# Patient Record
Sex: FEMALE | Race: WHITE | NOT HISPANIC OR LATINO | Employment: OTHER | URBAN - METROPOLITAN AREA
[De-identification: names, ages, dates, MRNs, and addresses within clinical notes are randomized per-mention and may not be internally consistent; named-entity substitution may affect disease eponyms.]

---

## 2018-03-26 LAB
INR PPP: 2.29 (ref 0.9–1.5)
PROTHROMBIN TIME (HISTORICAL): 27 SEC (ref 10.1–12.9)

## 2018-04-11 LAB
INR PPP: 1.44 (ref 0.9–1.5)
PROTHROMBIN TIME (HISTORICAL): 16.8 SEC (ref 10.1–12.9)

## 2018-04-18 LAB
%SAT (HISTORICAL): 17 % (ref 20–55)
ALBUMIN SERPL BCP-MCNC: 3.9 G/DL (ref 3.5–5.7)
ALP SERPL-CCNC: 81 IU/L (ref 55–165)
ALT SERPL W P-5'-P-CCNC: 16 IU/L (ref 5–26)
ANION GAP SERPL CALCULATED.3IONS-SCNC: 11.2 MM/L
AST SERPL W P-5'-P-CCNC: 19 U/L (ref 7–26)
BASOPHILS # BLD AUTO: 0 X3/UL (ref 0–0.3)
BASOPHILS # BLD AUTO: 0.6 % (ref 0–2)
BILIRUB SERPL-MCNC: 0.4 MG/DL (ref 0.3–1)
BILIRUBIN DIRECT (HISTORICAL): 0.1 MG/DL (ref 0–0.2)
BUN SERPL-MCNC: 18 MG/DL (ref 7–25)
CALCIUM SERPL-MCNC: 8.7 MG/DL (ref 8.6–10.5)
CHLORIDE SERPL-SCNC: 103 MM/L (ref 98–107)
CHOLEST SERPL-MCNC: 195 MG/DL (ref 0–200)
CO2 SERPL-SCNC: 29 MM/L (ref 21–31)
CREAT SERPL-MCNC: 0.93 MG/DL (ref 0.6–1.2)
DEPRECATED RDW RBC AUTO: 16.5 % (ref 11.5–14.5)
EGFR (HISTORICAL): 57 GFR
EGFR AFRICAN AMERICAN (HISTORICAL): > 60 GFR
EOSINOPHIL # BLD AUTO: 0.1 X3/UL (ref 0–0.5)
EOSINOPHIL NFR BLD AUTO: 1 % (ref 0–5)
GLUCOSE (HISTORICAL): 170 MG/DL (ref 65–99)
HCT VFR BLD AUTO: 45.2 % (ref 37–47)
HDLC SERPL-MCNC: 72 MG/DL (ref 40–60)
HGB BLD-MCNC: 14.7 G/DL (ref 12–16)
INR PPP: 2.36 (ref 0.9–1.5)
IRON SERPL-MCNC: 58 UG/DL (ref 50–212)
LDLC SERPL CALC-MCNC: 92.2 MG/DL (ref 75–193)
LYMPHOCYTES # BLD AUTO: 1.1 X3/UL (ref 1.2–4.2)
LYMPHOCYTES NFR BLD AUTO: 17.4 % (ref 20.5–51.1)
MCH RBC QN AUTO: 30 PG (ref 26–34)
MCHC RBC AUTO-ENTMCNC: 32.5 G/DL (ref 31–36)
MCV RBC AUTO: 92.4 FL (ref 81–99)
MONOCYTES # BLD AUTO: 0.4 X3/UL (ref 0–1)
MONOCYTES NFR BLD AUTO: 7 % (ref 1.7–12)
NEUTROPHILS # BLD AUTO: 4.8 X3/UL (ref 1.4–6.5)
NEUTS SEG NFR BLD AUTO: 74 % (ref 42.2–75.2)
OSMOLALITY, SERUM (HISTORICAL): 283 MOSM (ref 262–291)
PLATELET # BLD AUTO: 175 X3/UL (ref 130–400)
PMV BLD AUTO: 10.2 FL (ref 8.6–11.7)
POTASSIUM SERPL-SCNC: 4.2 MM/L (ref 3.5–5.5)
PROTHROMBIN TIME (HISTORICAL): 27.8 SEC (ref 10.1–12.9)
RBC # BLD AUTO: 4.89 X6/UL (ref 3.9–5.2)
SODIUM SERPL-SCNC: 139 MM/L (ref 134–143)
TIBC SERPL-MCNC: 347 UG/DL (ref 250–425)
TOTAL PROTEIN (HISTORICAL): 6.7 G/DL (ref 6.4–8.9)
TRANSFERRIN (HISTORICAL): 248 MG/DL (ref 250–380)
TRIGL SERPL-MCNC: 156 MG/DL (ref 44–166)
VLDL CHOLESTEROL (HISTORICAL): 31 MG/DL (ref 5–51)
WBC # BLD AUTO: 6.4 X3/UL (ref 4.8–10.8)

## 2018-04-30 LAB
INR PPP: 3.35 (ref 0.9–1.5)
PROTHROMBIN TIME (HISTORICAL): 39.8 SEC (ref 10.1–12.9)

## 2018-05-18 LAB
INR PPP: 1.52 (ref 0.9–1.5)
PROTHROMBIN TIME (HISTORICAL): 17.8 SEC (ref 10.1–12.9)

## 2018-06-01 LAB
INR PPP: 1.66 (ref 0.9–1.5)
PROTHROMBIN TIME (HISTORICAL): 19.5 SEC (ref 10.1–12.9)

## 2018-06-18 LAB
INR PPP: 3.89 (ref 0.9–1.5)
PROTHROMBIN TIME (HISTORICAL): 46 SEC (ref 10.1–12.9)

## 2018-07-07 ENCOUNTER — APPOINTMENT (OUTPATIENT)
Dept: LAB | Facility: HOSPITAL | Age: 83
End: 2018-07-07
Payer: MEDICARE

## 2018-07-07 ENCOUNTER — TRANSCRIBE ORDERS (OUTPATIENT)
Dept: ADMINISTRATIVE | Facility: HOSPITAL | Age: 83
End: 2018-07-07

## 2018-07-07 DIAGNOSIS — R53.83 FATIGUE, UNSPECIFIED TYPE: ICD-10-CM

## 2018-07-07 DIAGNOSIS — I82.402 DEEP VEIN THROMBOSIS (DVT) OF LEFT LOWER EXTREMITY, UNSPECIFIED CHRONICITY, UNSPECIFIED VEIN (HCC): ICD-10-CM

## 2018-07-07 DIAGNOSIS — E11.9 TYPE 2 DIABETES MELLITUS WITHOUT COMPLICATION, UNSPECIFIED WHETHER LONG TERM INSULIN USE (HCC): Primary | ICD-10-CM

## 2018-07-07 LAB
INR PPP: 1.57 (ref 0.9–1.5)
PROTHROMBIN TIME: 18.2 SECONDS (ref 10.1–12.9)

## 2018-07-07 PROCEDURE — 85610 PROTHROMBIN TIME: CPT

## 2018-07-07 PROCEDURE — 36415 COLL VENOUS BLD VENIPUNCTURE: CPT

## 2018-07-18 ENCOUNTER — APPOINTMENT (OUTPATIENT)
Dept: LAB | Facility: HOSPITAL | Age: 83
End: 2018-07-18
Payer: MEDICARE

## 2018-07-18 DIAGNOSIS — E11.9 TYPE 2 DIABETES MELLITUS WITHOUT COMPLICATION, UNSPECIFIED WHETHER LONG TERM INSULIN USE (HCC): ICD-10-CM

## 2018-07-18 DIAGNOSIS — I82.402 DEEP VEIN THROMBOSIS (DVT) OF LEFT LOWER EXTREMITY, UNSPECIFIED CHRONICITY, UNSPECIFIED VEIN (HCC): ICD-10-CM

## 2018-07-18 DIAGNOSIS — R53.83 FATIGUE, UNSPECIFIED TYPE: ICD-10-CM

## 2018-07-18 LAB
ALBUMIN SERPL BCP-MCNC: 4.1 G/DL (ref 3.5–5.7)
ALP SERPL-CCNC: 75 U/L (ref 55–165)
ALT SERPL W P-5'-P-CCNC: 9 U/L (ref 7–52)
ANION GAP SERPL CALCULATED.3IONS-SCNC: 8 MMOL/L (ref 4–13)
AST SERPL W P-5'-P-CCNC: 14 U/L (ref 13–39)
BASOPHILS # BLD AUTO: 0 THOUSANDS/ΜL (ref 0–0.1)
BASOPHILS NFR BLD AUTO: 1 % (ref 0–1)
BILIRUB DIRECT SERPL-MCNC: 0.1 MG/DL (ref 0–0.2)
BILIRUB SERPL-MCNC: 0.4 MG/DL (ref 0.2–1)
BUN SERPL-MCNC: 24 MG/DL (ref 7–25)
CALCIUM SERPL-MCNC: 9.4 MG/DL (ref 8.6–10.5)
CHLORIDE SERPL-SCNC: 104 MMOL/L (ref 98–107)
CHOLEST SERPL-MCNC: 171 MG/DL (ref 0–200)
CO2 SERPL-SCNC: 28 MMOL/L (ref 21–31)
CREAT SERPL-MCNC: 1.05 MG/DL (ref 0.6–1.2)
EOSINOPHIL # BLD AUTO: 0 THOUSAND/ΜL (ref 0–0.61)
EOSINOPHIL NFR BLD AUTO: 0 % (ref 0–6)
ERYTHROCYTE [DISTWIDTH] IN BLOOD BY AUTOMATED COUNT: 16.1 % (ref 11.6–15.1)
FERRITIN SERPL-MCNC: 46 NG/ML (ref 8–388)
GFR SERPL CREATININE-BSD FRML MDRD: 49 ML/MIN/1.73SQ M
GLUCOSE P FAST SERPL-MCNC: 140 MG/DL (ref 65–99)
HCT VFR BLD AUTO: 43.6 % (ref 37–47)
HDLC SERPL-MCNC: 66 MG/DL (ref 40–60)
HGB BLD-MCNC: 14.7 G/DL (ref 11.5–15.4)
INR PPP: 2.98 (ref 0.9–1.5)
IRON SATN MFR SERPL: 20 %
IRON SERPL-MCNC: 61 UG/DL (ref 50–170)
LDLC SERPL CALC-MCNC: 79 MG/DL (ref 0–100)
LYMPHOCYTES # BLD AUTO: 1 THOUSANDS/ΜL (ref 0.6–4.47)
LYMPHOCYTES NFR BLD AUTO: 12 % (ref 14–44)
MCH RBC QN AUTO: 32.1 PG (ref 26.8–34.3)
MCHC RBC AUTO-ENTMCNC: 33.8 G/DL (ref 31.4–37.4)
MCV RBC AUTO: 95 FL (ref 82–98)
MONOCYTES # BLD AUTO: 0.6 THOUSAND/ΜL (ref 0.17–1.22)
MONOCYTES NFR BLD AUTO: 7 % (ref 4–12)
NEUTROPHILS # BLD AUTO: 6.7 THOUSANDS/ΜL (ref 1.85–7.62)
NEUTS SEG NFR BLD AUTO: 80 % (ref 43–75)
NONHDLC SERPL-MCNC: 105 MG/DL
NRBC BLD AUTO-RTO: 0 /100 WBCS
PLATELET # BLD AUTO: 177 THOUSANDS/UL (ref 149–390)
PMV BLD AUTO: 9.9 FL (ref 8.9–12.7)
POTASSIUM SERPL-SCNC: 4.1 MMOL/L (ref 3.5–5.5)
PROT SERPL-MCNC: 7.6 G/DL (ref 6.4–8.9)
PROTHROMBIN TIME: 35.1 SECONDS (ref 10.1–12.9)
RBC # BLD AUTO: 4.59 MILLION/UL (ref 3.81–5.12)
SODIUM SERPL-SCNC: 140 MMOL/L (ref 134–143)
T4 FREE SERPL-MCNC: 1.31 NG/DL (ref 0.76–1.46)
TIBC SERPL-MCNC: 312 UG/DL (ref 250–450)
TRIGL SERPL-MCNC: 132 MG/DL (ref 44–166)
TSH SERPL DL<=0.05 MIU/L-ACNC: 3.39 UIU/ML (ref 0.45–5.33)
VIT B12 SERPL-MCNC: 678 PG/ML (ref 100–900)
WBC # BLD AUTO: 8.4 THOUSAND/UL (ref 4.31–10.16)

## 2018-07-18 PROCEDURE — 83550 IRON BINDING TEST: CPT

## 2018-07-18 PROCEDURE — 82306 VITAMIN D 25 HYDROXY: CPT

## 2018-07-18 PROCEDURE — 36415 COLL VENOUS BLD VENIPUNCTURE: CPT

## 2018-07-18 PROCEDURE — 85610 PROTHROMBIN TIME: CPT

## 2018-07-18 PROCEDURE — 84439 ASSAY OF FREE THYROXINE: CPT

## 2018-07-18 PROCEDURE — 82728 ASSAY OF FERRITIN: CPT

## 2018-07-18 PROCEDURE — 80076 HEPATIC FUNCTION PANEL: CPT

## 2018-07-18 PROCEDURE — 82607 VITAMIN B-12: CPT

## 2018-07-18 PROCEDURE — 80061 LIPID PANEL: CPT

## 2018-07-18 PROCEDURE — 84443 ASSAY THYROID STIM HORMONE: CPT

## 2018-07-18 PROCEDURE — 80048 BASIC METABOLIC PNL TOTAL CA: CPT

## 2018-07-18 PROCEDURE — 83540 ASSAY OF IRON: CPT

## 2018-07-18 PROCEDURE — 85025 COMPLETE CBC W/AUTO DIFF WBC: CPT

## 2018-07-24 LAB
25(OH)D2 SERPL-MCNC: <1 NG/ML
25(OH)D3 SERPL-MCNC: 49 NG/ML
25(OH)D3+25(OH)D2 SERPL-MCNC: 49 NG/ML

## 2018-08-01 ENCOUNTER — APPOINTMENT (OUTPATIENT)
Dept: LAB | Facility: HOSPITAL | Age: 83
End: 2018-08-01
Payer: MEDICARE

## 2018-08-01 ENCOUNTER — TRANSCRIBE ORDERS (OUTPATIENT)
Dept: ADMINISTRATIVE | Facility: HOSPITAL | Age: 83
End: 2018-08-01

## 2018-08-01 DIAGNOSIS — I82.4Y2 DEEP VEIN THROMBOSIS (DVT) OF PROXIMAL VEIN OF LEFT LOWER EXTREMITY, UNSPECIFIED CHRONICITY (HCC): ICD-10-CM

## 2018-08-01 DIAGNOSIS — I82.4Y2 DEEP VEIN THROMBOSIS (DVT) OF PROXIMAL VEIN OF LEFT LOWER EXTREMITY, UNSPECIFIED CHRONICITY (HCC): Primary | ICD-10-CM

## 2018-08-01 LAB
INR PPP: 4.02 (ref 0.9–1.5)
PROTHROMBIN TIME: 47.6 SECONDS (ref 10.1–12.9)

## 2018-08-01 PROCEDURE — 36415 COLL VENOUS BLD VENIPUNCTURE: CPT

## 2018-08-01 PROCEDURE — 85610 PROTHROMBIN TIME: CPT

## 2018-08-08 ENCOUNTER — TRANSCRIBE ORDERS (OUTPATIENT)
Dept: ADMINISTRATIVE | Facility: HOSPITAL | Age: 83
End: 2018-08-08

## 2018-08-08 ENCOUNTER — APPOINTMENT (OUTPATIENT)
Dept: LAB | Facility: HOSPITAL | Age: 83
End: 2018-08-08
Payer: MEDICARE

## 2018-08-08 DIAGNOSIS — I82.402 DEEP VEIN THROMBOSIS (DVT) OF LEFT LOWER EXTREMITY, UNSPECIFIED CHRONICITY, UNSPECIFIED VEIN (HCC): ICD-10-CM

## 2018-08-08 DIAGNOSIS — I82.402 DEEP VEIN THROMBOSIS (DVT) OF LEFT LOWER EXTREMITY, UNSPECIFIED CHRONICITY, UNSPECIFIED VEIN (HCC): Primary | ICD-10-CM

## 2018-08-08 LAB
INR PPP: 0.96 (ref 0.9–1.5)
PROTHROMBIN TIME: 11 SECONDS (ref 10.1–12.9)

## 2018-08-08 PROCEDURE — 36415 COLL VENOUS BLD VENIPUNCTURE: CPT

## 2018-08-08 PROCEDURE — 85610 PROTHROMBIN TIME: CPT

## 2018-08-15 ENCOUNTER — APPOINTMENT (OUTPATIENT)
Dept: LAB | Facility: HOSPITAL | Age: 83
End: 2018-08-15
Payer: MEDICARE

## 2018-08-15 ENCOUNTER — TRANSCRIBE ORDERS (OUTPATIENT)
Dept: ADMINISTRATIVE | Facility: HOSPITAL | Age: 83
End: 2018-08-15

## 2018-08-15 DIAGNOSIS — I82.402 DEEP VEIN THROMBOSIS (DVT) OF LEFT LOWER EXTREMITY, UNSPECIFIED CHRONICITY, UNSPECIFIED VEIN (HCC): ICD-10-CM

## 2018-08-15 DIAGNOSIS — I82.402 DEEP VEIN THROMBOSIS (DVT) OF LEFT LOWER EXTREMITY, UNSPECIFIED CHRONICITY, UNSPECIFIED VEIN (HCC): Primary | ICD-10-CM

## 2018-08-15 LAB
INR PPP: 1.61 (ref 0.9–1.5)
PROTHROMBIN TIME: 18.7 SECONDS (ref 10.1–12.9)

## 2018-08-15 PROCEDURE — 85610 PROTHROMBIN TIME: CPT

## 2018-08-15 PROCEDURE — 36415 COLL VENOUS BLD VENIPUNCTURE: CPT

## 2018-08-27 ENCOUNTER — TRANSCRIBE ORDERS (OUTPATIENT)
Dept: ADMINISTRATIVE | Facility: HOSPITAL | Age: 83
End: 2018-08-27

## 2018-08-27 ENCOUNTER — APPOINTMENT (OUTPATIENT)
Dept: LAB | Facility: HOSPITAL | Age: 83
End: 2018-08-27
Payer: MEDICARE

## 2018-08-27 DIAGNOSIS — I82.402 DEEP VEIN THROMBOSIS (DVT) OF LEFT LOWER EXTREMITY, UNSPECIFIED CHRONICITY, UNSPECIFIED VEIN (HCC): Primary | ICD-10-CM

## 2018-08-27 DIAGNOSIS — I82.402 DEEP VEIN THROMBOSIS (DVT) OF LEFT LOWER EXTREMITY, UNSPECIFIED CHRONICITY, UNSPECIFIED VEIN (HCC): ICD-10-CM

## 2018-08-27 LAB
INR PPP: 2.91 (ref 0.9–1.5)
PROTHROMBIN TIME: 34.2 SECONDS (ref 10.1–12.9)

## 2018-08-27 PROCEDURE — 85610 PROTHROMBIN TIME: CPT

## 2018-08-27 PROCEDURE — 36415 COLL VENOUS BLD VENIPUNCTURE: CPT

## 2018-09-12 ENCOUNTER — TRANSCRIBE ORDERS (OUTPATIENT)
Dept: ADMINISTRATIVE | Facility: HOSPITAL | Age: 83
End: 2018-09-12

## 2018-09-12 ENCOUNTER — APPOINTMENT (OUTPATIENT)
Dept: LAB | Facility: HOSPITAL | Age: 83
End: 2018-09-12
Payer: MEDICARE

## 2018-09-12 DIAGNOSIS — I82.4Y2 DEEP VEIN THROMBOSIS (DVT) OF PROXIMAL VEIN OF LEFT LOWER EXTREMITY, UNSPECIFIED CHRONICITY (HCC): ICD-10-CM

## 2018-09-12 DIAGNOSIS — I82.4Y2 DEEP VEIN THROMBOSIS (DVT) OF PROXIMAL VEIN OF LEFT LOWER EXTREMITY, UNSPECIFIED CHRONICITY (HCC): Primary | ICD-10-CM

## 2018-09-12 LAB
INR PPP: 3.72 (ref 0.9–1.5)
PROTHROMBIN TIME: 44 SECONDS (ref 10.1–12.9)

## 2018-09-12 PROCEDURE — 85610 PROTHROMBIN TIME: CPT

## 2018-09-12 PROCEDURE — 36415 COLL VENOUS BLD VENIPUNCTURE: CPT

## 2018-09-24 ENCOUNTER — APPOINTMENT (OUTPATIENT)
Dept: LAB | Facility: HOSPITAL | Age: 83
End: 2018-09-24
Payer: MEDICARE

## 2018-09-24 ENCOUNTER — TRANSCRIBE ORDERS (OUTPATIENT)
Dept: ADMINISTRATIVE | Facility: HOSPITAL | Age: 83
End: 2018-09-24

## 2018-09-24 DIAGNOSIS — I82.402 DEEP VEIN THROMBOSIS (DVT) OF LEFT LOWER EXTREMITY, UNSPECIFIED CHRONICITY, UNSPECIFIED VEIN (HCC): Primary | ICD-10-CM

## 2018-09-24 DIAGNOSIS — I82.402 DEEP VEIN THROMBOSIS (DVT) OF LEFT LOWER EXTREMITY, UNSPECIFIED CHRONICITY, UNSPECIFIED VEIN (HCC): ICD-10-CM

## 2018-09-24 LAB
INR PPP: 2.36 (ref 0.9–1.5)
PROTHROMBIN TIME: 27.6 SECONDS (ref 10.1–12.9)

## 2018-09-24 PROCEDURE — 36415 COLL VENOUS BLD VENIPUNCTURE: CPT

## 2018-09-24 PROCEDURE — 85610 PROTHROMBIN TIME: CPT

## 2018-10-08 ENCOUNTER — APPOINTMENT (OUTPATIENT)
Dept: LAB | Facility: HOSPITAL | Age: 83
End: 2018-10-08
Payer: MEDICARE

## 2018-10-08 ENCOUNTER — TRANSCRIBE ORDERS (OUTPATIENT)
Dept: ADMINISTRATIVE | Facility: HOSPITAL | Age: 83
End: 2018-10-08

## 2018-10-08 DIAGNOSIS — Z79.4 TYPE 2 DIABETES MELLITUS WITHOUT COMPLICATION, WITH LONG-TERM CURRENT USE OF INSULIN (HCC): Primary | ICD-10-CM

## 2018-10-08 DIAGNOSIS — E11.9 TYPE 2 DIABETES MELLITUS WITHOUT COMPLICATION, WITH LONG-TERM CURRENT USE OF INSULIN (HCC): Primary | ICD-10-CM

## 2018-10-08 DIAGNOSIS — E11.9 TYPE 2 DIABETES MELLITUS WITHOUT COMPLICATION, WITH LONG-TERM CURRENT USE OF INSULIN (HCC): ICD-10-CM

## 2018-10-08 DIAGNOSIS — Z79.4 TYPE 2 DIABETES MELLITUS WITHOUT COMPLICATION, WITH LONG-TERM CURRENT USE OF INSULIN (HCC): ICD-10-CM

## 2018-10-08 LAB
ALBUMIN SERPL BCP-MCNC: 3.9 G/DL (ref 3.5–5.7)
ALP SERPL-CCNC: 75 U/L (ref 55–165)
ALT SERPL W P-5'-P-CCNC: 17 U/L (ref 7–52)
ANION GAP SERPL CALCULATED.3IONS-SCNC: 11 MMOL/L (ref 4–13)
AST SERPL W P-5'-P-CCNC: 19 U/L (ref 13–39)
BASOPHILS # BLD AUTO: 0.1 THOUSANDS/ΜL (ref 0–0.1)
BASOPHILS NFR BLD AUTO: 1 % (ref 0–1)
BILIRUB DIRECT SERPL-MCNC: 0.1 MG/DL (ref 0–0.2)
BILIRUB SERPL-MCNC: 0.4 MG/DL (ref 0.2–1)
BUN SERPL-MCNC: 20 MG/DL (ref 7–25)
CALCIUM SERPL-MCNC: 8.7 MG/DL (ref 8.6–10.5)
CHLORIDE SERPL-SCNC: 105 MMOL/L (ref 98–107)
CHOLEST SERPL-MCNC: 175 MG/DL (ref 0–200)
CO2 SERPL-SCNC: 24 MMOL/L (ref 21–31)
CREAT SERPL-MCNC: 1.07 MG/DL (ref 0.6–1.2)
EOSINOPHIL # BLD AUTO: 0.1 THOUSAND/ΜL (ref 0–0.61)
EOSINOPHIL NFR BLD AUTO: 1 % (ref 0–6)
ERYTHROCYTE [DISTWIDTH] IN BLOOD BY AUTOMATED COUNT: 15.4 % (ref 11.6–15.1)
FERRITIN SERPL-MCNC: 34 NG/ML (ref 8–388)
GFR SERPL CREATININE-BSD FRML MDRD: 47 ML/MIN/1.73SQ M
GLUCOSE P FAST SERPL-MCNC: 125 MG/DL (ref 65–99)
HCT VFR BLD AUTO: 44.4 % (ref 37–47)
HDLC SERPL-MCNC: 67 MG/DL (ref 40–60)
HGB BLD-MCNC: 14.5 G/DL (ref 11.5–15.4)
INR PPP: 2.73 (ref 0.9–1.5)
IRON SATN MFR SERPL: 19 %
IRON SERPL-MCNC: 63 UG/DL (ref 50–170)
LDLC SERPL CALC-MCNC: 79 MG/DL (ref 0–100)
LYMPHOCYTES # BLD AUTO: 1.1 THOUSANDS/ΜL (ref 0.6–4.47)
LYMPHOCYTES NFR BLD AUTO: 16 % (ref 14–44)
MCH RBC QN AUTO: 31.1 PG (ref 26.8–34.3)
MCHC RBC AUTO-ENTMCNC: 32.7 G/DL (ref 31.4–37.4)
MCV RBC AUTO: 95 FL (ref 82–98)
MONOCYTES # BLD AUTO: 0.5 THOUSAND/ΜL (ref 0.17–1.22)
MONOCYTES NFR BLD AUTO: 8 % (ref 4–12)
NEUTROPHILS # BLD AUTO: 5.1 THOUSANDS/ΜL (ref 1.85–7.62)
NEUTS SEG NFR BLD AUTO: 75 % (ref 43–75)
NONHDLC SERPL-MCNC: 108 MG/DL
NRBC BLD AUTO-RTO: 0 /100 WBCS
PLATELET # BLD AUTO: 163 THOUSANDS/UL (ref 149–390)
PMV BLD AUTO: 9.6 FL (ref 8.9–12.7)
POTASSIUM SERPL-SCNC: 4.2 MMOL/L (ref 3.5–5.5)
PROT SERPL-MCNC: 7 G/DL (ref 6.4–8.9)
PROTHROMBIN TIME: 32.1 SECONDS (ref 10.1–12.9)
RBC # BLD AUTO: 4.68 MILLION/UL (ref 3.81–5.12)
SODIUM SERPL-SCNC: 140 MMOL/L (ref 134–143)
T4 FREE SERPL-MCNC: 1.16 NG/DL (ref 0.76–1.46)
TIBC SERPL-MCNC: 325 UG/DL (ref 250–450)
TRIGL SERPL-MCNC: 146 MG/DL (ref 44–166)
TSH SERPL DL<=0.05 MIU/L-ACNC: 4.06 UIU/ML (ref 0.45–5.33)
WBC # BLD AUTO: 6.9 THOUSAND/UL (ref 4.31–10.16)

## 2018-10-08 PROCEDURE — 83540 ASSAY OF IRON: CPT

## 2018-10-08 PROCEDURE — 36415 COLL VENOUS BLD VENIPUNCTURE: CPT

## 2018-10-08 PROCEDURE — 80061 LIPID PANEL: CPT

## 2018-10-08 PROCEDURE — 83550 IRON BINDING TEST: CPT

## 2018-10-08 PROCEDURE — 84443 ASSAY THYROID STIM HORMONE: CPT

## 2018-10-08 PROCEDURE — 80076 HEPATIC FUNCTION PANEL: CPT

## 2018-10-08 PROCEDURE — 84439 ASSAY OF FREE THYROXINE: CPT

## 2018-10-08 PROCEDURE — 85025 COMPLETE CBC W/AUTO DIFF WBC: CPT

## 2018-10-08 PROCEDURE — 80048 BASIC METABOLIC PNL TOTAL CA: CPT

## 2018-10-08 PROCEDURE — 82728 ASSAY OF FERRITIN: CPT

## 2018-10-08 PROCEDURE — 85610 PROTHROMBIN TIME: CPT

## 2018-10-19 ENCOUNTER — TRANSCRIBE ORDERS (OUTPATIENT)
Dept: ADMINISTRATIVE | Facility: HOSPITAL | Age: 83
End: 2018-10-19

## 2018-10-19 ENCOUNTER — APPOINTMENT (OUTPATIENT)
Dept: LAB | Facility: HOSPITAL | Age: 83
End: 2018-10-19
Payer: MEDICARE

## 2018-10-19 DIAGNOSIS — I82.4Y2 DEEP VEIN THROMBOSIS (DVT) OF PROXIMAL VEIN OF LEFT LOWER EXTREMITY, UNSPECIFIED CHRONICITY (HCC): Primary | ICD-10-CM

## 2018-10-19 DIAGNOSIS — I82.4Y2 DEEP VEIN THROMBOSIS (DVT) OF PROXIMAL VEIN OF LEFT LOWER EXTREMITY, UNSPECIFIED CHRONICITY (HCC): ICD-10-CM

## 2018-10-19 LAB
INR PPP: 2.43 (ref 0.9–1.5)
PROTHROMBIN TIME: 28.5 SECONDS (ref 10.1–12.9)

## 2018-10-19 PROCEDURE — 85610 PROTHROMBIN TIME: CPT

## 2018-10-19 PROCEDURE — 36415 COLL VENOUS BLD VENIPUNCTURE: CPT

## 2018-10-31 ENCOUNTER — TRANSCRIBE ORDERS (OUTPATIENT)
Dept: ADMINISTRATIVE | Facility: HOSPITAL | Age: 83
End: 2018-10-31

## 2018-10-31 ENCOUNTER — APPOINTMENT (OUTPATIENT)
Dept: LAB | Facility: HOSPITAL | Age: 83
End: 2018-10-31
Payer: MEDICARE

## 2018-10-31 DIAGNOSIS — I82.4Y2 DEEP VEIN THROMBOSIS (DVT) OF PROXIMAL VEIN OF LEFT LOWER EXTREMITY, UNSPECIFIED CHRONICITY (HCC): ICD-10-CM

## 2018-10-31 DIAGNOSIS — I82.4Y2 DEEP VEIN THROMBOSIS (DVT) OF PROXIMAL VEIN OF LEFT LOWER EXTREMITY, UNSPECIFIED CHRONICITY (HCC): Primary | ICD-10-CM

## 2018-10-31 LAB
INR PPP: 2.01 (ref 0.9–1.5)
PROTHROMBIN TIME: 23.4 SECONDS (ref 10.1–12.9)

## 2018-10-31 PROCEDURE — 85610 PROTHROMBIN TIME: CPT

## 2018-10-31 PROCEDURE — 36415 COLL VENOUS BLD VENIPUNCTURE: CPT

## 2018-11-14 ENCOUNTER — APPOINTMENT (OUTPATIENT)
Dept: LAB | Facility: HOSPITAL | Age: 83
End: 2018-11-14
Payer: MEDICARE

## 2018-11-14 ENCOUNTER — TRANSCRIBE ORDERS (OUTPATIENT)
Dept: ADMINISTRATIVE | Facility: HOSPITAL | Age: 83
End: 2018-11-14

## 2018-11-14 DIAGNOSIS — I82.4Y2 DEEP VEIN THROMBOSIS (DVT) OF PROXIMAL VEIN OF LEFT LOWER EXTREMITY, UNSPECIFIED CHRONICITY (HCC): ICD-10-CM

## 2018-11-14 DIAGNOSIS — I82.4Y2 DEEP VEIN THROMBOSIS (DVT) OF PROXIMAL VEIN OF LEFT LOWER EXTREMITY, UNSPECIFIED CHRONICITY (HCC): Primary | ICD-10-CM

## 2018-11-14 LAB
INR PPP: 2.79 (ref 0.9–1.5)
PROTHROMBIN TIME: 32.4 SECONDS (ref 10.2–13)

## 2018-11-14 PROCEDURE — 36415 COLL VENOUS BLD VENIPUNCTURE: CPT

## 2018-11-14 PROCEDURE — 85610 PROTHROMBIN TIME: CPT

## 2018-11-28 ENCOUNTER — APPOINTMENT (OUTPATIENT)
Dept: LAB | Facility: HOSPITAL | Age: 83
End: 2018-11-28
Payer: MEDICARE

## 2018-11-28 ENCOUNTER — TRANSCRIBE ORDERS (OUTPATIENT)
Dept: ADMINISTRATIVE | Facility: HOSPITAL | Age: 83
End: 2018-11-28

## 2018-11-28 DIAGNOSIS — I82.402 DEEP VEIN THROMBOSIS (DVT) OF LEFT LOWER EXTREMITY, UNSPECIFIED CHRONICITY, UNSPECIFIED VEIN (HCC): Primary | ICD-10-CM

## 2018-11-28 DIAGNOSIS — I82.402 DEEP VEIN THROMBOSIS (DVT) OF LEFT LOWER EXTREMITY, UNSPECIFIED CHRONICITY, UNSPECIFIED VEIN (HCC): ICD-10-CM

## 2018-11-28 LAB
INR PPP: 3.01 (ref 0.9–1.5)
PROTHROMBIN TIME: 35 SECONDS (ref 10.2–13)

## 2018-11-28 PROCEDURE — 36415 COLL VENOUS BLD VENIPUNCTURE: CPT

## 2018-11-28 PROCEDURE — 85610 PROTHROMBIN TIME: CPT

## 2018-12-10 ENCOUNTER — APPOINTMENT (OUTPATIENT)
Dept: LAB | Facility: HOSPITAL | Age: 83
End: 2018-12-10
Payer: MEDICARE

## 2018-12-10 ENCOUNTER — TRANSCRIBE ORDERS (OUTPATIENT)
Dept: ADMINISTRATIVE | Facility: HOSPITAL | Age: 83
End: 2018-12-10

## 2018-12-10 DIAGNOSIS — I82.4Y2 DEEP VEIN THROMBOSIS (DVT) OF PROXIMAL VEIN OF LEFT LOWER EXTREMITY, UNSPECIFIED CHRONICITY (HCC): ICD-10-CM

## 2018-12-10 DIAGNOSIS — I82.4Y2 DEEP VEIN THROMBOSIS (DVT) OF PROXIMAL VEIN OF LEFT LOWER EXTREMITY, UNSPECIFIED CHRONICITY (HCC): Primary | ICD-10-CM

## 2018-12-10 LAB
INR PPP: 2.68 (ref 0.9–1.5)
PROTHROMBIN TIME: 31.1 SECONDS (ref 10.2–13)

## 2018-12-10 PROCEDURE — 85610 PROTHROMBIN TIME: CPT

## 2018-12-10 PROCEDURE — 36415 COLL VENOUS BLD VENIPUNCTURE: CPT

## 2018-12-27 ENCOUNTER — APPOINTMENT (OUTPATIENT)
Dept: LAB | Facility: HOSPITAL | Age: 83
End: 2018-12-27
Payer: MEDICARE

## 2018-12-27 ENCOUNTER — TRANSCRIBE ORDERS (OUTPATIENT)
Dept: ADMINISTRATIVE | Facility: HOSPITAL | Age: 83
End: 2018-12-27

## 2018-12-27 DIAGNOSIS — I82.4Y2 DEEP VEIN THROMBOSIS (DVT) OF PROXIMAL VEIN OF LEFT LOWER EXTREMITY, UNSPECIFIED CHRONICITY (HCC): Primary | ICD-10-CM

## 2018-12-27 DIAGNOSIS — I82.4Y2 DEEP VEIN THROMBOSIS (DVT) OF PROXIMAL VEIN OF LEFT LOWER EXTREMITY, UNSPECIFIED CHRONICITY (HCC): ICD-10-CM

## 2018-12-27 LAB
INR PPP: 2.34 (ref 0.9–1.5)
PROTHROMBIN TIME: 27.1 SECONDS (ref 10.2–13)

## 2018-12-27 PROCEDURE — 85610 PROTHROMBIN TIME: CPT

## 2018-12-27 PROCEDURE — 36415 COLL VENOUS BLD VENIPUNCTURE: CPT

## 2019-01-10 ENCOUNTER — TRANSCRIBE ORDERS (OUTPATIENT)
Dept: ADMINISTRATIVE | Facility: HOSPITAL | Age: 84
End: 2019-01-10

## 2019-01-10 ENCOUNTER — APPOINTMENT (OUTPATIENT)
Dept: LAB | Facility: HOSPITAL | Age: 84
End: 2019-01-10
Payer: MEDICARE

## 2019-01-10 DIAGNOSIS — I82.492 DEEP VEIN THROMBOSIS (DVT) OF OTHER VEIN OF LEFT LOWER EXTREMITY, UNSPECIFIED CHRONICITY (HCC): Primary | ICD-10-CM

## 2019-01-10 DIAGNOSIS — I82.492 DEEP VEIN THROMBOSIS (DVT) OF OTHER VEIN OF LEFT LOWER EXTREMITY, UNSPECIFIED CHRONICITY (HCC): ICD-10-CM

## 2019-01-10 LAB
INR PPP: 2.1 (ref 0.9–1.5)
PROTHROMBIN TIME: 24.3 SECONDS (ref 10.2–13)

## 2019-01-10 PROCEDURE — 85610 PROTHROMBIN TIME: CPT

## 2019-01-10 PROCEDURE — 36415 COLL VENOUS BLD VENIPUNCTURE: CPT

## 2019-01-23 ENCOUNTER — TRANSCRIBE ORDERS (OUTPATIENT)
Dept: ADMINISTRATIVE | Facility: HOSPITAL | Age: 84
End: 2019-01-23

## 2019-01-23 ENCOUNTER — APPOINTMENT (OUTPATIENT)
Dept: LAB | Facility: HOSPITAL | Age: 84
End: 2019-01-23
Payer: MEDICARE

## 2019-01-23 DIAGNOSIS — I82.492 DEEP VEIN THROMBOSIS (DVT) OF OTHER VEIN OF LEFT LOWER EXTREMITY, UNSPECIFIED CHRONICITY (HCC): Primary | ICD-10-CM

## 2019-01-23 DIAGNOSIS — I82.492 DEEP VEIN THROMBOSIS (DVT) OF OTHER VEIN OF LEFT LOWER EXTREMITY, UNSPECIFIED CHRONICITY (HCC): ICD-10-CM

## 2019-01-23 LAB
INR PPP: 2.83 (ref 0.9–1.5)
PROTHROMBIN TIME: 32.9 SECONDS (ref 10.2–13)

## 2019-01-23 PROCEDURE — 36415 COLL VENOUS BLD VENIPUNCTURE: CPT

## 2019-01-23 PROCEDURE — 85610 PROTHROMBIN TIME: CPT

## 2019-02-06 ENCOUNTER — TRANSCRIBE ORDERS (OUTPATIENT)
Dept: ADMINISTRATIVE | Facility: HOSPITAL | Age: 84
End: 2019-02-06

## 2019-02-06 ENCOUNTER — APPOINTMENT (OUTPATIENT)
Dept: LAB | Facility: HOSPITAL | Age: 84
End: 2019-02-06
Payer: MEDICARE

## 2019-02-06 DIAGNOSIS — I82.4Y2 DEEP VEIN THROMBOSIS (DVT) OF PROXIMAL VEIN OF LEFT LOWER EXTREMITY, UNSPECIFIED CHRONICITY (HCC): ICD-10-CM

## 2019-02-06 DIAGNOSIS — I82.4Y2 DEEP VEIN THROMBOSIS (DVT) OF PROXIMAL VEIN OF LEFT LOWER EXTREMITY, UNSPECIFIED CHRONICITY (HCC): Primary | ICD-10-CM

## 2019-02-06 LAB
INR PPP: 2.04 (ref 0.9–1.5)
PROTHROMBIN TIME: 23.6 SECONDS (ref 10.2–13)

## 2019-02-06 PROCEDURE — 85610 PROTHROMBIN TIME: CPT

## 2019-02-06 PROCEDURE — 36415 COLL VENOUS BLD VENIPUNCTURE: CPT

## 2019-02-15 ENCOUNTER — TRANSCRIBE ORDERS (OUTPATIENT)
Dept: ADMINISTRATIVE | Facility: HOSPITAL | Age: 84
End: 2019-02-15

## 2019-02-15 ENCOUNTER — APPOINTMENT (OUTPATIENT)
Dept: LAB | Facility: HOSPITAL | Age: 84
End: 2019-02-15
Payer: MEDICARE

## 2019-02-15 DIAGNOSIS — I82.4Y2 DEEP VEIN THROMBOSIS (DVT) OF PROXIMAL VEIN OF LEFT LOWER EXTREMITY, UNSPECIFIED CHRONICITY (HCC): Primary | ICD-10-CM

## 2019-02-15 DIAGNOSIS — I82.4Y2 DEEP VEIN THROMBOSIS (DVT) OF PROXIMAL VEIN OF LEFT LOWER EXTREMITY, UNSPECIFIED CHRONICITY (HCC): ICD-10-CM

## 2019-02-15 LAB
INR PPP: 2.84 (ref 0.9–1.5)
PROTHROMBIN TIME: 33 SECONDS (ref 10.2–13)

## 2019-02-15 PROCEDURE — 85610 PROTHROMBIN TIME: CPT

## 2019-02-15 PROCEDURE — 36415 COLL VENOUS BLD VENIPUNCTURE: CPT

## 2019-03-19 ENCOUNTER — APPOINTMENT (EMERGENCY)
Dept: CT IMAGING | Facility: HOSPITAL | Age: 84
End: 2019-03-19
Payer: MEDICARE

## 2019-03-19 ENCOUNTER — HOSPITAL ENCOUNTER (EMERGENCY)
Facility: HOSPITAL | Age: 84
Discharge: HOME | End: 2019-03-19
Attending: EMERGENCY MEDICINE | Admitting: EMERGENCY MEDICINE
Payer: MEDICARE

## 2019-03-19 VITALS
HEART RATE: 68 BPM | WEIGHT: 160 LBS | SYSTOLIC BLOOD PRESSURE: 165 MMHG | RESPIRATION RATE: 18 BRPM | OXYGEN SATURATION: 99 % | TEMPERATURE: 99 F | BODY MASS INDEX: 25.71 KG/M2 | HEIGHT: 66 IN | DIASTOLIC BLOOD PRESSURE: 80 MMHG

## 2019-03-19 DIAGNOSIS — M54.50 ACUTE LOW BACK PAIN: Primary | ICD-10-CM

## 2019-03-19 PROCEDURE — 96372 THER/PROPH/DIAG INJ SC/IM: CPT

## 2019-03-19 PROCEDURE — 74176 CT ABD & PELVIS W/O CONTRAST: CPT

## 2019-03-19 PROCEDURE — 99284 EMERGENCY DEPT VISIT MOD MDM: CPT

## 2019-03-19 RX ORDER — ACETAMINOPHEN AND CODEINE PHOSPHATE 300; 30 MG/1; MG/1
1-2 TABLET ORAL EVERY 6 HOURS PRN
Qty: 20 TABLET | Refills: 0 | Status: SHIPPED | OUTPATIENT
Start: 2019-03-19 | End: 2019-03-29

## 2019-03-19 RX ORDER — LOVASTATIN 20 MG/1
20 TABLET ORAL
COMMUNITY
End: 2019-10-10 | Stop reason: SDUPTHER

## 2019-03-19 RX ORDER — LEVOTHYROXINE SODIUM 0.05 MG/1
50 TABLET ORAL DAILY
COMMUNITY
End: 2019-10-10 | Stop reason: SDUPTHER

## 2019-03-19 RX ORDER — GLIPIZIDE 5 MG/1
2.5 TABLET ORAL DAILY
COMMUNITY
End: 2019-10-10 | Stop reason: SDUPTHER

## 2019-03-19 RX ORDER — WARFARIN SODIUM 5 MG/1
5 TABLET ORAL
COMMUNITY
End: 2019-10-10 | Stop reason: SDUPTHER

## 2019-03-19 RX ADMIN — MORPHINE SULFATE 2 MG: 2 INJECTION, SOLUTION INTRAMUSCULAR; INTRAVENOUS at 16:00

## 2019-03-19 NOTE — ED PROVIDER NOTES
History  Chief Complaint   Patient presents with    Back Pain     Pt states that she has lower back pain that started Friday after she was cleaning and goes around to the front of her abdomen; pt states multiple abdominal surgeries    Abdominal Pain     Patient 80year old female who stated she was cleaning the radiator is a 5 days ago and was bending over  Patient states that since that time she has had pain in her low back  Patient is a poor historian who use muscle wall possible reasons for having the back pain besides the cleaning episode  The pain started the day after she did the cleaning out her house  Pain is localized to her low back  She denies any radiation of pain down her legs  She denies any abdominal pain  She denies any numbness or weakness  Denies any difficulty with bladder function          Prior to Admission Medications   Prescriptions Last Dose Informant Patient Reported? Taking?   glipiZIDE (GLUCOTROL) 5 mg tablet   Yes Yes   Sig: Take 5 mg by mouth 3 (three) times a day   levothyroxine 50 mcg tablet   Yes Yes   Sig: Take 50 mcg by mouth daily   lovastatin (MEVACOR) 20 mg tablet   Yes Yes   Sig: Take 20 mg by mouth daily at bedtime   metFORMIN (GLUCOPHAGE) 500 mg tablet   Yes Yes   Sig: Take 1,000 mg by mouth 2 (two) times a day with meals   metoprolol tartrate (LOPRESSOR) 25 mg tablet   Yes Yes   Sig: Take 25 mg by mouth every 12 (twelve) hours   warfarin (COUMADIN) 5 mg tablet   Yes Yes   Sig: Take 5 mg by mouth daily      Facility-Administered Medications: None       Past Medical History:   Diagnosis Date    Diabetes mellitus ([address] )     Disease of thyroid gland     Hypertension        Past Surgical History:   Procedure Laterality Date    ABDOMINAL SURGERY      APPENDECTOMY      CHOLECYSTECTOMY      COLON SURGERY      EXCISION BLADDER MESH TRANSVESICLEPORT W/ LASER      HERNIA REPAIR      IVC FILTER INSERTION         History reviewed  No pertinent family history    I have reviewed and agree with the history as documented  Social History     Tobacco Use    Smoking status: Former Smoker     Types: Cigarettes     Last attempt to quit: 3/19/2001     Years since quittin 0    Smokeless tobacco: Never Used   Substance Use Topics    Alcohol use: Not on file    Drug use: Never        Review of Systems   Constitutional: Negative  Respiratory: Negative  Cardiovascular: Negative  Gastrointestinal: Negative  Genitourinary: Negative  Musculoskeletal: Positive for back pain and myalgias  Skin: Negative  Neurological: Negative  All other systems reviewed and are negative  Physical Exam  Physical Exam   Constitutional: She is oriented to person, place, and time  She appears well-developed and well-nourished  HENT:   Head: Normocephalic and atraumatic  Eyes: No scleral icterus  Cardiovascular: Normal rate and normal heart sounds  Pulmonary/Chest: Effort normal and breath sounds normal    Abdominal: Soft  There is no tenderness  Musculoskeletal:        Arms:  Neurological: She is alert and oriented to person, place, and time  Skin: Skin is warm and dry  Capillary refill takes less than 2 seconds  Psychiatric: She has a normal mood and affect  Her behavior is normal    Nursing note and vitals reviewed        Vital Signs  ED Triage Vitals [19 1529]   Temperature Pulse Respirations Blood Pressure SpO2   98 1 °F (36 7 °C) 75 18 (!) 198/89 99 %      Temp src Heart Rate Source Patient Position - Orthostatic VS BP Location FiO2 (%)   -- Monitor Sitting Left arm --      Pain Score       9           Vitals:    19 1529 19 1635   BP: (!) 198/89 (!) 185/69   Pulse: 75    Patient Position - Orthostatic VS: Sitting Sitting         Visual Acuity      ED Medications  Medications   morphine injection 2 mg (2 mg Intramuscular Given 3/19/19 1600)       Diagnostic Studies  Results Reviewed     None                 CT renal stone study abdomen pelvis wo contrast   Final Result by Huber Shah MD (03/19 4900)      No urinary tract calculi  Age-indeterminate T11 superior endplate compression deformity  Clinical correlation recommended  Workstation performed: [id number]                    Procedures  Procedures       Phone Contacts  ED Phone Contact    ED Course                               MDM  Number of Diagnoses or Management Options  Diagnosis management comments: As patient is poor historian and has history of kidney stones decision was made to CT the patient damages the low back  Age indeterminate compression fracture was seen but no acute pathology noted       Amount and/or Complexity of Data Reviewed  Tests in the radiology section of CPT®: reviewed    Risk of Complications, Morbidity, and/or Mortality  Presenting problems: moderate  Diagnostic procedures: moderate  Management options: low    Patient Progress  Patient progress: stable      Disposition  Final diagnoses:   None     ED Disposition     None      Follow-up Information    None         Patient's Medications   Discharge Prescriptions    No medications on file     No discharge procedures on file      ED Provider  Electronically Signed by           Francheska Ramirez MD  03/19/19 5428

## 2019-03-26 ENCOUNTER — TRANSCRIBE ORDERS (OUTPATIENT)
Dept: ADMINISTRATIVE | Facility: HOSPITAL | Age: 84
End: 2019-03-26

## 2019-03-26 ENCOUNTER — APPOINTMENT (OUTPATIENT)
Dept: LAB | Facility: HOSPITAL | Age: 84
End: 2019-03-26
Payer: MEDICARE

## 2019-03-26 DIAGNOSIS — I82.4Y2 DEEP VEIN THROMBOSIS (DVT) OF PROXIMAL VEIN OF LEFT LOWER EXTREMITY, UNSPECIFIED CHRONICITY (HCC): ICD-10-CM

## 2019-03-26 DIAGNOSIS — I51.9 MYXEDEMA HEART DISEASE: ICD-10-CM

## 2019-03-26 DIAGNOSIS — E11.9 TYPE 2 DIABETES MELLITUS WITHOUT COMPLICATION, WITHOUT LONG-TERM CURRENT USE OF INSULIN (HCC): ICD-10-CM

## 2019-03-26 DIAGNOSIS — E11.9 TYPE 2 DIABETES MELLITUS WITHOUT COMPLICATION, WITHOUT LONG-TERM CURRENT USE OF INSULIN (HCC): Primary | ICD-10-CM

## 2019-03-26 DIAGNOSIS — E03.9 MYXEDEMA HEART DISEASE: ICD-10-CM

## 2019-03-26 LAB
ALBUMIN SERPL BCP-MCNC: 3.8 G/DL (ref 3.5–5.7)
ALP SERPL-CCNC: 94 U/L (ref 55–165)
ALT SERPL W P-5'-P-CCNC: 13 U/L (ref 7–52)
ANION GAP SERPL CALCULATED.3IONS-SCNC: 9 MMOL/L (ref 4–13)
AST SERPL W P-5'-P-CCNC: 16 U/L (ref 13–39)
BILIRUB DIRECT SERPL-MCNC: 0.1 MG/DL (ref 0–0.2)
BILIRUB SERPL-MCNC: 0.4 MG/DL (ref 0.2–1)
BUN SERPL-MCNC: 20 MG/DL (ref 7–25)
CALCIUM SERPL-MCNC: 9 MG/DL (ref 8.6–10.5)
CHLORIDE SERPL-SCNC: 99 MMOL/L (ref 98–107)
CHOLEST SERPL-MCNC: 146 MG/DL (ref 0–200)
CO2 SERPL-SCNC: 27 MMOL/L (ref 21–31)
CREAT SERPL-MCNC: 1.17 MG/DL (ref 0.6–1.2)
EOSINOPHIL # BLD AUTO: 0.14 THOUSAND/UL (ref 0–0.61)
EOSINOPHIL NFR BLD MANUAL: 2 % (ref 0–6)
ERYTHROCYTE [DISTWIDTH] IN BLOOD BY AUTOMATED COUNT: 15.2 % (ref 11.6–15.1)
FERRITIN SERPL-MCNC: 62 NG/ML (ref 8–388)
GFR SERPL CREATININE-BSD FRML MDRD: 43 ML/MIN/1.73SQ M
GLUCOSE P FAST SERPL-MCNC: 311 MG/DL (ref 65–99)
HCT VFR BLD AUTO: 43.7 % (ref 37–47)
HDLC SERPL-MCNC: 56 MG/DL (ref 40–60)
HGB BLD-MCNC: 14.5 G/DL (ref 11.5–15.4)
INR PPP: 2.87 (ref 0.9–1.5)
IRON SATN MFR SERPL: 21 %
IRON SERPL-MCNC: 65 UG/DL (ref 50–170)
LDLC SERPL CALC-MCNC: 69 MG/DL (ref 0–100)
LYMPHOCYTES # BLD AUTO: 0.64 THOUSAND/UL (ref 0.6–4.47)
LYMPHOCYTES # BLD AUTO: 9 % (ref 20–51)
MCH RBC QN AUTO: 31.6 PG (ref 26.8–34.3)
MCHC RBC AUTO-ENTMCNC: 33.3 G/DL (ref 31.4–37.4)
MCV RBC AUTO: 95 FL (ref 82–98)
MONOCYTES # BLD AUTO: 0.21 THOUSAND/UL (ref 0–1.22)
MONOCYTES NFR BLD AUTO: 3 % (ref 4–12)
NEUTS SEG # BLD: 6.11 THOUSAND/UL (ref 1.81–6.82)
NEUTS SEG NFR BLD AUTO: 86 % (ref 42–75)
NONHDLC SERPL-MCNC: 90 MG/DL
PLATELET # BLD AUTO: 186 THOUSANDS/UL (ref 149–390)
PLATELET BLD QL SMEAR: ADEQUATE
PMV BLD AUTO: 9.9 FL (ref 8.9–12.7)
POTASSIUM SERPL-SCNC: 4.5 MMOL/L (ref 3.5–5.5)
PROT SERPL-MCNC: 7.2 G/DL (ref 6.4–8.9)
PROTHROMBIN TIME: 33.4 SECONDS (ref 10.2–13)
RBC # BLD AUTO: 4.61 MILLION/UL (ref 3.81–5.12)
RBC MORPH BLD: NORMAL
SODIUM SERPL-SCNC: 135 MMOL/L (ref 134–143)
T4 FREE SERPL-MCNC: 1.44 NG/DL (ref 0.76–1.46)
TIBC SERPL-MCNC: 309 UG/DL (ref 250–450)
TOTAL CELLS COUNTED SPEC: 100
TRIGL SERPL-MCNC: 105 MG/DL (ref 44–166)
TSH SERPL DL<=0.05 MIU/L-ACNC: 2.94 UIU/ML (ref 0.45–5.33)
WBC # BLD AUTO: 7.1 THOUSAND/UL (ref 4.31–10.16)

## 2019-03-26 PROCEDURE — 84439 ASSAY OF FREE THYROXINE: CPT

## 2019-03-26 PROCEDURE — 80061 LIPID PANEL: CPT

## 2019-03-26 PROCEDURE — 85027 COMPLETE CBC AUTOMATED: CPT

## 2019-03-26 PROCEDURE — 84443 ASSAY THYROID STIM HORMONE: CPT

## 2019-03-26 PROCEDURE — 83550 IRON BINDING TEST: CPT

## 2019-03-26 PROCEDURE — 85610 PROTHROMBIN TIME: CPT

## 2019-03-26 PROCEDURE — 85007 BL SMEAR W/DIFF WBC COUNT: CPT

## 2019-03-26 PROCEDURE — 36415 COLL VENOUS BLD VENIPUNCTURE: CPT

## 2019-03-26 PROCEDURE — 80076 HEPATIC FUNCTION PANEL: CPT

## 2019-03-26 PROCEDURE — 80048 BASIC METABOLIC PNL TOTAL CA: CPT

## 2019-03-26 PROCEDURE — 82728 ASSAY OF FERRITIN: CPT

## 2019-03-26 PROCEDURE — 83540 ASSAY OF IRON: CPT

## 2019-04-04 ENCOUNTER — APPOINTMENT (EMERGENCY)
Dept: RADIOLOGY | Facility: HOSPITAL | Age: 84
End: 2019-04-04
Payer: MEDICARE

## 2019-04-04 ENCOUNTER — HOSPITAL ENCOUNTER (EMERGENCY)
Facility: HOSPITAL | Age: 84
Discharge: HOME | End: 2019-04-04
Attending: EMERGENCY MEDICINE
Payer: MEDICARE

## 2019-04-04 VITALS
TEMPERATURE: 97.9 F | HEIGHT: 66 IN | BODY MASS INDEX: 25.72 KG/M2 | OXYGEN SATURATION: 98 % | SYSTOLIC BLOOD PRESSURE: 140 MMHG | HEART RATE: 96 BPM | RESPIRATION RATE: 18 BRPM | WEIGHT: 160.05 LBS | DIASTOLIC BLOOD PRESSURE: 57 MMHG

## 2019-04-04 DIAGNOSIS — G89.29 CHRONIC BACK PAIN: Primary | ICD-10-CM

## 2019-04-04 DIAGNOSIS — N39.0 UTI (URINARY TRACT INFECTION): ICD-10-CM

## 2019-04-04 DIAGNOSIS — M54.9 CHRONIC BACK PAIN: Primary | ICD-10-CM

## 2019-04-04 LAB
BACTERIA UR QL AUTO: ABNORMAL /HPF
BILIRUB UR QL STRIP: NEGATIVE
CLARITY UR: ABNORMAL
COLOR UR: YELLOW
GLUCOSE UR STRIP-MCNC: NEGATIVE MG/DL
HGB UR QL STRIP.AUTO: ABNORMAL
KETONES UR STRIP-MCNC: ABNORMAL MG/DL
LEUKOCYTE ESTERASE UR QL STRIP: NEGATIVE
NITRITE UR QL STRIP: NEGATIVE
NON-SQ EPI CELLS URNS QL MICRO: ABNORMAL /HPF
PH UR STRIP.AUTO: 7 [PH]
PROT UR STRIP-MCNC: ABNORMAL MG/DL
RBC #/AREA URNS AUTO: ABNORMAL /HPF
SP GR UR STRIP.AUTO: >=1.03 (ref 1–1.03)
UROBILINOGEN UR QL STRIP.AUTO: 0.2 E.U./DL
WBC #/AREA URNS AUTO: ABNORMAL /HPF

## 2019-04-04 PROCEDURE — 81001 URINALYSIS AUTO W/SCOPE: CPT | Performed by: EMERGENCY MEDICINE

## 2019-04-04 PROCEDURE — 99284 EMERGENCY DEPT VISIT MOD MDM: CPT

## 2019-04-04 PROCEDURE — 72100 X-RAY EXAM L-S SPINE 2/3 VWS: CPT

## 2019-04-04 RX ORDER — NITROFURANTOIN 25; 75 MG/1; MG/1
100 CAPSULE ORAL 2 TIMES DAILY
Qty: 14 CAPSULE | Refills: 0 | Status: SHIPPED | OUTPATIENT
Start: 2019-04-04 | End: 2019-04-11

## 2019-04-04 RX ORDER — NITROFURANTOIN 25; 75 MG/1; MG/1
100 CAPSULE ORAL 2 TIMES DAILY WITH MEALS
Status: DISCONTINUED | OUTPATIENT
Start: 2019-04-04 | End: 2019-04-04 | Stop reason: HOSPADM

## 2019-04-04 RX ORDER — TRAMADOL HYDROCHLORIDE 50 MG/1
50 TABLET ORAL
Qty: 20 TABLET | Refills: 0 | Status: SHIPPED | OUTPATIENT
Start: 2019-04-04 | End: 2019-04-14

## 2019-04-04 RX ORDER — TRAMADOL HYDROCHLORIDE 50 MG/1
50 TABLET ORAL ONCE
Status: COMPLETED | OUTPATIENT
Start: 2019-04-04 | End: 2019-04-04

## 2019-04-04 RX ORDER — NITROFURANTOIN 25; 75 MG/1; MG/1
100 CAPSULE ORAL 2 TIMES DAILY WITH MEALS
Status: DISCONTINUED | OUTPATIENT
Start: 2019-04-04 | End: 2019-04-04

## 2019-04-04 RX ORDER — TRAMADOL HYDROCHLORIDE 50 MG/1
50 TABLET ORAL
Status: DISCONTINUED | OUTPATIENT
Start: 2019-04-04 | End: 2019-04-04

## 2019-04-04 RX ADMIN — TRAMADOL HYDROCHLORIDE 50 MG: 50 TABLET, COATED ORAL at 08:49

## 2019-04-04 RX ADMIN — NITROFURANTOIN (MONOHYDRATE/MACROCRYSTALS) 100 MG: 75; 25 CAPSULE ORAL at 10:59

## 2019-04-08 ENCOUNTER — HOSPITAL ENCOUNTER (EMERGENCY)
Facility: HOSPITAL | Age: 84
Discharge: HOME | End: 2019-04-08
Attending: EMERGENCY MEDICINE | Admitting: EMERGENCY MEDICINE
Payer: MEDICARE

## 2019-04-08 VITALS
HEART RATE: 71 BPM | DIASTOLIC BLOOD PRESSURE: 84 MMHG | HEIGHT: 66 IN | TEMPERATURE: 99.5 F | BODY MASS INDEX: 25.72 KG/M2 | OXYGEN SATURATION: 95 % | WEIGHT: 160.05 LBS | RESPIRATION RATE: 18 BRPM | SYSTOLIC BLOOD PRESSURE: 177 MMHG

## 2019-04-08 DIAGNOSIS — M54.9 BACK PAIN: Primary | ICD-10-CM

## 2019-04-08 LAB
BACTERIA UR QL AUTO: ABNORMAL /HPF
BILIRUB UR QL STRIP: NEGATIVE
CLARITY UR: ABNORMAL
COLOR UR: YELLOW
GLUCOSE UR STRIP-MCNC: NEGATIVE MG/DL
HGB UR QL STRIP.AUTO: ABNORMAL
KETONES UR STRIP-MCNC: ABNORMAL MG/DL
LEUKOCYTE ESTERASE UR QL STRIP: NEGATIVE
NITRITE UR QL STRIP: POSITIVE
NON-SQ EPI CELLS URNS QL MICRO: ABNORMAL /HPF
PH UR STRIP.AUTO: 6.5 [PH]
PROT UR STRIP-MCNC: ABNORMAL MG/DL
RBC #/AREA URNS AUTO: ABNORMAL /HPF
SP GR UR STRIP.AUTO: 1.02 (ref 1–1.03)
UROBILINOGEN UR QL STRIP.AUTO: 1 E.U./DL
WBC #/AREA URNS AUTO: ABNORMAL /HPF

## 2019-04-08 PROCEDURE — 81001 URINALYSIS AUTO W/SCOPE: CPT | Performed by: EMERGENCY MEDICINE

## 2019-04-08 PROCEDURE — 96372 THER/PROPH/DIAG INJ SC/IM: CPT

## 2019-04-08 PROCEDURE — 87086 URINE CULTURE/COLONY COUNT: CPT | Performed by: EMERGENCY MEDICINE

## 2019-04-08 PROCEDURE — 87077 CULTURE AEROBIC IDENTIFY: CPT | Performed by: EMERGENCY MEDICINE

## 2019-04-08 PROCEDURE — 99284 EMERGENCY DEPT VISIT MOD MDM: CPT

## 2019-04-08 PROCEDURE — 87186 SC STD MICRODIL/AGAR DIL: CPT | Performed by: EMERGENCY MEDICINE

## 2019-04-08 RX ORDER — KETOROLAC TROMETHAMINE 30 MG/ML
15 INJECTION, SOLUTION INTRAMUSCULAR; INTRAVENOUS ONCE
Status: COMPLETED | OUTPATIENT
Start: 2019-04-08 | End: 2019-04-08

## 2019-04-08 RX ORDER — KETOROLAC TROMETHAMINE 30 MG/ML
15 INJECTION, SOLUTION INTRAMUSCULAR; INTRAVENOUS ONCE
Status: DISCONTINUED | OUTPATIENT
Start: 2019-04-08 | End: 2019-04-08

## 2019-04-08 RX ORDER — LORAZEPAM 1 MG/1
1 TABLET ORAL ONCE
Status: COMPLETED | OUTPATIENT
Start: 2019-04-08 | End: 2019-04-08

## 2019-04-08 RX ORDER — IBUPROFEN 400 MG/1
400 TABLET ORAL EVERY 8 HOURS PRN
Qty: 15 TABLET | Refills: 0 | Status: SHIPPED | OUTPATIENT
Start: 2019-04-08 | End: 2019-10-10

## 2019-04-08 RX ORDER — CIPROFLOXACIN 250 MG/1
250 TABLET, FILM COATED ORAL EVERY 12 HOURS
Qty: 10 TABLET | Refills: 0 | Status: SHIPPED | OUTPATIENT
Start: 2019-04-08 | End: 2019-04-13

## 2019-04-08 RX ORDER — DIAZEPAM 5 MG/1
5 TABLET ORAL 2 TIMES DAILY PRN
Qty: 10 TABLET | Refills: 0 | Status: SHIPPED | OUTPATIENT
Start: 2019-04-08 | End: 2019-10-10

## 2019-04-08 RX ORDER — HYDROCODONE BITARTRATE AND ACETAMINOPHEN 5; 325 MG/1; MG/1
1 TABLET ORAL EVERY 6 HOURS PRN
Qty: 12 TABLET | Refills: 0 | Status: SHIPPED | OUTPATIENT
Start: 2019-04-08 | End: 2019-04-18

## 2019-04-08 RX ADMIN — LORAZEPAM 1 MG: 1 TABLET ORAL at 20:36

## 2019-04-08 RX ADMIN — METOPROLOL TARTRATE 25 MG: 25 TABLET ORAL at 20:19

## 2019-04-08 RX ADMIN — KETOROLAC TROMETHAMINE 15 MG: 30 INJECTION, SOLUTION INTRAMUSCULAR; INTRAVENOUS at 21:15

## 2019-04-10 LAB — BACTERIA UR CULT: ABNORMAL

## 2019-06-04 DIAGNOSIS — I48.91 ATRIAL FIBRILLATION, UNSPECIFIED TYPE (HCC): Primary | ICD-10-CM

## 2019-06-05 ENCOUNTER — CONSULT (OUTPATIENT)
Dept: CARDIOLOGY CLINIC | Facility: CLINIC | Age: 84
End: 2019-06-05
Payer: MEDICARE

## 2019-06-05 VITALS
DIASTOLIC BLOOD PRESSURE: 72 MMHG | SYSTOLIC BLOOD PRESSURE: 138 MMHG | HEIGHT: 66 IN | HEART RATE: 64 BPM | WEIGHT: 157 LBS | BODY MASS INDEX: 25.23 KG/M2

## 2019-06-05 DIAGNOSIS — Z86.711 HISTORY OF PULMONARY EMBOLISM: ICD-10-CM

## 2019-06-05 DIAGNOSIS — I48.0 PAROXYSMAL ATRIAL FIBRILLATION (HCC): ICD-10-CM

## 2019-06-05 DIAGNOSIS — I10 ESSENTIAL HYPERTENSION: ICD-10-CM

## 2019-06-05 DIAGNOSIS — I48.20 CHRONIC ATRIAL FIBRILLATION (HCC): Primary | ICD-10-CM

## 2019-06-05 PROCEDURE — 99204 OFFICE O/P NEW MOD 45 MIN: CPT | Performed by: INTERNAL MEDICINE

## 2019-06-05 PROCEDURE — 93000 ELECTROCARDIOGRAM COMPLETE: CPT | Performed by: INTERNAL MEDICINE

## 2019-06-05 RX ORDER — BLOOD SUGAR DIAGNOSTIC
STRIP MISCELLANEOUS
Refills: 0 | COMMUNITY
Start: 2019-06-01 | End: 2020-08-13 | Stop reason: SDUPTHER

## 2019-06-05 RX ORDER — ACETAMINOPHEN 500 MG
500 TABLET ORAL EVERY 6 HOURS PRN
COMMUNITY
Start: 2016-03-02

## 2019-06-05 RX ORDER — LANCETS
EACH MISCELLANEOUS
Refills: 0 | COMMUNITY
Start: 2019-04-05 | End: 2020-08-13 | Stop reason: SDUPTHER

## 2019-06-05 RX ORDER — DOXEPIN HYDROCHLORIDE 10 MG/1
10 CAPSULE ORAL DAILY
Refills: 0 | COMMUNITY
Start: 2019-06-03 | End: 2019-10-10

## 2019-06-07 ENCOUNTER — APPOINTMENT (OUTPATIENT)
Dept: LAB | Facility: HOSPITAL | Age: 84
End: 2019-06-07
Attending: INTERNAL MEDICINE
Payer: MEDICARE

## 2019-06-07 ENCOUNTER — TRANSCRIBE ORDERS (OUTPATIENT)
Dept: ADMINISTRATIVE | Facility: HOSPITAL | Age: 84
End: 2019-06-07

## 2019-06-07 DIAGNOSIS — I48.91 ATRIAL FIBRILLATION, UNSPECIFIED TYPE (HCC): ICD-10-CM

## 2019-06-07 DIAGNOSIS — I48.91 ATRIAL FIBRILLATION, UNSPECIFIED TYPE (HCC): Primary | ICD-10-CM

## 2019-06-07 LAB
INR PPP: 3.77 (ref 0.9–1.5)
PROTHROMBIN TIME: 44.7 SECONDS (ref 10.2–13)

## 2019-06-07 PROCEDURE — 85610 PROTHROMBIN TIME: CPT

## 2019-06-07 PROCEDURE — 36415 COLL VENOUS BLD VENIPUNCTURE: CPT

## 2019-06-14 ENCOUNTER — TRANSCRIBE ORDERS (OUTPATIENT)
Dept: ADMINISTRATIVE | Facility: HOSPITAL | Age: 84
End: 2019-06-14

## 2019-06-14 ENCOUNTER — APPOINTMENT (OUTPATIENT)
Dept: LAB | Facility: HOSPITAL | Age: 84
End: 2019-06-14
Attending: INTERNAL MEDICINE
Payer: MEDICARE

## 2019-06-14 DIAGNOSIS — E03.9 HYPOTHYROIDISM, UNSPECIFIED TYPE: ICD-10-CM

## 2019-06-14 DIAGNOSIS — E11.9 TYPE 2 DIABETES MELLITUS WITHOUT COMPLICATION, WITHOUT LONG-TERM CURRENT USE OF INSULIN (HCC): ICD-10-CM

## 2019-06-14 DIAGNOSIS — E78.2 MIXED HYPERLIPIDEMIA: ICD-10-CM

## 2019-06-14 DIAGNOSIS — I48.91 ATRIAL FIBRILLATION, UNSPECIFIED TYPE (HCC): ICD-10-CM

## 2019-06-14 DIAGNOSIS — I48.91 ATRIAL FIBRILLATION, UNSPECIFIED TYPE (HCC): Primary | ICD-10-CM

## 2019-06-14 LAB
ALBUMIN SERPL BCP-MCNC: 4.3 G/DL (ref 3.5–5.7)
ALP SERPL-CCNC: 94 U/L (ref 55–165)
ALT SERPL W P-5'-P-CCNC: 13 U/L (ref 7–52)
ANION GAP SERPL CALCULATED.3IONS-SCNC: 9 MMOL/L (ref 4–13)
AST SERPL W P-5'-P-CCNC: 18 U/L (ref 13–39)
BACTERIA UR QL AUTO: ABNORMAL /HPF
BILIRUB SERPL-MCNC: 0.4 MG/DL (ref 0.2–1)
BILIRUB UR QL STRIP: NEGATIVE
BUN SERPL-MCNC: 21 MG/DL (ref 7–25)
CALCIUM SERPL-MCNC: 9.2 MG/DL (ref 8.6–10.5)
CHLORIDE SERPL-SCNC: 105 MMOL/L (ref 98–107)
CLARITY UR: CLEAR
CO2 SERPL-SCNC: 27 MMOL/L (ref 21–31)
COLOR UR: YELLOW
CREAT SERPL-MCNC: 1.08 MG/DL (ref 0.6–1.2)
CREAT UR-MCNC: 201 MG/DL
ERYTHROCYTE [DISTWIDTH] IN BLOOD BY AUTOMATED COUNT: 15.8 % (ref 11.6–15.1)
GFR SERPL CREATININE-BSD FRML MDRD: 47 ML/MIN/1.73SQ M
GLUCOSE P FAST SERPL-MCNC: 153 MG/DL (ref 65–99)
GLUCOSE UR STRIP-MCNC: NEGATIVE MG/DL
HCT VFR BLD AUTO: 47.1 % (ref 37–47)
HGB BLD-MCNC: 15.8 G/DL (ref 11.5–15.4)
HGB UR QL STRIP.AUTO: ABNORMAL
INR PPP: 2.97 (ref 0.9–1.5)
KETONES UR STRIP-MCNC: NEGATIVE MG/DL
LDLC SERPL DIRECT ASSAY-MCNC: 99 MG/DL (ref 0–100)
LEUKOCYTE ESTERASE UR QL STRIP: ABNORMAL
MCH RBC QN AUTO: 32.1 PG (ref 26.8–34.3)
MCHC RBC AUTO-ENTMCNC: 33.5 G/DL (ref 31.4–37.4)
MCV RBC AUTO: 96 FL (ref 82–98)
MICROALBUMIN UR-MCNC: 852 MG/L (ref 0–20)
MICROALBUMIN/CREAT 24H UR: 424 MG/G CREATININE (ref 0–30)
NITRITE UR QL STRIP: NEGATIVE
NON-SQ EPI CELLS URNS QL MICRO: ABNORMAL /HPF
PH UR STRIP.AUTO: 5.5 [PH]
PLATELET # BLD AUTO: 179 THOUSANDS/UL (ref 149–390)
PMV BLD AUTO: 9.8 FL (ref 8.9–12.7)
POTASSIUM SERPL-SCNC: 4.1 MMOL/L (ref 3.5–5.5)
PROT SERPL-MCNC: 6.9 G/DL (ref 6.4–8.9)
PROT UR STRIP-MCNC: ABNORMAL MG/DL
PROTHROMBIN TIME: 35.1 SECONDS (ref 10.2–13)
RBC # BLD AUTO: 4.92 MILLION/UL (ref 3.81–5.12)
RBC #/AREA URNS AUTO: ABNORMAL /HPF
SODIUM SERPL-SCNC: 141 MMOL/L (ref 134–143)
SP GR UR STRIP.AUTO: >=1.03 (ref 1–1.03)
T4 SERPL-MCNC: 13 UG/DL (ref 4.7–13.3)
TSH SERPL DL<=0.05 MIU/L-ACNC: 3.33 UIU/ML (ref 0.45–5.33)
UROBILINOGEN UR QL STRIP.AUTO: 0.2 E.U./DL
WBC # BLD AUTO: 7.1 THOUSAND/UL (ref 4.31–10.16)
WBC #/AREA URNS AUTO: ABNORMAL /HPF

## 2019-06-14 PROCEDURE — 85610 PROTHROMBIN TIME: CPT

## 2019-06-14 PROCEDURE — 84436 ASSAY OF TOTAL THYROXINE: CPT

## 2019-06-14 PROCEDURE — 81001 URINALYSIS AUTO W/SCOPE: CPT | Performed by: INTERNAL MEDICINE

## 2019-06-14 PROCEDURE — 84443 ASSAY THYROID STIM HORMONE: CPT

## 2019-06-14 PROCEDURE — 83721 ASSAY OF BLOOD LIPOPROTEIN: CPT

## 2019-06-14 PROCEDURE — 80053 COMPREHEN METABOLIC PANEL: CPT

## 2019-06-14 PROCEDURE — 82043 UR ALBUMIN QUANTITATIVE: CPT | Performed by: INTERNAL MEDICINE

## 2019-06-14 PROCEDURE — 82570 ASSAY OF URINE CREATININE: CPT | Performed by: INTERNAL MEDICINE

## 2019-06-14 PROCEDURE — 36415 COLL VENOUS BLD VENIPUNCTURE: CPT

## 2019-06-14 PROCEDURE — 85027 COMPLETE CBC AUTOMATED: CPT

## 2019-07-02 ENCOUNTER — APPOINTMENT (OUTPATIENT)
Dept: LAB | Facility: HOSPITAL | Age: 84
End: 2019-07-02
Attending: INTERNAL MEDICINE
Payer: MEDICARE

## 2019-07-02 ENCOUNTER — TRANSCRIBE ORDERS (OUTPATIENT)
Dept: URGENT CARE | Facility: HOSPITAL | Age: 84
End: 2019-07-02

## 2019-07-02 DIAGNOSIS — I48.91 ATRIAL FIBRILLATION, UNSPECIFIED TYPE (HCC): ICD-10-CM

## 2019-07-02 DIAGNOSIS — I48.91 ATRIAL FIBRILLATION, UNSPECIFIED TYPE (HCC): Primary | ICD-10-CM

## 2019-07-02 LAB
INR PPP: 2.13 (ref 0.84–1.19)
PROTHROMBIN TIME: 23.3 SECONDS (ref 11.6–14.5)

## 2019-07-02 PROCEDURE — 36415 COLL VENOUS BLD VENIPUNCTURE: CPT

## 2019-07-02 PROCEDURE — 85610 PROTHROMBIN TIME: CPT

## 2019-07-18 ENCOUNTER — APPOINTMENT (OUTPATIENT)
Dept: LAB | Facility: HOSPITAL | Age: 84
End: 2019-07-18
Attending: INTERNAL MEDICINE
Payer: MEDICARE

## 2019-07-18 ENCOUNTER — TRANSCRIBE ORDERS (OUTPATIENT)
Dept: URGENT CARE | Facility: HOSPITAL | Age: 84
End: 2019-07-18

## 2019-07-18 DIAGNOSIS — I48.91 ATRIAL FIBRILLATION, UNSPECIFIED TYPE (HCC): ICD-10-CM

## 2019-07-18 DIAGNOSIS — I48.91 ATRIAL FIBRILLATION, UNSPECIFIED TYPE (HCC): Primary | ICD-10-CM

## 2019-07-18 LAB
INR PPP: 1.7 (ref 0.84–1.19)
PROTHROMBIN TIME: 19.5 SECONDS (ref 11.6–14.5)

## 2019-07-18 PROCEDURE — 85610 PROTHROMBIN TIME: CPT

## 2019-07-18 PROCEDURE — 36415 COLL VENOUS BLD VENIPUNCTURE: CPT

## 2019-07-26 ENCOUNTER — TRANSCRIBE ORDERS (OUTPATIENT)
Dept: ADMINISTRATIVE | Facility: HOSPITAL | Age: 84
End: 2019-07-26

## 2019-07-26 ENCOUNTER — APPOINTMENT (OUTPATIENT)
Dept: LAB | Facility: HOSPITAL | Age: 84
End: 2019-07-26
Attending: INTERNAL MEDICINE
Payer: MEDICARE

## 2019-07-26 DIAGNOSIS — I48.91 ATRIAL FIBRILLATION, UNSPECIFIED TYPE (HCC): Primary | ICD-10-CM

## 2019-07-26 DIAGNOSIS — I48.91 ATRIAL FIBRILLATION, UNSPECIFIED TYPE (HCC): ICD-10-CM

## 2019-07-26 LAB
INR PPP: 1.25 (ref 0.84–1.19)
PROTHROMBIN TIME: 15.3 SECONDS (ref 11.6–14.5)

## 2019-07-26 PROCEDURE — 36415 COLL VENOUS BLD VENIPUNCTURE: CPT

## 2019-07-26 PROCEDURE — 85610 PROTHROMBIN TIME: CPT

## 2019-08-05 ENCOUNTER — TRANSCRIBE ORDERS (OUTPATIENT)
Dept: URGENT CARE | Facility: HOSPITAL | Age: 84
End: 2019-08-05

## 2019-08-05 ENCOUNTER — APPOINTMENT (OUTPATIENT)
Dept: LAB | Facility: HOSPITAL | Age: 84
End: 2019-08-05
Attending: INTERNAL MEDICINE
Payer: MEDICARE

## 2019-08-05 DIAGNOSIS — I48.91 ATRIAL FIBRILLATION, UNSPECIFIED TYPE (HCC): ICD-10-CM

## 2019-08-05 DIAGNOSIS — I48.91 ATRIAL FIBRILLATION, UNSPECIFIED TYPE (HCC): Primary | ICD-10-CM

## 2019-08-05 LAB
INR PPP: 1.99 (ref 0.84–1.19)
PROTHROMBIN TIME: 22.1 SECONDS (ref 11.6–14.5)

## 2019-08-05 PROCEDURE — 85610 PROTHROMBIN TIME: CPT

## 2019-08-05 PROCEDURE — 36415 COLL VENOUS BLD VENIPUNCTURE: CPT

## 2019-08-13 ENCOUNTER — TRANSCRIBE ORDERS (OUTPATIENT)
Dept: ADMINISTRATIVE | Facility: HOSPITAL | Age: 84
End: 2019-08-13

## 2019-08-13 ENCOUNTER — APPOINTMENT (OUTPATIENT)
Dept: LAB | Facility: HOSPITAL | Age: 84
End: 2019-08-13
Attending: INTERNAL MEDICINE
Payer: MEDICARE

## 2019-08-13 DIAGNOSIS — I48.91 ATRIAL FIBRILLATION, UNSPECIFIED TYPE (HCC): Primary | ICD-10-CM

## 2019-08-13 DIAGNOSIS — I48.91 ATRIAL FIBRILLATION, UNSPECIFIED TYPE (HCC): ICD-10-CM

## 2019-08-13 LAB
INR PPP: 2.27 (ref 0.84–1.19)
PROTHROMBIN TIME: 24.5 SECONDS (ref 11.6–14.5)

## 2019-08-13 PROCEDURE — 36415 COLL VENOUS BLD VENIPUNCTURE: CPT

## 2019-08-13 PROCEDURE — 85610 PROTHROMBIN TIME: CPT

## 2019-08-27 ENCOUNTER — TRANSCRIBE ORDERS (OUTPATIENT)
Dept: ADMINISTRATIVE | Facility: HOSPITAL | Age: 84
End: 2019-08-27

## 2019-08-27 ENCOUNTER — APPOINTMENT (OUTPATIENT)
Dept: LAB | Facility: HOSPITAL | Age: 84
End: 2019-08-27
Attending: INTERNAL MEDICINE
Payer: MEDICARE

## 2019-08-27 DIAGNOSIS — I48.91 ATRIAL FIBRILLATION, UNSPECIFIED TYPE (HCC): Primary | ICD-10-CM

## 2019-08-27 DIAGNOSIS — I48.91 ATRIAL FIBRILLATION, UNSPECIFIED TYPE (HCC): ICD-10-CM

## 2019-08-27 LAB
INR PPP: 2.16 (ref 0.84–1.19)
PROTHROMBIN TIME: 23.6 SECONDS (ref 11.6–14.5)

## 2019-08-27 PROCEDURE — 36415 COLL VENOUS BLD VENIPUNCTURE: CPT

## 2019-08-27 PROCEDURE — 85610 PROTHROMBIN TIME: CPT

## 2019-09-19 ENCOUNTER — TRANSCRIBE ORDERS (OUTPATIENT)
Dept: ADMINISTRATIVE | Facility: HOSPITAL | Age: 84
End: 2019-09-19

## 2019-09-19 ENCOUNTER — APPOINTMENT (OUTPATIENT)
Dept: LAB | Facility: HOSPITAL | Age: 84
End: 2019-09-19
Attending: INTERNAL MEDICINE
Payer: MEDICARE

## 2019-09-19 DIAGNOSIS — I48.91 ATRIAL FIBRILLATION, UNSPECIFIED TYPE (HCC): ICD-10-CM

## 2019-09-19 DIAGNOSIS — I48.91 ATRIAL FIBRILLATION, UNSPECIFIED TYPE (HCC): Primary | ICD-10-CM

## 2019-09-19 LAB
INR PPP: 3 (ref 0.84–1.19)
PROTHROMBIN TIME: 30.6 SECONDS (ref 11.6–14.5)

## 2019-09-19 PROCEDURE — 36415 COLL VENOUS BLD VENIPUNCTURE: CPT

## 2019-09-19 PROCEDURE — 85610 PROTHROMBIN TIME: CPT

## 2019-09-20 ENCOUNTER — TELEPHONE (OUTPATIENT)
Dept: NEPHROLOGY | Facility: CLINIC | Age: 84
End: 2019-09-20

## 2019-09-20 NOTE — TELEPHONE ENCOUNTER
----- Message from Lorenzo Huff MD sent at 9/20/2019 12:54 PM EDT -----  Reduce to 5mg a day and recheck in a week

## 2019-09-20 NOTE — TELEPHONE ENCOUNTER
----- Message from Mukesh Hutton MD sent at 9/20/2019 12:54 PM EDT -----  Reduce to 5mg a day and recheck in a week

## 2019-09-23 ENCOUNTER — TELEPHONE (OUTPATIENT)
Dept: NEPHROLOGY | Facility: CLINIC | Age: 84
End: 2019-09-23

## 2019-09-23 NOTE — TELEPHONE ENCOUNTER
----- Message from Rosey Kearns MD sent at 9/20/2019 12:54 PM EDT -----  Reduce to 5mg a day and recheck in a week

## 2019-09-27 ENCOUNTER — APPOINTMENT (OUTPATIENT)
Dept: LAB | Facility: HOSPITAL | Age: 84
End: 2019-09-27
Attending: INTERNAL MEDICINE
Payer: MEDICARE

## 2019-09-27 ENCOUNTER — TRANSCRIBE ORDERS (OUTPATIENT)
Dept: ADMINISTRATIVE | Facility: HOSPITAL | Age: 84
End: 2019-09-27

## 2019-09-27 DIAGNOSIS — I48.91 ATRIAL FIBRILLATION, UNSPECIFIED TYPE (HCC): ICD-10-CM

## 2019-09-27 DIAGNOSIS — I48.91 ATRIAL FIBRILLATION, UNSPECIFIED TYPE (HCC): Primary | ICD-10-CM

## 2019-09-27 LAB
INR PPP: 2.44 (ref 0.84–1.19)
PROTHROMBIN TIME: 26 SECONDS (ref 11.6–14.5)

## 2019-09-27 PROCEDURE — 36415 COLL VENOUS BLD VENIPUNCTURE: CPT

## 2019-09-27 PROCEDURE — 85610 PROTHROMBIN TIME: CPT

## 2019-10-01 ENCOUNTER — TELEPHONE (OUTPATIENT)
Dept: NEPHROLOGY | Facility: CLINIC | Age: 84
End: 2019-10-01

## 2019-10-10 ENCOUNTER — OFFICE VISIT (OUTPATIENT)
Dept: NEPHROLOGY | Facility: CLINIC | Age: 84
End: 2019-10-10
Payer: MEDICARE

## 2019-10-10 VITALS
DIASTOLIC BLOOD PRESSURE: 80 MMHG | WEIGHT: 152 LBS | HEART RATE: 80 BPM | BODY MASS INDEX: 25.33 KG/M2 | SYSTOLIC BLOOD PRESSURE: 142 MMHG | HEIGHT: 65 IN

## 2019-10-10 DIAGNOSIS — E11.69 TYPE 2 DIABETES MELLITUS WITH OTHER SPECIFIED COMPLICATION, WITHOUT LONG-TERM CURRENT USE OF INSULIN (HCC): Primary | ICD-10-CM

## 2019-10-10 DIAGNOSIS — I26.99 OTHER PULMONARY EMBOLISM WITHOUT ACUTE COR PULMONALE, UNSPECIFIED CHRONICITY (HCC): ICD-10-CM

## 2019-10-10 DIAGNOSIS — E03.9 ACQUIRED HYPOTHYROIDISM: ICD-10-CM

## 2019-10-10 DIAGNOSIS — E78.00 PURE HYPERCHOLESTEROLEMIA: ICD-10-CM

## 2019-10-10 DIAGNOSIS — I10 ESSENTIAL HYPERTENSION: ICD-10-CM

## 2019-10-10 PROBLEM — E11.9 TYPE 2 DIABETES MELLITUS WITHOUT COMPLICATION (HCC): Status: ACTIVE | Noted: 2019-10-10

## 2019-10-10 PROCEDURE — 99214 OFFICE O/P EST MOD 30 MIN: CPT | Performed by: INTERNAL MEDICINE

## 2019-10-10 RX ORDER — WARFARIN SODIUM 5 MG/1
5 TABLET ORAL
Qty: 90 TABLET | Refills: 3 | Status: SHIPPED | OUTPATIENT
Start: 2019-10-10 | End: 2020-03-16 | Stop reason: SDUPTHER

## 2019-10-10 RX ORDER — LEVOTHYROXINE SODIUM 0.05 MG/1
50 TABLET ORAL DAILY
Qty: 90 TABLET | Refills: 3 | Status: SHIPPED | OUTPATIENT
Start: 2019-10-10 | End: 2020-09-14

## 2019-10-10 RX ORDER — LOVASTATIN 20 MG/1
20 TABLET ORAL
Qty: 90 TABLET | Refills: 3 | Status: SHIPPED | OUTPATIENT
Start: 2019-10-10 | End: 2020-09-14

## 2019-10-10 RX ORDER — GLIPIZIDE 5 MG/1
2.5 TABLET ORAL DAILY
Qty: 90 TABLET | Refills: 3 | Status: SHIPPED | OUTPATIENT
Start: 2019-10-10 | End: 2020-02-20 | Stop reason: SDUPTHER

## 2019-10-10 NOTE — PROGRESS NOTES
[address] [facility] [place], [place]    Name: Ki Herrera  YOB: 1933      Assessment/Plan:    No problem-specific Assessment & Plan notes found for this encounter  Problem List Items Addressed This Visit     None            Subjective:      Patient ID: Ki Herrera is a 80 y o  female  HPI  Has a history of CKD 3  She has lost weight as she has less appetite  She does not sleep well  She is taking coumadin and ran out of it  She is taking it for atrial fibrillation  She is urinating well and has nocturia 4 times a night  She is not drinking a lot in the evening  Diabetes:  this morning   Was in the 70-80 in the morning    The following portions of the patient's history were reviewed and updated as appropriate: allergies, current medications, past family history, past medical history, past social history, past surgical history and problem list     Review of Systems   Gastrointestinal: Positive for nausea  Genitourinary: Positive for urgency (has nocturia frequently)  Musculoskeletal: Positive for arthralgias and back pain          She had seen [facility]          Social History     Socioeconomic History    Marital status: /Civil Union     Spouse name: None    Number of children: None    Years of education: None    Highest education level: None   Occupational History    Occupation: Retired    Social Needs    Financial resource strain: None    Food insecurity:     Worry: None     Inability: None    Transportation needs:     Medical: None     Non-medical: None   Tobacco Use    Smoking status: Former Smoker     Types: Cigarettes     Last attempt to quit: 3/19/2001     Years since quittin 5    Smokeless tobacco: Never Used   Substance and Sexual Activity    Alcohol use: Never     Frequency: Never    Drug use: Never    Sexual activity: Not Currently   Lifestyle    Physical activity:     Days per week: None     Minutes per session: None    Stress: None   Relationships    Social connections:     Talks on phone: None     Gets together: None     Attends Catholic service: None     Active member of club or organization: None     Attends meetings of clubs or organizations: None     Relationship status: None    Intimate partner violence:     Fear of current or ex partner: None     Emotionally abused: None     Physically abused: None     Forced sexual activity: None   Other Topics Concern    None   Social History Narrative    Rarely consumes alcohol - As per [employer]    Consumes on average 2 cups of regular coffee per day    Consumes on average 12 oz of soda per day     Past Medical History:   Diagnosis Date    Abnormal gait     Accidental fall from chair, subsequent encounter     [facility]     Benign essential hypertension     Bite of animal     Cataract     Cellulitis     Chronic kidney disease, stage 3 ([facility]     Colon cancer ([facility]     Current tear of lateral cartilage or meniscus of knee     Diabetes mellitus ([facility]     Disease of thyroid gland     Disorder of magnesium metabolism     Dvt femoral (deep venous thrombosis)     Embolism from thrombosis of vein of distal end of lower extremity     Essential hypertension     Fall     Hyperlipidemia     Hyperlipidemia     Hypertension     Hypothyroidism     Insomnia     Knee joint effusion     Leukocytosis     Malaise and fatigue     MI (myocardial infarction) ([facility] )     Hospitalization     Orbital hemorrhage     Other sleep disorders     PAF (paroxysmal atrial fibrillation) ([facility] )     Presence of vena cava filter     Pulmonary embolism (HCC)     Tear film insufficiency     Type 2 diabetes mellitus ([facility] )     Unspecified osteoarthritis, unspecified site     Weight decreased      Past Surgical History:   Procedure Laterality Date    ABDOMINAL ADHESION SURGERY  2015    Had wound vac    ABDOMINAL SURGERY      APPENDECTOMY      CATARACT EXTRACTION      CHOLECYSTECTOMY      COLON SURGERY      COLOSTOMY  2011    Due to Ileus, then reanastomosis in 2002   [place] W/ LASER      HERNIA REPAIR      IVC FILTER INSERTION      KNEE SURGERY  2014    repair        Current Outpatient Medications:     acetaminophen (TYLENOL) 500 mg tablet, Take 500 mg by mouth every 6 (six) hours as needed, Disp: , Rfl:     CONTOUR NEXT TEST test strip, , Disp: , Rfl: 0    glipiZIDE (GLUCOTROL) 5 mg tablet, Take 5 mg by mouth daily , Disp: , Rfl:     levothyroxine 50 mcg tablet, Take 50 mcg by mouth daily, Disp: , Rfl:     linaGLIPtin (TRADJENTA) 5 MG TABS, Take 5 mg by mouth daily, Disp: , Rfl:     lovastatin (MEVACOR) 20 mg tablet, Take 20 mg by mouth daily at bedtime, Disp: , Rfl:     metFORMIN (GLUCOPHAGE) 500 mg tablet, Take 1,000 mg by mouth 2 (two) times a day with meals, Disp: , Rfl:     metoprolol tartrate (LOPRESSOR) 25 mg tablet, Take 25 mg by mouth every 12 (twelve) hours, Disp: , Rfl:     MICROLET LANCETS MISC, , Disp: , Rfl: 0    sitaGLIPtin (JANUVIA) 50 mg tablet, Take 50 mg by mouth daily, Disp: , Rfl:     warfarin (COUMADIN) 5 mg tablet, Take 5 mg by mouth daily, Disp: , Rfl:     diazepam (VALIUM) 5 mg tablet, Take 1 tablet (5 mg total) by mouth 2 (two) times a day as needed for muscle spasms for up to 10 days, Disp: 10 tablet, Rfl: 0    doxepin (SINEquan) 10 mg capsule, Take 10 mg by mouth daily, Disp: , Rfl: 0    ibuprofen (MOTRIN) 400 mg tablet, Take 1 tablet (400 mg total) by mouth every 8 (eight) hours as needed for moderate pain (Patient not taking: Reported on 6/5/2019), Disp: 15 tablet, Rfl: 0    Lab Results   Component Value Date     04/18/2018    SODIUM 141 06/14/2019    K 4 1 06/14/2019     06/14/2019    CO2 27 06/14/2019    ANIONGAP 11 2 04/18/2018    AGAP 9 06/14/2019    BUN 21 06/14/2019    CREATININE 1 08 06/14/2019    GLUF 153 (H) 06/14/2019    CALCIUM 9 2 06/14/2019    AST 18 06/14/2019    ALT 13 06/14/2019    ALKPHOS 94 06/14/2019    PROT 6 7 04/18/2018    TP 6 9 06/14/2019    BILITOT 0 4 04/18/2018    TBILI 0 40 06/14/2019    EGFR 47 06/14/2019     Lab Results   Component Value Date    WBC 7 10 06/14/2019    HGB 15 8 (H) 06/14/2019    HCT 47 1 (H) 06/14/2019    MCV 96 06/14/2019     06/14/2019     Lab Results   Component Value Date    CHOLESTEROL 146 03/26/2019    CHOLESTEROL 175 10/08/2018    CHOLESTEROL 171 07/18/2018     Lab Results   Component Value Date    HDL 56 03/26/2019    HDL 67 (H) 10/08/2018    HDL 66 (H) 07/18/2018     Lab Results   Component Value Date    LDLCALC 69 03/26/2019    LDLCALC 79 10/08/2018    LDLCALC 79 07/18/2018     Lab Results   Component Value Date    TRIG 105 03/26/2019    TRIG 146 10/08/2018    TRIG 132 07/18/2018     No results found for: CHOLHDL  Lab Results   Component Value Date    OEN0YUFHXSXX 3 330 06/14/2019     Lab Results   Component Value Date    CALCIUM 9 2 06/14/2019     No results found for: SPEP, UPEP  No results found for: MICROALBUR, [id number]     mg/g  Objective:      /80 (BP Location: Right arm, Patient Position: Sitting, Cuff Size: Standard)   Pulse 80   Ht 5' 5" (1 651 m)   Wt 68 9 kg (152 lb)   BMI 25 29 kg/m²          Physical Exam   Constitutional: She is oriented to person, place, and time  She appears well-developed and well-nourished  No distress  HENT:   Head: Normocephalic  Right Ear: External ear normal    Left Ear: External ear normal    Nose: Nose normal    Mouth/Throat: Oropharynx is clear and moist    Eyes: Pupils are equal, round, and reactive to light  Conjunctivae are normal    Cardiovascular: Normal rate and normal heart sounds  No murmur heard  Has atrial fibrillation    Pulmonary/Chest: Effort normal and breath sounds normal  No respiratory distress  She has no wheezes  She has no rales  Abdominal: Soft  Bowel sounds are normal  She exhibits no distension  There is no tenderness  There is no rebound and no guarding  Musculoskeletal: Normal range of motion  She exhibits no edema  Severe right sided back pain   Neurological: She is alert and oriented to person, place, and time  Skin: Skin is warm and dry  Capillary refill takes less than 2 seconds  She is not diaphoretic  Psychiatric: She has a normal mood and affect

## 2019-10-21 ENCOUNTER — APPOINTMENT (OUTPATIENT)
Dept: LAB | Facility: HOSPITAL | Age: 84
End: 2019-10-21
Attending: INTERNAL MEDICINE
Payer: MEDICARE

## 2019-10-21 ENCOUNTER — TRANSCRIBE ORDERS (OUTPATIENT)
Dept: ADMINISTRATIVE | Facility: HOSPITAL | Age: 84
End: 2019-10-21

## 2019-10-21 DIAGNOSIS — I48.91 ATRIAL FIBRILLATION, UNSPECIFIED TYPE (HCC): Primary | ICD-10-CM

## 2019-10-21 DIAGNOSIS — I48.91 ATRIAL FIBRILLATION, UNSPECIFIED TYPE (HCC): ICD-10-CM

## 2019-10-21 LAB
INR PPP: 1.66 (ref 0.84–1.19)
PROTHROMBIN TIME: 19.1 SECONDS (ref 11.6–14.5)

## 2019-10-21 PROCEDURE — 85610 PROTHROMBIN TIME: CPT

## 2019-10-21 PROCEDURE — 36415 COLL VENOUS BLD VENIPUNCTURE: CPT

## 2019-10-22 ENCOUNTER — TELEPHONE (OUTPATIENT)
Dept: NEPHROLOGY | Facility: CLINIC | Age: 84
End: 2019-10-22

## 2019-10-22 NOTE — TELEPHONE ENCOUNTER
----- Message from Melquiades Webber MD sent at 10/22/2019 12:48 PM EDT -----  INR 1 66 - how much coumadin is she on

## 2019-10-24 ENCOUNTER — ANTICOAG VISIT (OUTPATIENT)
Dept: NEPHROLOGY | Facility: CLINIC | Age: 84
End: 2019-10-24

## 2019-10-24 DIAGNOSIS — Z86.711 HISTORY OF PULMONARY EMBOLISM: Primary | ICD-10-CM

## 2019-10-24 LAB — INR PPP: 1.66 (ref 0.84–1.19)

## 2019-10-30 ENCOUNTER — APPOINTMENT (OUTPATIENT)
Dept: LAB | Facility: HOSPITAL | Age: 84
End: 2019-10-30
Attending: INTERNAL MEDICINE
Payer: MEDICARE

## 2019-10-30 ENCOUNTER — TRANSCRIBE ORDERS (OUTPATIENT)
Dept: ADMINISTRATIVE | Facility: HOSPITAL | Age: 84
End: 2019-10-30

## 2019-10-30 DIAGNOSIS — I48.91 ATRIAL FIBRILLATION, UNSPECIFIED TYPE (HCC): ICD-10-CM

## 2019-10-30 DIAGNOSIS — I48.91 ATRIAL FIBRILLATION, UNSPECIFIED TYPE (HCC): Primary | ICD-10-CM

## 2019-10-30 LAB
INR PPP: 1.98 (ref 0.84–1.19)
PROTHROMBIN TIME: 22 SECONDS (ref 11.6–14.5)

## 2019-10-30 PROCEDURE — 85610 PROTHROMBIN TIME: CPT

## 2019-10-30 PROCEDURE — 36415 COLL VENOUS BLD VENIPUNCTURE: CPT

## 2019-10-31 ENCOUNTER — TELEPHONE (OUTPATIENT)
Dept: NEPHROLOGY | Facility: CLINIC | Age: 84
End: 2019-10-31

## 2019-10-31 NOTE — TELEPHONE ENCOUNTER
----- Message from Brandon Toure MD sent at 10/31/2019 10:29 AM EDT -----  Do we control her warfarin?  How muc is she on

## 2019-11-01 ENCOUNTER — ANTICOAG VISIT (OUTPATIENT)
Dept: NEPHROLOGY | Facility: CLINIC | Age: 84
End: 2019-11-01

## 2019-11-01 LAB — INR PPP: 1.98 (ref 0.84–1.19)

## 2019-11-08 ENCOUNTER — TRANSCRIBE ORDERS (OUTPATIENT)
Dept: ADMINISTRATIVE | Facility: HOSPITAL | Age: 84
End: 2019-11-08

## 2019-11-08 ENCOUNTER — APPOINTMENT (OUTPATIENT)
Dept: LAB | Facility: HOSPITAL | Age: 84
End: 2019-11-08
Attending: INTERNAL MEDICINE
Payer: MEDICARE

## 2019-11-08 DIAGNOSIS — I48.91 ATRIAL FIBRILLATION, UNSPECIFIED TYPE (HCC): Primary | ICD-10-CM

## 2019-11-08 DIAGNOSIS — I48.91 ATRIAL FIBRILLATION, UNSPECIFIED TYPE (HCC): ICD-10-CM

## 2019-11-08 LAB
INR PPP: 2.41 (ref 0.84–1.19)
PROTHROMBIN TIME: 25.7 SECONDS (ref 11.6–14.5)

## 2019-11-08 PROCEDURE — 85610 PROTHROMBIN TIME: CPT

## 2019-11-08 PROCEDURE — 36415 COLL VENOUS BLD VENIPUNCTURE: CPT

## 2019-11-12 ENCOUNTER — ANTICOAG VISIT (OUTPATIENT)
Dept: NEPHROLOGY | Facility: CLINIC | Age: 84
End: 2019-11-12

## 2019-11-12 LAB — INR PPP: 2.41 (ref 0.84–1.19)

## 2019-11-30 ENCOUNTER — TRANSCRIBE ORDERS (OUTPATIENT)
Dept: URGENT CARE | Facility: HOSPITAL | Age: 84
End: 2019-11-30

## 2019-11-30 ENCOUNTER — LAB (OUTPATIENT)
Dept: LAB | Facility: HOSPITAL | Age: 84
End: 2019-11-30
Attending: INTERNAL MEDICINE
Payer: MEDICARE

## 2019-11-30 DIAGNOSIS — I48.91 ATRIAL FIBRILLATION, UNSPECIFIED TYPE (HCC): Primary | ICD-10-CM

## 2019-11-30 DIAGNOSIS — I48.91 ATRIAL FIBRILLATION, UNSPECIFIED TYPE (HCC): ICD-10-CM

## 2019-11-30 LAB
INR PPP: 2.91 (ref 0.84–1.19)
PROTHROMBIN TIME: 29.9 SECONDS (ref 11.6–14.5)

## 2019-11-30 PROCEDURE — 85610 PROTHROMBIN TIME: CPT

## 2019-11-30 PROCEDURE — 36415 COLL VENOUS BLD VENIPUNCTURE: CPT

## 2019-12-02 ENCOUNTER — ANTICOAG VISIT (OUTPATIENT)
Dept: NEPHROLOGY | Facility: CLINIC | Age: 84
End: 2019-12-02

## 2019-12-02 LAB — INR PPP: 2.91 (ref 0.84–1.19)

## 2019-12-20 ENCOUNTER — APPOINTMENT (OUTPATIENT)
Dept: LAB | Facility: HOSPITAL | Age: 84
End: 2019-12-20
Attending: INTERNAL MEDICINE
Payer: MEDICARE

## 2019-12-20 ENCOUNTER — TRANSCRIBE ORDERS (OUTPATIENT)
Dept: ADMINISTRATIVE | Facility: HOSPITAL | Age: 84
End: 2019-12-20

## 2019-12-20 DIAGNOSIS — I48.91 ATRIAL FIBRILLATION, UNSPECIFIED TYPE (HCC): ICD-10-CM

## 2019-12-20 DIAGNOSIS — I48.91 ATRIAL FIBRILLATION, UNSPECIFIED TYPE (HCC): Primary | ICD-10-CM

## 2019-12-20 LAB
INR PPP: 2.07 (ref 0.84–1.19)
PROTHROMBIN TIME: 22.8 SECONDS (ref 11.6–14.5)

## 2019-12-20 PROCEDURE — 36415 COLL VENOUS BLD VENIPUNCTURE: CPT

## 2019-12-20 PROCEDURE — 85610 PROTHROMBIN TIME: CPT

## 2019-12-23 ENCOUNTER — ANTICOAG VISIT (OUTPATIENT)
Dept: NEPHROLOGY | Facility: CLINIC | Age: 84
End: 2019-12-23

## 2019-12-30 ENCOUNTER — TRANSCRIBE ORDERS (OUTPATIENT)
Dept: ADMINISTRATIVE | Facility: HOSPITAL | Age: 84
End: 2019-12-30

## 2019-12-30 ENCOUNTER — APPOINTMENT (OUTPATIENT)
Dept: LAB | Facility: HOSPITAL | Age: 84
End: 2019-12-30
Attending: INTERNAL MEDICINE
Payer: MEDICARE

## 2019-12-30 DIAGNOSIS — I48.91 ATRIAL FIBRILLATION, UNSPECIFIED TYPE (HCC): ICD-10-CM

## 2019-12-30 DIAGNOSIS — I48.91 ATRIAL FIBRILLATION, UNSPECIFIED TYPE (HCC): Primary | ICD-10-CM

## 2019-12-30 LAB
INR PPP: 2.77 (ref 0.84–1.19)
PROTHROMBIN TIME: 28.7 SECONDS (ref 11.6–14.5)

## 2019-12-30 PROCEDURE — 85610 PROTHROMBIN TIME: CPT

## 2019-12-30 PROCEDURE — 36415 COLL VENOUS BLD VENIPUNCTURE: CPT

## 2019-12-31 ENCOUNTER — ANTICOAG VISIT (OUTPATIENT)
Dept: NEPHROLOGY | Facility: CLINIC | Age: 84
End: 2019-12-31

## 2020-01-07 ENCOUNTER — TRANSCRIBE ORDERS (OUTPATIENT)
Dept: ADMINISTRATIVE | Facility: HOSPITAL | Age: 85
End: 2020-01-07

## 2020-01-07 ENCOUNTER — APPOINTMENT (OUTPATIENT)
Dept: LAB | Facility: HOSPITAL | Age: 85
End: 2020-01-07
Attending: INTERNAL MEDICINE
Payer: MEDICARE

## 2020-01-07 DIAGNOSIS — I48.91 ATRIAL FIBRILLATION, UNSPECIFIED TYPE (HCC): Primary | ICD-10-CM

## 2020-01-07 DIAGNOSIS — I48.91 ATRIAL FIBRILLATION, UNSPECIFIED TYPE (HCC): ICD-10-CM

## 2020-01-07 LAB
INR PPP: 2.96 (ref 0.84–1.19)
PROTHROMBIN TIME: 30.3 SECONDS (ref 11.6–14.5)

## 2020-01-07 PROCEDURE — 85610 PROTHROMBIN TIME: CPT

## 2020-01-07 PROCEDURE — 36415 COLL VENOUS BLD VENIPUNCTURE: CPT

## 2020-01-08 ENCOUNTER — TELEPHONE (OUTPATIENT)
Dept: NEPHROLOGY | Facility: CLINIC | Age: 85
End: 2020-01-08

## 2020-01-09 ENCOUNTER — ANTICOAG VISIT (OUTPATIENT)
Dept: NEPHROLOGY | Facility: CLINIC | Age: 85
End: 2020-01-09

## 2020-01-09 LAB — INR PPP: 2.96 (ref 0.84–1.19)

## 2020-01-16 ENCOUNTER — TRANSCRIBE ORDERS (OUTPATIENT)
Dept: ADMINISTRATIVE | Facility: HOSPITAL | Age: 85
End: 2020-01-16

## 2020-01-16 ENCOUNTER — APPOINTMENT (OUTPATIENT)
Dept: LAB | Facility: HOSPITAL | Age: 85
End: 2020-01-16
Attending: INTERNAL MEDICINE
Payer: MEDICARE

## 2020-01-16 DIAGNOSIS — I48.91 ATRIAL FIBRILLATION, UNSPECIFIED TYPE (HCC): ICD-10-CM

## 2020-01-16 DIAGNOSIS — I48.91 ATRIAL FIBRILLATION, UNSPECIFIED TYPE (HCC): Primary | ICD-10-CM

## 2020-01-16 LAB
INR PPP: 4.1 (ref 0.84–1.19)
PROTHROMBIN TIME: 39.2 SECONDS (ref 11.6–14.5)

## 2020-01-16 PROCEDURE — 36415 COLL VENOUS BLD VENIPUNCTURE: CPT

## 2020-01-16 PROCEDURE — 85610 PROTHROMBIN TIME: CPT

## 2020-01-17 ENCOUNTER — ANTICOAG VISIT (OUTPATIENT)
Dept: NEPHROLOGY | Facility: CLINIC | Age: 85
End: 2020-01-17

## 2020-01-24 ENCOUNTER — APPOINTMENT (OUTPATIENT)
Dept: LAB | Facility: HOSPITAL | Age: 85
End: 2020-01-24
Attending: INTERNAL MEDICINE
Payer: MEDICARE

## 2020-01-24 ENCOUNTER — TRANSCRIBE ORDERS (OUTPATIENT)
Dept: ADMINISTRATIVE | Facility: HOSPITAL | Age: 85
End: 2020-01-24

## 2020-01-24 DIAGNOSIS — I48.91 ATRIAL FIBRILLATION, UNSPECIFIED TYPE (HCC): Primary | ICD-10-CM

## 2020-01-24 DIAGNOSIS — I48.91 ATRIAL FIBRILLATION, UNSPECIFIED TYPE (HCC): ICD-10-CM

## 2020-01-24 LAB
INR PPP: 1.56 (ref 0.84–1.19)
PROTHROMBIN TIME: 18.2 SECONDS (ref 11.6–14.5)

## 2020-01-24 PROCEDURE — 85610 PROTHROMBIN TIME: CPT

## 2020-01-24 PROCEDURE — 36415 COLL VENOUS BLD VENIPUNCTURE: CPT

## 2020-01-27 LAB — INR PPP: 1.56 (ref 0.84–1.19)

## 2020-01-28 ENCOUNTER — ANTICOAG VISIT (OUTPATIENT)
Dept: NEPHROLOGY | Facility: CLINIC | Age: 85
End: 2020-01-28

## 2020-02-04 ENCOUNTER — TRANSCRIBE ORDERS (OUTPATIENT)
Dept: URGENT CARE | Facility: CLINIC | Age: 85
End: 2020-02-04

## 2020-02-04 ENCOUNTER — APPOINTMENT (OUTPATIENT)
Dept: LAB | Facility: CLINIC | Age: 85
End: 2020-02-04
Payer: MEDICARE

## 2020-02-04 DIAGNOSIS — I48.91 ATRIAL FIBRILLATION, UNSPECIFIED TYPE (HCC): Primary | ICD-10-CM

## 2020-02-04 DIAGNOSIS — I48.91 ATRIAL FIBRILLATION, UNSPECIFIED TYPE (HCC): ICD-10-CM

## 2020-02-04 LAB
INR PPP: 2.35 (ref 0.84–1.19)
PROTHROMBIN TIME: 25.2 SECONDS (ref 11.6–14.5)

## 2020-02-04 PROCEDURE — 85610 PROTHROMBIN TIME: CPT

## 2020-02-04 PROCEDURE — 36415 COLL VENOUS BLD VENIPUNCTURE: CPT

## 2020-02-05 ENCOUNTER — ANTICOAG VISIT (OUTPATIENT)
Dept: NEPHROLOGY | Facility: CLINIC | Age: 85
End: 2020-02-05

## 2020-02-05 LAB — INR PPP: 2.35 (ref 0.84–1.19)

## 2020-02-19 ENCOUNTER — TRANSCRIBE ORDERS (OUTPATIENT)
Dept: URGENT CARE | Facility: CLINIC | Age: 85
End: 2020-02-19

## 2020-02-19 ENCOUNTER — APPOINTMENT (OUTPATIENT)
Dept: LAB | Facility: CLINIC | Age: 85
End: 2020-02-19
Payer: MEDICARE

## 2020-02-19 DIAGNOSIS — I48.91 ATRIAL FIBRILLATION, UNSPECIFIED TYPE (HCC): ICD-10-CM

## 2020-02-19 DIAGNOSIS — I48.91 ATRIAL FIBRILLATION, UNSPECIFIED TYPE (HCC): Primary | ICD-10-CM

## 2020-02-19 LAB
INR PPP: 1.82 (ref 0.84–1.19)
PROTHROMBIN TIME: 20.6 SECONDS (ref 11.6–14.5)

## 2020-02-19 PROCEDURE — 85610 PROTHROMBIN TIME: CPT

## 2020-02-19 PROCEDURE — 36415 COLL VENOUS BLD VENIPUNCTURE: CPT

## 2020-02-20 ENCOUNTER — ANTICOAG VISIT (OUTPATIENT)
Dept: NEPHROLOGY | Facility: CLINIC | Age: 85
End: 2020-02-20

## 2020-02-20 DIAGNOSIS — E11.69 TYPE 2 DIABETES MELLITUS WITH OTHER SPECIFIED COMPLICATION, WITHOUT LONG-TERM CURRENT USE OF INSULIN (HCC): ICD-10-CM

## 2020-02-20 RX ORDER — GLIPIZIDE 5 MG/1
2.5 TABLET ORAL DAILY
Qty: 90 TABLET | Refills: 1 | Status: SHIPPED | OUTPATIENT
Start: 2020-02-20 | End: 2020-11-10

## 2020-02-27 ENCOUNTER — APPOINTMENT (OUTPATIENT)
Dept: LAB | Facility: CLINIC | Age: 85
End: 2020-02-27
Payer: MEDICARE

## 2020-02-27 ENCOUNTER — TRANSCRIBE ORDERS (OUTPATIENT)
Dept: URGENT CARE | Facility: CLINIC | Age: 85
End: 2020-02-27

## 2020-02-27 DIAGNOSIS — I48.91 ATRIAL FIBRILLATION, UNSPECIFIED TYPE (HCC): ICD-10-CM

## 2020-02-27 DIAGNOSIS — I48.91 ATRIAL FIBRILLATION, UNSPECIFIED TYPE (HCC): Primary | ICD-10-CM

## 2020-02-27 LAB
INR PPP: 1.69 (ref 0.84–1.19)
PROTHROMBIN TIME: 19.4 SECONDS (ref 11.6–14.5)

## 2020-02-27 PROCEDURE — 36415 COLL VENOUS BLD VENIPUNCTURE: CPT

## 2020-02-27 PROCEDURE — 85610 PROTHROMBIN TIME: CPT

## 2020-02-28 ENCOUNTER — OFFICE VISIT (OUTPATIENT)
Dept: NEPHROLOGY | Facility: CLINIC | Age: 85
End: 2020-02-28
Payer: MEDICARE

## 2020-02-28 ENCOUNTER — ANTICOAG VISIT (OUTPATIENT)
Dept: NEPHROLOGY | Facility: CLINIC | Age: 85
End: 2020-02-28

## 2020-02-28 VITALS
HEART RATE: 60 BPM | OXYGEN SATURATION: 98 % | WEIGHT: 156 LBS | DIASTOLIC BLOOD PRESSURE: 74 MMHG | HEIGHT: 65 IN | SYSTOLIC BLOOD PRESSURE: 116 MMHG | BODY MASS INDEX: 25.99 KG/M2

## 2020-02-28 DIAGNOSIS — M48.54XS COMPRESSION FRACTURE OF THORACIC SPINE, NON-TRAUMATIC, SEQUELA: ICD-10-CM

## 2020-02-28 DIAGNOSIS — I10 ESSENTIAL HYPERTENSION: ICD-10-CM

## 2020-02-28 DIAGNOSIS — Z86.711 HISTORY OF PULMONARY EMBOLISM: Primary | ICD-10-CM

## 2020-02-28 DIAGNOSIS — E55.9 VITAMIN D DEFICIENCY: ICD-10-CM

## 2020-02-28 DIAGNOSIS — E78.00 PURE HYPERCHOLESTEROLEMIA: ICD-10-CM

## 2020-02-28 DIAGNOSIS — N18.30 CHRONIC KIDNEY DISEASE, STAGE 3 (MODERATE): ICD-10-CM

## 2020-02-28 DIAGNOSIS — E11.9 TYPE 2 DIABETES MELLITUS WITHOUT COMPLICATION, WITHOUT LONG-TERM CURRENT USE OF INSULIN (HCC): ICD-10-CM

## 2020-02-28 DIAGNOSIS — I48.0 PAROXYSMAL ATRIAL FIBRILLATION (HCC): ICD-10-CM

## 2020-02-28 LAB — INR PPP: 1.69 (ref 0.84–1.19)

## 2020-02-28 PROCEDURE — 99215 OFFICE O/P EST HI 40 MIN: CPT | Performed by: INTERNAL MEDICINE

## 2020-02-28 NOTE — PROGRESS NOTES
[facility] [place], [place]    Name: Khurram Wolf  YOB: 1933      Assessment/Plan:  Kidney function was at 47% - Will check labs - If the function is < 45 will need to stop metformin  Blood pressure is fine  Has an old T11 compression fracture - will obtain an xray of the thoracici spine  Full labs ordered  Heart sounds regular today  Diabetes - will be assessed with an A1c           Problem List Items Addressed This Visit        Endocrine    Type 2 diabetes mellitus without complication ([address] )       Cardiovascular and Mediastinum    Paroxysmal atrial fibrillation ([address] )    Essential hypertension       Musculoskeletal and Integument    Compression fracture of thoracic spine, non-traumatic, sequela       Genitourinary    Chronic kidney disease, stage 3 (moderate) (HCC)       Other    History of pulmonary embolism - Primary            Subjective:      Patient ID: Khurram Wolf is a 80 y o  female  HPI  Has CKD 3 and diabetes  She is using metformin which gives her intermittent diarrhea  Has hypertension, dyslipidemia and hypothyroidism  She had fallen and was told that T11 was abnormal   She has abdominal pain on standing and walking around,  She needs to lie down and it goes away  She has a t11 compression fracture which had increased  In April 2019    The following portions of the patient's history were reviewed and updated as appropriate: allergies, current medications, past family history, past medical history, past social history, past surgical history and problem list     Review of Systems   Constitutional: Positive for fatigue  Negative for activity change, appetite change and unexpected weight change  HENT: Positive for rhinorrhea and sinus pain  Negative for congestion, ear discharge, ear pain, hearing loss, sinus pressure, sore throat, trouble swallowing and voice change  Eyes: Positive for visual disturbance  Negative for pain and discharge  Has diplopia of the left eye  She had a remote history of cataract extraction  Respiratory: Negative for cough, chest tightness, shortness of breath and wheezing  Cardiovascular: Negative for chest pain, palpitations and leg swelling  Gastrointestinal: Negative for abdominal distention, abdominal pain, blood in stool, constipation, diarrhea, nausea and vomiting  Endocrine: Negative for heat intolerance, polydipsia, polyphagia and polyuria  Genitourinary: Negative for decreased urine volume, difficulty urinating, frequency and urgency  Musculoskeletal: Positive for arthralgias, gait problem and myalgias  Negative for back pain  Skin: Negative for color change and rash  Neurological: Positive for headaches  Negative for dizziness and weakness  Hematological: Negative for adenopathy  Does not bruise/bleed easily  Psychiatric/Behavioral: Negative for agitation           Social History     Socioeconomic History    Marital status: /Civil Union     Spouse name: None    Number of children: None    Years of education: None    Highest education level: None   Occupational History    Occupation: Retired    Social Needs    Financial resource strain: None    Food insecurity:     Worry: None     Inability: None    Transportation needs:     Medical: None     Non-medical: None   Tobacco Use    Smoking status: Former Smoker     Types: Cigarettes     Last attempt to quit: 3/19/2001     Years since quittin 9    Smokeless tobacco: Never Used   Substance and Sexual Activity    Alcohol use: Never     Frequency: Never    Drug use: Never    Sexual activity: Not Currently   Lifestyle    Physical activity:     Days per week: None     Minutes per session: None    Stress: None   Relationships    Social connections:     Talks on phone: None     Gets together: None     Attends Voodoo service: None     Active member of club or organization: None     Attends meetings of clubs or organizations: None     Relationship status: None    Intimate partner violence:     Fear of current or ex partner: None     Emotionally abused: None     Physically abused: None     Forced sexual activity: None   Other Topics Concern    None   Social History Narrative    Rarely consumes alcohol - As per [employer]    Consumes on average 2 cups of regular coffee per day    Consumes on average 12 oz of soda per day     Past Medical History:   Diagnosis Date    Abnormal gait     Accidental fall from chair, subsequent encounter     Atrial fibrillation ([place] [address] )     Benign essential hypertension     Bite of animal     Cataract     Cellulitis     Chronic kidney disease, stage 3 ([place] [address] )     Colon cancer ([place] [address] )     Current tear of lateral cartilage or meniscus of knee     Diabetes mellitus ([place] [address] )     Disease of thyroid gland     Disorder of magnesium metabolism     Dvt femoral (deep venous thrombosis)     Embolism from thrombosis of vein of distal end of lower extremity     Essential hypertension     Fall     Hyperlipidemia     Hyperlipidemia     Hypertension     Hypothyroidism     Insomnia     Knee joint effusion     Leukocytosis     Malaise and fatigue     MI (myocardial infarction) ([place] [address] )     Hospitalization     Orbital hemorrhage     Other sleep disorders     PAF (paroxysmal atrial fibrillation) (HCC)     Presence of vena cava filter     Pulmonary embolism (HCC)     Tear film insufficiency     Type 2 diabetes mellitus ([place] [address] )     Unspecified osteoarthritis, unspecified site     Weight decreased      Past Surgical History:   Procedure Laterality Date    ABDOMINAL ADHESION SURGERY  2015    Had wound vac    ABDOMINAL SURGERY      APPENDECTOMY      CATARACT EXTRACTION      CHOLECYSTECTOMY      COLON SURGERY      COLOSTOMY  2011    Due to Ileus, then reanastomosis in 2002    EXCISION BLADDER MESH TRANSVESICLEPORT W/ LASER      HERNIA REPAIR      IVC FILTER INSERTION      KNEE SURGERY  2014 repair        Current Outpatient Medications:     acetaminophen (TYLENOL) 500 mg tablet, Take 500 mg by mouth every 6 (six) hours as needed, Disp: , Rfl:     CONTOUR NEXT TEST test strip, , Disp: , Rfl: 0    glipiZIDE (GLUCOTROL) 5 mg tablet, Take 0 5 tablets (2 5 mg total) by mouth daily, Disp: 90 tablet, Rfl: 1    levothyroxine 50 mcg tablet, Take 1 tablet (50 mcg total) by mouth daily, Disp: 90 tablet, Rfl: 3    lovastatin (MEVACOR) 20 mg tablet, Take 1 tablet (20 mg total) by mouth daily at bedtime, Disp: 90 tablet, Rfl: 3    metFORMIN (GLUCOPHAGE) 500 mg tablet, Take 1 tablet (500 mg total) by mouth 2 (two) times a day with meals, Disp: 180 tablet, Rfl: 3    metoprolol tartrate (LOPRESSOR) 25 mg tablet, Take 1 tablet (25 mg total) by mouth every 12 (twelve) hours, Disp: 90 tablet, Rfl: 3    MICROLET LANCETS MISC, , Disp: , Rfl: 0    sitaGLIPtin (JANUVIA) 50 mg tablet, Take 1 tablet (50 mg total) by mouth daily, Disp: 90 tablet, Rfl: 3    warfarin (COUMADIN) 5 mg tablet, Take 1 tablet (5 mg total) by mouth daily, Disp: 90 tablet, Rfl: 3    Lab Results   Component Value Date     04/18/2018    SODIUM 141 06/14/2019    K 4 1 06/14/2019     06/14/2019    CO2 27 06/14/2019    ANIONGAP 11 2 04/18/2018    AGAP 9 06/14/2019    BUN 21 06/14/2019    CREATININE 1 08 06/14/2019    GLUF 153 (H) 06/14/2019    CALCIUM 9 2 06/14/2019    AST 18 06/14/2019    ALT 13 06/14/2019    ALKPHOS 94 06/14/2019    PROT 6 7 04/18/2018    TP 6 9 06/14/2019    BILITOT 0 4 04/18/2018    TBILI 0 40 06/14/2019    EGFR 47 06/14/2019     Lab Results   Component Value Date    WBC 7 10 06/14/2019    HGB 15 8 (H) 06/14/2019    HCT 47 1 (H) 06/14/2019    MCV 96 06/14/2019     06/14/2019     Lab Results   Component Value Date    CHOLESTEROL 146 03/26/2019    CHOLESTEROL 175 10/08/2018    CHOLESTEROL 171 07/18/2018     Lab Results   Component Value Date    HDL 56 03/26/2019    HDL 67 (H) 10/08/2018    HDL 66 (H) 07/18/2018 Lab Results   Component Value Date    LDLCALC 69 03/26/2019    LDLCALC 79 10/08/2018    LDLCALC 79 07/18/2018     Lab Results   Component Value Date    TRIG 105 03/26/2019    TRIG 146 10/08/2018    TRIG 132 07/18/2018     No results found for: CHOLHDL  Lab Results   Component Value Date    [id number] 3 330 06/14/2019     Lab Results   Component Value Date    CALCIUM 9 2 06/14/2019     No results found for: SPEP, UPEP  No results found for: KEISHAALBUR, [id number]        Objective:      /74 (BP Location: Right arm, Patient Position: Sitting, Cuff Size: Standard)   Pulse 60   Ht 5' 5" (1 651 m)   Wt 70 8 kg (156 lb)   SpO2 98%   BMI 25 96 kg/m²          Physical Exam   Constitutional: She is oriented to person, place, and time  She appears well-developed and well-nourished  No distress  HENT:   Head: Normocephalic  Right Ear: External ear normal    Left Ear: External ear normal    Nose: Nose normal    Mouth/Throat: Oropharynx is clear and moist    Eyes: Pupils are equal, round, and reactive to light  Conjunctivae are normal    Cardiovascular: Normal rate, regular rhythm and normal heart sounds  No murmur heard  Pulmonary/Chest: Effort normal and breath sounds normal  No respiratory distress  She has no wheezes  She has no rales  Abdominal: Soft  Bowel sounds are normal  She exhibits no distension  There is no tenderness  There is no rebound and no guarding  Musculoskeletal: Normal range of motion  She exhibits no edema  Pain on getting on and off the table  Neurological: She is alert and oriented to person, place, and time  Skin: Skin is warm and dry  Capillary refill takes less than 2 seconds  She is not diaphoretic  Psychiatric: She has a normal mood and affect

## 2020-03-06 ENCOUNTER — APPOINTMENT (OUTPATIENT)
Dept: LAB | Facility: CLINIC | Age: 85
End: 2020-03-06
Payer: MEDICARE

## 2020-03-06 ENCOUNTER — APPOINTMENT (OUTPATIENT)
Dept: RADIOLOGY | Facility: CLINIC | Age: 85
End: 2020-03-06
Payer: MEDICARE

## 2020-03-06 ENCOUNTER — TRANSCRIBE ORDERS (OUTPATIENT)
Dept: URGENT CARE | Facility: CLINIC | Age: 85
End: 2020-03-06

## 2020-03-06 DIAGNOSIS — E11.9 TYPE 2 DIABETES MELLITUS WITHOUT COMPLICATION, WITHOUT LONG-TERM CURRENT USE OF INSULIN (HCC): ICD-10-CM

## 2020-03-06 DIAGNOSIS — E78.00 PURE HYPERCHOLESTEROLEMIA: ICD-10-CM

## 2020-03-06 DIAGNOSIS — E55.9 VITAMIN D DEFICIENCY: ICD-10-CM

## 2020-03-06 DIAGNOSIS — I48.91 ATRIAL FIBRILLATION, UNSPECIFIED TYPE (HCC): Primary | ICD-10-CM

## 2020-03-06 DIAGNOSIS — N18.30 CHRONIC KIDNEY DISEASE, STAGE 3 (MODERATE): ICD-10-CM

## 2020-03-06 DIAGNOSIS — I48.91 ATRIAL FIBRILLATION, UNSPECIFIED TYPE (HCC): ICD-10-CM

## 2020-03-06 DIAGNOSIS — M48.54XS COMPRESSION FRACTURE OF THORACIC SPINE, NON-TRAUMATIC, SEQUELA: ICD-10-CM

## 2020-03-06 LAB
25(OH)D3 SERPL-MCNC: 45.9 NG/ML (ref 30–100)
ALBUMIN SERPL BCP-MCNC: 3.8 G/DL (ref 3.5–5)
ALP SERPL-CCNC: 76 U/L (ref 46–116)
ALT SERPL W P-5'-P-CCNC: 27 U/L (ref 12–78)
ANION GAP SERPL CALCULATED.3IONS-SCNC: 4 MMOL/L (ref 4–13)
AST SERPL W P-5'-P-CCNC: 24 U/L (ref 5–45)
BASOPHILS # BLD AUTO: 0.05 THOUSANDS/ΜL (ref 0–0.1)
BASOPHILS NFR BLD AUTO: 1 % (ref 0–1)
BILIRUB SERPL-MCNC: 0.44 MG/DL (ref 0.2–1)
BUN SERPL-MCNC: 22 MG/DL (ref 5–25)
CALCIUM SERPL-MCNC: 8.6 MG/DL (ref 8.3–10.1)
CHLORIDE SERPL-SCNC: 107 MMOL/L (ref 100–108)
CHOLEST SERPL-MCNC: 201 MG/DL (ref 50–200)
CO2 SERPL-SCNC: 29 MMOL/L (ref 21–32)
CREAT SERPL-MCNC: 1.12 MG/DL (ref 0.6–1.3)
CREAT UR-MCNC: 143 MG/DL
CREAT UR-MCNC: 143 MG/DL
EOSINOPHIL # BLD AUTO: 0.05 THOUSAND/ΜL (ref 0–0.61)
EOSINOPHIL NFR BLD AUTO: 1 % (ref 0–6)
ERYTHROCYTE [DISTWIDTH] IN BLOOD BY AUTOMATED COUNT: 14.7 % (ref 11.6–15.1)
EST. AVERAGE GLUCOSE BLD GHB EST-MCNC: 140 MG/DL
GFR SERPL CREATININE-BSD FRML MDRD: 45 ML/MIN/1.73SQ M
GLUCOSE P FAST SERPL-MCNC: 125 MG/DL (ref 65–99)
HBA1C MFR BLD: 6.5 %
HCT VFR BLD AUTO: 48.1 % (ref 34.8–46.1)
HDLC SERPL-MCNC: 80 MG/DL
HGB BLD-MCNC: 15 G/DL (ref 11.5–15.4)
IMM GRANULOCYTES # BLD AUTO: 0.01 THOUSAND/UL (ref 0–0.2)
IMM GRANULOCYTES NFR BLD AUTO: 0 % (ref 0–2)
INR PPP: 1.89 (ref 0.84–1.19)
LDLC SERPL CALC-MCNC: 90 MG/DL (ref 0–100)
LYMPHOCYTES # BLD AUTO: 1.33 THOUSANDS/ΜL (ref 0.6–4.47)
LYMPHOCYTES NFR BLD AUTO: 19 % (ref 14–44)
MCH RBC QN AUTO: 31.1 PG (ref 26.8–34.3)
MCHC RBC AUTO-ENTMCNC: 31.2 G/DL (ref 31.4–37.4)
MCV RBC AUTO: 100 FL (ref 82–98)
MICROALBUMIN UR-MCNC: 115 MG/L (ref 0–20)
MICROALBUMIN/CREAT 24H UR: 80 MG/G CREATININE (ref 0–30)
MONOCYTES # BLD AUTO: 0.48 THOUSAND/ΜL (ref 0.17–1.22)
MONOCYTES NFR BLD AUTO: 7 % (ref 4–12)
NEUTROPHILS # BLD AUTO: 5.01 THOUSANDS/ΜL (ref 1.85–7.62)
NEUTS SEG NFR BLD AUTO: 72 % (ref 43–75)
NONHDLC SERPL-MCNC: 121 MG/DL
NRBC BLD AUTO-RTO: 0 /100 WBCS
PLATELET # BLD AUTO: 142 THOUSANDS/UL (ref 149–390)
PMV BLD AUTO: 11.7 FL (ref 8.9–12.7)
POTASSIUM SERPL-SCNC: 4.5 MMOL/L (ref 3.5–5.3)
PROT SERPL-MCNC: 7.4 G/DL (ref 6.4–8.2)
PROT UR-MCNC: 24 MG/DL
PROT/CREAT UR: 0.17 MG/G{CREAT} (ref 0–0.1)
PROTHROMBIN TIME: 21.2 SECONDS (ref 11.6–14.5)
RBC # BLD AUTO: 4.82 MILLION/UL (ref 3.81–5.12)
SODIUM SERPL-SCNC: 140 MMOL/L (ref 136–145)
TRIGL SERPL-MCNC: 153 MG/DL
URATE SERPL-MCNC: 5 MG/DL (ref 2–6.8)
WBC # BLD AUTO: 6.93 THOUSAND/UL (ref 4.31–10.16)

## 2020-03-06 PROCEDURE — 84156 ASSAY OF PROTEIN URINE: CPT

## 2020-03-06 PROCEDURE — 72072 X-RAY EXAM THORAC SPINE 3VWS: CPT

## 2020-03-06 PROCEDURE — 85025 COMPLETE CBC W/AUTO DIFF WBC: CPT

## 2020-03-06 PROCEDURE — 83036 HEMOGLOBIN GLYCOSYLATED A1C: CPT

## 2020-03-06 PROCEDURE — 36415 COLL VENOUS BLD VENIPUNCTURE: CPT

## 2020-03-06 PROCEDURE — 82570 ASSAY OF URINE CREATININE: CPT

## 2020-03-06 PROCEDURE — 80053 COMPREHEN METABOLIC PANEL: CPT

## 2020-03-06 PROCEDURE — 84550 ASSAY OF BLOOD/URIC ACID: CPT

## 2020-03-06 PROCEDURE — 82043 UR ALBUMIN QUANTITATIVE: CPT

## 2020-03-06 PROCEDURE — 82306 VITAMIN D 25 HYDROXY: CPT

## 2020-03-06 PROCEDURE — 85610 PROTHROMBIN TIME: CPT

## 2020-03-06 PROCEDURE — 80061 LIPID PANEL: CPT

## 2020-03-09 ENCOUNTER — ANTICOAG VISIT (OUTPATIENT)
Dept: NEPHROLOGY | Facility: CLINIC | Age: 85
End: 2020-03-09

## 2020-03-09 DIAGNOSIS — I48.0 PAROXYSMAL ATRIAL FIBRILLATION (HCC): Primary | ICD-10-CM

## 2020-03-10 DIAGNOSIS — Z86.711 HISTORY OF PULMONARY EMBOLISM: Primary | ICD-10-CM

## 2020-03-10 RX ORDER — WARFARIN SODIUM 7.5 MG/1
TABLET ORAL
Qty: 30 TABLET | Refills: 5 | Status: SHIPPED | OUTPATIENT
Start: 2020-03-10 | End: 2021-05-14 | Stop reason: SDUPTHER

## 2020-03-12 RX ORDER — WARFARIN SODIUM 7.5 MG/1
TABLET ORAL
Qty: 90 TABLET | Refills: 0 | Status: SHIPPED | OUTPATIENT
Start: 2020-03-12 | End: 2021-05-01 | Stop reason: HOSPADM

## 2020-03-13 ENCOUNTER — TRANSCRIBE ORDERS (OUTPATIENT)
Dept: URGENT CARE | Facility: CLINIC | Age: 85
End: 2020-03-13

## 2020-03-13 ENCOUNTER — APPOINTMENT (OUTPATIENT)
Dept: LAB | Facility: CLINIC | Age: 85
End: 2020-03-13
Payer: MEDICARE

## 2020-03-13 DIAGNOSIS — I48.91 ATRIAL FIBRILLATION, UNSPECIFIED TYPE (HCC): ICD-10-CM

## 2020-03-13 DIAGNOSIS — I48.91 ATRIAL FIBRILLATION, UNSPECIFIED TYPE (HCC): Primary | ICD-10-CM

## 2020-03-13 LAB
INR PPP: 2.24 (ref 0.84–1.19)
PROTHROMBIN TIME: 24.3 SECONDS (ref 11.6–14.5)

## 2020-03-13 PROCEDURE — 85610 PROTHROMBIN TIME: CPT

## 2020-03-13 PROCEDURE — 36415 COLL VENOUS BLD VENIPUNCTURE: CPT

## 2020-03-16 ENCOUNTER — ANTICOAG VISIT (OUTPATIENT)
Dept: NEPHROLOGY | Facility: CLINIC | Age: 85
End: 2020-03-16

## 2020-03-16 DIAGNOSIS — I26.99 OTHER PULMONARY EMBOLISM WITHOUT ACUTE COR PULMONALE, UNSPECIFIED CHRONICITY (HCC): ICD-10-CM

## 2020-03-16 RX ORDER — WARFARIN SODIUM 5 MG/1
5 TABLET ORAL
Qty: 90 TABLET | Refills: 3 | Status: SHIPPED | OUTPATIENT
Start: 2020-03-16 | End: 2021-05-14 | Stop reason: SDUPTHER

## 2020-03-23 ENCOUNTER — TELEPHONE (OUTPATIENT)
Dept: NEPHROLOGY | Facility: CLINIC | Age: 85
End: 2020-03-23

## 2020-03-23 NOTE — TELEPHONE ENCOUNTER
Patient called stating that she doesn't want to get her PT/INR checked due to everyone being sick  She states that she doesn't want to go to the lab for a while

## 2020-03-25 ENCOUNTER — APPOINTMENT (OUTPATIENT)
Dept: LAB | Facility: CLINIC | Age: 85
End: 2020-03-25
Payer: MEDICARE

## 2020-03-25 ENCOUNTER — TRANSCRIBE ORDERS (OUTPATIENT)
Dept: URGENT CARE | Facility: CLINIC | Age: 85
End: 2020-03-25

## 2020-03-25 DIAGNOSIS — I48.91 ATRIAL FIBRILLATION, UNSPECIFIED TYPE (HCC): Primary | ICD-10-CM

## 2020-03-25 DIAGNOSIS — I48.91 ATRIAL FIBRILLATION, UNSPECIFIED TYPE (HCC): ICD-10-CM

## 2020-03-25 PROCEDURE — 85610 PROTHROMBIN TIME: CPT

## 2020-03-25 PROCEDURE — 36415 COLL VENOUS BLD VENIPUNCTURE: CPT

## 2020-03-26 LAB
INR PPP: 4.96 (ref 0.84–1.19)
PROTHROMBIN TIME: 45.6 SECONDS (ref 11.6–14.5)

## 2020-03-27 ENCOUNTER — ANTICOAG VISIT (OUTPATIENT)
Dept: NEPHROLOGY | Facility: CLINIC | Age: 85
End: 2020-03-27

## 2020-03-27 LAB — INR PPP: 4.96 (ref 0.84–1.19)

## 2020-04-02 ENCOUNTER — TRANSCRIBE ORDERS (OUTPATIENT)
Dept: URGENT CARE | Facility: CLINIC | Age: 85
End: 2020-04-02

## 2020-04-02 ENCOUNTER — APPOINTMENT (OUTPATIENT)
Dept: LAB | Facility: CLINIC | Age: 85
End: 2020-04-02
Payer: MEDICARE

## 2020-04-02 DIAGNOSIS — I48.91 ATRIAL FIBRILLATION, UNSPECIFIED TYPE (HCC): Primary | ICD-10-CM

## 2020-04-02 DIAGNOSIS — I48.91 ATRIAL FIBRILLATION, UNSPECIFIED TYPE (HCC): ICD-10-CM

## 2020-04-02 LAB
INR PPP: 2.41 (ref 0.84–1.19)
PROTHROMBIN TIME: 25.7 SECONDS (ref 11.6–14.5)

## 2020-04-02 PROCEDURE — 36415 COLL VENOUS BLD VENIPUNCTURE: CPT

## 2020-04-02 PROCEDURE — 85610 PROTHROMBIN TIME: CPT

## 2020-04-03 ENCOUNTER — ANTICOAG VISIT (OUTPATIENT)
Dept: NEPHROLOGY | Facility: CLINIC | Age: 85
End: 2020-04-03

## 2020-04-10 ENCOUNTER — APPOINTMENT (OUTPATIENT)
Dept: LAB | Facility: CLINIC | Age: 85
End: 2020-04-10
Payer: MEDICARE

## 2020-04-10 ENCOUNTER — TRANSCRIBE ORDERS (OUTPATIENT)
Dept: URGENT CARE | Facility: CLINIC | Age: 85
End: 2020-04-10

## 2020-04-10 DIAGNOSIS — I48.91 ATRIAL FIBRILLATION, UNSPECIFIED TYPE (HCC): ICD-10-CM

## 2020-04-10 DIAGNOSIS — I48.91 ATRIAL FIBRILLATION, UNSPECIFIED TYPE (HCC): Primary | ICD-10-CM

## 2020-04-10 LAB
INR PPP: 2.92 (ref 0.84–1.19)
PROTHROMBIN TIME: 30 SECONDS (ref 11.6–14.5)

## 2020-04-10 PROCEDURE — 85610 PROTHROMBIN TIME: CPT

## 2020-04-10 PROCEDURE — 36415 COLL VENOUS BLD VENIPUNCTURE: CPT

## 2020-04-13 ENCOUNTER — ANTICOAG VISIT (OUTPATIENT)
Dept: NEPHROLOGY | Facility: CLINIC | Age: 85
End: 2020-04-13

## 2020-04-14 LAB — INTERNATIONAL NORMALIZATION RATIO, POC: 2.92

## 2020-04-19 DIAGNOSIS — I10 ESSENTIAL HYPERTENSION: Primary | ICD-10-CM

## 2020-04-19 RX ORDER — LISINOPRIL 2.5 MG/1
TABLET ORAL
Qty: 90 TABLET | Refills: 3 | Status: SHIPPED | OUTPATIENT
Start: 2020-04-19 | End: 2021-03-01

## 2020-04-20 ENCOUNTER — APPOINTMENT (OUTPATIENT)
Dept: LAB | Facility: CLINIC | Age: 85
End: 2020-04-20
Payer: MEDICARE

## 2020-04-20 ENCOUNTER — TRANSCRIBE ORDERS (OUTPATIENT)
Dept: URGENT CARE | Facility: CLINIC | Age: 85
End: 2020-04-20

## 2020-04-20 DIAGNOSIS — I48.91 ATRIAL FIBRILLATION, UNSPECIFIED TYPE (HCC): ICD-10-CM

## 2020-04-20 DIAGNOSIS — I48.91 ATRIAL FIBRILLATION, UNSPECIFIED TYPE (HCC): Primary | ICD-10-CM

## 2020-04-20 PROCEDURE — 85610 PROTHROMBIN TIME: CPT

## 2020-04-20 PROCEDURE — 36415 COLL VENOUS BLD VENIPUNCTURE: CPT

## 2020-04-21 ENCOUNTER — ANTICOAG VISIT (OUTPATIENT)
Dept: NEPHROLOGY | Facility: CLINIC | Age: 85
End: 2020-04-21

## 2020-04-21 LAB
INR PPP: 2.69 (ref 0.84–1.19)
PROTHROMBIN TIME: 28.1 SECONDS (ref 11.6–14.5)

## 2020-04-27 ENCOUNTER — TRANSCRIBE ORDERS (OUTPATIENT)
Dept: URGENT CARE | Facility: CLINIC | Age: 85
End: 2020-04-27

## 2020-04-27 ENCOUNTER — APPOINTMENT (OUTPATIENT)
Dept: LAB | Facility: CLINIC | Age: 85
End: 2020-04-27
Payer: MEDICARE

## 2020-04-27 DIAGNOSIS — I48.91 ATRIAL FIBRILLATION, UNSPECIFIED TYPE (HCC): ICD-10-CM

## 2020-04-27 DIAGNOSIS — I48.91 ATRIAL FIBRILLATION, UNSPECIFIED TYPE (HCC): Primary | ICD-10-CM

## 2020-04-27 LAB
INR PPP: 2.18 (ref 0.84–1.19)
PROTHROMBIN TIME: 23.8 SECONDS (ref 11.6–14.5)

## 2020-04-27 PROCEDURE — 85610 PROTHROMBIN TIME: CPT

## 2020-04-27 PROCEDURE — 36415 COLL VENOUS BLD VENIPUNCTURE: CPT

## 2020-04-28 ENCOUNTER — ANTICOAG VISIT (OUTPATIENT)
Dept: NEPHROLOGY | Facility: CLINIC | Age: 85
End: 2020-04-28

## 2020-05-12 ENCOUNTER — APPOINTMENT (OUTPATIENT)
Dept: LAB | Facility: CLINIC | Age: 85
End: 2020-05-12
Payer: MEDICARE

## 2020-05-12 ENCOUNTER — TRANSCRIBE ORDERS (OUTPATIENT)
Dept: URGENT CARE | Facility: CLINIC | Age: 85
End: 2020-05-12

## 2020-05-12 DIAGNOSIS — I48.91 ATRIAL FIBRILLATION, UNSPECIFIED TYPE (HCC): ICD-10-CM

## 2020-05-12 DIAGNOSIS — I48.91 ATRIAL FIBRILLATION, UNSPECIFIED TYPE (HCC): Primary | ICD-10-CM

## 2020-05-12 LAB
INR PPP: 3.1 (ref 0.84–1.19)
PROTHROMBIN TIME: 31.4 SECONDS (ref 11.6–14.5)

## 2020-05-12 PROCEDURE — 36415 COLL VENOUS BLD VENIPUNCTURE: CPT

## 2020-05-12 PROCEDURE — 85610 PROTHROMBIN TIME: CPT

## 2020-05-13 ENCOUNTER — ANTICOAG VISIT (OUTPATIENT)
Dept: NEPHROLOGY | Facility: CLINIC | Age: 85
End: 2020-05-13

## 2020-05-20 ENCOUNTER — APPOINTMENT (OUTPATIENT)
Dept: LAB | Facility: CLINIC | Age: 85
End: 2020-05-20
Payer: MEDICARE

## 2020-05-20 ENCOUNTER — TRANSCRIBE ORDERS (OUTPATIENT)
Dept: URGENT CARE | Facility: CLINIC | Age: 85
End: 2020-05-20

## 2020-05-20 DIAGNOSIS — I48.91 ATRIAL FIBRILLATION, UNSPECIFIED TYPE (HCC): ICD-10-CM

## 2020-05-20 DIAGNOSIS — I48.91 ATRIAL FIBRILLATION, UNSPECIFIED TYPE (HCC): Primary | ICD-10-CM

## 2020-05-20 LAB
INR PPP: 2.54 (ref 0.84–1.19)
PROTHROMBIN TIME: 26.8 SECONDS (ref 11.6–14.5)

## 2020-05-20 PROCEDURE — 36415 COLL VENOUS BLD VENIPUNCTURE: CPT

## 2020-05-20 PROCEDURE — 85610 PROTHROMBIN TIME: CPT

## 2020-05-21 ENCOUNTER — ANTICOAG VISIT (OUTPATIENT)
Dept: NEPHROLOGY | Facility: CLINIC | Age: 85
End: 2020-05-21

## 2020-05-27 ENCOUNTER — APPOINTMENT (OUTPATIENT)
Dept: LAB | Facility: CLINIC | Age: 85
End: 2020-05-27
Payer: MEDICARE

## 2020-05-27 ENCOUNTER — TRANSCRIBE ORDERS (OUTPATIENT)
Dept: URGENT CARE | Facility: CLINIC | Age: 85
End: 2020-05-27

## 2020-05-27 DIAGNOSIS — I48.91 ATRIAL FIBRILLATION, UNSPECIFIED TYPE (HCC): Primary | ICD-10-CM

## 2020-05-27 DIAGNOSIS — I48.91 ATRIAL FIBRILLATION, UNSPECIFIED TYPE (HCC): ICD-10-CM

## 2020-05-27 LAB
INR PPP: 2.37 (ref 0.84–1.19)
PROTHROMBIN TIME: 25.4 SECONDS (ref 11.6–14.5)

## 2020-05-27 PROCEDURE — 36415 COLL VENOUS BLD VENIPUNCTURE: CPT

## 2020-05-27 PROCEDURE — 85610 PROTHROMBIN TIME: CPT

## 2020-05-29 ENCOUNTER — ANTICOAG VISIT (OUTPATIENT)
Dept: NEPHROLOGY | Facility: CLINIC | Age: 85
End: 2020-05-29

## 2020-06-09 ENCOUNTER — TRANSCRIBE ORDERS (OUTPATIENT)
Dept: URGENT CARE | Facility: CLINIC | Age: 85
End: 2020-06-09

## 2020-06-09 ENCOUNTER — APPOINTMENT (OUTPATIENT)
Dept: LAB | Facility: CLINIC | Age: 85
End: 2020-06-09
Payer: MEDICARE

## 2020-06-09 DIAGNOSIS — I48.91 ATRIAL FIBRILLATION, UNSPECIFIED TYPE (HCC): ICD-10-CM

## 2020-06-09 DIAGNOSIS — I48.91 ATRIAL FIBRILLATION, UNSPECIFIED TYPE (HCC): Primary | ICD-10-CM

## 2020-06-09 LAB
INR PPP: 4.18 (ref 0.84–1.19)
PROTHROMBIN TIME: 39.8 SECONDS (ref 11.6–14.5)

## 2020-06-09 PROCEDURE — 36415 COLL VENOUS BLD VENIPUNCTURE: CPT

## 2020-06-09 PROCEDURE — 85610 PROTHROMBIN TIME: CPT

## 2020-06-10 ENCOUNTER — ANTICOAG VISIT (OUTPATIENT)
Dept: NEPHROLOGY | Facility: CLINIC | Age: 85
End: 2020-06-10

## 2020-06-17 ENCOUNTER — TRANSCRIBE ORDERS (OUTPATIENT)
Dept: URGENT CARE | Facility: CLINIC | Age: 85
End: 2020-06-17

## 2020-06-17 ENCOUNTER — APPOINTMENT (OUTPATIENT)
Dept: LAB | Facility: CLINIC | Age: 85
End: 2020-06-17
Payer: MEDICARE

## 2020-06-17 DIAGNOSIS — I48.91 ATRIAL FIBRILLATION, UNSPECIFIED TYPE (HCC): Primary | ICD-10-CM

## 2020-06-17 DIAGNOSIS — I48.91 ATRIAL FIBRILLATION, UNSPECIFIED TYPE (HCC): ICD-10-CM

## 2020-06-17 LAB
INR PPP: 1.56 (ref 0.84–1.19)
PROTHROMBIN TIME: 18.2 SECONDS (ref 11.6–14.5)

## 2020-06-17 PROCEDURE — 36415 COLL VENOUS BLD VENIPUNCTURE: CPT

## 2020-06-17 PROCEDURE — 85610 PROTHROMBIN TIME: CPT

## 2020-06-18 ENCOUNTER — ANTICOAG VISIT (OUTPATIENT)
Dept: NEPHROLOGY | Facility: CLINIC | Age: 85
End: 2020-06-18

## 2020-06-24 ENCOUNTER — TRANSCRIBE ORDERS (OUTPATIENT)
Dept: URGENT CARE | Facility: CLINIC | Age: 85
End: 2020-06-24

## 2020-06-24 ENCOUNTER — APPOINTMENT (OUTPATIENT)
Dept: LAB | Facility: CLINIC | Age: 85
End: 2020-06-24
Payer: MEDICARE

## 2020-06-24 DIAGNOSIS — I48.91 ATRIAL FIBRILLATION, UNSPECIFIED TYPE (HCC): Primary | ICD-10-CM

## 2020-06-24 DIAGNOSIS — I48.91 ATRIAL FIBRILLATION, UNSPECIFIED TYPE (HCC): ICD-10-CM

## 2020-06-24 LAB
INR PPP: 2.91 (ref 0.84–1.19)
PROTHROMBIN TIME: 30.2 SECONDS (ref 11.6–14.5)

## 2020-06-24 PROCEDURE — 85610 PROTHROMBIN TIME: CPT

## 2020-06-24 PROCEDURE — 36415 COLL VENOUS BLD VENIPUNCTURE: CPT

## 2020-06-25 ENCOUNTER — ANTICOAG VISIT (OUTPATIENT)
Dept: NEPHROLOGY | Facility: CLINIC | Age: 85
End: 2020-06-25

## 2020-07-08 ENCOUNTER — APPOINTMENT (OUTPATIENT)
Dept: LAB | Facility: CLINIC | Age: 85
End: 2020-07-08
Payer: MEDICARE

## 2020-07-08 ENCOUNTER — TRANSCRIBE ORDERS (OUTPATIENT)
Dept: URGENT CARE | Facility: CLINIC | Age: 85
End: 2020-07-08

## 2020-07-08 DIAGNOSIS — I48.91 ATRIAL FIBRILLATION, UNSPECIFIED TYPE (HCC): ICD-10-CM

## 2020-07-08 DIAGNOSIS — I48.91 ATRIAL FIBRILLATION, UNSPECIFIED TYPE (HCC): Primary | ICD-10-CM

## 2020-07-08 LAB
INR PPP: 2.03 (ref 0.84–1.19)
PROTHROMBIN TIME: 22.8 SECONDS (ref 11.6–14.5)

## 2020-07-08 PROCEDURE — 36415 COLL VENOUS BLD VENIPUNCTURE: CPT

## 2020-07-08 PROCEDURE — 85610 PROTHROMBIN TIME: CPT

## 2020-07-10 ENCOUNTER — ANTICOAG VISIT (OUTPATIENT)
Dept: NEPHROLOGY | Facility: CLINIC | Age: 85
End: 2020-07-10

## 2020-07-17 ENCOUNTER — TRANSCRIBE ORDERS (OUTPATIENT)
Dept: URGENT CARE | Facility: CLINIC | Age: 85
End: 2020-07-17

## 2020-07-17 ENCOUNTER — APPOINTMENT (OUTPATIENT)
Dept: LAB | Facility: CLINIC | Age: 85
End: 2020-07-17
Payer: MEDICARE

## 2020-07-17 DIAGNOSIS — I48.91 ATRIAL FIBRILLATION, UNSPECIFIED TYPE (HCC): Primary | ICD-10-CM

## 2020-07-17 DIAGNOSIS — I48.91 ATRIAL FIBRILLATION, UNSPECIFIED TYPE (HCC): ICD-10-CM

## 2020-07-17 LAB
INR PPP: 2.89 (ref 0.84–1.19)
PROTHROMBIN TIME: 30 SECONDS (ref 11.6–14.5)

## 2020-07-17 PROCEDURE — 36415 COLL VENOUS BLD VENIPUNCTURE: CPT

## 2020-07-17 PROCEDURE — 85610 PROTHROMBIN TIME: CPT

## 2020-07-21 ENCOUNTER — TELEPHONE (OUTPATIENT)
Dept: NEPHROLOGY | Facility: CLINIC | Age: 85
End: 2020-07-21

## 2020-07-21 ENCOUNTER — ANTICOAG VISIT (OUTPATIENT)
Dept: NEPHROLOGY | Facility: CLINIC | Age: 85
End: 2020-07-21

## 2020-08-05 ENCOUNTER — APPOINTMENT (OUTPATIENT)
Dept: LAB | Facility: CLINIC | Age: 85
End: 2020-08-05
Payer: MEDICARE

## 2020-08-05 ENCOUNTER — TRANSCRIBE ORDERS (OUTPATIENT)
Dept: URGENT CARE | Facility: CLINIC | Age: 85
End: 2020-08-05

## 2020-08-05 DIAGNOSIS — I48.91 ATRIAL FIBRILLATION, UNSPECIFIED TYPE (HCC): ICD-10-CM

## 2020-08-05 DIAGNOSIS — I48.91 ATRIAL FIBRILLATION, UNSPECIFIED TYPE (HCC): Primary | ICD-10-CM

## 2020-08-05 LAB
INR PPP: 3.91 (ref 0.84–1.19)
PROTHROMBIN TIME: 38 SECONDS (ref 11.6–14.5)

## 2020-08-05 PROCEDURE — 85610 PROTHROMBIN TIME: CPT

## 2020-08-05 PROCEDURE — 36415 COLL VENOUS BLD VENIPUNCTURE: CPT

## 2020-08-06 ENCOUNTER — TELEPHONE (OUTPATIENT)
Dept: NEPHROLOGY | Facility: CLINIC | Age: 85
End: 2020-08-06

## 2020-08-06 DIAGNOSIS — Z86.711 HISTORY OF PULMONARY EMBOLISM: Primary | ICD-10-CM

## 2020-08-06 NOTE — TELEPHONE ENCOUNTER
The lab called stating that they need a script for patient's pt/inr please make sure that it is a standing order

## 2020-08-07 ENCOUNTER — ANTICOAG VISIT (OUTPATIENT)
Dept: NEPHROLOGY | Facility: CLINIC | Age: 85
End: 2020-08-07

## 2020-08-13 ENCOUNTER — APPOINTMENT (OUTPATIENT)
Dept: LAB | Facility: CLINIC | Age: 85
End: 2020-08-13
Payer: MEDICARE

## 2020-08-13 DIAGNOSIS — E11.69 TYPE 2 DIABETES MELLITUS WITH OTHER SPECIFIED COMPLICATION, WITHOUT LONG-TERM CURRENT USE OF INSULIN (HCC): Primary | ICD-10-CM

## 2020-08-13 LAB
INR PPP: 1.91 (ref 0.84–1.19)
PROTHROMBIN TIME: 21.8 SECONDS (ref 11.6–14.5)

## 2020-08-13 PROCEDURE — 36415 COLL VENOUS BLD VENIPUNCTURE: CPT | Performed by: INTERNAL MEDICINE

## 2020-08-13 PROCEDURE — 85610 PROTHROMBIN TIME: CPT | Performed by: INTERNAL MEDICINE

## 2020-08-13 RX ORDER — BLOOD SUGAR DIAGNOSTIC
STRIP MISCELLANEOUS
Qty: 100 EACH | Refills: 5 | Status: SHIPPED | OUTPATIENT
Start: 2020-08-13 | End: 2021-08-30 | Stop reason: SDUPTHER

## 2020-08-13 RX ORDER — LANCETS
EACH MISCELLANEOUS
Qty: 100 EACH | Refills: 5 | Status: SHIPPED | OUTPATIENT
Start: 2020-08-13 | End: 2021-08-30 | Stop reason: SDUPTHER

## 2020-08-14 ENCOUNTER — ANTICOAG VISIT (OUTPATIENT)
Dept: NEPHROLOGY | Facility: CLINIC | Age: 85
End: 2020-08-14

## 2020-08-20 ENCOUNTER — APPOINTMENT (OUTPATIENT)
Dept: LAB | Facility: CLINIC | Age: 85
End: 2020-08-20
Payer: MEDICARE

## 2020-08-21 ENCOUNTER — ANTICOAG VISIT (OUTPATIENT)
Dept: NEPHROLOGY | Facility: CLINIC | Age: 85
End: 2020-08-21

## 2020-08-24 ENCOUNTER — OFFICE VISIT (OUTPATIENT)
Dept: NEPHROLOGY | Facility: CLINIC | Age: 85
End: 2020-08-24
Payer: MEDICARE

## 2020-08-24 VITALS
TEMPERATURE: 97 F | HEIGHT: 65 IN | SYSTOLIC BLOOD PRESSURE: 138 MMHG | DIASTOLIC BLOOD PRESSURE: 80 MMHG | OXYGEN SATURATION: 98 % | WEIGHT: 154 LBS | HEART RATE: 86 BPM | BODY MASS INDEX: 25.66 KG/M2

## 2020-08-24 DIAGNOSIS — E78.00 PURE HYPERCHOLESTEROLEMIA: ICD-10-CM

## 2020-08-24 DIAGNOSIS — E11.69 TYPE 2 DIABETES MELLITUS WITH OTHER SPECIFIED COMPLICATION, WITHOUT LONG-TERM CURRENT USE OF INSULIN (HCC): ICD-10-CM

## 2020-08-24 DIAGNOSIS — I10 ESSENTIAL HYPERTENSION: Primary | ICD-10-CM

## 2020-08-24 DIAGNOSIS — N18.30 CHRONIC KIDNEY DISEASE, STAGE 3 (MODERATE): ICD-10-CM

## 2020-08-24 DIAGNOSIS — E11.9 TYPE 2 DIABETES MELLITUS WITHOUT COMPLICATION, WITHOUT LONG-TERM CURRENT USE OF INSULIN (HCC): ICD-10-CM

## 2020-08-24 DIAGNOSIS — Z86.711 HISTORY OF PULMONARY EMBOLISM: ICD-10-CM

## 2020-08-24 DIAGNOSIS — E03.9 ACQUIRED HYPOTHYROIDISM: ICD-10-CM

## 2020-08-24 DIAGNOSIS — M48.54XS COMPRESSION FRACTURE OF THORACIC SPINE, NON-TRAUMATIC, SEQUELA: ICD-10-CM

## 2020-08-24 DIAGNOSIS — I48.0 PAROXYSMAL ATRIAL FIBRILLATION (HCC): ICD-10-CM

## 2020-08-24 DIAGNOSIS — R27.0 ATAXIA: ICD-10-CM

## 2020-08-24 PROCEDURE — 99214 OFFICE O/P EST MOD 30 MIN: CPT | Performed by: INTERNAL MEDICINE

## 2020-08-24 RX ORDER — METFORMIN HYDROCHLORIDE 500 MG/1
500 TABLET, EXTENDED RELEASE ORAL
Qty: 90 TABLET | Refills: 3 | Status: SHIPPED | OUTPATIENT
Start: 2020-08-24 | End: 2020-09-14

## 2020-08-24 NOTE — PROGRESS NOTES
[address] [facility] [place], [place]    Name: Andrew Valdez  YOB: 1933      Assessment/Plan:       Problem List Items Addressed This Visit        Endocrine    Type 2 diabetes mellitus without complication [facility])       Lab Results   Component Value Date    HGBA1C 6 5 (H) 03/06/2020   check an A1c  Take metformin to ER once a day in the morning         Relevant Medications    metFORMIN (GLUCOPHAGE-XR) 500 mg 24 hr tablet       Cardiovascular and Mediastinum    Paroxysmal atrial fibrillation (HCC)     Rate controlled  We are controlling her coumadin level         Essential hypertension - Primary     BP stable            Musculoskeletal and Integument    Compression fracture of thoracic spine, non-traumatic, sequela     She has significant pain in her lower back  Suggested using a walker and a lidocaine patch            Genitourinary    Chronic kidney disease, stage 3 (moderate) (HCC)     eGFR was 45 - will need to have  Further evaluation to see if she can continue metformin            Other    History of pulmonary embolism      Other Visit Diagnoses     Pure hypercholesterolemia        Acquired hypothyroidism        Relevant Orders    TSH + Free T4    Type 2 diabetes mellitus with other specified complication, without long-term current use of insulin (HCC)        Relevant Medications    metFORMIN (GLUCOPHAGE-XR) 500 mg 24 hr tablet    Other Relevant Orders    Hemoglobin A1C    CBC and differential    Comprehensive metabolic panel    TSH + Free T4    Vitamin B12/Folate, Serum Panel    Microalbumin / creatinine urine ratio    Protein / creatinine ratio, urine    Uric acid    Urinalysis with microscopic    Ataxia        Relevant Orders    Vitamin B12/Folate, Serum Panel            Subjective:      Patient ID: Andrew Valdez is a 80 y o  female  HPI  Has CKD 3, hypertension and hyperlipidemia  She is not sleeping and is sweating      The following portions of the patient's history were reviewed and updated as appropriate: allergies, current medications, past family history, past medical history, past social history, past surgical history and problem list     Review of Systems   Constitutional: Positive for activity change and diaphoresis  HENT: Negative for facial swelling  Eyes: Negative for redness and visual disturbance  Occasional diplopia on turning head to side   Respiratory: Negative for cough, chest tightness and shortness of breath  Cardiovascular: Negative for chest pain, palpitations and leg swelling  Gastrointestinal: Positive for diarrhea  Negative for abdominal pain, blood in stool, constipation, nausea and vomiting  Has diarrhea due to metformin   Genitourinary: Positive for frequency  Negative for dysuria  Nocturia   Musculoskeletal: Positive for gait problem  Skin: Negative  Neurological: Negative for dizziness, weakness, light-headedness and headaches  Hematological: Bruises/bleeds easily  Psychiatric/Behavioral: Negative for dysphoric mood           Social History     Socioeconomic History    Marital status: /Civil Union     Spouse name: None    Number of children: None    Years of education: None    Highest education level: None   Occupational History    Occupation: Retired    Social Needs    Financial resource strain: None    Food insecurity     Worry: None     Inability: None    Transportation needs     Medical: None     Non-medical: None   Tobacco Use    Smoking status: Former Smoker     Types: Cigarettes     Last attempt to quit: 3/19/2001     Years since quittin 4    Smokeless tobacco: Never Used   Substance and Sexual Activity    Alcohol use: Never     Frequency: Never    Drug use: Never    Sexual activity: Not Currently   Lifestyle    Physical activity     Days per week: None     Minutes per session: None    Stress: None   Relationships    Social connections     Talks on phone: None     Gets together: None     Attends Tenriism service: None     Active member of club or organization: None     Attends meetings of clubs or organizations: None     Relationship status: None    Intimate partner violence     Fear of current or ex partner: None     Emotionally abused: None     Physically abused: None     Forced sexual activity: None   Other Topics Concern    None   Social History Narrative    Rarely consumes alcohol - As per [employer]    Consumes on average 2 cups of regular coffee per day    Consumes on average 12 oz of soda per day     Past Medical History:   Diagnosis Date    Abnormal gait     Accidental fall from chair, subsequent encounter     [facility]     Benign essential hypertension     Bite of animal     Cataract     Cellulitis     Chronic [facility], [facility]     [facility]     Current tear of lateral cartilage or meniscus of knee     Diabetes mellitus [facility]     Disease of thyroid gland     Disorder of magnesium metabolism     Dvt femoral (deep venous thrombosis)     Embolism from thrombosis of vein of distal end of lower extremity     Essential hypertension     Fall     Hyperlipidemia     Hyperlipidemia     Hypertension     Hypothyroidism     Insomnia     Knee joint effusion     Leukocytosis     Malaise and fatigue     MI [facility]myocardial infarction) ([facility] )     Hospitalization     Orbital hemorrhage     Other sleep disorders     [facility]F [facility]oxysmal [facility] fibrillation) [facility]     Presence of vena cava filter     Pulmonary embolism (HCC)     Tear film insufficiency     Type 2 diabetes mellitus ([facility]     Unspecified osteoarthritis, unspecified site     Weight decreased      Past Surgical History:   Procedure Laterality Date    ABDOMINAL ADHESION SURGERY  2015    Had wound vac    ABDOMINAL SURGERY      APPENDECTOMY      CATARACT EXTRACTION      CHOLECYSTECTOMY      COLON SURGERY      COLOSTOMY  2011    Due to Ileus, then reanastomosis in 2002    EXCISION BLADDER MESH TRANSVESICLEPORT W/ LASER      HERNIA REPAIR      IVC FILTER INSERTION      KNEE SURGERY  2014    repair        Current Outpatient Medications:     acetaminophen (TYLENOL) 500 mg tablet, Take 500 mg by mouth every 6 (six) hours as needed, Disp: , Rfl:     Contour Next Test test strip, Test twice a day or as needed, Disp: 100 each, Rfl: 5    glipiZIDE (GLUCOTROL) 5 mg tablet, Take 0 5 tablets (2 5 mg total) by mouth daily, Disp: 90 tablet, Rfl: 1    levothyroxine 50 mcg tablet, Take 1 tablet (50 mcg total) by mouth daily, Disp: 90 tablet, Rfl: 3    lisinopril (ZESTRIL) 2 5 mg tablet, TAKE 1 TABLET BY MOUTH  EVERY DAY TO PROTECT YOUR  KIDNEYS, Disp: 90 tablet, Rfl: 3    lovastatin (MEVACOR) 20 mg tablet, Take 1 tablet (20 mg total) by mouth daily at bedtime, Disp: 90 tablet, Rfl: 3    metoprolol tartrate (LOPRESSOR) 25 mg tablet, Take 1 tablet (25 mg total) by mouth every 12 (twelve) hours, Disp: 90 tablet, Rfl: 3    Microlet Lancets MISC, Test twice a day or as needed, Disp: 100 each, Rfl: 5    warfarin (COUMADIN) 5 mg tablet, Take 1 tablet (5 mg total) by mouth daily, Disp: 90 tablet, Rfl: 3    warfarin (COUMADIN) 7 5 mg tablet, 1 daily as directed by INR, Disp: 90 tablet, Rfl: 0    warfarin (COUMADIN) 7 5 mg tablet, 1 daily as directed according to INR, Disp: 30 tablet, Rfl: 5    metFORMIN (GLUCOPHAGE-XR) 500 mg 24 hr tablet, Take 1 tablet (500 mg total) by mouth daily with dinner, Disp: 90 tablet, Rfl: 3    sitaGLIPtin (JANUVIA) 50 mg tablet, Take 1 tablet (50 mg total) by mouth daily (Patient not taking: Reported on 8/24/2020), Disp: 90 tablet, Rfl: 3    Lab Results   Component Value Date     04/18/2018    SODIUM 140 03/06/2020    K 4 5 03/06/2020     03/06/2020    CO2 29 03/06/2020    ANIONGAP 11 2 04/18/2018    AGAP 4 03/06/2020    BUN 22 03/06/2020    CREATININE 1 12 03/06/2020    GLUF 125 (H) 03/06/2020    CALCIUM 8 6 03/06/2020    AST 24 03/06/2020 ALT 27 03/06/2020    ALKPHOS 76 03/06/2020    PROT 6 7 04/18/2018    TP 7 4 03/06/2020    BILITOT 0 4 04/18/2018    TBILI 0 44 03/06/2020    EGFR 45 03/06/2020     Lab Results   Component Value Date    WBC 6 93 03/06/2020    HGB 15 0 03/06/2020    HCT 48 1 (H) 03/06/2020     (H) 03/06/2020     (L) 03/06/2020     Lab Results   Component Value Date    CHOLESTEROL 201 (H) 03/06/2020    CHOLESTEROL 146 03/26/2019    CHOLESTEROL 175 10/08/2018     Lab Results   Component Value Date    HDL 80 03/06/2020    HDL 56 03/26/2019    HDL 67 (H) 10/08/2018     Lab Results   Component Value Date    LDLCALC 90 03/06/2020    LDLCALC 69 03/26/2019    LDLCALC 79 10/08/2018     Lab Results   Component Value Date    TRIG 153 (H) 03/06/2020    TRIG 105 03/26/2019    TRIG 146 10/08/2018     No results found for: CHOLHDL  Lab Results   Component Value Date    [id number] 3 330 06/14/2019     Lab Results   Component Value Date    CALCIUM 8 6 03/06/2020     No results found for: SPEP, UPEP  No results found for: KEISHAALNEELIMA DASILVA4HUR        Objective:      /80 (BP Location: Right arm, Patient Position: Sitting, Cuff Size: Standard)   Pulse 86   Temp (!) 97 °F (36 1 °C) (Temporal)   Ht 5' 5" (1 651 m)   Wt 69 9 kg (154 lb)   SpO2 98%   BMI 25 63 kg/m²          Physical Exam  Constitutional:       Appearance: Normal appearance  She is normal weight  Eyes:      Pupils: Pupils are equal, round, and reactive to light  Neck:      Musculoskeletal: Normal range of motion and neck supple  Cardiovascular:      Rate and Rhythm: Normal rate and regular rhythm  Pulmonary:      Effort: Pulmonary effort is normal       Breath sounds: Normal breath sounds  Abdominal:      General: Bowel sounds are normal  There is no distension  Palpations: Abdomen is soft  Tenderness: There is no guarding  Musculoskeletal:         General: Tenderness present  No deformity        Comments: Difficulty arising from the table Skin:     General: Skin is warm and dry  Neurological:      General: No focal deficit present  Mental Status: She is alert  Mental status is at baseline  She is disoriented  Psychiatric:         Mood and Affect: Mood normal          Behavior: Behavior normal          Thought Content:  Thought content normal          Judgment: Judgment normal

## 2020-08-24 NOTE — ASSESSMENT & PLAN NOTE
Lab Results   Component Value Date    HGBA1C 6 5 (H) 03/06/2020   check an A1c  Take metformin to ER once a day in the morning

## 2020-08-25 ENCOUNTER — APPOINTMENT (OUTPATIENT)
Dept: LAB | Facility: CLINIC | Age: 85
End: 2020-08-25
Payer: MEDICARE

## 2020-08-25 ENCOUNTER — ANTICOAG VISIT (OUTPATIENT)
Dept: NEPHROLOGY | Facility: CLINIC | Age: 85
End: 2020-08-25

## 2020-08-25 DIAGNOSIS — R27.0 ATAXIA: ICD-10-CM

## 2020-08-25 DIAGNOSIS — Z86.711 HISTORY OF PULMONARY EMBOLISM: ICD-10-CM

## 2020-08-25 DIAGNOSIS — E03.9 ACQUIRED HYPOTHYROIDISM: ICD-10-CM

## 2020-08-25 DIAGNOSIS — E11.69 TYPE 2 DIABETES MELLITUS WITH OTHER SPECIFIED COMPLICATION, WITHOUT LONG-TERM CURRENT USE OF INSULIN (HCC): ICD-10-CM

## 2020-08-25 LAB
ALBUMIN SERPL BCP-MCNC: 3.9 G/DL (ref 3.5–5)
ALP SERPL-CCNC: 76 U/L (ref 46–116)
ALT SERPL W P-5'-P-CCNC: 19 U/L (ref 12–78)
ANION GAP SERPL CALCULATED.3IONS-SCNC: 8 MMOL/L (ref 4–13)
AST SERPL W P-5'-P-CCNC: 18 U/L (ref 5–45)
BACTERIA UR QL AUTO: ABNORMAL /HPF
BASOPHILS # BLD AUTO: 0.05 THOUSANDS/ΜL (ref 0–0.1)
BASOPHILS NFR BLD AUTO: 1 % (ref 0–1)
BILIRUB SERPL-MCNC: 0.62 MG/DL (ref 0.2–1)
BILIRUB UR QL STRIP: ABNORMAL
BUN SERPL-MCNC: 27 MG/DL (ref 5–25)
CALCIUM SERPL-MCNC: 8.9 MG/DL (ref 8.3–10.1)
CHLORIDE SERPL-SCNC: 106 MMOL/L (ref 100–108)
CLARITY UR: CLEAR
CO2 SERPL-SCNC: 26 MMOL/L (ref 21–32)
COLOR UR: ABNORMAL
CREAT SERPL-MCNC: 1.2 MG/DL (ref 0.6–1.3)
CREAT UR-MCNC: 249 MG/DL
CREAT UR-MCNC: 249 MG/DL
EOSINOPHIL # BLD AUTO: 0.04 THOUSAND/ΜL (ref 0–0.61)
EOSINOPHIL NFR BLD AUTO: 1 % (ref 0–6)
ERYTHROCYTE [DISTWIDTH] IN BLOOD BY AUTOMATED COUNT: 15.4 % (ref 11.6–15.1)
EST. AVERAGE GLUCOSE BLD GHB EST-MCNC: 174 MG/DL
FOLATE SERPL-MCNC: 15.5 NG/ML (ref 3.1–17.5)
GFR SERPL CREATININE-BSD FRML MDRD: 41 ML/MIN/1.73SQ M
GLUCOSE P FAST SERPL-MCNC: 177 MG/DL (ref 65–99)
GLUCOSE UR STRIP-MCNC: NEGATIVE MG/DL
HBA1C MFR BLD: 7.7 %
HCT VFR BLD AUTO: 49.8 % (ref 34.8–46.1)
HGB BLD-MCNC: 15.4 G/DL (ref 11.5–15.4)
HGB UR QL STRIP.AUTO: NEGATIVE
IMM GRANULOCYTES # BLD AUTO: 0.02 THOUSAND/UL (ref 0–0.2)
IMM GRANULOCYTES NFR BLD AUTO: 0 % (ref 0–2)
INR PPP: 1.42 (ref 0.84–1.19)
KETONES UR STRIP-MCNC: ABNORMAL MG/DL
LEUKOCYTE ESTERASE UR QL STRIP: ABNORMAL
LYMPHOCYTES # BLD AUTO: 1.28 THOUSANDS/ΜL (ref 0.6–4.47)
LYMPHOCYTES NFR BLD AUTO: 18 % (ref 14–44)
MCH RBC QN AUTO: 31.9 PG (ref 26.8–34.3)
MCHC RBC AUTO-ENTMCNC: 30.9 G/DL (ref 31.4–37.4)
MCV RBC AUTO: 103 FL (ref 82–98)
MICROALBUMIN UR-MCNC: 362 MG/L (ref 0–20)
MICROALBUMIN/CREAT 24H UR: 145 MG/G CREATININE (ref 0–30)
MONOCYTES # BLD AUTO: 0.47 THOUSAND/ΜL (ref 0.17–1.22)
MONOCYTES NFR BLD AUTO: 7 % (ref 4–12)
NEUTROPHILS # BLD AUTO: 5.16 THOUSANDS/ΜL (ref 1.85–7.62)
NEUTS SEG NFR BLD AUTO: 73 % (ref 43–75)
NITRITE UR QL STRIP: NEGATIVE
NON-SQ EPI CELLS URNS QL MICRO: ABNORMAL /HPF
NRBC BLD AUTO-RTO: 0 /100 WBCS
PH UR STRIP.AUTO: 6 [PH]
PLATELET # BLD AUTO: 155 THOUSANDS/UL (ref 149–390)
PMV BLD AUTO: 12.6 FL (ref 8.9–12.7)
POTASSIUM SERPL-SCNC: 4.2 MMOL/L (ref 3.5–5.3)
PROT SERPL-MCNC: 7.6 G/DL (ref 6.4–8.2)
PROT UR STRIP-MCNC: ABNORMAL MG/DL
PROT UR-MCNC: 61 MG/DL
PROT/CREAT UR: 0.24 MG/G{CREAT} (ref 0–0.1)
PROTHROMBIN TIME: 17.3 SECONDS (ref 11.6–14.5)
RBC # BLD AUTO: 4.83 MILLION/UL (ref 3.81–5.12)
RBC #/AREA URNS AUTO: ABNORMAL /HPF
SODIUM SERPL-SCNC: 140 MMOL/L (ref 136–145)
SP GR UR STRIP.AUTO: 1.03 (ref 1–1.03)
T4 FREE SERPL-MCNC: 1.17 NG/DL (ref 0.76–1.46)
TSH SERPL DL<=0.05 MIU/L-ACNC: 2.19 UIU/ML (ref 0.36–3.74)
URATE SERPL-MCNC: 5.7 MG/DL (ref 2–6.8)
UROBILINOGEN UR QL STRIP.AUTO: 0.2 E.U./DL
VIT B12 SERPL-MCNC: 660 PG/ML (ref 100–900)
WBC # BLD AUTO: 7.02 THOUSAND/UL (ref 4.31–10.16)
WBC #/AREA URNS AUTO: ABNORMAL /HPF

## 2020-08-25 PROCEDURE — 84439 ASSAY OF FREE THYROXINE: CPT

## 2020-08-25 PROCEDURE — 83036 HEMOGLOBIN GLYCOSYLATED A1C: CPT

## 2020-08-25 PROCEDURE — 85025 COMPLETE CBC W/AUTO DIFF WBC: CPT

## 2020-08-25 PROCEDURE — 84156 ASSAY OF PROTEIN URINE: CPT

## 2020-08-25 PROCEDURE — 85610 PROTHROMBIN TIME: CPT

## 2020-08-25 PROCEDURE — 80053 COMPREHEN METABOLIC PANEL: CPT

## 2020-08-25 PROCEDURE — 84550 ASSAY OF BLOOD/URIC ACID: CPT

## 2020-08-25 PROCEDURE — 82746 ASSAY OF FOLIC ACID SERUM: CPT

## 2020-08-25 PROCEDURE — 36415 COLL VENOUS BLD VENIPUNCTURE: CPT

## 2020-08-25 PROCEDURE — 81001 URINALYSIS AUTO W/SCOPE: CPT

## 2020-08-25 PROCEDURE — 82570 ASSAY OF URINE CREATININE: CPT

## 2020-08-25 PROCEDURE — 82607 VITAMIN B-12: CPT

## 2020-08-25 PROCEDURE — 84443 ASSAY THYROID STIM HORMONE: CPT

## 2020-08-25 PROCEDURE — 82043 UR ALBUMIN QUANTITATIVE: CPT

## 2020-08-28 ENCOUNTER — APPOINTMENT (OUTPATIENT)
Dept: LAB | Facility: CLINIC | Age: 85
End: 2020-08-28
Payer: MEDICARE

## 2020-08-28 ENCOUNTER — ANTICOAG VISIT (OUTPATIENT)
Dept: NEPHROLOGY | Facility: CLINIC | Age: 85
End: 2020-08-28

## 2020-08-28 DIAGNOSIS — Z86.711 HISTORY OF PULMONARY EMBOLISM: ICD-10-CM

## 2020-08-28 LAB
INR PPP: 1.14 (ref 0.84–1.19)
PROTHROMBIN TIME: 14.6 SECONDS (ref 11.6–14.5)

## 2020-08-28 PROCEDURE — 85610 PROTHROMBIN TIME: CPT

## 2020-08-28 PROCEDURE — 36415 COLL VENOUS BLD VENIPUNCTURE: CPT

## 2020-09-04 ENCOUNTER — APPOINTMENT (OUTPATIENT)
Dept: LAB | Facility: CLINIC | Age: 85
End: 2020-09-04
Payer: MEDICARE

## 2020-09-04 DIAGNOSIS — Z86.711 HISTORY OF PULMONARY EMBOLISM: ICD-10-CM

## 2020-09-04 LAB
INR PPP: 1.89 (ref 0.84–1.19)
PROTHROMBIN TIME: 21.6 SECONDS (ref 11.6–14.5)

## 2020-09-04 PROCEDURE — 85610 PROTHROMBIN TIME: CPT

## 2020-09-04 PROCEDURE — 36415 COLL VENOUS BLD VENIPUNCTURE: CPT

## 2020-09-08 ENCOUNTER — ANTICOAG VISIT (OUTPATIENT)
Dept: NEPHROLOGY | Facility: CLINIC | Age: 85
End: 2020-09-08

## 2020-09-11 DIAGNOSIS — E78.00 PURE HYPERCHOLESTEROLEMIA: ICD-10-CM

## 2020-09-11 DIAGNOSIS — I10 ESSENTIAL HYPERTENSION: ICD-10-CM

## 2020-09-11 DIAGNOSIS — E03.9 ACQUIRED HYPOTHYROIDISM: ICD-10-CM

## 2020-09-13 DIAGNOSIS — E11.69 TYPE 2 DIABETES MELLITUS WITH OTHER SPECIFIED COMPLICATION, WITHOUT LONG-TERM CURRENT USE OF INSULIN (HCC): Primary | ICD-10-CM

## 2020-09-14 RX ORDER — LOVASTATIN 20 MG/1
TABLET ORAL
Qty: 90 TABLET | Refills: 3 | Status: SHIPPED | OUTPATIENT
Start: 2020-09-14 | End: 2021-04-21 | Stop reason: SDUPTHER

## 2020-09-14 RX ORDER — LEVOTHYROXINE SODIUM 0.05 MG/1
TABLET ORAL
Qty: 90 TABLET | Refills: 3 | Status: SHIPPED | OUTPATIENT
Start: 2020-09-14 | End: 2021-04-21 | Stop reason: SDUPTHER

## 2020-09-15 ENCOUNTER — APPOINTMENT (OUTPATIENT)
Dept: LAB | Facility: CLINIC | Age: 85
End: 2020-09-15
Payer: MEDICARE

## 2020-09-15 DIAGNOSIS — Z86.711 HISTORY OF PULMONARY EMBOLISM: ICD-10-CM

## 2020-09-15 LAB
INR PPP: 3.3 (ref 0.84–1.19)
PROTHROMBIN TIME: 33.3 SECONDS (ref 11.6–14.5)

## 2020-09-15 PROCEDURE — 85610 PROTHROMBIN TIME: CPT

## 2020-09-15 PROCEDURE — 36415 COLL VENOUS BLD VENIPUNCTURE: CPT

## 2020-09-16 ENCOUNTER — ANTICOAG VISIT (OUTPATIENT)
Dept: NEPHROLOGY | Facility: CLINIC | Age: 85
End: 2020-09-16

## 2020-09-23 ENCOUNTER — APPOINTMENT (OUTPATIENT)
Dept: LAB | Facility: CLINIC | Age: 85
End: 2020-09-23
Payer: MEDICARE

## 2020-09-23 DIAGNOSIS — Z86.711 HISTORY OF PULMONARY EMBOLISM: ICD-10-CM

## 2020-09-23 LAB
INR PPP: 2.2 (ref 0.84–1.19)
PROTHROMBIN TIME: 24.4 SECONDS (ref 11.6–14.5)

## 2020-09-23 PROCEDURE — 36415 COLL VENOUS BLD VENIPUNCTURE: CPT

## 2020-09-23 PROCEDURE — 85610 PROTHROMBIN TIME: CPT

## 2020-09-24 ENCOUNTER — ANTICOAG VISIT (OUTPATIENT)
Dept: NEPHROLOGY | Facility: CLINIC | Age: 85
End: 2020-09-24

## 2020-10-07 ENCOUNTER — APPOINTMENT (OUTPATIENT)
Dept: LAB | Facility: CLINIC | Age: 85
End: 2020-10-07
Payer: MEDICARE

## 2020-10-08 ENCOUNTER — ANTICOAG VISIT (OUTPATIENT)
Dept: NEPHROLOGY | Facility: CLINIC | Age: 85
End: 2020-10-08

## 2020-10-15 ENCOUNTER — APPOINTMENT (OUTPATIENT)
Dept: LAB | Facility: CLINIC | Age: 85
End: 2020-10-15
Payer: MEDICARE

## 2020-10-19 ENCOUNTER — ANTICOAG VISIT (OUTPATIENT)
Dept: NEPHROLOGY | Facility: CLINIC | Age: 85
End: 2020-10-19

## 2020-10-26 ENCOUNTER — TELEPHONE (OUTPATIENT)
Dept: NEPHROLOGY | Facility: CLINIC | Age: 85
End: 2020-10-26

## 2020-10-27 ENCOUNTER — APPOINTMENT (OUTPATIENT)
Dept: LAB | Facility: CLINIC | Age: 85
End: 2020-10-27
Payer: MEDICARE

## 2020-10-28 ENCOUNTER — ANTICOAG VISIT (OUTPATIENT)
Dept: NEPHROLOGY | Facility: CLINIC | Age: 85
End: 2020-10-28

## 2020-11-04 ENCOUNTER — APPOINTMENT (OUTPATIENT)
Dept: LAB | Facility: CLINIC | Age: 85
End: 2020-11-04
Payer: MEDICARE

## 2020-11-04 DIAGNOSIS — I48.0 PAROXYSMAL ATRIAL FIBRILLATION (HCC): ICD-10-CM

## 2020-11-04 DIAGNOSIS — I48.0 PAROXYSMAL ATRIAL FIBRILLATION (HCC): Primary | ICD-10-CM

## 2020-11-04 LAB
INR PPP: 3.83 (ref 0.84–1.19)
PROTHROMBIN TIME: 37.4 SECONDS (ref 11.6–14.5)

## 2020-11-04 PROCEDURE — 36415 COLL VENOUS BLD VENIPUNCTURE: CPT | Performed by: NURSE PRACTITIONER

## 2020-11-04 PROCEDURE — 85610 PROTHROMBIN TIME: CPT | Performed by: NURSE PRACTITIONER

## 2020-11-05 ENCOUNTER — ANTICOAG VISIT (OUTPATIENT)
Dept: NEPHROLOGY | Facility: CLINIC | Age: 85
End: 2020-11-05

## 2020-11-10 DIAGNOSIS — E11.69 TYPE 2 DIABETES MELLITUS WITH OTHER SPECIFIED COMPLICATION, WITHOUT LONG-TERM CURRENT USE OF INSULIN (HCC): ICD-10-CM

## 2020-11-10 RX ORDER — GLIPIZIDE 5 MG/1
TABLET ORAL
Qty: 45 TABLET | Refills: 3 | Status: SHIPPED | OUTPATIENT
Start: 2020-11-10 | End: 2021-02-25

## 2020-11-12 ENCOUNTER — LAB (OUTPATIENT)
Dept: LAB | Facility: CLINIC | Age: 85
End: 2020-11-12
Payer: MEDICARE

## 2020-11-12 DIAGNOSIS — I48.0 PAROXYSMAL ATRIAL FIBRILLATION (HCC): ICD-10-CM

## 2020-11-12 LAB
INR PPP: 2.83 (ref 0.84–1.19)
PROTHROMBIN TIME: 29.6 SECONDS (ref 11.6–14.5)

## 2020-11-12 PROCEDURE — 85610 PROTHROMBIN TIME: CPT

## 2020-11-12 PROCEDURE — 36415 COLL VENOUS BLD VENIPUNCTURE: CPT

## 2020-11-16 ENCOUNTER — ANTICOAG VISIT (OUTPATIENT)
Dept: NEPHROLOGY | Facility: CLINIC | Age: 85
End: 2020-11-16

## 2020-12-01 ENCOUNTER — LAB (OUTPATIENT)
Dept: LAB | Facility: CLINIC | Age: 85
End: 2020-12-01
Payer: MEDICARE

## 2020-12-02 ENCOUNTER — ANTICOAG VISIT (OUTPATIENT)
Dept: NEPHROLOGY | Facility: CLINIC | Age: 85
End: 2020-12-02

## 2020-12-15 ENCOUNTER — LAB (OUTPATIENT)
Dept: LAB | Facility: CLINIC | Age: 85
End: 2020-12-15
Payer: MEDICARE

## 2020-12-16 ENCOUNTER — ANTICOAG VISIT (OUTPATIENT)
Dept: NEPHROLOGY | Facility: CLINIC | Age: 85
End: 2020-12-16

## 2020-12-23 ENCOUNTER — LAB (OUTPATIENT)
Dept: LAB | Facility: CLINIC | Age: 85
End: 2020-12-23
Payer: MEDICARE

## 2020-12-23 DIAGNOSIS — I48.0 PAROXYSMAL ATRIAL FIBRILLATION (HCC): ICD-10-CM

## 2020-12-23 LAB
INR PPP: 1.92 (ref 0.84–1.19)
PROTHROMBIN TIME: 21.9 SECONDS (ref 11.6–14.5)

## 2020-12-23 PROCEDURE — 85610 PROTHROMBIN TIME: CPT

## 2020-12-23 PROCEDURE — 36415 COLL VENOUS BLD VENIPUNCTURE: CPT

## 2020-12-24 ENCOUNTER — ANTICOAG VISIT (OUTPATIENT)
Dept: NEPHROLOGY | Facility: CLINIC | Age: 85
End: 2020-12-24

## 2020-12-31 ENCOUNTER — LAB (OUTPATIENT)
Dept: LAB | Facility: CLINIC | Age: 85
End: 2020-12-31
Payer: MEDICARE

## 2020-12-31 DIAGNOSIS — I48.0 PAROXYSMAL ATRIAL FIBRILLATION (HCC): ICD-10-CM

## 2020-12-31 LAB
INR PPP: 3.78 (ref 0.84–1.19)
PROTHROMBIN TIME: 37 SECONDS (ref 11.6–14.5)

## 2020-12-31 PROCEDURE — 36415 COLL VENOUS BLD VENIPUNCTURE: CPT

## 2020-12-31 PROCEDURE — 85610 PROTHROMBIN TIME: CPT

## 2021-01-04 ENCOUNTER — ANTICOAG VISIT (OUTPATIENT)
Dept: NEPHROLOGY | Facility: CLINIC | Age: 86
End: 2021-01-04

## 2021-01-11 ENCOUNTER — APPOINTMENT (OUTPATIENT)
Dept: LAB | Facility: CLINIC | Age: 86
End: 2021-01-11
Payer: MEDICARE

## 2021-01-11 DIAGNOSIS — I48.0 PAROXYSMAL ATRIAL FIBRILLATION (HCC): ICD-10-CM

## 2021-01-11 LAB
INR PPP: 2.38 (ref 0.84–1.19)
PROTHROMBIN TIME: 25.8 SECONDS (ref 11.6–14.5)

## 2021-01-11 PROCEDURE — 85610 PROTHROMBIN TIME: CPT

## 2021-01-11 PROCEDURE — 36415 COLL VENOUS BLD VENIPUNCTURE: CPT

## 2021-01-12 ENCOUNTER — ANTICOAG VISIT (OUTPATIENT)
Dept: NEPHROLOGY | Facility: CLINIC | Age: 86
End: 2021-01-12

## 2021-01-25 ENCOUNTER — LAB (OUTPATIENT)
Dept: LAB | Facility: CLINIC | Age: 86
End: 2021-01-25
Payer: MEDICARE

## 2021-01-25 DIAGNOSIS — I48.0 PAROXYSMAL ATRIAL FIBRILLATION (HCC): ICD-10-CM

## 2021-01-25 LAB
INR PPP: 3.23 (ref 0.84–1.19)
PROTHROMBIN TIME: 32.8 SECONDS (ref 11.6–14.5)

## 2021-01-25 PROCEDURE — 36415 COLL VENOUS BLD VENIPUNCTURE: CPT

## 2021-01-25 PROCEDURE — 85610 PROTHROMBIN TIME: CPT

## 2021-01-26 ENCOUNTER — ANTICOAG VISIT (OUTPATIENT)
Dept: NEPHROLOGY | Facility: CLINIC | Age: 86
End: 2021-01-26

## 2021-02-04 ENCOUNTER — LAB (OUTPATIENT)
Dept: LAB | Facility: CLINIC | Age: 86
End: 2021-02-04
Payer: MEDICARE

## 2021-02-04 DIAGNOSIS — I48.0 PAROXYSMAL ATRIAL FIBRILLATION (HCC): ICD-10-CM

## 2021-02-04 LAB
INR PPP: 2.23 (ref 0.84–1.19)
PROTHROMBIN TIME: 24.5 SECONDS (ref 11.6–14.5)

## 2021-02-04 PROCEDURE — 36415 COLL VENOUS BLD VENIPUNCTURE: CPT

## 2021-02-04 PROCEDURE — 85610 PROTHROMBIN TIME: CPT

## 2021-02-05 ENCOUNTER — ANTICOAG VISIT (OUTPATIENT)
Dept: NEPHROLOGY | Facility: CLINIC | Age: 86
End: 2021-02-05

## 2021-02-12 ENCOUNTER — LAB (OUTPATIENT)
Dept: LAB | Facility: CLINIC | Age: 86
End: 2021-02-12
Payer: MEDICARE

## 2021-02-12 DIAGNOSIS — I48.0 PAROXYSMAL ATRIAL FIBRILLATION (HCC): ICD-10-CM

## 2021-02-12 LAB
INR PPP: 1.37 (ref 0.84–1.19)
PROTHROMBIN TIME: 16.9 SECONDS (ref 11.6–14.5)

## 2021-02-12 PROCEDURE — 36415 COLL VENOUS BLD VENIPUNCTURE: CPT

## 2021-02-12 PROCEDURE — 85610 PROTHROMBIN TIME: CPT

## 2021-02-15 ENCOUNTER — ANTICOAG VISIT (OUTPATIENT)
Dept: NEPHROLOGY | Facility: CLINIC | Age: 86
End: 2021-02-15

## 2021-02-23 ENCOUNTER — LAB (OUTPATIENT)
Dept: LAB | Facility: CLINIC | Age: 86
End: 2021-02-23
Payer: MEDICARE

## 2021-02-23 DIAGNOSIS — I48.0 PAROXYSMAL ATRIAL FIBRILLATION (HCC): ICD-10-CM

## 2021-02-23 LAB
INR PPP: 2.52 (ref 0.84–1.19)
PROTHROMBIN TIME: 27.1 SECONDS (ref 11.6–14.5)

## 2021-02-23 PROCEDURE — 36415 COLL VENOUS BLD VENIPUNCTURE: CPT

## 2021-02-23 PROCEDURE — 85610 PROTHROMBIN TIME: CPT

## 2021-02-24 ENCOUNTER — ANTICOAG VISIT (OUTPATIENT)
Dept: NEPHROLOGY | Facility: CLINIC | Age: 86
End: 2021-02-24

## 2021-02-25 ENCOUNTER — OFFICE VISIT (OUTPATIENT)
Dept: NEPHROLOGY | Facility: CLINIC | Age: 86
End: 2021-02-25
Payer: MEDICARE

## 2021-02-25 ENCOUNTER — TELEPHONE (OUTPATIENT)
Dept: OBGYN CLINIC | Facility: HOSPITAL | Age: 86
End: 2021-02-25

## 2021-02-25 VITALS
HEART RATE: 68 BPM | SYSTOLIC BLOOD PRESSURE: 138 MMHG | HEIGHT: 65 IN | BODY MASS INDEX: 25.49 KG/M2 | OXYGEN SATURATION: 98 % | DIASTOLIC BLOOD PRESSURE: 74 MMHG | WEIGHT: 153 LBS

## 2021-02-25 DIAGNOSIS — E55.9 VITAMIN D DEFICIENCY: ICD-10-CM

## 2021-02-25 DIAGNOSIS — E03.9 ACQUIRED HYPOTHYROIDISM: ICD-10-CM

## 2021-02-25 DIAGNOSIS — N18.31 STAGE 3A CHRONIC KIDNEY DISEASE (HCC): ICD-10-CM

## 2021-02-25 DIAGNOSIS — I48.0 PAROXYSMAL ATRIAL FIBRILLATION (HCC): ICD-10-CM

## 2021-02-25 DIAGNOSIS — M48.54XS COMPRESSION FRACTURE OF THORACIC SPINE, NON-TRAUMATIC, SEQUELA: ICD-10-CM

## 2021-02-25 DIAGNOSIS — E78.5 HYPERLIPIDEMIA, UNSPECIFIED HYPERLIPIDEMIA TYPE: ICD-10-CM

## 2021-02-25 DIAGNOSIS — E11.9 TYPE 2 DIABETES MELLITUS WITHOUT COMPLICATION, WITHOUT LONG-TERM CURRENT USE OF INSULIN (HCC): ICD-10-CM

## 2021-02-25 DIAGNOSIS — E11.69 TYPE 2 DIABETES MELLITUS WITH OTHER SPECIFIED COMPLICATION, WITHOUT LONG-TERM CURRENT USE OF INSULIN (HCC): Primary | ICD-10-CM

## 2021-02-25 DIAGNOSIS — I10 ESSENTIAL HYPERTENSION: ICD-10-CM

## 2021-02-25 LAB — SL AMB POCT HEMOGLOBIN AIC: 8.9 (ref ?–6.5)

## 2021-02-25 PROCEDURE — 99215 OFFICE O/P EST HI 40 MIN: CPT | Performed by: INTERNAL MEDICINE

## 2021-02-25 PROCEDURE — 83036 HEMOGLOBIN GLYCOSYLATED A1C: CPT | Performed by: INTERNAL MEDICINE

## 2021-02-25 RX ORDER — GLIPIZIDE 5 MG/1
5 TABLET ORAL DAILY
Qty: 90 TABLET | Refills: 3 | Status: SHIPPED | OUTPATIENT
Start: 2021-02-25 | End: 2021-04-21 | Stop reason: SDUPTHER

## 2021-02-25 RX ORDER — GLIPIZIDE 5 MG/1
5 TABLET ORAL DAILY
Qty: 45 TABLET | Refills: 3 | Status: SHIPPED | OUTPATIENT
Start: 2021-02-25 | End: 2021-02-25 | Stop reason: SDUPTHER

## 2021-02-25 RX ORDER — LINAGLIPTIN 5 MG/1
5 TABLET, FILM COATED ORAL DAILY
Qty: 30 TABLET | Refills: 3 | Status: SHIPPED | OUTPATIENT
Start: 2021-02-25 | End: 2021-02-25

## 2021-02-25 RX ORDER — LINAGLIPTIN 5 MG/1
5 TABLET, FILM COATED ORAL DAILY
Qty: 90 TABLET | Refills: 3 | Status: SHIPPED | OUTPATIENT
Start: 2021-02-25 | End: 2021-03-30

## 2021-02-25 NOTE — TELEPHONE ENCOUNTER
Nephrology is calling wanting to refer this patient  She is having persistent back pain because of a t11 fracture from 2019  They want to see if you would see her for this despite it being two years ago and her having no MRI done  Massage therapist

## 2021-02-25 NOTE — PROGRESS NOTES
[address] [facility] [place], [place]    Name: Steven Stringer  YOB: 1933      Assessment/Plan:           Problem List Items Addressed This Visit        Endocrine    Type 2 diabetes mellitus without complication [facility])       Lab Results   Component Value Date    HGBA1C 8 9 (A) 02/25/2021   Will increase glipizide to 5mg daily  Would try Tradjenta 5mg daily         Relevant Medications    linaGLIPtin (Tradjenta) 5 MG TABS    glipiZIDE (GLUCOTROL) 5 mg tablet       Cardiovascular and Mediastinum    Paroxysmal atrial fibrillation (HCC)     Heart rate sounds regular today  She is on coumadin and we are controlling her INR/dose         Essential hypertension     Controlled            Musculoskeletal and Integument    Compression fracture of thoracic spine, non-traumatic, sequela     She agrees to see orthopedics as she has continued pain         Relevant Orders    Ambulatory referral to Orthopedic Surgery       Genitourinary    Chronic kidney disease, stage 3 (moderate)     Lab Results   Component Value Date    EGFR 41 08/25/2020    EGFR 45 03/06/2020    EGFR 47 06/14/2019    CREATININE 1 20 08/25/2020    CREATININE 1 12 03/06/2020    CREATININE 1 08 06/14/2019   Last kidney function was 1 2 mg/dl  Recheck labs and urine studies  Obtain a renal ultrasound  May need to stop metformin if the kidney function is worse  List of safe OTC meds given           Relevant Orders    US kidney and bladder      Other Visit Diagnoses     Type 2 diabetes mellitus with other specified complication, without long-term current use of insulin (HCC)    -  Primary    Relevant Medications    linaGLIPtin (Tradjenta) 5 MG TABS    glipiZIDE (GLUCOTROL) 5 mg tablet    Other Relevant Orders    POCT hemoglobin A1c (Completed)    CBC and differential    Comprehensive metabolic panel    Lipid panel    Vitamin D 25 hydroxy    Microalbumin / creatinine urine ratio    Protein / creatinine ratio, urine    Uric acid Urinalysis with microscopic    TSH + Free T4    Vitamin D deficiency        Relevant Orders    Vitamin D 25 hydroxy    Hyperlipidemia, unspecified hyperlipidemia type        Relevant Orders    Lipid panel    Acquired hypothyroidism        Relevant Orders    TSH + Free T4            Subjective:      Patient ID: Jim Garcia is a 80 y o  female  HPI Melina Whittaker has a history of CKD 3, DM -2, hypertension and atrial fibrillation  She stopped Januvia and feels that glipizide is not working for her  A1c is 8 9%  This morning , yesterday 147  She gets stressed out about her obligations  She is concerned about her weight    PCR 0 24   (was 80)  Creat 1 2 ->eGFR 41 ml/min    The following portions of the patient's history were reviewed and updated as appropriate: allergies, current medications, past family history, past medical history, past social history, past surgical history and problem list     Review of Systems   Constitutional: Positive for fatigue  Negative for chills and fever  HENT: Positive for tinnitus  Negative for hearing loss  Eyes: Negative for visual disturbance  Respiratory: Negative for shortness of breath  Cardiovascular: Negative for chest pain, palpitations and leg swelling  Gastrointestinal: Positive for diarrhea  Negative for abdominal pain, blood in stool and constipation  Has a BM every other day  Feels that she has "acid stomach"   Genitourinary: Negative for decreased urine volume, dysuria and hematuria  Freq nocturia and she says she doesn't drink a lot of fluid    Musculoskeletal: Positive for gait problem  She fractured T11 and has chronic radiating pain since last year  She went to the ED and an xray showed the fracture  Right knee pain due to DJD  She had an xray in April 2019 - T11 end plate compression fracture   Neurological: Negative for dizziness and weakness  Hematological: Does not bruise/bleed easily     Psychiatric/Behavioral: Positive for sleep disturbance  She goes to bed at 8 am and does puzzles   She can't fall asleep till 9: 30 and awakens at 11-12   She has nocturia x 4  She gets OOB at 6:45 am  She drinks caffeinated iced tea and soda         Social History     Socioeconomic History    Marital status: /Civil Union     Spouse name: None    Number of children: None    Years of education: None    Highest education level: None   Occupational History    Occupation: Retired    Social Needs    Financial resource strain: None    Food insecurity     Worry: None     Inability: None    Transportation needs     Medical: None     Non-medical: None   Tobacco Use    Smoking status: Former Smoker     Types: Cigarettes     Quit date: 3/19/2001     Years since quittin 9    Smokeless tobacco: Never Used   Substance and Sexual Activity    Alcohol use: Never     Frequency: Never    Drug use: Never    Sexual activity: Not Currently   Lifestyle    Physical activity     Days per week: None     Minutes per session: None    Stress: None   Relationships    Social connections     Talks on phone: None     Gets together: None     Attends Pentecostal service: None     Active member of club or organization: None     Attends meetings of clubs or organizations: None     Relationship status: None    Intimate partner violence     Fear of current or ex partner: None     Emotionally abused: None     Physically abused: None     Forced sexual activity: None   Other Topics Concern    None   Social History Narrative    Rarely consumes alcohol - As per [employer]    Consumes on average 2 cups of regular coffee per day    Consumes on average 12 oz of soda per day     Past Medical History:   Diagnosis Date    Abnormal gait     Accidental fall from chair, subsequent encounter     [facility]     Benign essential hypertension     Bite of animal     Cataract     Cellulitis     Chronic kidney disease, stage 3     [facility]     Current tear of lateral cartilage or meniscus of knee     Diabetes mellitus [facility]     Disease of thyroid gland     Disorder of magnesium metabolism     Dvt femoral (deep venous thrombosis)     Embolism from thrombosis of vein of distal end of lower extremity     Essential hypertension     Fall     Hyperlipidemia     Hyperlipidemia     Hypertension     Hypothyroidism     Insomnia     Knee joint effusion     Leukocytosis     Malaise and fatigue     MI (myocardial infarction) ([facility] )     Hospitalization     Orbital hemorrhage     Other sleep disorders     PAF (paroxysmal atrial fibrillation) (HCC)     Presence of vena cava filter     Pulmonary embolism (HCC)     Tear film insufficiency     Type 2 diabetes mellitus ([place] [address] )     Unspecified osteoarthritis, unspecified site     Weight decreased      Past Surgical History:   Procedure Laterality Date    ABDOMINAL ADHESION SURGERY  2015    Had wound vac    ABDOMINAL SURGERY      APPENDECTOMY      CATARACT EXTRACTION      CHOLECYSTECTOMY      COLON SURGERY      COLOSTOMY  2011    Due to Ileus, then reanastomosis in 2002    EXCISION BLADDER MESH TRANSVESICLEPORT W/ LASER      HERNIA REPAIR      IVC FILTER INSERTION      KNEE SURGERY  2014    repair        Current Outpatient Medications:     acetaminophen (TYLENOL) 500 mg tablet, Take 500 mg by mouth every 6 (six) hours as needed, Disp: , Rfl:     Contour Next Test test strip, Test twice a day or as needed, Disp: 100 each, Rfl: 5    glipiZIDE (GLUCOTROL) 5 mg tablet, Take 1 tablet (5 mg total) by mouth daily, Disp: 45 tablet, Rfl: 3    levothyroxine 50 mcg tablet, TAKE 1 TABLET BY MOUTH  DAILY, Disp: 90 tablet, Rfl: 3    lisinopril (ZESTRIL) 2 5 mg tablet, TAKE 1 TABLET BY MOUTH  EVERY DAY TO PROTECT YOUR  KIDNEYS, Disp: 90 tablet, Rfl: 3    lovastatin (MEVACOR) 20 mg tablet, TAKE 1 TABLET BY MOUTH  DAILY AT BEDTIME, Disp: 90 tablet, Rfl: 3    metFORMIN (GLUCOPHAGE) 500 mg tablet, TAKE 1 TABLET BY MOUTH  TWICE A DAY WITH MEALS, Disp: 180 tablet, Rfl: 3    metoprolol tartrate (LOPRESSOR) 25 mg tablet, TAKE 1 TABLET BY MOUTH  EVERY 12 HOURS, Disp: 180 tablet, Rfl: 3    Microlet Lancets MISC, Test twice a day or as needed, Disp: 100 each, Rfl: 5    warfarin (COUMADIN) 5 mg tablet, Take 1 tablet (5 mg total) by mouth daily, Disp: 90 tablet, Rfl: 3    warfarin (COUMADIN) 7 5 mg tablet, 1 daily as directed by INR, Disp: 90 tablet, Rfl: 0    warfarin (COUMADIN) 7 5 mg tablet, 1 daily as directed according to INR, Disp: 30 tablet, Rfl: 5    linaGLIPtin (Tradjenta) 5 MG TABS, Take 5 mg by mouth daily, Disp: 30 tablet, Rfl: 3    Lab Results   Component Value Date     04/18/2018    SODIUM 140 08/25/2020    K 4 2 08/25/2020     08/25/2020    CO2 26 08/25/2020    ANIONGAP 11 2 04/18/2018    AGAP 8 08/25/2020    BUN 27 (H) 08/25/2020    CREATININE 1 20 08/25/2020    GLUF 177 (H) 08/25/2020    CALCIUM 8 9 08/25/2020    AST 18 08/25/2020    ALT 19 08/25/2020    ALKPHOS 76 08/25/2020    PROT 6 7 04/18/2018    TP 7 6 08/25/2020    BILITOT 0 4 04/18/2018    TBILI 0 62 08/25/2020    EGFR 41 08/25/2020     Lab Results   Component Value Date    WBC 7 02 08/25/2020    HGB 15 4 08/25/2020    HCT 49 8 (H) 08/25/2020     (H) 08/25/2020     08/25/2020     Lab Results   Component Value Date    CHOLESTEROL 201 (H) 03/06/2020    CHOLESTEROL 146 03/26/2019    CHOLESTEROL 175 10/08/2018     Lab Results   Component Value Date    HDL 80 03/06/2020    HDL 56 03/26/2019    HDL 67 (H) 10/08/2018     Lab Results   Component Value Date    LDLCALC 90 03/06/2020    LDLCALC 69 03/26/2019    LDLCALC 79 10/08/2018     Lab Results   Component Value Date    TRIG 153 (H) 03/06/2020    TRIG 105 03/26/2019    TRIG 146 10/08/2018     No results found for: [place], [place]  Lab Results   Component Value Date    [id number] 2 190 08/25/2020     Lab Results   Component Value Date    CALCIUM 8 9 08/25/2020     No results found for: SPEP, UPEP  No results found for: NEELIMA [place]4HUR        Objective:      /74   Pulse 68   Ht 5' 5" (1 651 m)   Wt 69 4 kg (153 lb)   SpO2 98%   BMI 25 46 kg/m²     Last weight 154 - lost 1 lbs since Aug 2020     Physical Exam  Constitutional:       General: She is not in acute distress  Appearance: She is normal weight  She is not toxic-appearing  HENT:      Head: Normocephalic and atraumatic  Right Ear: External ear normal       Left Ear: External ear normal    Eyes:      Extraocular Movements: Extraocular movements intact  Pupils: Pupils are equal, round, and reactive to light  Neck:      Musculoskeletal: Normal range of motion  Vascular: No carotid bruit  Cardiovascular:      Rate and Rhythm: Normal rate and regular rhythm  Heart sounds: No murmur  Pulmonary:      Effort: Pulmonary effort is normal       Breath sounds: Normal breath sounds  No wheezing  Abdominal:      General: Bowel sounds are normal  There is no distension  Palpations: Abdomen is soft  Tenderness: There is no abdominal tenderness  Musculoskeletal:         General: Swelling present  Right lower leg: No edema  Left lower leg: No edema  Comments: Incoordination getting off the table   Lymphadenopathy:      Cervical: No cervical adenopathy  Skin:     General: Skin is warm and dry  Neurological:      General: No focal deficit present  Mental Status: She is alert  Mental status is at baseline  Psychiatric:         Mood and Affect: Mood normal          Behavior: Behavior normal          Thought Content:  Thought content normal          Judgment: Judgment normal

## 2021-02-25 NOTE — ASSESSMENT & PLAN NOTE
Lab Results   Component Value Date    EGFR 41 08/25/2020    EGFR 45 03/06/2020    EGFR 47 06/14/2019    CREATININE 1 20 08/25/2020    CREATININE 1 12 03/06/2020    CREATININE 1 08 06/14/2019   Last kidney function was 1 2 mg/dl  Recheck labs and urine studies  Obtain a renal ultrasound  May need to stop metformin if the kidney function is worse  List of safe OTC meds given

## 2021-02-25 NOTE — ASSESSMENT & PLAN NOTE
Lab Results   Component Value Date    HGBA1C 8 9 (A) 02/25/2021   Will increase glipizide to 5mg daily  Would try Tradjenta 5mg daily

## 2021-02-25 NOTE — PATIENT INSTRUCTIONS
Drink only decaf tea and soda  Have regular coffee in the morning but no more caffeine  Have a kidney ultrasound  Have labs drawn  See Dr Marielena Hastings about your back pain  Increase glipizide to 5mg daily  Take Tradjenta 5mg  Daily for sugar

## 2021-03-01 ENCOUNTER — APPOINTMENT (OUTPATIENT)
Dept: LAB | Facility: HOSPITAL | Age: 86
End: 2021-03-01
Attending: INTERNAL MEDICINE
Payer: MEDICARE

## 2021-03-01 ENCOUNTER — HOSPITAL ENCOUNTER (OUTPATIENT)
Dept: ULTRASOUND IMAGING | Facility: HOSPITAL | Age: 86
Discharge: HOME | End: 2021-03-01
Attending: INTERNAL MEDICINE
Payer: MEDICARE

## 2021-03-01 DIAGNOSIS — I10 ESSENTIAL HYPERTENSION: ICD-10-CM

## 2021-03-01 DIAGNOSIS — E55.9 VITAMIN D DEFICIENCY: ICD-10-CM

## 2021-03-01 DIAGNOSIS — E11.69 TYPE 2 DIABETES MELLITUS WITH OTHER SPECIFIED COMPLICATION, WITHOUT LONG-TERM CURRENT USE OF INSULIN (HCC): Primary | ICD-10-CM

## 2021-03-01 DIAGNOSIS — N18.31 STAGE 3A CHRONIC KIDNEY DISEASE (HCC): ICD-10-CM

## 2021-03-01 DIAGNOSIS — E03.9 ACQUIRED HYPOTHYROIDISM: ICD-10-CM

## 2021-03-01 DIAGNOSIS — E78.5 HYPERLIPIDEMIA, UNSPECIFIED HYPERLIPIDEMIA TYPE: ICD-10-CM

## 2021-03-01 LAB
25(OH)D3 SERPL-MCNC: 29.7 NG/ML (ref 30–100)
ALBUMIN SERPL BCP-MCNC: 4.1 G/DL (ref 3.5–5.7)
ALP SERPL-CCNC: 73 U/L (ref 55–165)
ALT SERPL W P-5'-P-CCNC: 11 U/L (ref 7–52)
ANION GAP SERPL CALCULATED.3IONS-SCNC: 9 MMOL/L (ref 4–13)
AST SERPL W P-5'-P-CCNC: 18 U/L (ref 13–39)
BACTERIA UR QL AUTO: ABNORMAL /HPF
BASOPHILS # BLD AUTO: 0 THOUSANDS/ΜL (ref 0–0.1)
BASOPHILS NFR BLD AUTO: 1 % (ref 0–2)
BILIRUB SERPL-MCNC: 0.6 MG/DL (ref 0.2–1)
BILIRUB UR QL STRIP: NEGATIVE
BUN SERPL-MCNC: 22 MG/DL (ref 7–25)
CALCIUM SERPL-MCNC: 8.8 MG/DL (ref 8.6–10.5)
CHLORIDE SERPL-SCNC: 99 MMOL/L (ref 98–107)
CHOLEST SERPL-MCNC: 185 MG/DL (ref 0–200)
CLARITY UR: CLEAR
CO2 SERPL-SCNC: 28 MMOL/L (ref 21–31)
COLOR UR: YELLOW
CREAT SERPL-MCNC: 1.02 MG/DL (ref 0.6–1.2)
EOSINOPHIL # BLD AUTO: 0 THOUSAND/ΜL (ref 0–0.61)
EOSINOPHIL NFR BLD AUTO: 1 % (ref 0–5)
ERYTHROCYTE [DISTWIDTH] IN BLOOD BY AUTOMATED COUNT: 14.3 % (ref 11.5–14.5)
GFR SERPL CREATININE-BSD FRML MDRD: 50 ML/MIN/1.73SQ M
GLUCOSE P FAST SERPL-MCNC: 187 MG/DL (ref 65–99)
GLUCOSE UR STRIP-MCNC: NEGATIVE MG/DL
HCT VFR BLD AUTO: 45.5 % (ref 42–47)
HDLC SERPL-MCNC: 68 MG/DL
HGB BLD-MCNC: 15 G/DL (ref 12–16)
HGB UR QL STRIP.AUTO: ABNORMAL
KETONES UR STRIP-MCNC: NEGATIVE MG/DL
LDLC SERPL CALC-MCNC: 94 MG/DL (ref 0–100)
LEUKOCYTE ESTERASE UR QL STRIP: ABNORMAL
LYMPHOCYTES # BLD AUTO: 1.1 THOUSANDS/ΜL (ref 0.6–4.47)
LYMPHOCYTES NFR BLD AUTO: 18 % (ref 21–51)
MCH RBC QN AUTO: 32 PG (ref 26–34)
MCHC RBC AUTO-ENTMCNC: 33.1 G/DL (ref 31–37)
MCV RBC AUTO: 97 FL (ref 81–99)
MONOCYTES # BLD AUTO: 0.4 THOUSAND/ΜL (ref 0.17–1.22)
MONOCYTES NFR BLD AUTO: 6 % (ref 2–12)
NEUTROPHILS # BLD AUTO: 4.7 THOUSANDS/ΜL (ref 1.4–6.5)
NEUTS SEG NFR BLD AUTO: 75 % (ref 42–75)
NITRITE UR QL STRIP: NEGATIVE
NON-SQ EPI CELLS URNS QL MICRO: ABNORMAL /HPF
NONHDLC SERPL-MCNC: 117 MG/DL
PH UR STRIP.AUTO: 5.5 [PH]
PLATELET # BLD AUTO: 140 THOUSANDS/UL (ref 149–390)
PMV BLD AUTO: 9.9 FL (ref 8.6–11.7)
POTASSIUM SERPL-SCNC: 4.1 MMOL/L (ref 3.5–5.5)
PROT SERPL-MCNC: 7.1 G/DL (ref 6.4–8.9)
PROT UR STRIP-MCNC: NEGATIVE MG/DL
RBC # BLD AUTO: 4.71 MILLION/UL (ref 3.9–5.2)
RBC #/AREA URNS AUTO: ABNORMAL /HPF
SODIUM SERPL-SCNC: 136 MMOL/L (ref 134–143)
SP GR UR STRIP.AUTO: 1.01 (ref 1–1.03)
T4 FREE SERPL-MCNC: 1.15 NG/DL (ref 0.76–1.46)
TRIGL SERPL-MCNC: 114 MG/DL (ref 44–166)
TSH SERPL DL<=0.05 MIU/L-ACNC: 1.35 UIU/ML (ref 0.45–5.33)
URATE SERPL-MCNC: 5.3 MG/DL (ref 2.3–7.6)
UROBILINOGEN UR QL STRIP.AUTO: 0.2 E.U./DL
WBC # BLD AUTO: 6.3 THOUSAND/UL (ref 4.8–10.8)
WBC #/AREA URNS AUTO: ABNORMAL /HPF

## 2021-03-01 PROCEDURE — 85025 COMPLETE CBC W/AUTO DIFF WBC: CPT

## 2021-03-01 PROCEDURE — 82043 UR ALBUMIN QUANTITATIVE: CPT

## 2021-03-01 PROCEDURE — 82570 ASSAY OF URINE CREATININE: CPT

## 2021-03-01 PROCEDURE — 81001 URINALYSIS AUTO W/SCOPE: CPT

## 2021-03-01 PROCEDURE — 80061 LIPID PANEL: CPT

## 2021-03-01 PROCEDURE — 36415 COLL VENOUS BLD VENIPUNCTURE: CPT

## 2021-03-01 PROCEDURE — 84156 ASSAY OF PROTEIN URINE: CPT

## 2021-03-01 PROCEDURE — 82306 VITAMIN D 25 HYDROXY: CPT

## 2021-03-01 PROCEDURE — 84550 ASSAY OF BLOOD/URIC ACID: CPT

## 2021-03-01 PROCEDURE — 84443 ASSAY THYROID STIM HORMONE: CPT

## 2021-03-01 PROCEDURE — 80053 COMPREHEN METABOLIC PANEL: CPT

## 2021-03-01 PROCEDURE — 84439 ASSAY OF FREE THYROXINE: CPT

## 2021-03-01 PROCEDURE — 76770 US EXAM ABDO BACK WALL COMP: CPT

## 2021-03-01 RX ORDER — LISINOPRIL 2.5 MG/1
TABLET ORAL
Qty: 90 TABLET | Refills: 3 | Status: SHIPPED | OUTPATIENT
Start: 2021-03-01 | End: 2021-03-03

## 2021-03-02 ENCOUNTER — TELEPHONE (OUTPATIENT)
Dept: OBGYN CLINIC | Facility: CLINIC | Age: 86
End: 2021-03-02

## 2021-03-02 NOTE — TELEPHONE ENCOUNTER
Patient called and spoke to the phone room  She was transferred to me  She stated she has a referral for a Compression fracture of thoracic spine  She stated she cannot go to [address] [place] [address] due to there age and driving  I asked RAYMUNDO if this would be ok to schedule with Dr Kenneth Crow and then he could direct her to who could help her  Patient stated she does not want to have any surgery

## 2021-03-03 ENCOUNTER — LAB (OUTPATIENT)
Dept: LAB | Facility: CLINIC | Age: 86
End: 2021-03-03
Payer: MEDICARE

## 2021-03-03 DIAGNOSIS — R80.9 PROTEINURIA, UNSPECIFIED TYPE: Primary | ICD-10-CM

## 2021-03-03 DIAGNOSIS — I48.0 PAROXYSMAL ATRIAL FIBRILLATION (HCC): ICD-10-CM

## 2021-03-03 LAB
CREAT UR-MCNC: 35.4 MG/DL
CREAT UR-MCNC: 35.4 MG/DL
INR PPP: 2.26 (ref 0.84–1.19)
MICROALBUMIN UR-MCNC: 160 MG/L (ref 0–20)
MICROALBUMIN/CREAT 24H UR: 452 MG/G CREATININE (ref 0–30)
PROT UR-MCNC: 22 MG/DL
PROT/CREAT UR: 0.62 MG/G{CREAT} (ref 0–0.1)
PROTHROMBIN TIME: 24.9 SECONDS (ref 11.6–14.5)

## 2021-03-03 PROCEDURE — 36415 COLL VENOUS BLD VENIPUNCTURE: CPT

## 2021-03-03 PROCEDURE — 85610 PROTHROMBIN TIME: CPT

## 2021-03-03 RX ORDER — LISINOPRIL 5 MG/1
5 TABLET ORAL DAILY
Qty: 90 TABLET | Refills: 2 | Status: SHIPPED | OUTPATIENT
Start: 2021-03-03 | End: 2022-03-16

## 2021-03-04 ENCOUNTER — ANTICOAG VISIT (OUTPATIENT)
Dept: NEPHROLOGY | Facility: CLINIC | Age: 86
End: 2021-03-04

## 2021-03-04 ENCOUNTER — OFFICE VISIT (OUTPATIENT)
Dept: OBGYN CLINIC | Facility: CLINIC | Age: 86
End: 2021-03-04
Payer: MEDICARE

## 2021-03-04 ENCOUNTER — APPOINTMENT (OUTPATIENT)
Dept: RADIOLOGY | Facility: CLINIC | Age: 86
End: 2021-03-04
Payer: MEDICARE

## 2021-03-04 VITALS
SYSTOLIC BLOOD PRESSURE: 152 MMHG | TEMPERATURE: 97.8 F | HEART RATE: 57 BPM | BODY MASS INDEX: 25.49 KG/M2 | WEIGHT: 153 LBS | DIASTOLIC BLOOD PRESSURE: 78 MMHG | HEIGHT: 65 IN

## 2021-03-04 DIAGNOSIS — S22.080G COMPRESSION FRACTURE OF T11 VERTEBRA WITH DELAYED HEALING, SUBSEQUENT ENCOUNTER: Primary | ICD-10-CM

## 2021-03-04 DIAGNOSIS — M48.54XS COMPRESSION FRACTURE OF THORACIC SPINE, NON-TRAUMATIC, SEQUELA: ICD-10-CM

## 2021-03-04 DIAGNOSIS — S22.080G COMPRESSION FRACTURE OF T11 VERTEBRA WITH DELAYED HEALING, SUBSEQUENT ENCOUNTER: ICD-10-CM

## 2021-03-04 PROCEDURE — 72072 X-RAY EXAM THORAC SPINE 3VWS: CPT

## 2021-03-04 PROCEDURE — 99204 OFFICE O/P NEW MOD 45 MIN: CPT | Performed by: FAMILY MEDICINE

## 2021-03-04 NOTE — PATIENT INSTRUCTIONS
F/u 2 wks for R knee  F/u as needed for Thoracic spine compression fx  Referral to Ortho spine- patient doesn't want to travel to [facility] [place] [place] at this time

## 2021-03-04 NOTE — PROGRESS NOTES
[facility] SPECIALISTS [place] [address]  [facility] [address] [phone]      Chief Complaint:  Chief Complaint   Patient presents with    Spine - Pain       Vitals:  /78 (BP Location: Right arm, Patient Position: Sitting, Cuff Size: Standard)   Pulse 57   Temp 97 8 °F (36 6 °C) (Tympanic)   Ht 5' 5" (1 651 m)   Wt 69 4 kg (153 lb)   BMI 25 46 kg/m²     The following portions of the patient's history were reviewed and updated as appropriate: allergies, current medications, past family history, past medical history, past social history, past surgical history, and problem list       Subjective:   Patient ID: Konstantin Dennis is a 80 y o  female  Here c/o chronic T11 compression fracture  Last XR 1 year ago  A couple of years ago fell in her living room and injured her back  She is still having pain in the back  Denies weakness  Hx of Colon Ca  Hurts to bend/extend  She is on coumadin for hx of PE/PAF  Taking tylenol PRN- doesn't help much  Waking up at night and urinating for months  No new bowel changes  Toes feel funny at times  Intermittently has pain radiating down R leg  THORACIC SPINE     INDICATION:   M48 [id number]: Collapsed vertebra, not elsewhere classified, thoracic region, sequela of fracture  E11 9: Type 2 diabetes mellitus without complications  O69 0: Chronic kidney disease, stage 3 (moderate)      COMPARISON:  4/4/2019     VIEWS:  XR SPINE THORACIC 3 VW        FINDINGS:     There is no fracture or pathologic bone lesion      There is persistent moderate loss of height of the T11 compression fracture  There has been further collapse ventrally with increased anterior wedging    No new compression fractures are seen      No significant degenerative changes       There is no displacement of the paraspinal line       The pedicles appear intact      IMPRESSION:     Persistent moderate compression fracture of the T11 vertebral body with further collapse and wedging anteriorly  No new compression fractures identified             Review of Systems   Constitutional: Negative for fatigue and fever  Respiratory: Negative for shortness of breath  Cardiovascular: Negative for chest pain  Gastrointestinal: Negative for abdominal pain and nausea  Genitourinary: Negative for dysuria  Skin: Negative for rash and wound  Neurological: Positive for headaches  Negative for weakness  Objective:  Back Exam     Tenderness   The patient is experiencing tenderness in the thoracic  Range of Motion   Extension: abnormal   Flexion: abnormal   Lateral bend right: normal   Lateral bend left: normal   Rotation right: normal   Rotation left: normal     Tests   Straight leg raise right: negative  Straight leg raise left: positive            Physical Exam  Vitals signs and nursing note reviewed  Constitutional:       Appearance: Normal appearance  She is well-developed  HENT:      Head: Normocephalic  Mouth/Throat:      Mouth: Mucous membranes are moist    Eyes:      Extraocular Movements: Extraocular movements intact  Neck:      Musculoskeletal: Normal range of motion  Cardiovascular:      Rate and Rhythm: Normal rate and regular rhythm  Heart sounds: Normal heart sounds  Pulmonary:      Effort: Pulmonary effort is normal       Breath sounds: Normal breath sounds  Abdominal:      General: Bowel sounds are normal       Palpations: Abdomen is soft  Musculoskeletal:         General: Tenderness present  Skin:     General: Skin is warm and dry  Neurological:      General: No focal deficit present  Mental Status: She is alert and oriented to person, place, and time  Motor: No weakness  Deep Tendon Reflexes: Reflexes normal    Psychiatric:         Mood and Affect: Mood normal          Behavior: Behavior normal          Thought Content:  Thought content normal          I have personally reviewed pertinent films in PACS and my interpretation is XR- T spine T11 compression fx, >50 dec in height  Assessment/Plan:  Assessment/Plan   Diagnoses and all orders for this visit:    Compression fracture of T11 vertebra with delayed healing, subsequent encounter  -     XR spine thoracic 3 vw; Future  -     Ambulatory referral to Orthopedic Surgery; Future    Compression fracture of thoracic spine, non-traumatic, sequela  -     Ambulatory referral to Orthopedic Surgery  -     Ambulatory referral to Orthopedic Surgery; Future        Return in about 2 weeks (around 3/18/2021) for Recheck       Ofelia Cox MD

## 2021-03-05 ENCOUNTER — TELEPHONE (OUTPATIENT)
Dept: NEPHROLOGY | Facility: CLINIC | Age: 86
End: 2021-03-05

## 2021-03-05 DIAGNOSIS — N32.89 BLADDER MASS: Primary | ICD-10-CM

## 2021-03-08 NOTE — TELEPHONE ENCOUNTER
Called Dr Jose J Iraheta office ([phone]) patient is set up with Dr Kia Gonzalez for Thursday 3/11/21 @ 3:15 pm   Will fax records to their office [phone]  Patient aware of the apt

## 2021-03-10 NOTE — TELEPHONE ENCOUNTER
Patient complains of 8-10 x 10 headache which is behind both the eyes  Patient received course of magnesium, Toradol, Reglan and no improvement noted of headache  Will repeat a course of IV magnesium, Toradol, Reglan an add dexamethasone 4 mg IV x1 and see there is any improvement of her headache  Pt has no 7 5 tablets and she was told 3 days day ago to start 7 5  As of today she still has not started 7 5 tablets  Please send to the pharmacy

## 2021-03-18 ENCOUNTER — OFFICE VISIT (OUTPATIENT)
Dept: OBGYN CLINIC | Facility: CLINIC | Age: 86
End: 2021-03-18
Payer: MEDICARE

## 2021-03-18 ENCOUNTER — APPOINTMENT (OUTPATIENT)
Dept: RADIOLOGY | Facility: CLINIC | Age: 86
End: 2021-03-18
Payer: MEDICARE

## 2021-03-18 ENCOUNTER — APPOINTMENT (OUTPATIENT)
Dept: LAB | Facility: CLINIC | Age: 86
End: 2021-03-18
Payer: MEDICARE

## 2021-03-18 VITALS
TEMPERATURE: 98.4 F | BODY MASS INDEX: 25.49 KG/M2 | HEART RATE: 60 BPM | SYSTOLIC BLOOD PRESSURE: 162 MMHG | DIASTOLIC BLOOD PRESSURE: 73 MMHG | WEIGHT: 153 LBS | HEIGHT: 65 IN

## 2021-03-18 DIAGNOSIS — M25.561 CHRONIC PAIN OF RIGHT KNEE: ICD-10-CM

## 2021-03-18 DIAGNOSIS — S22.080G COMPRESSION FRACTURE OF T11 VERTEBRA WITH DELAYED HEALING, SUBSEQUENT ENCOUNTER: ICD-10-CM

## 2021-03-18 DIAGNOSIS — G89.29 CHRONIC PAIN OF RIGHT KNEE: ICD-10-CM

## 2021-03-18 DIAGNOSIS — M17.11 PRIMARY OSTEOARTHRITIS OF RIGHT KNEE: ICD-10-CM

## 2021-03-18 DIAGNOSIS — I48.0 PAROXYSMAL ATRIAL FIBRILLATION (HCC): ICD-10-CM

## 2021-03-18 DIAGNOSIS — M48.54XS COMPRESSION FRACTURE OF THORACIC SPINE, NON-TRAUMATIC, SEQUELA: Primary | ICD-10-CM

## 2021-03-18 LAB
INR PPP: 2.2 (ref 0.84–1.19)
PROTHROMBIN TIME: 24.4 SECONDS (ref 11.6–14.5)

## 2021-03-18 PROCEDURE — 99214 OFFICE O/P EST MOD 30 MIN: CPT | Performed by: FAMILY MEDICINE

## 2021-03-18 PROCEDURE — 36415 COLL VENOUS BLD VENIPUNCTURE: CPT

## 2021-03-18 PROCEDURE — 20610 DRAIN/INJ JOINT/BURSA W/O US: CPT | Performed by: FAMILY MEDICINE

## 2021-03-18 PROCEDURE — 73562 X-RAY EXAM OF KNEE 3: CPT

## 2021-03-18 PROCEDURE — 85610 PROTHROMBIN TIME: CPT

## 2021-03-18 RX ORDER — METHYLPREDNISOLONE ACETATE 40 MG/ML
1 INJECTION, SUSPENSION INTRA-ARTICULAR; INTRALESIONAL; INTRAMUSCULAR; SOFT TISSUE
Status: COMPLETED | OUTPATIENT
Start: 2021-03-18 | End: 2021-03-18

## 2021-03-18 RX ORDER — LIDOCAINE HYDROCHLORIDE 10 MG/ML
4 INJECTION, SOLUTION INFILTRATION; PERINEURAL
Status: COMPLETED | OUTPATIENT
Start: 2021-03-18 | End: 2021-03-18

## 2021-03-18 RX ADMIN — LIDOCAINE HYDROCHLORIDE 4 ML: 10 INJECTION, SOLUTION INFILTRATION; PERINEURAL at 12:08

## 2021-03-18 RX ADMIN — METHYLPREDNISOLONE ACETATE 1 ML: 40 INJECTION, SUSPENSION INTRA-ARTICULAR; INTRALESIONAL; INTRAMUSCULAR; SOFT TISSUE at 12:08

## 2021-03-18 NOTE — PROGRESS NOTES
[facility] SPECIALISTS [place] [address]  [facility] [address]  [phone]      Chief Complaint:  Chief Complaint   Patient presents with    Spine - Follow-up       Vitals:  /73   Pulse 60   Temp 98 4 °F (36 9 °C) (Tympanic)   Ht 5' 5" (1 651 m)   Wt 69 4 kg (153 lb)   BMI 25 46 kg/m²     The following portions of the patient's history were reviewed and updated as appropriate: allergies, current medications, past family history, past medical history, past social history, past surgical history, and problem list       Subjective:   Patient ID: Ki Herrera is a 80 y o  female  Here c/o R knee pain and T spine compression fx  1)  Compression fracture-  She is still having a lot of back pain but does not want surgery or to travel to   -taking tylenol- doesn't help much- she would like a back brace  2)  R knee pain  - hx of meniscectomy  - hurts to walk intermittently  Sharp pains  Taking tylenol- not helping much  Swells up  No locking or giving out  Walking differently due to pain  Pain for years,getting worse  Using a cane        Review of Systems   Constitutional: Negative for fatigue and fever  Respiratory: Negative for shortness of breath  Cardiovascular: Negative for chest pain  Gastrointestinal: Negative for abdominal pain and nausea  Genitourinary: Negative for dysuria  Musculoskeletal: Positive for arthralgias, back pain and gait problem  Skin: Negative for rash and wound  Neurological: Negative for weakness and headaches  Objective:  Right Knee Exam     Tenderness   The patient is experiencing tenderness in the lateral joint line  Range of Motion   Extension: normal   Flexion: normal     Tests   Adam:  Medial - negative Lateral - positive  Varus: negative Valgus: negative    Other   Swelling: none  Effusion: no effusion present          Observations     Right Knee   Negative for effusion         Physical Exam  Constitutional:       Appearance: Normal appearance  She is normal weight  HENT:      Head: Normocephalic  Eyes:      Extraocular Movements: Extraocular movements intact  Neck:      Musculoskeletal: Normal range of motion  Pulmonary:      Effort: Pulmonary effort is normal    Musculoskeletal:         General: Tenderness present  No swelling  Right knee: She exhibits no effusion  Skin:     General: Skin is warm and dry  Neurological:      General: No focal deficit present  Mental Status: She is alert and oriented to person, place, and time  Mental status is at baseline  Psychiatric:         Mood and Affect: Mood normal          Behavior: Behavior normal          Thought Content: Thought content normal          Judgment: Judgment normal          I have personally reviewed pertinent films in PACS and my interpretation is XR- R knee- mod DJD, no fx, dec joint space lateral knee  Large joint arthrocentesis: R knee  Universal Protocol:  Consent: Verbal consent obtained  Consent given by: patient  Time out: Immediately prior to procedure a "time out" was called to verify the correct patient, procedure, equipment, support staff and site/side marked as required    Timeout called at: 3/18/2021 12:01 PM   Site marked: the operative site was marked  Supporting Documentation  Indications: pain   Procedure Details  Location: knee - R knee  Preparation: Patient was prepped and draped in the usual sterile fashion  Needle size: 25 G  Ultrasound guidance: no  Approach: anterolateral  Medications administered: 4 mL lidocaine 1 %; 1 mL methylPREDNISolone acetate 40 mg/mL    Patient tolerance: patient tolerated the procedure well with no immediate complications  Dressing:  Sterile dressing applied          Assessment/Plan:  Assessment/Plan   Diagnoses and all orders for this visit:    Compression fracture of thoracic spine, non-traumatic, sequela  -     Tlso Back Brace    Compression fracture of T11 vertebra with delayed healing, subsequent encounter  -     Tlso Back Brace    Chronic pain of right knee  -     XR knee 3 vw right non injury; Future    Primary osteoarthritis of right knee    Other orders  -     Large joint arthrocentesis        Return in about 1 month (around 4/18/2021) for Recheck       Doni Gallegos MD

## 2021-03-18 NOTE — PATIENT INSTRUCTIONS
F/u 4 wks  Icing/OTC pain meds as needed  TLSO back brace  Knee exercises  Knee Exercises   AMBULATORY CARE:   What you need to know about knee exercises:  Knee exercises help strengthen the muscles around your knee  Strong muscles can help reduce pain and decrease your risk of future injury  Knee exercises also help you heal after an injury or surgery  · Start slow  These are beginning exercises  Ask your healthcare provider if you need to see a physical therapist for more advanced exercises  As you get stronger, you may be able to do more sets of each exercise or add weights  · Stop if you feel pain  It is normal to feel some discomfort at first  Regular exercise will help decrease your discomfort over time  · Do the exercises on both legs  Do this so both knees remain strong  · Warm up before you do knee exercises  Walk or ride a stationary bike for 5 or 10 minutes to warm your muscles  How to perform knee stretches safely:  Always stretch before you do strengthening exercises  Do these stretching exercises again after you do the strengthening exercises  Do these stretches 4 or 5 days a week, or as directed  · Standing calf stretch: Face a wall and place both palms flat on the wall, or hold the back of a chair for balance  Keep a slight bend in your knees  Take a big step backward with one leg  Keep your other leg directly under you  Keep both heels flat and press your hips forward  Hold the stretch for 30 seconds, and then relax for 30 seconds  Switch legs  Repeat 2 or 3 times on each leg  · Standing quadriceps stretch:  Stand and place one hand against a wall or hold the back of a chair for balance  With your weight on one leg, bend your other leg and grab your ankle  Bring your heel toward your buttocks  Hold the stretch for 30 to 60 seconds  Switch legs  Repeat 2 or 3 times on each leg  · Sitting hamstring stretch:  Sit with both legs straight in front of you   Do not point or flex your toes  Place your palms on the floor and slide your hands forward until you feel the stretch  Do not round your back  Hold the stretch for 30 seconds  Repeat 2 or 3 times  How to perform knee strengthening exercises safely:  Do these exercises 4 or 5 days a week, or as directed  · Standing half squats:  Stand with your feet shoulder-width apart  Lean your back against a wall or hold the back of a chair for balance, if needed  Slowly sit down about 10 inches, as if you are going to sit in a chair  Your body weight should be mostly over your heels  Hold the squat for 5 seconds, then rise to a standing position  Do 3 sets of 10 squats to strengthen your buttocks and thighs  · Standing hamstring curls: Face a wall and place both palms flat on the wall, or hold the back of a chair for balance  With your weight on one leg, lift your other foot as close to your buttocks as you can  Hold for 5 seconds and then lower your leg  Do 2 sets of 10 curls on each leg  This exercise strengthens the muscles in the back of your thigh  · Standing calf raises:  Face a wall and place both palms flat on the wall, or hold the back of a chair for balance  Stand up straight, and do not lean  Place all your weight on one leg by lifting the other foot off the floor  Raise the heel of the foot that is on the floor as high as you can and then lower it  Do 2 sets of 10 calf raises on each leg to strengthen your calf muscles  · Straight leg lifts:  Lie on your stomach with straight legs  Fold your arms in front of you and rest your head in your arms  Tighten your leg muscles and raise one leg as high as you can  Hold for 5 seconds, then lower your leg  Do 2 sets of 10 lifts on each leg to strengthen your buttocks  · Sitting leg lifts:  Sit in a chair  Slowly straighten and raise one leg  Squeeze your thigh muscles and hold for 5 seconds  Relax and return your foot to the floor   Do 2 sets of 10 lifts on each leg  This helps strengthen the muscles in the front of your thigh  Contact your healthcare provider if:   · You have new pain or your pain becomes worse  · You have questions or concerns about your condition or care  © Copyright [address] Information is for End User's use only and may not be sold, redistributed or otherwise used for commercial purposes  All illustrations and images included in CareNotes® are the copyrighted property of A D [employer] M [employer]  or [facility] Riccardo Nowak   The above information is an  only  It is not intended as medical advice for individual conditions or treatments  Talk to your doctor, nurse or pharmacist before following any medical regimen to see if it is safe and effective for you

## 2021-03-19 ENCOUNTER — ANTICOAG VISIT (OUTPATIENT)
Dept: NEPHROLOGY | Facility: CLINIC | Age: 86
End: 2021-03-19

## 2021-03-30 ENCOUNTER — OFFICE VISIT (OUTPATIENT)
Dept: FAMILY MEDICINE CLINIC | Facility: CLINIC | Age: 86
End: 2021-03-30
Payer: MEDICARE

## 2021-03-30 VITALS
HEART RATE: 66 BPM | WEIGHT: 152 LBS | TEMPERATURE: 97.2 F | DIASTOLIC BLOOD PRESSURE: 74 MMHG | OXYGEN SATURATION: 99 % | RESPIRATION RATE: 18 BRPM | BODY MASS INDEX: 25.33 KG/M2 | HEIGHT: 65 IN | SYSTOLIC BLOOD PRESSURE: 128 MMHG

## 2021-03-30 DIAGNOSIS — I48.0 PAROXYSMAL ATRIAL FIBRILLATION (HCC): ICD-10-CM

## 2021-03-30 DIAGNOSIS — Z86.711 HISTORY OF PULMONARY EMBOLISM: ICD-10-CM

## 2021-03-30 DIAGNOSIS — E03.9 HYPOTHYROIDISM, UNSPECIFIED TYPE: ICD-10-CM

## 2021-03-30 DIAGNOSIS — Z00.00 MEDICARE ANNUAL WELLNESS VISIT, SUBSEQUENT: ICD-10-CM

## 2021-03-30 DIAGNOSIS — I10 ESSENTIAL HYPERTENSION: ICD-10-CM

## 2021-03-30 DIAGNOSIS — D69.6 THROMBOCYTOPENIA (HCC): ICD-10-CM

## 2021-03-30 DIAGNOSIS — M48.54XS COMPRESSION FRACTURE OF THORACIC SPINE, NON-TRAUMATIC, SEQUELA: ICD-10-CM

## 2021-03-30 DIAGNOSIS — N18.30 STAGE 3 CHRONIC KIDNEY DISEASE, UNSPECIFIED WHETHER STAGE 3A OR 3B CKD (HCC): ICD-10-CM

## 2021-03-30 DIAGNOSIS — E11.9 ENCOUNTER FOR DIABETIC FOOT EXAM (HCC): ICD-10-CM

## 2021-03-30 DIAGNOSIS — E11.9 TYPE 2 DIABETES MELLITUS WITHOUT COMPLICATION, WITHOUT LONG-TERM CURRENT USE OF INSULIN (HCC): Primary | ICD-10-CM

## 2021-03-30 DIAGNOSIS — N32.89 BLADDER MASS: ICD-10-CM

## 2021-03-30 DIAGNOSIS — E55.9 VITAMIN D DEFICIENCY: ICD-10-CM

## 2021-03-30 PROCEDURE — 99204 OFFICE O/P NEW MOD 45 MIN: CPT | Performed by: INTERNAL MEDICINE

## 2021-03-30 PROCEDURE — 1123F ACP DISCUSS/DSCN MKR DOCD: CPT | Performed by: INTERNAL MEDICINE

## 2021-03-30 PROCEDURE — G0439 PPPS, SUBSEQ VISIT: HCPCS | Performed by: INTERNAL MEDICINE

## 2021-03-30 NOTE — PATIENT INSTRUCTIONS
Start D3 1,000 units daily  Make sure to do weight bearing exercises to help build bone health  Please get blood work in 6 months  Medicare Preventive Visit Patient Instructions  Thank you for completing your Welcome to Medicare Visit or Medicare Annual Wellness Visit today  Your next wellness visit will be due in one year (3/31/2022)  The screening/preventive services that you may require over the next 5-10 years are detailed below  Some tests may not apply to you based off risk factors and/or age  Screening tests ordered at today's visit but not completed yet may show as past due  Also, please note that scanned in results may not display below  Preventive Screenings:  Service Recommendations Previous Testing/Comments   Colorectal Cancer Screening  * Colonoscopy    * Fecal Occult Blood Test (FOBT)/Fecal Immunochemical Test (FIT)  * Fecal DNA/Cologuard Test  * Flexible Sigmoidoscopy Age: 54-65 years old   Colonoscopy: every 10 years (may be performed more frequently if at higher risk)  OR  FOBT/FIT: every 1 year  OR  Cologuard: every 3 years  OR  Sigmoidoscopy: every 5 years  Screening may be recommended earlier than age 48 if at higher risk for colorectal cancer  Also, an individualized decision between you and your healthcare provider will decide whether screening between the ages of 74-80 would be appropriate  Colonoscopy: Not on file  FOBT/FIT: Not on file  Cologuard: Not on file  Sigmoidoscopy: Not on file    Screening Not Indicated     Breast Cancer Screening Age: 36 years old  Frequency: every 1-2 years  Not required if history of left and right mastectomy Mammogram: Not on file        Cervical Cancer Screening Between the ages of 21-29, pap smear recommended once every 3 years  Between the ages of 33-67, can perform pap smear with HPV co-testing every 5 years     Recommendations may differ for women with a history of total hysterectomy, cervical cancer, or abnormal pap smears in past  Pap Smear: Not on file    Screening Not Indicated   Hepatitis C Screening Once for adults born between 1945 and 1965  More frequently in patients at high risk for Hepatitis C Hep C Antibody: Not on file        Diabetes Screening 1-2 times per year if you're at risk for diabetes or have pre-diabetes Fasting glucose: 187 mg/dL   A1C: 8 9    Screening Not Indicated  History Diabetes   Cholesterol Screening Once every 5 years if you don't have a lipid disorder  May order more often based on risk factors  Lipid panel: 03/01/2021    Screening Not Indicated  History Lipid Disorder     Other Preventive Screenings Covered by Medicare:  1  Abdominal Aortic Aneurysm (AAA) Screening: covered once if your at risk  You're considered to be at risk if you have a family history of AAA  2  Lung Cancer Screening: covers low dose CT scan once per year if you meet all of the following conditions: (1) Age 50-69; (2) No signs or symptoms of lung cancer; (3) Current smoker or have quit smoking within the last 15 years; (4) You have a tobacco smoking history of at least 30 pack years (packs per day multiplied by number of years you smoked); (5) You get a written order from a healthcare provider  3  Glaucoma Screening: covered annually if you're considered high risk: (1) You have diabetes OR (2) Family history of glaucoma OR (3)  aged 48 and older OR (3)  American aged 72 and older  3  Osteoporosis Screening: covered every 2 years if you meet one of the following conditions: (1) You're estrogen deficient and at risk for osteoporosis based off medical history and other findings; (2) Have a vertebral abnormality; (3) On glucocorticoid therapy for more than 3 months; (4) Have primary hyperparathyroidism; (5) On osteoporosis medications and need to assess response to drug therapy  · Last bone density test (DXA Scan): Not on file  5  HIV Screening: covered annually if you're between the age of 12-76   Also covered annually if you are younger than 13 and older than 72 with risk factors for HIV infection  For pregnant patients, it is covered up to 3 times per pregnancy  Immunizations:  Immunization Recommendations   Influenza Vaccine Annual influenza vaccination during flu season is recommended for all persons aged >= 6 months who do not have contraindications   Pneumococcal Vaccine (Prevnar and Pneumovax)  * Prevnar = PCV13  * Pneumovax = PPSV23   Adults 25-60 years old: 1-3 doses may be recommended based on certain risk factors  Adults 72 years old: Prevnar (PCV13) vaccine recommended followed by Pneumovax (PPSV23) vaccine  If already received PPSV23 since turning 65, then PCV13 recommended at least one year after PPSV23 dose  Hepatitis B Vaccine 3 dose series if at intermediate or high risk (ex: diabetes, end stage renal disease, liver disease)   Tetanus (Td) Vaccine - COST NOT COVERED BY MEDICARE PART B Following completion of primary series, a booster dose should be given every 10 years to maintain immunity against tetanus  Td may also be given as tetanus wound prophylaxis  Tdap Vaccine - COST NOT COVERED BY MEDICARE PART B Recommended at least once for all adults  For pregnant patients, recommended with each pregnancy  Shingles Vaccine (Shingrix) - COST NOT COVERED BY MEDICARE PART B  2 shot series recommended in those aged 48 and above     Health Maintenance Due:  There are no preventive care reminders to display for this patient  Immunizations Due:      Topic Date Due    COVID-19 Vaccine (1) Never done    DTaP,Tdap,and Td Vaccines (1 - Tdap) Never done    Influenza Vaccine (1) 09/01/2020     Advance Directives   What are advance directives? Advance directives are legal documents that state your wishes and plans for medical care  These plans are made ahead of time in case you lose your ability to make decisions for yourself   Advance directives can apply to any medical decision, such as the treatments you want, and if you want to donate organs  What are the types of advance directives? There are many types of advance directives, and each state has rules about how to use them  You may choose a combination of any of the following:  · Living will: This is a written record of the treatment you want  You can also choose which treatments you do not want, which to limit, and which to stop at a certain time  This includes surgery, medicine, IV fluid, and tube feedings  · Durable power of  for healthcare [facility]): This is a written record that states who you want to make healthcare choices for you when you are unable to make them for yourself  This person, called a proxy, is usually a family member or a friend  You may choose more than 1 proxy  · Do not resuscitate (DNR) order:  A DNR order is used in case your heart stops beating or you stop breathing  It is a request not to have certain forms of treatment, such as CPR  A DNR order may be included in other types of advance directives  · Medical directive: This covers the care that you want if you are in a coma, near death, or unable to make decisions for yourself  You can list the treatments you want for each condition  Treatment may include pain medicine, surgery, blood transfusions, dialysis, IV or tube feedings, and a ventilator (breathing machine)  · Values history: This document has questions about your views, beliefs, and how you feel and think about life  This information can help others choose the care that you would choose  Why are advance directives important? An advance directive helps you control your care  Although spoken wishes may be used, it is better to have your wishes written down  Spoken wishes can be misunderstood, or not followed  Treatments may be given even if you do not want them  An advance directive may make it easier for your family to make difficult choices about your care     Fall Prevention    Fall prevention  includes ways to make your home and other areas safer  It also includes ways you can move more carefully to prevent a fall  Health conditions that cause changes in your blood pressure, vision, or muscle strength and coordination may increase your risk for falls  Medicines may also increase your risk for falls if they make you dizzy, weak, or sleepy  Fall prevention tips:   · Stand or sit up slowly  · Use assistive devices as directed  · Wear shoes that fit well and have soles that   · Wear a personal alarm  · Stay active  · Manage your medical conditions  Home Safety Tips:  · Add items to prevent falls in the bathroom  · Keep paths clear  · Install bright lights in your home  · Keep items you use often on shelves within reach  · Paint or place reflective tape on the edges of your stairs  Weight Management   Why it is important to manage your weight:  Being overweight increases your risk of health conditions such as heart disease, high blood pressure, type 2 diabetes, and certain types of cancer  It can also increase your risk for osteoarthritis, sleep apnea, and other respiratory problems  Aim for a slow, steady weight loss  Even a small amount of weight loss can lower your risk of health problems  How to lose weight safely:  A safe and healthy way to lose weight is to eat fewer calories and get regular exercise  You can lose up about 1 pound a week by decreasing the number of calories you eat by 500 calories each day  Healthy meal plan for weight management:  A healthy meal plan includes a variety of foods, contains fewer calories, and helps you stay healthy  A healthy meal plan includes the following:  · Eat whole-grain foods more often  A healthy meal plan should contain fiber  Fiber is the part of grains, fruits, and vegetables that is not broken down by your body  Whole-grain foods are healthy and provide extra fiber in your diet   Some examples of whole-grain foods are whole-wheat breads and pastas, oatmeal, brown rice, and bulgur  · Eat a variety of vegetables every day  Include dark, leafy greens such as spinach, kale, johnny greens, and mustard greens  Eat yellow and orange vegetables such as carrots, sweet potatoes, and winter squash  · Eat a variety of fruits every day  Choose fresh or canned fruit (canned in its own juice or light syrup) instead of juice  Fruit juice has very little or no fiber  · Eat low-fat dairy foods  Drink fat-free (skim) milk or 1% milk  Eat fat-free yogurt and low-fat cottage cheese  Try low-fat cheeses such as mozzarella and other reduced-fat cheeses  · Choose meat and other protein foods that are low in fat  Choose beans or other legumes such as split peas or lentils  Choose fish, skinless poultry (chicken or turkey), or lean cuts of red meat (beef or pork)  Before you cook meat or poultry, cut off any visible fat  · Use less fat and oil  Try baking foods instead of frying them  Add less fat, such as margarine, sour cream, regular salad dressing and mayonnaise to foods  Eat fewer high-fat foods  Some examples of high-fat foods include french fries, doughnuts, ice cream, and cakes  · Eat fewer sweets  Limit foods and drinks that are high in sugar  This includes candy, cookies, regular soda, and sweetened drinks  Exercise:  Exercise at least 30 minutes per day on most days of the week  Some examples of exercise include walking, biking, dancing, and swimming  You can also fit in more physical activity by taking the stairs instead of the elevator or parking farther away from stores  Ask your healthcare provider about the best exercise plan for you  © Copyright [employer] 2018 Information is for End User's use only and may not be sold, redistributed or otherwise used for commercial purposes   All illustrations and images included in CareNotes® are the copyrighted property of A D [employer] M , Inc  or [address] [place] [address]

## 2021-03-30 NOTE — PROGRESS NOTES
Assessment and Plan:     Problem List Items Addressed This Visit        Endocrine    Type 2 diabetes mellitus without complication ([facility] )       Cardiovascular and Mediastinum    Paroxysmal atrial fibrillation (HCC)    Essential hypertension    Relevant Orders    CBC and differential    Comprehensive metabolic panel       Musculoskeletal and Integument    Compression fracture of thoracic spine, non-traumatic, sequela    Relevant Orders    Vitamin D 25 hydroxy       Genitourinary    Chronic kidney disease, stage 3 (moderate)    Relevant Orders    CBC and differential    Comprehensive metabolic panel       Other    History of pulmonary embolism      Other Visit Diagnoses     Medicare annual wellness visit, subsequent    -  Primary    Encounter for diabetic foot exam ([facility] )        Hypothyroidism, unspecified type        Relevant Orders    TSH, 3rd generation with Free T4 reflex    Vitamin D deficiency        Relevant Orders    Vitamin D 25 hydroxy    Thrombocytopenia ([facility] )        Relevant Orders    CBC and differential          BMI Counseling: Body mass index is 25 29 kg/m²  Follow-up plan was not completed due to elderly patient (72 years old) where weight reduction/weight gain would complicate underlying health condition such as: illness or physical disability  Falls Plan of Care: referral to physical therapy and balance, strength, and gait training instructions were provided  Recommended assistive device to help with gait and balance  Vitamin D supplementation was recommended  Preventive health issues were discussed with patient, and age appropriate screening tests were ordered as noted in patient's After Visit Summary  Personalized health advice and appropriate referrals for health education or preventive services given if needed, as noted in patient's After Visit Summary       History of Present Illness:     Patient presents for Medicare Annual Wellness visit    Patient Care Team:  Pili Tatum MD as PCP - General (Internal Medicine)     Problem List:     Patient Active Problem List   Diagnosis    [facility]ox[facility]mal [facility] fibrillation ([facility] )    Essential hypertension    History of pulmonary embolism    Type 2 diabetes mellitus without complication ([facility] )    Compression fracture of thoracic spine, non-traumatic, sequela    Chronic kidney disease, stage 3 (moderate)      Past Medical and Surgical History:     Past Medical History:   Diagnosis Date    Abnormal gait     Accidental fall from chair, subsequent encounter     Atrial fibrillation ([facility] )     Benign essential hypertension     Bite of animal     Cataract     Cellulitis     Chronic kidney disease, stage 3     Colon cancer ([facility] )     Current tear of lateral cartilage or meniscus of knee     Diabetes mellitus ([facility] )     Disease of thyroid gland     Disorder of magnesium metabolism     Dvt femoral (deep venous thrombosis)     Embolism from thrombosis of vein of distal end of lower extremity     Essential hypertension     Fall     Hyperlipidemia     Hyperlipidemia     Hypertension     Hypothyroidism     Insomnia     Knee joint effusion     Leukocytosis     Malaise and fatigue     MI (myocardial infarction) ([facility] )     Hospitalization     Orbital hemorrhage     Other sleep disorders     PAF (paroxysmal atrial fibrillation) (HCC)     Presence of vena cava filter     Pulmonary embolism (HCC)     Tear film insufficiency     Type 2 diabetes mellitus ([facility] )     Unspecified osteoarthritis, unspecified site     Weight decreased      Past Surgical History:   Procedure Laterality Date    ABDOMINAL ADHESION SURGERY  2015    Had wound vac    ABDOMINAL SURGERY      APPENDECTOMY      CATARACT EXTRACTION      CHOLECYSTECTOMY      COLON SURGERY      COLOSTOMY  2011    Due to Ileus, then reanastomosis in 2002    EXCISION BLADDER MESH TRANSVESICLEPORT W/ LASER      HERNIA REPAIR      IVC FILTER INSERTION      KNEE SURGERY  2014    repair Family History:     Family History   Problem Relation Age of Onset    Deep vein thrombosis Mother       Social History:        Social History     Socioeconomic History    Marital status: /Civil Union     Spouse name: None    Number of children: None    Years of education: None    Highest education level: None   Occupational History    Occupation: Retired    Social Needs    Financial resource strain: None    Food insecurity     Worry: None     Inability: None    Transportation needs     Medical: None     Non-medical: None   Tobacco Use    Smoking status: Former Smoker     Types: Cigarettes     Quit date: 3/19/2001     Years since quittin 0    Smokeless tobacco: Never Used   Substance and Sexual Activity    Alcohol use: Never     Frequency: Never    Drug use: Never    Sexual activity: Not Currently   Lifestyle    Physical activity     Days per week: None     Minutes per session: None    Stress: None   Relationships    Social connections     Talks on phone: None     Gets together: None     Attends Christian service: None     Active member of club or organization: None     Attends meetings of clubs or organizations: None     Relationship status: None    Intimate partner violence     Fear of current or ex partner: None     Emotionally abused: None     Physically abused: None     Forced sexual activity: None   Other Topics Concern    None   Social History Narrative    Rarely consumes alcohol - As per [employer]    Consumes on average 2 cups of regular coffee per day    Consumes on average 12 oz of soda per day      Medications and Allergies:     Current Outpatient Medications   Medication Sig Dispense Refill    acetaminophen (TYLENOL) 500 mg tablet Take 500 mg by mouth every 6 (six) hours as needed      Contour Next Test test strip Test twice a day or as needed 100 each 5    glipiZIDE (GLUCOTROL) 5 mg tablet Take 1 tablet (5 mg total) by mouth daily 90 tablet 3    levothyroxine 50 mcg tablet TAKE 1 TABLET BY MOUTH  DAILY 90 tablet 3    lisinopril (ZESTRIL) 5 mg tablet Take 1 tablet (5 mg total) by mouth daily 90 tablet 2    lovastatin (MEVACOR) 20 mg tablet TAKE 1 TABLET BY MOUTH  DAILY AT BEDTIME 90 tablet 3    metFORMIN (GLUCOPHAGE) 500 mg tablet TAKE 1 TABLET BY MOUTH  TWICE A DAY WITH MEALS 180 tablet 3    metoprolol tartrate (LOPRESSOR) 25 mg tablet TAKE 1 TABLET BY MOUTH  EVERY 12 HOURS 180 tablet 3    Microlet Lancets MISC Test twice a day or as needed 100 each 5    warfarin (COUMADIN) 5 mg tablet Take 1 tablet (5 mg total) by mouth daily 90 tablet 3    warfarin (COUMADIN) 7 5 mg tablet 1 daily as directed by INR 90 tablet 0    warfarin (COUMADIN) 7 5 mg tablet 1 daily as directed according to INR 30 tablet 5     No current facility-administered medications for this visit  No Known Allergies   Immunizations:     Immunization History   Administered Date(s) Administered    INFLUENZA 10/26/2015, 09/01/2016, 08/17/2017, 09/02/2018    Pneumococcal Conjugate 13-Valent 12/31/2018    Pneumococcal Polysaccharide PPV23 10/23/2017      Health Maintenance: There are no preventive care reminders to display for this patient  Topic Date Due    COVID-19 Vaccine (1) Never done    DTaP,Tdap,and Td Vaccines (1 - Tdap) Never done    Influenza Vaccine (1) 09/01/2020      Medicare Health Risk Assessment:     /74   Pulse 66   Temp (!) 97 2 °F (36 2 °C) (Tympanic)   Resp 18   Ht 5' 5" (1 651 m)   Wt 68 9 kg (152 lb)   SpO2 99%   BMI 25 29 kg/m²      Ki Marcial is here for her Subsequent Wellness visit  Health Risk Assessment:   Patient rates overall health as good  Patient feels that their physical health rating is slightly worse  Patient is satisfied with their life  Eyesight was rated as same  Hearing was rated as same  Patient feels that their emotional and mental health rating is same  Patients states they are never, rarely angry   Patient states they are often unusually tired/fatigued  Pain experienced in the last 7 days has been some  Patient's pain rating has been 7/10  Patient states that she has experienced no weight loss or gain in last 6 months  Back and R knee pain    Depression Screening:   PHQ-2 Score: 1      Fall Risk Screening: In the past year, patient has experienced: history of falling in past year    Number of falls: 1  Injured during fall?: Yes    Feels unsteady when standing or walking?: Yes    Worried about falling?: Yes      Urinary Incontinence Screening:   Patient has not leaked urine accidently in the last six months  Home Safety:  Patient has trouble with stairs inside or outside of their home  Patient has no working smoke alarms and has no working carbon monoxide detector  Home safety hazards include: loose rugs on the floor  Nutrition:   Current diet is Regular  Medications:   Patient is currently taking over-the-counter supplements  OTC medications include: see medication list  Patient is able to manage medications  Activities of Daily Living (ADLs)/Instrumental Activities of Daily Living (IADLs):   Walk and transfer into and out of bed and chair?: Yes  Dress and groom yourself?: Yes    Bathe or shower yourself?: Yes    Feed yourself?  Yes  Do your laundry/housekeeping?: Yes  Manage your money, pay your bills and track your expenses?: Yes  Make your own meals?: Yes    Do your own shopping?: Yes    Previous Hospitalizations:   Any hospitalizations or ED visits within the last 12 months?: No      Advance Care Planning:   Living will: No    Durable POA for healthcare: No    Advanced directive: No    Advanced directive counseling given: Yes    Five wishes given: No    Patient declined ACP directive: Yes    End of Life Decisions reviewed with patient: Yes    Provider agrees with end of life decisions: Yes      Cognitive Screening:   Provider or family/friend/caregiver concerned regarding cognition?: No    PREVENTIVE SCREENINGS Cardiovascular Screening:    General: Screening Not Indicated and History Lipid Disorder      Diabetes Screening:     General: Screening Not Indicated and History Diabetes      Colorectal Cancer Screening:     General: Screening Not Indicated      Breast Cancer Screening:     General: Screening Not Indicated      Cervical Cancer Screening:    General: Screening Not Indicated      Osteoporosis Screening:    General: Patient Declines    Due for: DXA Axial and DXA Appendicular      Abdominal Aortic Aneurysm (AAA) Screening:        General: Screening Not Indicated      Lung Cancer Screening:     General: Screening Not Indicated      Hepatitis C Screening:    General: Screening Not Indicated    Screening, Brief Intervention, and Referral to Treatment (SBIRT)    Screening  Typical number of drinks in a day: 0  Typical number of drinks in a week: 0  Interpretation: Low risk drinking behavior  Single Item Drug Screening:  How often have you used an illegal drug (including marijuana) or a prescription medication for non-medical reasons in the past year? never    Single Item Drug Screen Score: 0  Interpretation: Negative screen for possible drug use disorder    Other Counseling Topics:   Car/seat belt/driving safety and calcium and vitamin D intake and regular weightbearing exercise         Uzma Donahue MD

## 2021-03-30 NOTE — PROGRESS NOTES
[facility]        NAME: Sherrill Adams is a 80 y o  female  : 1933    MRN: [id number]  DATE: 2021  TIME: 9:04 AM    Assessment and Plan   1  Type 2 diabetes mellitus without complication, without long-term current use of insulin (HCC)  - A1c today is 9 2  She declines additional medications like tradjenta, stating she already takes enough pills  2  Paroxysmal atrial fibrillation (HCC)  -    ECG in 2019 sinus rhythm, sounds regular on exam today, we did not do an ECG today due to recent vertebral compression fracture and pain with lying flat for prolonged periods, she is otherwise asymptomatic    3  Stage 3 chronic kidney disease, unspecified whether stage 3a or 3b CKD  - renal function appears stable, repeat labs in 6 months   -    CBC and differential; Future; Expected date: 2021  -     Comprehensive metabolic panel; Future; Expected date: 2021    4  Essential hypertension  - BP well-controlled for age    -   CBC and differential; Future; Expected date: 2021  -     Comprehensive metabolic panel; Future; Expected date: 2021    5  History of pulmonary embolism  - remote history of provoked PE, has IVC filter, also on warfarin for Afib, she has never tried NOACs  6  Compression fracture of thoracic spine, non-traumatic, sequela  - she is seeing Dr Martha Workman for this, she decline DEXA scan to evaluate for underlying osteoporosis, states that she would not want treatment or additional medications     -  Vitamin D 25 hydroxy; Future; Expected date: 2021    7  Medicare annual wellness visit, subsequent  - refer to AWV note    8  Encounter for diabetic foot exam ([facility] [address] )    9  Hypothyroidism, unspecified type  - TSH normal recently at 1 35, repeat in 6 months   -    TSH, 3rd generation with Free T4 reflex; Future; Expected date: 2021    10   Vitamin D deficiency  - recently low at 29 7, she is taking D3 supplementation, not sure of dose    - Vitamin D 25 hydroxy; Future; Expected date: 09/30/2021    11  Thrombocytopenia ([address] )  - platelet count borderline low since March 2020  Appears stable recently  -  CBC and differential; Future; Expected date: 09/30/2021    12  Bladder mass  - 2cm focal thickening along posterolateral wall of bladder noted on US, concerning for possible urothelial tumor  She has already seen Dr Tatianna Montoya, will get records  Chief Complaint     Chief Complaint   Patient presents with   [address]     Previous dr Gamaliel Chowdhury    Medication Problem    Insomnia     Pt c/o being unable to sleep - "she hears everything"   Also c/o b/l ear ringing when laying down         History of Present Illness       Here to establish care  Previous PCP is Dr Nazario Bravo  Past medical history remarkable for the following:      Afib: paroxysmal, hasn't had episodes recently, feels like pulse is regular  Denies lightheadedness, palpitations or dyspnea  She is on warfarin  No history of stroke or TIA  PE: occurred after long care ride, driving home from [place]  About 20 years ago  She has an IVC filter  Bladder mass: diagnosed on US recently  Saw Dr Tatianna Montoya for follow up  Social hx: Originally from [place]  Quit smoking 19 years ago, smoked about < 0 5 ppd 1963 - 2002  Diabetes: diagnosed 20 years ago, has never been on insulin  Currently glipizide 5mg, metformin 500mg BID, not taking linagliptin due to cost      Thoracic compression fracture: not sure how long she's had this  Reports a fall about a year ago, tripped on table and landed on back  Seeing Dr Cristine Adam, not using brace due to discomfort but does wear it at home  Review of Systems   Review of Systems   Constitutional: Positive for fatigue  Negative for appetite change, chills, fever and unexpected weight change  HENT: Negative for hearing loss  Respiratory: Negative for cough and shortness of breath      Cardiovascular: Negative for chest pain, palpitations and leg swelling  Gastrointestinal: Negative for abdominal pain, blood in stool, constipation, diarrhea, nausea and vomiting  Genitourinary: Negative for difficulty urinating, dysuria and hematuria  Musculoskeletal: Positive for arthralgias and back pain  Neurological: Negative for dizziness, weakness, light-headedness, numbness and headaches           Current Medications       Current Outpatient Medications:     acetaminophen (TYLENOL) 500 mg tablet, Take 500 mg by mouth every 6 (six) hours as needed, Disp: , Rfl:     Contour Next Test test strip, Test twice a day or as needed, Disp: 100 each, Rfl: 5    glipiZIDE (GLUCOTROL) 5 mg tablet, Take 1 tablet (5 mg total) by mouth daily, Disp: 90 tablet, Rfl: 3    levothyroxine 50 mcg tablet, TAKE 1 TABLET BY MOUTH  DAILY, Disp: 90 tablet, Rfl: 3    lisinopril (ZESTRIL) 5 mg tablet, Take 1 tablet (5 mg total) by mouth daily, Disp: 90 tablet, Rfl: 2    lovastatin (MEVACOR) 20 mg tablet, TAKE 1 TABLET BY MOUTH  DAILY AT BEDTIME, Disp: 90 tablet, Rfl: 3    metFORMIN (GLUCOPHAGE) 500 mg tablet, TAKE 1 TABLET BY MOUTH  TWICE A DAY WITH MEALS, Disp: 180 tablet, Rfl: 3    metoprolol tartrate (LOPRESSOR) 25 mg tablet, TAKE 1 TABLET BY MOUTH  EVERY 12 HOURS, Disp: 180 tablet, Rfl: 3    Microlet Lancets MISC, Test twice a day or as needed, Disp: 100 each, Rfl: 5    warfarin (COUMADIN) 5 mg tablet, Take 1 tablet (5 mg total) by mouth daily, Disp: 90 tablet, Rfl: 3    warfarin (COUMADIN) 7 5 mg tablet, 1 daily as directed by INR, Disp: 90 tablet, Rfl: 0    warfarin (COUMADIN) 7 5 mg tablet, 1 daily as directed according to INR, Disp: 30 tablet, Rfl: 5    Current Allergies     Allergies as of 03/30/2021    (No Known Allergies)            The following portions of the patient's history were reviewed and updated as appropriate: allergies, current medications, past family history, past medical history, past social history, past surgical history and problem list      Past Medical History:   Diagnosis Date    Abnormal gait     Accidental fall from chair, subsequent encounter     Atrial fibrillation (HCC)     Benign essential hypertension     Bite of animal     Cataract     Cellulitis     Chronic kidney disease, stage 3     Colon cancer (HCC)     Current tear of lateral cartilage or meniscus of knee     Diabetes mellitus ([facility] [address] )     Disease of thyroid gland     Disorder of magnesium metabolism     Dvt femoral (deep venous thrombosis)     Embolism from thrombosis of vein of distal end of lower extremity     Essential hypertension     Fall     Hyperlipidemia     Hyperlipidemia     Hypertension     Hypothyroidism     Insomnia     Knee joint effusion     Leukocytosis     Malaise and fatigue     MI (myocardial infarction) ([facility] [address] )     Hospitalization     Orbital hemorrhage     Other sleep disorders     PAF (paroxysmal atrial fibrillation) (HCC)     Presence of vena cava filter     Pulmonary embolism (HCC)     Tear film insufficiency     Type 2 diabetes mellitus (HCC)     Unspecified osteoarthritis, unspecified site     Weight decreased        Past Surgical History:   Procedure Laterality Date    ABDOMINAL ADHESION SURGERY  2015    Had wound vac    ABDOMINAL SURGERY      APPENDECTOMY      CATARACT EXTRACTION      CHOLECYSTECTOMY      COLON SURGERY      COLOSTOMY  2011    Due to Ileus, then reanastomosis in 2002   [place] W/ LASER      HERNIA REPAIR      IVC FILTER INSERTION      KNEE SURGERY  2014    repair        Family History   Problem Relation Age of Onset    Deep vein thrombosis Mother          Medications have been verified  Objective   /74   Pulse 66   Temp (!) 97 2 °F (36 2 °C) (Tympanic)   Resp 18   Ht 5' 5" (1 651 m)   Wt 68 9 kg (152 lb)   SpO2 99%   BMI 25 29 kg/m²        Physical Exam     Physical Exam  Vitals signs reviewed     Constitutional:       General: She is not in acute distress  Appearance: Normal appearance  She is normal weight  HENT:      Head: Normocephalic and atraumatic  Right Ear: Tympanic membrane, ear canal and external ear normal       Left Ear: Tympanic membrane, ear canal and external ear normal       Mouth/Throat:      Pharynx: No oropharyngeal exudate or posterior oropharyngeal erythema  Eyes:      General: No scleral icterus  Conjunctiva/sclera: Conjunctivae normal    Neck:      Musculoskeletal: Normal range of motion and neck supple  Thyroid: No thyromegaly  Vascular: No carotid bruit or JVD  Cardiovascular:      Rate and Rhythm: Normal rate and regular rhythm  Pulses: Pulses are weak  Decreased pulses  Dorsalis pedis pulses are 1+ on the right side and 1+ on the left side  Posterior tibial pulses are 1+ on the right side and 1+ on the left side  Heart sounds: S1 normal and S2 normal  No murmur  No friction rub  No gallop  Pulmonary:      Effort: Pulmonary effort is normal  No accessory muscle usage  Breath sounds: Normal breath sounds  No wheezing, rhonchi or rales  Abdominal:      General: Abdomen is flat  Bowel sounds are normal       Palpations: Abdomen is soft  There is no mass  Tenderness: There is no abdominal tenderness  There is no guarding or rebound  Hernia: No hernia is present  Musculoskeletal:      Thoracic back: She exhibits deformity (kyphosis)  Right lower leg: No edema  Left lower leg: No edema  Feet:      Right foot:      Skin integrity: No ulcer, skin breakdown, erythema, warmth, callus or dry skin  Left foot:      Skin integrity: No ulcer, skin breakdown, erythema, warmth, callus or dry skin  Lymphadenopathy:      Cervical: No cervical adenopathy  Neurological:      Mental Status: She is alert  Motor: Motor function is intact  No tremor        Gait: Gait abnormal    Psychiatric:         Mood and Affect: Mood and affect normal  Speech: Speech normal          Behavior: Behavior normal  Behavior is cooperative  Thought Content: Thought content normal      Diabetic Foot Exam    Patient's shoes and socks removed  Right Foot/Ankle   Right Foot Inspection  Skin Exam: skin normal and skin intact no dry skin, no warmth, no callus, no erythema, no maceration, no abnormal color, no pre-ulcer, no ulcer and no callus                          Toe Exam: ROM and strength within normal limits  Sensory   Vibration: intact      Vascular  Capillary refills: elevated  The right DP pulse is 1+  The right PT pulse is 1+  Left Foot/Ankle  Left Foot Inspection  Skin Exam: skin normal and skin intactno dry skin, no warmth, no erythema, no maceration, normal color, no pre-ulcer, no ulcer and no callus                         Toe Exam: ROM and strength within normal limits                   Sensory   Vibration: intact      Vascular  Capillary refills: elevated  The left DP pulse is 1+  The left PT pulse is 1+  Assign Risk Category:  No deformity present; No loss of protective sensation; Weak pulses       Risk: 1          Results:  Lab Results   Component Value Date    SODIUM 136 03/01/2021    K 4 1 03/01/2021    CL 99 03/01/2021    CO2 28 03/01/2021    BUN 22 03/01/2021    CREATININE 1 02 03/01/2021    CALCIUM 8 8 03/01/2021       Lab Results   Component Value Date    HGBA1C 8 9 (A) 02/25/2021       Lab Results   Component Value Date    WBC 6 30 03/01/2021    HGB 15 0 03/01/2021    HCT 45 5 03/01/2021    MCV 97 03/01/2021     (L) 03/01/2021     Bladder US 03/01/21  FINDINGS:     KIDNEYS:  Symmetric and slightly small in size  Right kidney:  8 9 x 4 3 cm  Left kidney:  9 4 x 5 0 cm      Right kidney  Normal echogenicity and contour  No suspicious masses detected  No hydronephrosis  No shadowing calculi  No perinephric fluid collections      Left kidney  Normal echogenicity and contour  No suspicious masses detected     No hydronephrosis  No shadowing calculi  No perinephric fluid collections      URETERS:  Nonvisualized      BLADDER:   Normally distended  2 0 x 1 1 x 1 4 cm focal thickening seen along the right posterolateral bladder wall  Possibility of urothelial tumor is raised  Bilateral ureteral jets detected      IMPRESSION:     2 cm soft tissue thickening right posterolateral bladder wall suspicious for transitional cell tumor  Correlation with cystoscopy is recommended      Mild renal atrophy  Otherwise unremarkable kidneys  Xray thoracic spine 03/04/21  FINDINGS:     Severe anterior wedging deformity at the T11 vertebral body shows no progression from 2020  No new compression deformities demonstrated or other acute fractures  No evidence for destruction of the pedicles or other lytic/blastic lesions      Mild thoracic hyperkyphosis around the wedging deformity  No listhesis  Incompletely characterized lumbar levocurvature--scoliosis versus positional      No significant degenerative changes       Lungs are clear  Thoracic aortic atherosclerosis and tortuosity  Heart size is normal      IMPRESSION:     No progression of severe anterior wedging deformity of the T11 vertebral body  No new compression deformities

## 2021-03-31 PROBLEM — N32.89 BLADDER MASS: Status: ACTIVE | Noted: 2021-03-31

## 2021-04-01 ENCOUNTER — IMMUNIZATIONS (OUTPATIENT)
Dept: FAMILY MEDICINE CLINIC | Facility: HOSPITAL | Age: 86
End: 2021-04-01

## 2021-04-01 DIAGNOSIS — Z23 ENCOUNTER FOR IMMUNIZATION: Primary | ICD-10-CM

## 2021-04-01 PROCEDURE — 91301 SARS-COV-2 / COVID-19 MRNA VACCINE (MODERNA) 100 MCG: CPT

## 2021-04-01 PROCEDURE — 0011A SARS-COV-2 / COVID-19 MRNA VACCINE (MODERNA) 100 MCG: CPT

## 2021-04-08 ENCOUNTER — APPOINTMENT (OUTPATIENT)
Dept: LAB | Facility: CLINIC | Age: 86
End: 2021-04-08
Payer: MEDICARE

## 2021-04-08 ENCOUNTER — TELEPHONE (OUTPATIENT)
Dept: NEPHROLOGY | Facility: CLINIC | Age: 86
End: 2021-04-08

## 2021-04-08 DIAGNOSIS — I48.0 PAROXYSMAL ATRIAL FIBRILLATION (HCC): ICD-10-CM

## 2021-04-08 LAB
INR PPP: 2.2 (ref 0.84–1.19)
PROTHROMBIN TIME: 24.3 SECONDS (ref 11.6–14.5)
SL AMB POCT INR: 2.2

## 2021-04-08 PROCEDURE — 36415 COLL VENOUS BLD VENIPUNCTURE: CPT

## 2021-04-08 PROCEDURE — 85610 PROTHROMBIN TIME: CPT

## 2021-04-08 NOTE — TELEPHONE ENCOUNTER
Called and spoke with Kalie at [facility] to see if they are going to be handle patient's PT/INR now that she is going there for primary care needs  They are going to speak with Dr Johnnie Solis and get to us  Kalie said that it will probably be next week that we hear back because Dr Johnnie Solis is out of the office till than

## 2021-04-09 ENCOUNTER — ANTICOAG VISIT (OUTPATIENT)
Dept: FAMILY MEDICINE CLINIC | Facility: CLINIC | Age: 86
End: 2021-04-09

## 2021-04-09 ENCOUNTER — TELEPHONE (OUTPATIENT)
Dept: FAMILY MEDICINE CLINIC | Facility: CLINIC | Age: 86
End: 2021-04-09

## 2021-04-09 NOTE — TELEPHONE ENCOUNTER
Ana spoke with patient  Patient reports that she goes to the lab for monitoring  Forwarding to provider as [place]

## 2021-04-09 NOTE — TELEPHONE ENCOUNTER
Called and left message with patient requesting a call back to confirm if she does home monitoring or goes to the lab for INR

## 2021-04-12 ENCOUNTER — TELEPHONE (OUTPATIENT)
Dept: FAMILY MEDICINE CLINIC | Facility: CLINIC | Age: 86
End: 2021-04-12

## 2021-04-12 ENCOUNTER — ANTICOAG VISIT (OUTPATIENT)
Dept: FAMILY MEDICINE CLINIC | Facility: CLINIC | Age: 86
End: 2021-04-12

## 2021-04-12 NOTE — PROGRESS NOTES
Dosing regimen past 7 days:  4/4/21 - 7 5mg   4/5/21 - 5mg   4/6/21- 5mg   4/7/21 - 7 5mg   4/8/21 - 5mg   4/9/21 - 5mg   4/10/21 - 7 5mg   4/11/21 - 5mg   4/12/21 - 5mg

## 2021-04-21 ENCOUNTER — OFFICE VISIT (OUTPATIENT)
Dept: FAMILY MEDICINE CLINIC | Facility: CLINIC | Age: 86
End: 2021-04-21
Payer: MEDICARE

## 2021-04-21 VITALS
HEART RATE: 65 BPM | WEIGHT: 152 LBS | RESPIRATION RATE: 14 BRPM | TEMPERATURE: 97.6 F | BODY MASS INDEX: 25.33 KG/M2 | HEIGHT: 65 IN | SYSTOLIC BLOOD PRESSURE: 122 MMHG | OXYGEN SATURATION: 99 % | DIASTOLIC BLOOD PRESSURE: 70 MMHG

## 2021-04-21 DIAGNOSIS — N32.89 BLADDER MASS: ICD-10-CM

## 2021-04-21 DIAGNOSIS — E78.00 PURE HYPERCHOLESTEROLEMIA: ICD-10-CM

## 2021-04-21 DIAGNOSIS — I10 ESSENTIAL HYPERTENSION: ICD-10-CM

## 2021-04-21 DIAGNOSIS — E03.9 ACQUIRED HYPOTHYROIDISM: ICD-10-CM

## 2021-04-21 DIAGNOSIS — Z01.818 PRE-OP EVALUATION: Primary | ICD-10-CM

## 2021-04-21 DIAGNOSIS — R80.9 PROTEINURIA, UNSPECIFIED TYPE: ICD-10-CM

## 2021-04-21 DIAGNOSIS — Z86.711 HISTORY OF PULMONARY EMBOLISM: ICD-10-CM

## 2021-04-21 DIAGNOSIS — N18.31 STAGE 3A CHRONIC KIDNEY DISEASE (HCC): ICD-10-CM

## 2021-04-21 DIAGNOSIS — I48.0 PAROXYSMAL ATRIAL FIBRILLATION (HCC): ICD-10-CM

## 2021-04-21 DIAGNOSIS — E11.65 TYPE 2 DIABETES MELLITUS WITH HYPERGLYCEMIA, WITHOUT LONG-TERM CURRENT USE OF INSULIN (HCC): ICD-10-CM

## 2021-04-21 PROBLEM — I82.4Z9 EMBOLISM FROM THROMBOSIS OF VEIN OF DISTAL END OF LOWER EXTREMITY (HCC): Status: ACTIVE | Noted: 2017-03-27

## 2021-04-21 PROBLEM — I26.99 OTHER PULMONARY EMBOLISM WITHOUT ACUTE COR PULMONALE (HCC): Status: ACTIVE | Noted: 2021-04-21

## 2021-04-21 PROBLEM — M17.10 OSTEOARTHRITIS OF KNEE: Status: ACTIVE | Noted: 2018-05-01

## 2021-04-21 PROBLEM — D68.59 HYPERCOAGULABLE STATE, PRIMARY (HCC): Status: ACTIVE | Noted: 2017-03-27

## 2021-04-21 PROBLEM — M17.9 OSTEOARTHRITIS OF KNEE: Status: ACTIVE | Noted: 2018-05-01

## 2021-04-21 PROCEDURE — 99214 OFFICE O/P EST MOD 30 MIN: CPT | Performed by: INTERNAL MEDICINE

## 2021-04-21 PROCEDURE — 93000 ELECTROCARDIOGRAM COMPLETE: CPT | Performed by: INTERNAL MEDICINE

## 2021-04-21 RX ORDER — LOVASTATIN 20 MG/1
20 TABLET ORAL
Qty: 90 TABLET | Refills: 3 | Status: SHIPPED | OUTPATIENT
Start: 2021-04-21 | End: 2022-03-07 | Stop reason: SDUPTHER

## 2021-04-21 RX ORDER — LEVOTHYROXINE SODIUM 0.05 MG/1
50 TABLET ORAL DAILY
Qty: 90 TABLET | Refills: 3 | Status: SHIPPED | OUTPATIENT
Start: 2021-04-21 | End: 2022-03-07 | Stop reason: SDUPTHER

## 2021-04-21 RX ORDER — UREA 10 %
500 LOTION (ML) TOPICAL DAILY
COMMUNITY

## 2021-04-21 RX ORDER — GLIPIZIDE 5 MG/1
5 TABLET ORAL
Qty: 90 TABLET | Refills: 3 | Status: SHIPPED | OUTPATIENT
Start: 2021-04-21 | End: 2021-09-01 | Stop reason: SDUPTHER

## 2021-04-21 NOTE — PROGRESS NOTES
[place] [facility]  [facility]YSICIAN GROUP - [facility]    NAME: Josephine Sanderson  AGE: 80 y o  SEX: female  : 1933     DATE: 2021     Pre-Operative Evaluation      Chief Complaint: Pre-operative Evaluation     Surgery: cystoscopy, transurethral resection of bladder tumor   Anticipated Date of Surgery: 21  Referring Provider: No ref  provider found       History of Present Illness:     Marbin Ho is a 80 y o  female who presents to the office today for a preoperative consultation at the request of surgeon, Dr Alonzo Carranza, who plans on performing cystoscopy with TURBT on 21  Planned anesthesia is choice  Patient has a bleeding risk of: no recent abnormal bleeding  Current anti-platelet/anti-coagulation medications that the patient is prescribed includes: Warfarin (Coumadin or Dean Ajit)  Assessment of Chronic Conditions:   - Diabetes Mellitus: uncontrolled, A1c 8 9%   - Hypertension: well-controlled  - Afib: rate controlled     Assessment of Cardiac Risk:  · Denies unstable or severe angina or MI in the last 6 weeks or history of stent placement in the last year   · Denies decompensated heart failure (e g  New onset heart failure, NYHA functional class IV heart failure, or worsening existing heart failure)  · Denies significant arrhythmias such as high grade AV block, symptomatic ventricular arrhythmia, newly recognized ventricular tachycardia, supraventricular tachycardia with resting heart rate >100, or symptomatic bradycardia  · Denies severe heart valve disease including aortic stenosis or symptomatic mitral stenosis     Exercise Capacity:  · Able to walk 4 blocks without symptoms?: Yes, limited by back pain  · Able to walk 2 flights without symptoms?: Yes, limited by back pain    Prior Anesthesia Reactions: No     Personal history of venous thromboembolic disease? Yes, history of provoked DVT x 2, has IVC filter       History of steroid use for >2 weeks within last year? No         Review of Systems:     Review of Systems   Constitutional: Negative for appetite change, chills, fatigue, fever and unexpected weight change  Respiratory: Negative for cough, chest tightness, shortness of breath and wheezing  Cardiovascular: Negative for chest pain, palpitations and leg swelling  Gastrointestinal: Negative for abdominal pain, constipation, diarrhea, nausea and vomiting  Genitourinary: Negative for difficulty urinating and dysuria  Musculoskeletal: Positive for back pain and gait problem  Neurological: Negative for dizziness, weakness, light-headedness, numbness and headaches  Psychiatric/Behavioral: Positive for sleep disturbance         Current Problem List:     Patient Active Problem List   Diagnosis    Chronic a-fib ([facility] )    Essential hypertension    History of pulmonary embolism    Type II diabetes mellitus ([place] [address] )    Compression fracture of thoracic spine, non-traumatic, sequela    Stage 3a chronic kidney disease    Bladder mass    Embolism from thrombosis of vein of distal end of lower extremity    Hypercoagulable state, primary ([facility] )    Osteoarthritis of knee    Other pulmonary embolism without acute cor pulmonale (HCC)       Allergies:     No Known Allergies    Current Medications:       Current Outpatient Medications:     acetaminophen (TYLENOL) 500 mg tablet, Take 500 mg by mouth every 6 (six) hours as needed, Disp: , Rfl:     B Complex Vitamins (B-COMPLEX/B-12 PO), Take by mouth, Disp: , Rfl:     Contour Next Test test strip, Test twice a day or as needed, Disp: 100 each, Rfl: 5    glipiZIDE (GLUCOTROL) 5 mg tablet, Take 1 tablet (5 mg total) by mouth 2 (two) times a day before meals, Disp: 90 tablet, Rfl: 3    levothyroxine 50 mcg tablet, Take 1 tablet (50 mcg total) by mouth daily, Disp: 90 tablet, Rfl: 3    lisinopril (ZESTRIL) 5 mg tablet, Take 1 tablet (5 mg total) by mouth daily, Disp: 90 tablet, Rfl: 2    lovastatin (MEVACOR) 20 mg tablet, Take 1 tablet (20 mg total) by mouth daily at bedtime, Disp: 90 tablet, Rfl: 3    magnesium gluconate (MAGONATE) 500 mg tablet, Take 500 mg by mouth 2 (two) times a day, Disp: , Rfl:     metFORMIN (GLUCOPHAGE) 1000 MG tablet, Take 1 tablet (1,000 mg total) by mouth 2 (two) times a day with meals, Disp: 180 tablet, Rfl: 3    metoprolol tartrate (LOPRESSOR) 25 mg tablet, Take 1 tablet (25 mg total) by mouth every 12 (twelve) hours, Disp: 180 tablet, Rfl: 3    Microlet Lancets MISC, Test twice a day or as needed, Disp: 100 each, Rfl: 5    warfarin (COUMADIN) 5 mg tablet, Take 1 tablet (5 mg total) by mouth daily, Disp: 90 tablet, Rfl: 3    warfarin (COUMADIN) 7 5 mg tablet, 1 daily as directed according to INR, Disp: 30 tablet, Rfl: 5    warfarin (COUMADIN) 7 5 mg tablet, 1 daily as directed by INR (Patient not taking: Reported on 4/21/2021), Disp: 90 tablet, Rfl: 0    Past Medical History:       Past Medical History:   Diagnosis Date    Abnormal gait     Accidental fall from chair, subsequent encounter     Atrial fibrillation (HCC)     Benign essential hypertension     Bite of animal     Cataract     Cellulitis     Chronic kidney disease, stage 3     Colon cancer ([facility])     Current tear of lateral cartilage or meniscus of knee     Diabetes mellitus ([address] )     Disease of thyroid gland     Disorder of magnesium metabolism     Dvt femoral (deep venous thrombosis)     Embolism from thrombosis of vein of distal end of lower extremity     Essential hypertension     Fall     Hyperlipidemia     Hyperlipidemia     Hypertension     Hypothyroidism     Insomnia     Knee joint effusion     Leukocytosis     Malaise and fatigue     MI (myocardial infarction) ([address] )     Hospitalization     Orbital hemorrhage     Other sleep disorders     PAF (paroxysmal atrial fibrillation) ([facility])     Presence of vena cava filter     Pulmonary embolism ([facility])     Tear film insufficiency     Type 2 diabetes mellitus ([facility]     Unspecified osteoarthritis, unspecified site     Weight decreased         Past Surgical History:   Procedure Laterality Date    ABDOMINAL ADHESION SURGERY  2015    Had wound vac    ABDOMINAL SURGERY      APPENDECTOMY      CATARACT EXTRACTION      CHOLECYSTECTOMY      COLON SURGERY      COLOSTOMY      Due to Ileus, then reanastomosis in     [address]      HERNIA REPAIR      IVC FILTER INSERTION      KNEE SURGERY  2014    repair         Family History   Problem Relation Age of Onset    Deep vein thrombosis Mother         Social History     Socioeconomic History    Marital status: /Civil Union     Spouse name: Not on file    Number of children: Not on file    Years of education: Not on file    Highest education level: Not on file   Occupational History    Occupation: Retired    Social Needs    Financial resource strain: Not on file    Food insecurity     Worry: Not on file     Inability: Not on file   [employer] needs     Medical: Not on file     Non-medical: Not on file   Tobacco Use    Smoking status: Former Smoker     Types: Cigarettes     Quit date: 3/19/2001     Years since quittin 1    Smokeless tobacco: Never Used   Substance and Sexual Activity    Alcohol use: Never     Frequency: Never    Drug use: Never    Sexual activity: Not Currently   Lifestyle    Physical activity     Days per week: Not on file     Minutes per session: Not on file    Stress: Not on file   Relationships    Social connections     Talks on phone: Not on file     Gets together: Not on file     Attends Congregational service: Not on file     Active member of club or organization: Not on file     Attends meetings of clubs or organizations: Not on file     Relationship status: Not on file    Intimate partner violence     Fear of current or ex partner: Not on file     Emotionally abused: Not on file     Physically abused: Not on file     Forced sexual activity: Not on file   Other Topics Concern    Not on file   Social History Narrative    Rarely consumes alcohol - As per [employer]    Consumes on average 2 cups of regular coffee per day    Consumes on average 12 oz of soda per day        Physical Exam:     /70   Pulse 65   Temp 97 6 °F (36 4 °C) (Tympanic)   Resp 14   Ht 5' 5" (1 651 m)   Wt 68 9 kg (152 lb)   SpO2 99%   BMI 25 29 kg/m²     Physical Exam  Vitals signs reviewed  Constitutional:       General: She is not in acute distress  Appearance: Normal appearance  She is normal weight  Cardiovascular:      Rate and Rhythm: Normal rate and regular rhythm  Heart sounds: S1 normal and S2 normal  No murmur  No friction rub  No gallop  Pulmonary:      Effort: Pulmonary effort is normal  No accessory muscle usage  Breath sounds: Normal breath sounds  No wheezing, rhonchi or rales  Abdominal:      General: Abdomen is flat  Bowel sounds are normal       Palpations: Abdomen is soft  Tenderness: There is no abdominal tenderness  There is no guarding or rebound  Musculoskeletal:      Right lower leg: No edema  Left lower leg: No edema  Neurological:      Mental Status: She is alert  Gait: Gait abnormal       Comments: Antalgic gait, ambulates with cane, back brace in place   Psychiatric:         Mood and Affect: Mood and affect normal          Speech: Speech normal          Behavior: Behavior normal  Behavior is cooperative  Thought Content: Thought content normal          Cognition and Memory: Cognition normal          Judgment: Judgment normal           Data:     Pre-operative work-up    Laboratory Results: not done yet     EKG: I have personally reviewed pertinent reports    Sinus rhythm, no ST-T wave abnormalities    Chest x-ray: NA      Previous cardiopulmonary studies within the past year:  · Echocardiogram: NA  · Cardiac Catheterization: NA  · Stress Test: NA  · Pulmonary Function Testing: NA      Assessment & Recommendations:     1  Pre-op evaluation  POCT ECG   2  Paroxysmal atrial fibrillation (HCC)  metoprolol tartrate (LOPRESSOR) 25 mg tablet   3  Type 2 diabetes mellitus with hyperglycemia, without long-term current use of insulin (HCC)  glipiZIDE (GLUCOTROL) 5 mg tablet    metFORMIN (GLUCOPHAGE) 1000 MG tablet   4  Essential hypertension  metoprolol tartrate (LOPRESSOR) 25 mg tablet   5  Stage 3a chronic kidney disease     6  History of pulmonary embolism     7  Bladder mass     8  Acquired hypothyroidism  levothyroxine 50 mcg tablet   9  Proteinuria, unspecified type     10  Pure hypercholesterolemia  lovastatin (MEVACOR) 20 mg tablet       Pre-Op Evaluation Assessment  80 y o  female with planned surgery: cystoscopy with TURBT  Known risk factors for perioperative complications: Diabetes mellitus  history of provoked DVT, warfarin therapy  RCRI Class I risk, 0 4% risk of MACE  Diabetes is uncontrolled, advised her to increase glipizide to BID, metformin 1,000mg BID  These should be held 1 day prior to surgery and resumed day after surgery if po intake is normal       Current medications which may produce withdrawal symptoms if withheld perioperatively: metoprolol    Pre-Op Evaluation Plan  1  Further preoperative workup as follows:   - Complete blood count  - Basic metabolic profile  - Coagulation studies    2  Medication Management/Recommendations:   - Patient should continue beta-blocker medication up through and including the day of surgery  - Patient should continue his statin medication up through and including the day of surgery   - Hold metformin the morning of surgery and do not resume until 48 hours AFTER surgery to avoid risk of lactic acidosis  Do not resume if eGFR is < 30  - Warfarin management: Discontinue warfarin 5 days before surgery and resume post-operatively if bleeding controlled and okay with surgeon  Does not require bridging   Prior DVTs remote, provoked and has IVC filter  Rhythm is sinus on ECG  3  Prophylaxis for cardiac events with perioperative beta-blockers: NA already taking BB      4  Patient requires further consultation with: None           Pastor Aby MD  [address]  [address] [facility]  [address] [address]  Phone#  [phone]  Fax#  [phone]

## 2021-04-21 NOTE — PATIENT INSTRUCTIONS
Please hold diabetes medications 1 day prior to and day of surgery  OK to resume one day after surgery if eating/drinking normally  You should increase glipizide to 5 mg twice daily, metformin 1,000mg twice daily starting today  Continue thyroid medication, lovastatin and metoprolol throughout surgery, hold lisinopril day of surgery and resume day after surgery  Hold warfarin 5 days prior to surgery, starting 04/26, and resume day after surgery if OK per Dr Shay Gerardo

## 2021-04-22 ENCOUNTER — OFFICE VISIT (OUTPATIENT)
Dept: OBGYN CLINIC | Facility: CLINIC | Age: 86
End: 2021-04-22
Payer: MEDICARE

## 2021-04-22 VITALS
WEIGHT: 152 LBS | BODY MASS INDEX: 25.33 KG/M2 | HEIGHT: 65 IN | SYSTOLIC BLOOD PRESSURE: 132 MMHG | DIASTOLIC BLOOD PRESSURE: 82 MMHG

## 2021-04-22 DIAGNOSIS — M17.11 PRIMARY OSTEOARTHRITIS OF RIGHT KNEE: ICD-10-CM

## 2021-04-22 DIAGNOSIS — M48.54XS COMPRESSION FRACTURE OF THORACIC SPINE, NON-TRAUMATIC, SEQUELA: Primary | ICD-10-CM

## 2021-04-22 PROCEDURE — 99214 OFFICE O/P EST MOD 30 MIN: CPT | Performed by: FAMILY MEDICINE

## 2021-04-22 NOTE — PROGRESS NOTES
[facility] [place] [address]  [facility] [address]  [phone]  [phone]      Chief Complaint:  Chief Complaint   Patient presents with    Spine - Follow-up       Vitals:  /82   Ht 5' 5" (1 651 m)   Wt 68 9 kg (152 lb)   BMI 25 29 kg/m²     The following portions of the patient's history were reviewed and updated as appropriate: allergies, current medications, past family history, past medical history, past social history, past surgical history, and problem list       Subjective:   Patient ID: Juarez Garland is a 80 y o  female  Here c/o R knee pain and T spine compression fx  1)  Compression fracture-  She is not having any pain  She is not wearing the TLSO brace- didn't like  She is wearing another brace given by a relative- helps  Last pain 2 days ago  No pain wearing new brace  Taking tylenol PRN  2)  R knee pain  - injection helped for a few day  Still having pain  - hurts to walk intermittently/hurts to bend  Sharp pains  Taking osteo bioflex  Using a cane         Review of Systems   Constitutional: Negative for fatigue and fever  Respiratory: Negative for shortness of breath  Cardiovascular: Negative for chest pain  Gastrointestinal: Negative for abdominal pain and nausea  Genitourinary: Negative for dysuria  Musculoskeletal: Positive for arthralgias and back pain  Skin: Negative for rash and wound  Neurological: Negative for weakness and headaches  Objective:  Ortho Exam    Physical Exam  Constitutional:       Appearance: Normal appearance  She is normal weight  HENT:      Head: Normocephalic  Eyes:      Extraocular Movements: Extraocular movements intact  Neck:      Musculoskeletal: Normal range of motion  Pulmonary:      Effort: Pulmonary effort is normal    Musculoskeletal:         General: Tenderness present        Comments: R knee- lateral joint line TTP  L spine- no TTP   Skin:     General: Skin is warm and dry  Neurological:      General: No focal deficit present  Mental Status: She is alert and oriented to person, place, and time  Mental status is at baseline  Psychiatric:         Mood and Affect: Mood normal          Behavior: Behavior normal          Thought Content: Thought content normal          Judgment: Judgment normal                Assessment/Plan:  Assessment/Plan   Diagnoses and all orders for this visit:    Compression fracture of thoracic spine, non-traumatic, sequela    Primary osteoarthritis of right knee  -     Injection procedure prior authorization; Future        Return for Recheck       Duc Pérez MD

## 2021-04-22 NOTE — PATIENT INSTRUCTIONS
F/u for Visco injections  Continue wearing back brace  Continue home exercise  Patient declines formal therapy  Ice/heat/OTC pain meds as needed

## 2021-04-23 ENCOUNTER — TRANSCRIBE ORDERS (OUTPATIENT)
Dept: URGENT CARE | Facility: CLINIC | Age: 86
End: 2021-04-23

## 2021-04-23 ENCOUNTER — APPOINTMENT (OUTPATIENT)
Dept: LAB | Facility: CLINIC | Age: 86
End: 2021-04-23
Payer: MEDICARE

## 2021-04-23 DIAGNOSIS — Z01.818 PRE-OP EVALUATION: ICD-10-CM

## 2021-04-23 DIAGNOSIS — R31.0 GROSS HEMATURIA: ICD-10-CM

## 2021-04-23 DIAGNOSIS — C67.8 MALIGNANT NEOPLASM OF OVERLAPPING SITES OF BLADDER (HCC): Primary | ICD-10-CM

## 2021-04-23 DIAGNOSIS — C67.8 MALIGNANT NEOPLASM OF OVERLAPPING SITES OF BLADDER (HCC): ICD-10-CM

## 2021-04-23 DIAGNOSIS — N39.0 URINARY TRACT INFECTION WITHOUT HEMATURIA, SITE UNSPECIFIED: ICD-10-CM

## 2021-04-23 LAB
ALBUMIN SERPL BCP-MCNC: 3.8 G/DL (ref 3.5–5)
ALP SERPL-CCNC: 81 U/L (ref 46–116)
ALT SERPL W P-5'-P-CCNC: 19 U/L (ref 12–78)
ANION GAP SERPL CALCULATED.3IONS-SCNC: 8 MMOL/L (ref 4–13)
APTT PPP: 43 SECONDS (ref 23–37)
AST SERPL W P-5'-P-CCNC: 17 U/L (ref 5–45)
BACTERIA UR QL AUTO: NORMAL /HPF
BILIRUB SERPL-MCNC: 0.49 MG/DL (ref 0.2–1)
BILIRUB UR QL STRIP: NEGATIVE
BUN SERPL-MCNC: 24 MG/DL (ref 5–25)
CALCIUM SERPL-MCNC: 8.7 MG/DL (ref 8.3–10.1)
CHLORIDE SERPL-SCNC: 108 MMOL/L (ref 100–108)
CLARITY UR: CLEAR
CO2 SERPL-SCNC: 24 MMOL/L (ref 21–32)
COLOR UR: YELLOW
CREAT SERPL-MCNC: 1.15 MG/DL (ref 0.6–1.3)
ERYTHROCYTE [DISTWIDTH] IN BLOOD BY AUTOMATED COUNT: 14.7 % (ref 11.6–15.1)
GFR SERPL CREATININE-BSD FRML MDRD: 43 ML/MIN/1.73SQ M
GLUCOSE P FAST SERPL-MCNC: 163 MG/DL (ref 65–99)
GLUCOSE UR STRIP-MCNC: NEGATIVE MG/DL
HCT VFR BLD AUTO: 47.9 % (ref 34.8–46.1)
HGB BLD-MCNC: 15.2 G/DL (ref 11.5–15.4)
HGB UR QL STRIP.AUTO: NEGATIVE
HYALINE CASTS #/AREA URNS LPF: NORMAL /LPF
INR PPP: 3.18 (ref 0.84–1.19)
KETONES UR STRIP-MCNC: ABNORMAL MG/DL
LEUKOCYTE ESTERASE UR QL STRIP: NEGATIVE
MCH RBC QN AUTO: 31.8 PG (ref 26.8–34.3)
MCHC RBC AUTO-ENTMCNC: 31.7 G/DL (ref 31.4–37.4)
MCV RBC AUTO: 100 FL (ref 82–98)
NITRITE UR QL STRIP: NEGATIVE
NON-SQ EPI CELLS URNS QL MICRO: NORMAL /HPF
PH UR STRIP.AUTO: 5.5 [PH]
PLATELET # BLD AUTO: 158 THOUSANDS/UL (ref 149–390)
PMV BLD AUTO: 11.9 FL (ref 8.9–12.7)
POTASSIUM SERPL-SCNC: 4.2 MMOL/L (ref 3.5–5.3)
PROT SERPL-MCNC: 7.3 G/DL (ref 6.4–8.2)
PROT UR STRIP-MCNC: ABNORMAL MG/DL
PROTHROMBIN TIME: 32.3 SECONDS (ref 11.6–14.5)
RBC # BLD AUTO: 4.78 MILLION/UL (ref 3.81–5.12)
RBC #/AREA URNS AUTO: NORMAL /HPF
SODIUM SERPL-SCNC: 140 MMOL/L (ref 136–145)
SP GR UR STRIP.AUTO: 1.02 (ref 1–1.03)
UROBILINOGEN UR QL STRIP.AUTO: 0.2 E.U./DL
WBC # BLD AUTO: 6.77 THOUSAND/UL (ref 4.31–10.16)
WBC #/AREA URNS AUTO: NORMAL /HPF

## 2021-04-23 PROCEDURE — 81001 URINALYSIS AUTO W/SCOPE: CPT | Performed by: UROLOGY

## 2021-04-23 PROCEDURE — 85027 COMPLETE CBC AUTOMATED: CPT

## 2021-04-23 PROCEDURE — 87086 URINE CULTURE/COLONY COUNT: CPT

## 2021-04-23 PROCEDURE — 80053 COMPREHEN METABOLIC PANEL: CPT

## 2021-04-23 PROCEDURE — 85610 PROTHROMBIN TIME: CPT

## 2021-04-23 PROCEDURE — 85730 THROMBOPLASTIN TIME PARTIAL: CPT

## 2021-04-23 PROCEDURE — 36415 COLL VENOUS BLD VENIPUNCTURE: CPT

## 2021-04-24 LAB — BACTERIA UR CULT: ABNORMAL

## 2021-04-26 ENCOUNTER — ANESTHESIA EVENT (OUTPATIENT)
Dept: PERIOP | Facility: HOSPITAL | Age: 86
End: 2021-04-26
Payer: MEDICARE

## 2021-04-26 DIAGNOSIS — N32.89 BLADDER MASS: Primary | ICD-10-CM

## 2021-04-26 NOTE — PRE-PROCEDURE INSTRUCTIONS
Pre-Surgery Instructions:   Medication Instructions    acetaminophen (TYLENOL) 500 mg tablet Continue to take this medication on your normal schedule  If this is an oral medication and you take it in the morning, then you may take this medicine with a sip of water   B Complex Vitamins (B-COMPLEX/B-12 PO) You may continue to take any vitamin that your surgeon has prescribed to you up to the day before surgery  If your surgeon has not specifically prescribed this vitamin or instructed you to continue then stop taking 7 days prior to surgery   glipiZIDE (GLUCOTROL) 5 mg tablet Continue this medication up to the evening before surgery/procedure, but do not take the morning of the day of surgery   levothyroxine 50 mcg tablet Continue to take this medication on your normal schedule  If this is an oral medication and you take it in the morning, then you may take this medicine with a sip of water   lisinopril (ZESTRIL) 5 mg tablet Continue this medication up to the evening before surgery/procedure, but do not take the morning of the day of surgery   lovastatin (MEVACOR) 20 mg tablet Continue to take this medication on your normal schedule  If this is an oral medication and you take it in the morning, then you may take this medicine with a sip of water   magnesium gluconate (MAGONATE) 500 mg tablet You may continue to take any vitamin that your surgeon has prescribed to you up to the day before surgery  If your surgeon has not specifically prescribed this vitamin or instructed you to continue then stop taking 7 days prior to surgery   metFORMIN (GLUCOPHAGE) 1000 MG tablet Continue this medication up to the evening before surgery/procedure, but do not take the morning of the day of surgery   metoprolol tartrate (LOPRESSOR) 25 mg tablet Continue to take this heart medication on your normal schedule    If this is an oral medication and you take it in the morning, then you may take this medicine with a sip of water   warfarin (COUMADIN) 5 mg tablet Patient was instructed to contact Physician for medication instruction   warfarin (COUMADIN) 7 5 mg tablet Patient was instructed to contact Physician for medication instruction  Covid screening negative as per patient  Reviewed with patient via phone all medication and showering instructions  Advised patient to call Surgeon's office for Warfarin/Coumadin instructions and get ok to receive 2nd covid vaccine dose scheduled 4/29/21 and patient agrees  Advised not to take any NSAID's, Vitamins or Herbal products prior to the DOS  Acetaminophen products are ok to take  Informed about call from [facility] with time of scheduled surgery  Patient verbalized understanding  ACE/ARB Med Class  Continue this medication up to the evening before surgery/procedure, but do not take the morning of the day of surgery  Acetaminophen Med Class  Continue to take this medication on your normal schedule  If this is an oral medication and you take it in the morning, then you may take this medicine with a sip of water  Beta blocker Med Class  Continue to take this heart medication on your normal schedule  If this is an oral medication and you take it in the morning, then you may take this medicine with a sip of water  Insulin Med Class  Pre-Surgery/Procedure Instructions for Adult Patients who Take Medicine for Diabetes or to Control their Blood Sugar     Day Before Surgery/Procedure  Use the directions based on the type of medicine you take for your diabetes  1  If you are having a procedure that does not require a bowel prep:  ? Pre-Mixed Insulin (Intermediate Acting: Humalog 75/25, Humulin 70/30  Novolog 70/30, Regular Insulin)  § Take ½ your regular dose the evening before your procedure  ? Rapid/Fast Acting Insulin/Long Acting Insulin (Humalog U200, NovoLog, Apidra, Lantus, Levemir, Elvira Ross)  § Take your FULL regular dose the day before procedure    ? Oral Diabetic Medicines including Glipizide/Glimepiride/Glucotrol (sulfonylurea)  § Take your regular dose with dinner the evening before your procedure  2  If you are having a procedure (e g  Colonoscopy) that requires a bowel prep and you are allowed to have at least a clear liquid diet:  ? Pre-Mixed Insulin (Intermediate Acting: Humalog 75/25, Humulin 70/30, Novolog 70/30, Regular Insulin)  § Take ½ your regular dose the evening before your procedure  ? Rapid/Fast Acting Insulin (Humalog U200, NovoLog, Apidra, Fiasp)  § Take ½ your regular dose the evening before your procedure  ? Long Acting Insulin (Lantus, Levemir, Sharolyn Bur)  § Take your FULL regular dose the day before procedure  ? Oral Glipizide/Glimepiride/Glucotrol (sulfonylurea)  § Take ½ your regular dose the evening before your procedure  ? Oral Diabetic Medicines that are NOT Glipizide/Glimepiride/Glucotrol  § Take your regular dose with dinner in the evening before your procedure      Day of Surgery/Procedure  · Long Acting Insulin (Lantus, Levemir, Sharolyn Bur)  ? If you usually take your Long-Acting Insulin in the morning, take the full dose as scheduled  · With the exception of the morning Long-Acting Insulin noted above, DO NOT take ANY diabetic medicine on the day of your procedure unless you were instructed by the doctor who manages your diabetic medicines  · Continue to check your blood sugars  · If you have an insulin pump then consult with your Endocrinologist for instructions  · If you cannot see your Endocrinologist, on the day of the procedure set your insulin pump to your basal rate only  Please bring your insulin pump supplies to the hospital      This Educational material has been approved by the Patient Education Advisory Committee  Date prepared: 1/17/2018          Expiration date: 1/17/2019        Approval Number:            Statin Med Class  Continue to take this medication on your normal schedule    If this is an oral medication and you take it in the morning, then you may take this medicine with a sip of water  Thyroxine Med Class  Continue to take this medication on your normal schedule  If this is an oral medication and you take it in the morning, then you may take this medicine with a sip of water  Vitamin Med Class  You may continue to take any vitamin that your surgeon has prescribed to you up to the day before surgery  If your surgeon has not specifically prescribed this vitamin or instructed you to continue then stop taking 7 days prior to surgery  Warfarin Med Class  Consult with your Surgeon and prescribing Physician for exact timing of stopping this medication  Bridging anticoagulation may be needed if you have an artificial heart valve  Frequently, this medication is stopped 5 days prior to surgery  Approval to stop this medication should first come from your surgeon and/or prescribing physician

## 2021-04-29 ENCOUNTER — APPOINTMENT (OUTPATIENT)
Dept: LAB | Facility: CLINIC | Age: 86
End: 2021-04-29
Payer: MEDICARE

## 2021-04-29 DIAGNOSIS — Z01.818 PRE-OP EXAM: ICD-10-CM

## 2021-04-29 DIAGNOSIS — E11.65 TYPE 2 DIABETES MELLITUS WITH HYPERGLYCEMIA, WITHOUT LONG-TERM CURRENT USE OF INSULIN (HCC): ICD-10-CM

## 2021-04-29 DIAGNOSIS — I48.0 PAROXYSMAL ATRIAL FIBRILLATION (HCC): ICD-10-CM

## 2021-04-29 DIAGNOSIS — Z86.711 HISTORY OF PULMONARY EMBOLISM: Primary | ICD-10-CM

## 2021-04-29 LAB
EST. AVERAGE GLUCOSE BLD GHB EST-MCNC: 209 MG/DL
HBA1C MFR BLD: 8.9 %
INR PPP: 1.3 (ref 0.84–1.19)
PROTHROMBIN TIME: 16.1 SECONDS (ref 11.6–14.5)

## 2021-04-29 PROCEDURE — 36415 COLL VENOUS BLD VENIPUNCTURE: CPT | Performed by: INTERNAL MEDICINE

## 2021-04-29 PROCEDURE — 85610 PROTHROMBIN TIME: CPT | Performed by: INTERNAL MEDICINE

## 2021-04-29 PROCEDURE — 83036 HEMOGLOBIN GLYCOSYLATED A1C: CPT

## 2021-04-30 ENCOUNTER — HOSPITAL ENCOUNTER (OUTPATIENT)
Facility: HOSPITAL | Age: 86
Setting detail: OUTPATIENT SURGERY
Discharge: HOME | End: 2021-05-01
Attending: UROLOGY | Admitting: UROLOGY
Payer: MEDICARE

## 2021-04-30 ENCOUNTER — ANESTHESIA (OUTPATIENT)
Dept: PERIOP | Facility: HOSPITAL | Age: 86
End: 2021-04-30
Payer: MEDICARE

## 2021-04-30 DIAGNOSIS — Z01.818 PRE-OP EXAM: Primary | ICD-10-CM

## 2021-04-30 DIAGNOSIS — E11.65 TYPE 2 DIABETES MELLITUS WITH HYPERGLYCEMIA, WITHOUT LONG-TERM CURRENT USE OF INSULIN (HCC): ICD-10-CM

## 2021-04-30 DIAGNOSIS — D41.4 NEOPLASM OF UNCERTAIN BEHAVIOR OF BLADDER: ICD-10-CM

## 2021-04-30 LAB
APTT PPP: 24 SECONDS (ref 23–37)
BACTERIA UR QL AUTO: ABNORMAL /HPF
BILIRUB UR QL STRIP: NEGATIVE
CLARITY UR: CLEAR
COLOR UR: YELLOW
GLUCOSE SERPL-MCNC: 193 MG/DL (ref 65–140)
GLUCOSE SERPL-MCNC: 224 MG/DL (ref 65–140)
GLUCOSE UR STRIP-MCNC: NEGATIVE MG/DL
HGB UR QL STRIP.AUTO: ABNORMAL
INR PPP: 1.14 (ref 0.84–1.19)
KETONES UR STRIP-MCNC: ABNORMAL MG/DL
LEUKOCYTE ESTERASE UR QL STRIP: NEGATIVE
MUCOUS THREADS UR QL AUTO: ABNORMAL
NITRITE UR QL STRIP: NEGATIVE
NON-SQ EPI CELLS URNS QL MICRO: ABNORMAL /HPF
PH UR STRIP.AUTO: 5 [PH]
PROT UR STRIP-MCNC: ABNORMAL MG/DL
PROTHROMBIN TIME: 14.5 SECONDS (ref 11.6–14.5)
RBC #/AREA URNS AUTO: ABNORMAL /HPF
SP GR UR STRIP.AUTO: 1.02 (ref 1–1.03)
UROBILINOGEN UR QL STRIP.AUTO: 0.2 E.U./DL
WBC #/AREA URNS AUTO: ABNORMAL /HPF

## 2021-04-30 PROCEDURE — 88307 TISSUE EXAM BY PATHOLOGIST: CPT | Performed by: PATHOLOGY

## 2021-04-30 PROCEDURE — 81001 URINALYSIS AUTO W/SCOPE: CPT | Performed by: UROLOGY

## 2021-04-30 PROCEDURE — 87086 URINE CULTURE/COLONY COUNT: CPT | Performed by: UROLOGY

## 2021-04-30 PROCEDURE — 85610 PROTHROMBIN TIME: CPT | Performed by: UROLOGY

## 2021-04-30 PROCEDURE — 85730 THROMBOPLASTIN TIME PARTIAL: CPT | Performed by: UROLOGY

## 2021-04-30 PROCEDURE — 81003 URINALYSIS AUTO W/O SCOPE: CPT | Performed by: UROLOGY

## 2021-04-30 PROCEDURE — 82948 REAGENT STRIP/BLOOD GLUCOSE: CPT

## 2021-04-30 RX ORDER — GLIPIZIDE 5 MG/1
5 TABLET ORAL
Status: DISCONTINUED | OUTPATIENT
Start: 2021-04-30 | End: 2021-05-01 | Stop reason: HOSPADM

## 2021-04-30 RX ORDER — MAGNESIUM HYDROXIDE 1200 MG/15ML
LIQUID ORAL AS NEEDED
Status: DISCONTINUED | OUTPATIENT
Start: 2021-04-30 | End: 2021-04-30 | Stop reason: HOSPADM

## 2021-04-30 RX ORDER — CEPHALEXIN 500 MG/1
500 CAPSULE ORAL EVERY 12 HOURS SCHEDULED
Status: DISCONTINUED | OUTPATIENT
Start: 2021-05-01 | End: 2021-05-01 | Stop reason: HOSPADM

## 2021-04-30 RX ORDER — SODIUM CHLORIDE, SODIUM LACTATE, POTASSIUM CHLORIDE, CALCIUM CHLORIDE 600; 310; 30; 20 MG/100ML; MG/100ML; MG/100ML; MG/100ML
100 INJECTION, SOLUTION INTRAVENOUS CONTINUOUS
Status: DISCONTINUED | OUTPATIENT
Start: 2021-04-30 | End: 2021-04-30

## 2021-04-30 RX ORDER — SODIUM CHLORIDE 9 MG/ML
75 INJECTION, SOLUTION INTRAVENOUS CONTINUOUS
Status: DISCONTINUED | OUTPATIENT
Start: 2021-04-30 | End: 2021-05-01

## 2021-04-30 RX ORDER — FENTANYL CITRATE/PF 50 MCG/ML
25 SYRINGE (ML) INJECTION
Status: DISCONTINUED | OUTPATIENT
Start: 2021-04-30 | End: 2021-04-30 | Stop reason: HOSPADM

## 2021-04-30 RX ORDER — LIDOCAINE HYDROCHLORIDE 10 MG/ML
INJECTION, SOLUTION EPIDURAL; INFILTRATION; INTRACAUDAL; PERINEURAL AS NEEDED
Status: DISCONTINUED | OUTPATIENT
Start: 2021-04-30 | End: 2021-04-30

## 2021-04-30 RX ORDER — LEVOFLOXACIN 5 MG/ML
500 INJECTION, SOLUTION INTRAVENOUS ONCE
Status: COMPLETED | OUTPATIENT
Start: 2021-04-30 | End: 2021-04-30

## 2021-04-30 RX ORDER — FENTANYL CITRATE 50 UG/ML
INJECTION, SOLUTION INTRAMUSCULAR; INTRAVENOUS AS NEEDED
Status: DISCONTINUED | OUTPATIENT
Start: 2021-04-30 | End: 2021-04-30

## 2021-04-30 RX ORDER — EPHEDRINE SULFATE 50 MG/ML
INJECTION INTRAVENOUS AS NEEDED
Status: DISCONTINUED | OUTPATIENT
Start: 2021-04-30 | End: 2021-04-30

## 2021-04-30 RX ORDER — PROPOFOL 10 MG/ML
INJECTION, EMULSION INTRAVENOUS AS NEEDED
Status: DISCONTINUED | OUTPATIENT
Start: 2021-04-30 | End: 2021-04-30

## 2021-04-30 RX ORDER — LISINOPRIL 5 MG/1
5 TABLET ORAL DAILY
Status: DISCONTINUED | OUTPATIENT
Start: 2021-04-30 | End: 2021-05-01 | Stop reason: HOSPADM

## 2021-04-30 RX ORDER — LEVOTHYROXINE SODIUM 0.05 MG/1
50 TABLET ORAL
Status: DISCONTINUED | OUTPATIENT
Start: 2021-04-30 | End: 2021-05-01 | Stop reason: HOSPADM

## 2021-04-30 RX ORDER — SODIUM CHLORIDE, SODIUM LACTATE, POTASSIUM CHLORIDE, CALCIUM CHLORIDE 600; 310; 30; 20 MG/100ML; MG/100ML; MG/100ML; MG/100ML
125 INJECTION, SOLUTION INTRAVENOUS CONTINUOUS
Status: DISCONTINUED | OUTPATIENT
Start: 2021-04-30 | End: 2021-04-30

## 2021-04-30 RX ORDER — PRAVASTATIN SODIUM 20 MG
20 TABLET ORAL
Status: DISCONTINUED | OUTPATIENT
Start: 2021-04-30 | End: 2021-05-01 | Stop reason: HOSPADM

## 2021-04-30 RX ORDER — ONDANSETRON 2 MG/ML
INJECTION INTRAMUSCULAR; INTRAVENOUS AS NEEDED
Status: DISCONTINUED | OUTPATIENT
Start: 2021-04-30 | End: 2021-04-30

## 2021-04-30 RX ADMIN — ONDANSETRON 4 MG: 2 INJECTION INTRAMUSCULAR; INTRAVENOUS at 09:02

## 2021-04-30 RX ADMIN — PHENYLEPHRINE HYDROCHLORIDE 200 MCG: 10 INJECTION INTRAVENOUS at 08:08

## 2021-04-30 RX ADMIN — SODIUM CHLORIDE 75 ML/HR: 0.9 INJECTION, SOLUTION INTRAVENOUS at 10:37

## 2021-04-30 RX ADMIN — FENTANYL CITRATE 25 MCG: 50 INJECTION INTRAMUSCULAR; INTRAVENOUS at 09:05

## 2021-04-30 RX ADMIN — FENTANYL CITRATE 25 MCG: 50 INJECTION INTRAMUSCULAR; INTRAVENOUS at 08:58

## 2021-04-30 RX ADMIN — GLIPIZIDE 5 MG: 5 TABLET ORAL at 16:52

## 2021-04-30 RX ADMIN — LISINOPRIL 5 MG: 5 TABLET ORAL at 10:52

## 2021-04-30 RX ADMIN — METFORMIN HYDROCHLORIDE 500 MG: 500 TABLET ORAL at 16:52

## 2021-04-30 RX ADMIN — EPHEDRINE SULFATE 5 MG: 50 INJECTION, SOLUTION INTRAVENOUS at 08:09

## 2021-04-30 RX ADMIN — PRAVASTATIN SODIUM 20 MG: 20 TABLET ORAL at 16:52

## 2021-04-30 RX ADMIN — PHENYLEPHRINE HYDROCHLORIDE 20 MCG/MIN: 10 INJECTION INTRAVENOUS at 08:44

## 2021-04-30 RX ADMIN — FENTANYL CITRATE 25 MCG: 50 INJECTION INTRAMUSCULAR; INTRAVENOUS at 07:57

## 2021-04-30 RX ADMIN — LIDOCAINE HYDROCHLORIDE 100 MG: 10 INJECTION, SOLUTION EPIDURAL; INFILTRATION; INTRACAUDAL; PERINEURAL at 07:57

## 2021-04-30 RX ADMIN — FENTANYL CITRATE 25 MCG: 50 INJECTION INTRAMUSCULAR; INTRAVENOUS at 08:27

## 2021-04-30 RX ADMIN — PROPOFOL 100 MG: 10 INJECTION, EMULSION INTRAVENOUS at 07:57

## 2021-04-30 RX ADMIN — LEVOFLOXACIN: 5 INJECTION, SOLUTION INTRAVENOUS at 07:41

## 2021-04-30 RX ADMIN — PHENYLEPHRINE HYDROCHLORIDE 200 MCG: 10 INJECTION INTRAVENOUS at 07:57

## 2021-04-30 RX ADMIN — SODIUM CHLORIDE, SODIUM LACTATE, POTASSIUM CHLORIDE, AND CALCIUM CHLORIDE 125 ML/HR: .6; .31; .03; .02 INJECTION, SOLUTION INTRAVENOUS at 07:11

## 2021-04-30 NOTE — NURSING NOTE
1900-- AWAKE AND PLEASANT AND ORIENTED X4  R/A STATUS  LUNGS ARE CLEAR  HEART IS REGULAR  ABDOMEN IS SOFT AND NON TENDER  LBM WAS TODAY  SHE DENIES PAIN  NO EDEMA IS NOTED  ADEQ PEDAL/RADIAL PULSES  IVF CONTINUE  CALL CHAND GIVEN   MARY [facility]

## 2021-04-30 NOTE — OP NOTE
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]    Procedure(s) (LRB):  CYSTOSCOPY, TRANSURETHRAL RESECTION OF BLADDER TUMOR (TURBT) (N/A)    Specimen(s):  ID Type Source Tests Collected by Time Destination   1 : bladder tumor Tissue Urinary Bladder TISSUE EXAM Byron Obrien MD 2021 [id number]    A : u/a and urine culture Urine Urinary Bladder URINE CULTURE, URINALYSIS WITH REFLEX TO SCOPE Byron Obrien MD 2021 2040        Estimated Blood Loss:   Minimal    Drains:  Urethral Catheter Straight-tip 22 Fr  (Active)   Number of days: 0       Anesthesia Type:   Choice    Operative Indications:  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]  This is an 59-year-old female who underwent renal ultrasound for chronic renal insufficiency that incidentally showed a 2 cm bladder mass  Outpatient cystoscopy confirmed multifocal papillary superficial appearing bladder tumors  Options, procedures, benefits and risks were discussed and she agreed to the planned procedure  Warfarin has been on hold for 5 days and PT INR this morning are within normal range  Operative Findings:  Multifocal superficial papillary bladder tumors  There was a large area of field change with small papillary tumors just medial and behind the left ureteral orifice progressing along the posterior wall medially  This area did also contained several well-defined larger papillary tumors  There were also several papillary bladder tumors located just inside the bladder neck at the 2:00 a m , 12:00 p m  And 8-10 o'clock positions  The 1 on the right side was along the inner aspect of the bladder neck    There were also several small papillary tumors studding the posterior wall and floor of the bladder  The ureteral orifices were not involved  Complications:   None    Procedure and Technique:  The patient was properly identified in the operating room and after adequate general anesthesia was placed in lithotomy position  The lower abdomen and genitalia were prepped and draped in the usual fashion  Twenty-one [place]i cystoscope was inserted into the bladder and urine was drained and sent for urinalysis and culture  Cystoscopy was performed with 70 degree lens  The above findings were seen  The cystoscope was removed and an Olympus bipolar resectoscope was inserted with 30 degree lens  Cystoscopy was repeated with findings as above  The larger tumors on the left floor behind the left ureteral orifice were resected 1st down to and including the base  Several of the more prominent tumors were resected in the same fashion along the posterior wall, left lateral wall and the tumors inside the bladder neck anteriorly  The remainder of the fine papillary lesions throughout the bladder were then fulgurated with a combination of cutting and coagulation current  The base and surrounding mucosa of all lesions were fulgurated and hemostasis was achieved throughout  A large contiguous area of fulguration was performed on the left trigone and posterior wall behind the trigone for total diameter greater than 5 cm  Upon completion, all bladder tumor tissue was irrigated free with an Ellik evacuator  Hemostasis was achieved and checked with coagulation current  Careful cystoscopy was repeated revealing no residual suspicious lesions  Hemostasis had been achieved  The ureteral orifices were intact  The cystoscope was removed  A 22 [place]i Alanis catheter was placed with 10 cc of water in the balloon  The bladder was gently irrigated with water with clear drainage  The patient tolerated the procedure well left the operating room in good condition       I was present for the entire procedure    Patient Disposition:  PACU     SIGNATURE: Chris Hamilton MD  DATE: April 30, 2021  TIME: 9:17 AM

## 2021-04-30 NOTE — PLAN OF CARE
Problem: Potential for Falls  Goal: Patient will remain free of falls  Description: INTERVENTIONS:  - Assess patient frequently for physical needs  -  Identify cognitive and physical deficits and behaviors that affect risk of falls    -  Alligator fall precautions as indicated by assessment   - Educate patient/family on patient safety including physical limitations  - Instruct patient to call for assistance with activity based on assessment  - Modify environment to reduce risk of injury  - Consider OT/PT consult to assist with strengthening/mobility  Outcome: Progressing     Problem: GENITOURINARY - ADULT  Goal: Maintains or returns to baseline urinary function  Description: INTERVENTIONS:  - Assess urinary function  - Encourage oral fluids to ensure adequate hydration if ordered  - Administer IV fluids as ordered to ensure adequate hydration  - Administer ordered medications as needed  - Offer frequent toileting  - Follow urinary retention protocol if ordered  Outcome: Progressing  Goal: Absence of urinary retention  Description: INTERVENTIONS:  - Assess patients ability to void and empty bladder  - Monitor I/O  - Bladder scan as needed  - Discuss with physician/AP medications to alleviate retention as needed  - Discuss catheterization for long term situations as appropriate  Outcome: Progressing  Goal: Urinary catheter remains patent  Description: INTERVENTIONS:  - Assess patency of urinary catheter  - If patient has a chronic quiñonez, consider changing catheter if non-functioning  - Follow guidelines for intermittent irrigation of non-functioning urinary catheter  Outcome: Progressing     Problem: METABOLIC, FLUID AND ELECTROLYTES - ADULT  Goal: Electrolytes maintained within normal limits  Description: INTERVENTIONS:  - Monitor labs and assess patient for signs and symptoms of electrolyte imbalances  - Administer electrolyte replacement as ordered  - Monitor response to electrolyte replacements, including repeat lab results as appropriate  - Instruct patient on fluid and nutrition as appropriate  Outcome: Progressing  Goal: Fluid balance maintained  Description: INTERVENTIONS:  - Monitor labs   - Monitor I/O and WT  - Instruct patient on fluid and nutrition as appropriate  - Assess for signs & symptoms of volume excess or deficit  Outcome: Progressing  Goal: Glucose maintained within target range  Description: INTERVENTIONS:  - Monitor Blood Glucose as ordered  - Assess for signs and symptoms of hyperglycemia and hypoglycemia  - Administer ordered medications to maintain glucose within target range  - Assess nutritional intake and initiate nutrition service referral as needed  Outcome: Progressing     Problem: SKIN/TISSUE INTEGRITY - ADULT  Goal: Skin integrity remains intact  Description: INTERVENTIONS  - Identify patients at risk for skin breakdown  - Assess and monitor skin integrity  - Assess and monitor nutrition and hydration status  - Monitor labs (i e  albumin)  - Assess for incontinence   - Turn and reposition patient  - Assist with mobility/ambulation  - Relieve pressure over bony prominences  - Avoid friction and shearing  - Provide appropriate hygiene as needed including keeping skin clean and dry  - Evaluate need for skin moisturizer/barrier cream  - Collaborate with interdisciplinary team (i e  Nutrition, Rehabilitation, etc )   - Patient/family teaching  Outcome: Progressing     Problem: HEMATOLOGIC - ADULT  Goal: Maintains hematologic stability  Description: INTERVENTIONS  - Assess for signs and symptoms of bleeding or hemorrhage  - Monitor labs  - Administer supportive blood products/factors as ordered and appropriate  Outcome: Progressing     Problem: MUSCULOSKELETAL - ADULT  Goal: Maintain or return mobility to safest level of function  Description: INTERVENTIONS:  - Assess patient's ability to carry out ADLs; assess patient's baseline for ADL function and identify physical deficits which impact ability to perform ADLs (bathing, care of mouth/teeth, toileting, grooming, dressing, etc )  - Assess/evaluate cause of self-care deficits   - Assess range of motion  - Assess patient's mobility  - Assess patient's need for assistive devices and provide as appropriate  - Encourage maximum independence but intervene and supervise when necessary  - Involve family in performance of ADLs  - Assess for home care needs following discharge   - Consider OT consult to assist with ADL evaluation and planning for discharge  - Provide patient education as appropriate  Outcome: Progressing     Problem: PAIN - ADULT  Goal: Verbalizes/displays adequate comfort level or baseline comfort level  Description: Interventions:  - Encourage patient to monitor pain and request assistance  - Assess pain using appropriate pain scale  - Administer analgesics based on type and severity of pain and evaluate response  - Implement non-pharmacological measures as appropriate and evaluate response  - Consider cultural and social influences on pain and pain management  - Notify physician/advanced practitioner if interventions unsuccessful or patient reports new pain  Outcome: Progressing     Problem: INFECTION - ADULT  Goal: Absence or prevention of progression during hospitalization  Description: INTERVENTIONS:  - Assess and monitor for signs and symptoms of infection  - Monitor lab/diagnostic results  - Monitor all insertion sites, i e  indwelling lines, tubes, and drains  - Monitor endotracheal if appropriate and nasal secretions for changes in amount and color  - Gantt appropriate cooling/warming therapies per order  - Administer medications as ordered  - Instruct and encourage patient and family to use good hand hygiene technique  - Identify and instruct in appropriate isolation precautions for identified infection/condition  Outcome: Progressing     Problem: SAFETY ADULT  Goal: Patient will remain free of falls  Description: INTERVENTIONS:  - Assess patient frequently for physical needs  -  Identify cognitive and physical deficits and behaviors that affect risk of falls    -  [place] fall precautions as indicated by assessment   - Educate patient/family on patient safety including physical limitations  - Instruct patient to call for assistance with activity based on assessment  - Modify environment to reduce risk of injury  - Consider OT/PT consult to assist with strengthening/mobility  Outcome: Progressing  Goal: Maintain or return to baseline ADL function  Description: INTERVENTIONS:  -  Assess patient's ability to carry out ADLs; assess patient's baseline for ADL function and identify physical deficits which impact ability to perform ADLs (bathing, care of mouth/teeth, toileting, grooming, dressing, etc )  - Assess/evaluate cause of self-care deficits   - Assess range of motion  - Assess patient's mobility; develop plan if impaired  - Assess patient's need for assistive devices and provide as appropriate  - Encourage maximum independence but intervene and supervise when necessary  - Involve family in performance of ADLs  - Assess for home care needs following discharge   - Consider OT consult to assist with ADL evaluation and planning for discharge  - Provide patient education as appropriate  Outcome: Progressing  Goal: Maintain or return mobility status to optimal level  Description: INTERVENTIONS:  - Assess patient's baseline mobility status (ambulation, transfers, stairs, etc )    - Identify cognitive and physical deficits and behaviors that affect mobility  - Identify mobility aids required to assist with transfers and/or ambulation (gait belt, sit-to-stand, lift, walker, cane, etc )  - [place] fall precautions as indicated by assessment  - Record patient progress and toleration of activity level on Mobility SBAR; progress patient to next Phase/Stage  - Instruct patient to call for assistance with activity based on assessment  - Consider rehabilitation consult to assist with strengthening/weightbearing, etc   Outcome: Progressing     Problem: DISCHARGE PLANNING  Goal: Discharge to home or other facility with appropriate resources  Description: INTERVENTIONS:  - Identify barriers to discharge w/patient and caregiver  - Arrange for needed discharge resources and transportation as appropriate  - Identify discharge learning needs (meds, wound care, etc )  - Refer to Case Management Department for coordinating discharge planning if the patient needs post-hospital services based on physician/advanced practitioner order or complex needs related to functional status, cognitive ability, or social support system  Outcome: Progressing     Problem: Knowledge Deficit  Goal: Patient/family/caregiver demonstrates understanding of disease process, treatment plan, medications, and discharge instructions  Description: Complete learning assessment and assess knowledge base    Interventions:  - Provide teaching at level of understanding  - Provide teaching via preferred learning methods  Outcome: Progressing

## 2021-04-30 NOTE — INTERVAL H&P NOTE
H&P reviewed  After examining the patient I find no changes in the patients condition since the H&P had been written      Vitals:    04/30/21 0651   BP: (!) 179/76   Pulse: 68   Resp: 16   Temp: (!) 96 2 °F (35 7 °C)   SpO2: 97%

## 2021-04-30 NOTE — ANESTHESIA PREPROCEDURE EVALUATION
Procedure:  CYSTOSCOPY, TRANSURETHRAL RESECTION OF BLADDER TUMOR (TURBT) (N/A Bladder)    Relevant Problems   CARDIO   (+) Chronic a-fib (HCC)   (+) Essential hypertension      ENDO   (+) Type II diabetes mellitus (HCC)      /RENAL   (+) Stage 3a chronic kidney disease (HCC)      HEMATOLOGY   (+) Hypercoagulable state, primary ([facility] [address] )      MUSCULOSKELETAL   (+) Osteoarthritis of knee      NEURO/PSYCH   (+) History of pulmonary embolism      hgb 15 2, plt 158, Cr 1 15    H/o PE, s/p IVCF  Physical Exam    Airway       Dental       Cardiovascular      Pulmonary      Other Findings  Missing several, nothing loose      Anesthesia Plan  ASA Score- 3     Anesthesia Type- general with ASA Monitors  Additional Monitors:   Airway Plan: LMA  Comment: General anesthesia, LMA; standard ASA monitors  Risks and benefits discussed with patient; patient consented and agrees to proceed  I saw and evaluated the patient  If seen with CRNA, we have discussed the anesthetic plan and I am in agreement that the plan is appropriate for the patient          Plan Factors-    Induction- intravenous  Postoperative Plan- Plan for postoperative opioid use  Planned trial extubation    Informed Consent- Anesthetic plan and risks discussed with patient  I personally reviewed this patient with the CRNA  Discussed and agreed on the Anesthesia Plan with the CRNA  Garo Malhotra

## 2021-05-01 VITALS
RESPIRATION RATE: 18 BRPM | SYSTOLIC BLOOD PRESSURE: 138 MMHG | OXYGEN SATURATION: 96 % | BODY MASS INDEX: 25.33 KG/M2 | TEMPERATURE: 98.1 F | DIASTOLIC BLOOD PRESSURE: 66 MMHG | WEIGHT: 152 LBS | HEART RATE: 69 BPM | HEIGHT: 65 IN

## 2021-05-01 LAB
ANION GAP SERPL CALCULATED.3IONS-SCNC: 8 MMOL/L (ref 4–13)
BACTERIA UR CULT: NORMAL
BUN SERPL-MCNC: 19 MG/DL (ref 7–25)
CALCIUM SERPL-MCNC: 7.9 MG/DL (ref 8.6–10.5)
CHLORIDE SERPL-SCNC: 108 MMOL/L (ref 98–107)
CO2 SERPL-SCNC: 24 MMOL/L (ref 21–31)
CREAT SERPL-MCNC: 0.87 MG/DL (ref 0.6–1.2)
ERYTHROCYTE [DISTWIDTH] IN BLOOD BY AUTOMATED COUNT: 15.1 % (ref 11.5–14.5)
GFR SERPL CREATININE-BSD FRML MDRD: 60 ML/MIN/1.73SQ M
GLUCOSE P FAST SERPL-MCNC: 134 MG/DL (ref 65–99)
GLUCOSE SERPL-MCNC: 134 MG/DL (ref 65–99)
HCT VFR BLD AUTO: 40.2 % (ref 42–47)
HGB BLD-MCNC: 13.3 G/DL (ref 12–16)
MCH RBC QN AUTO: 31.9 PG (ref 26–34)
MCHC RBC AUTO-ENTMCNC: 33 G/DL (ref 31–37)
MCV RBC AUTO: 97 FL (ref 81–99)
PLATELET # BLD AUTO: 104 THOUSANDS/UL (ref 149–390)
PMV BLD AUTO: 10.3 FL (ref 8.6–11.7)
POTASSIUM SERPL-SCNC: 4 MMOL/L (ref 3.5–5.5)
RBC # BLD AUTO: 4.16 MILLION/UL (ref 3.9–5.2)
SODIUM SERPL-SCNC: 140 MMOL/L (ref 134–143)
WBC # BLD AUTO: 7.4 THOUSAND/UL (ref 4.8–10.8)

## 2021-05-01 PROCEDURE — 80048 BASIC METABOLIC PNL TOTAL CA: CPT | Performed by: UROLOGY

## 2021-05-01 PROCEDURE — 85027 COMPLETE CBC AUTOMATED: CPT | Performed by: UROLOGY

## 2021-05-01 RX ADMIN — METFORMIN HYDROCHLORIDE 500 MG: 500 TABLET ORAL at 09:12

## 2021-05-01 RX ADMIN — LEVOTHYROXINE SODIUM 50 MCG: 50 TABLET ORAL at 06:03

## 2021-05-01 RX ADMIN — CEPHALEXIN 500 MG: 500 CAPSULE ORAL at 09:12

## 2021-05-01 RX ADMIN — GLIPIZIDE 5 MG: 5 TABLET ORAL at 06:03

## 2021-05-01 RX ADMIN — LISINOPRIL 5 MG: 5 TABLET ORAL at 09:12

## 2021-05-01 NOTE — NURSING NOTE
Dr Durward Mcburney put order in for d/c, quiñonez removed per the order and pt able to void without difficulty, urine yellow, no blood noted, avs reviewed with the pt and , all questions answered to her satisfaction, taken to family car via w/c and the pt was d/c'd from the hospital

## 2021-05-01 NOTE — NURSING NOTE
Assumed care of patient, no complaints of discomfort  Patient resting comfortably in bed, call bell within reach and bed locked in lowest position  Will continue to monitor patient throughout shift

## 2021-05-01 NOTE — NURSING NOTE
DR Usha Mar CALLED FOR STATUS REPORT ON HIS PATIENT , AND RELATED TO HIM, THAT URINE IS LIGHT PINK WITH FLEX RED IN TUBING

## 2021-05-01 NOTE — PLAN OF CARE
Problem: Potential for Falls  Goal: Patient will remain free of falls  Description: INTERVENTIONS:  - Assess patient frequently for physical needs  -  Identify cognitive and physical deficits and behaviors that affect risk of falls    -  [place] fall precautions as indicated by assessment   - Educate patient/family on patient safety including physical limitations  - Instruct patient to call for assistance with activity based on assessment  - Modify environment to reduce risk of injury  - Consider OT/PT consult to assist with strengthening/mobility  4/30/2021 2027 by Debbie Thomas RN  Outcome: Progressing  4/30/2021 2027 by Debbie Thomas RN  Outcome: Progressing  4/30/2021 2026 by Debbie Thomas RN  Outcome: Progressing     Problem: GENITOURINARY - ADULT  Goal: Maintains or returns to baseline urinary function  Description: INTERVENTIONS:  - Assess urinary function  - Encourage oral fluids to ensure adequate hydration if ordered  - Administer IV fluids as ordered to ensure adequate hydration  - Administer ordered medications as needed  - Offer frequent toileting  - Follow urinary retention protocol if ordered  4/30/2021 2027 by Debbie Thomas RN  Outcome: Progressing  4/30/2021 2027 by Debbie Thomas RN  Outcome: Progressing  4/30/2021 2026 by Debbie Thomas RN  Outcome: Progressing  Goal: Absence of urinary retention  Description: INTERVENTIONS:  - Assess patients ability to void and empty bladder  - Monitor I/O  - Bladder scan as needed  - Discuss with physician/AP medications to alleviate retention as needed  - Discuss catheterization for long term situations as appropriate  4/30/2021 2027 by Debbie Thomas RN  Outcome: Progressing  4/30/2021 2027 by Debbie Thomas RN  Outcome: Progressing  4/30/2021 2026 by Debbie Thomas RN  Outcome: Progressing  Goal: Urinary catheter remains patent  Description: INTERVENTIONS:  - Assess patency of urinary catheter  - If patient has a chronic quiñonez, consider changing catheter if non-functioning  - Follow guidelines for intermittent irrigation of non-functioning urinary catheter  4/30/2021 2027 by Isai Walsh RN  Outcome: Progressing  4/30/2021 2027 by Isai Walsh RN  Outcome: Progressing  4/30/2021 2026 by Isai Walsh RN  Outcome: Progressing     Problem: METABOLIC, FLUID AND ELECTROLYTES - ADULT  Goal: Electrolytes maintained within normal limits  Description: INTERVENTIONS:  - Monitor labs and assess patient for signs and symptoms of electrolyte imbalances  - Administer electrolyte replacement as ordered  - Monitor response to electrolyte replacements, including repeat lab results as appropriate  - Instruct patient on fluid and nutrition as appropriate  4/30/2021 2027 by Isai Walsh RN  Outcome: Progressing  4/30/2021 2027 by Isai aWlsh RN  Outcome: Progressing  4/30/2021 2026 by Isai Walsh RN  Outcome: Progressing  Goal: Fluid balance maintained  Description: INTERVENTIONS:  - Monitor labs   - Monitor I/O and WT  - Instruct patient on fluid and nutrition as appropriate  - Assess for signs & symptoms of volume excess or deficit  4/30/2021 2027 by Isai Walsh RN  Outcome: Progressing  4/30/2021 2027 by Isai Walsh RN  Outcome: Progressing  4/30/2021 2026 by Isai Walsh RN  Outcome: Progressing  Goal: Glucose maintained within target range  Description: INTERVENTIONS:  - Monitor Blood Glucose as ordered  - Assess for signs and symptoms of hyperglycemia and hypoglycemia  - Administer ordered medications to maintain glucose within target range  - Assess nutritional intake and initiate nutrition service referral as needed  4/30/2021 2027 by Isai Walsh RN  Outcome: Progressing  4/30/2021 2027 by Isai Walsh RN  Outcome: Progressing  4/30/2021 2026 by Isai Walsh RN  Outcome: Progressing     Problem: SKIN/TISSUE INTEGRITY - ADULT  Goal: Skin integrity remains intact  Description: INTERVENTIONS  - Identify patients at risk for skin breakdown  - Assess and monitor skin integrity  - Assess and monitor nutrition and hydration status  - Monitor labs (i e  albumin)  - Assess for incontinence   - Turn and reposition patient  - Assist with mobility/ambulation  - Relieve pressure over bony prominences  - Avoid friction and shearing  - Provide appropriate hygiene as needed including keeping skin clean and dry  - Evaluate need for skin moisturizer/barrier cream  - Collaborate with interdisciplinary team (i e  Nutrition, Rehabilitation, etc )   - Patient/family teaching  4/30/2021 2027 by Ricardo Ferrer RN  Outcome: Progressing  4/30/2021 2027 by Ricardo Ferrer RN  Outcome: Progressing  4/30/2021 2026 by Ricardo Ferrer RN  Outcome: Progressing     Problem: HEMATOLOGIC - ADULT  Goal: Maintains hematologic stability  Description: INTERVENTIONS  - Assess for signs and symptoms of bleeding or hemorrhage  - Monitor labs  - Administer supportive blood products/factors as ordered and appropriate  4/30/2021 2027 by Ricardo Ferrer RN  Outcome: Progressing  4/30/2021 2027 by Ricardo Ferrer RN  Outcome: Progressing  4/30/2021 2026 by Ricardo Ferrer RN  Outcome: Progressing     Problem: MUSCULOSKELETAL - ADULT  Goal: Maintain or return mobility to safest level of function  Description: INTERVENTIONS:  - Assess patient's ability to carry out ADLs; assess patient's baseline for ADL function and identify physical deficits which impact ability to perform ADLs (bathing, care of mouth/teeth, toileting, grooming, dressing, etc )  - Assess/evaluate cause of self-care deficits   - Assess range of motion  - Assess patient's mobility  - Assess patient's need for assistive devices and provide as appropriate  - Encourage maximum independence but intervene and supervise when necessary  - Involve family in performance of ADLs  - Assess for home care needs following discharge   - Consider OT consult to assist with ADL evaluation and planning for discharge  - Provide patient education as appropriate  4/30/2021 2027 by Janny Botello RN  Outcome: Progressing  4/30/2021 2027 by Janny Botello RN  Outcome: Progressing  4/30/2021 2026 by Janny Botello RN  Outcome: Progressing     Problem: PAIN - ADULT  Goal: Verbalizes/displays adequate comfort level or baseline comfort level  Description: Interventions:  - Encourage patient to monitor pain and request assistance  - Assess pain using appropriate pain scale  - Administer analgesics based on type and severity of pain and evaluate response  - Implement non-pharmacological measures as appropriate and evaluate response  - Consider cultural and social influences on pain and pain management  - Notify physician/advanced practitioner if interventions unsuccessful or patient reports new pain  4/30/2021 2027 by Janny Botello RN  Outcome: Progressing  4/30/2021 2027 by Janny Botello RN  Outcome: Progressing  4/30/2021 2026 by Janny Botello RN  Outcome: Progressing     Problem: INFECTION - ADULT  Goal: Absence or prevention of progression during hospitalization  Description: INTERVENTIONS:  - Assess and monitor for signs and symptoms of infection  - Monitor lab/diagnostic results  - Monitor all insertion sites, i e  indwelling lines, tubes, and drains  - Monitor endotracheal if appropriate and nasal secretions for changes in amount and color  - [place] appropriate cooling/warming therapies per order  - Administer medications as ordered  - Instruct and encourage patient and family to use good hand hygiene technique  - Identify and instruct in appropriate isolation precautions for identified infection/condition  4/30/2021 2027 by Janny Botello RN  Outcome: Progressing  4/30/2021 2027 by Janny Botello RN  Outcome: Progressing  4/30/2021 2026 by Janny Botello RN  Outcome: Progressing Problem: SAFETY ADULT  Goal: Patient will remain free of falls  Description: INTERVENTIONS:  - Assess patient frequently for physical needs  -  Identify cognitive and physical deficits and behaviors that affect risk of falls    -  [place] fall precautions as indicated by assessment   - Educate patient/family on patient safety including physical limitations  - Instruct patient to call for assistance with activity based on assessment  - Modify environment to reduce risk of injury  - Consider OT/PT consult to assist with strengthening/mobility  4/30/2021 2027 by Mark Esteban RN  Outcome: Progressing  4/30/2021 2027 by Mark Esteban RN  Outcome: Progressing  4/30/2021 2026 by Mark Esteban RN  Outcome: Progressing  Goal: Maintain or return to baseline ADL function  Description: INTERVENTIONS:  -  Assess patient's ability to carry out ADLs; assess patient's baseline for ADL function and identify physical deficits which impact ability to perform ADLs (bathing, care of mouth/teeth, toileting, grooming, dressing, etc )  - Assess/evaluate cause of self-care deficits   - Assess range of motion  - Assess patient's mobility; develop plan if impaired  - Assess patient's need for assistive devices and provide as appropriate  - Encourage maximum independence but intervene and supervise when necessary  - Involve family in performance of ADLs  - Assess for home care needs following discharge   - Consider OT consult to assist with ADL evaluation and planning for discharge  - Provide patient education as appropriate  4/30/2021 2027 by Mark Esteban RN  Outcome: Progressing  4/30/2021 2027 by Mark Esteban RN  Outcome: Progressing  4/30/2021 2026 by Mark Esteban RN  Outcome: Progressing  Goal: Maintain or return mobility status to optimal level  Description: INTERVENTIONS:  - Assess patient's baseline mobility status (ambulation, transfers, stairs, etc )    - Identify cognitive and physical deficits and behaviors that affect mobility  - Identify mobility aids required to assist with transfers and/or ambulation (gait belt, sit-to-stand, lift, walker, cane, etc )  - [place] fall precautions as indicated by assessment  - Record patient progress and toleration of activity level on Mobility SBAR; progress patient to next Phase/Stage  - Instruct patient to call for assistance with activity based on assessment  - Consider rehabilitation consult to assist with strengthening/weightbearing, etc   4/30/2021 2027 by Sydnee Holm RN  Outcome: Progressing  4/30/2021 2027 by Sydnee Holm RN  Outcome: Progressing  4/30/2021 2026 by Sydnee Holm RN  Outcome: Progressing     Problem: DISCHARGE PLANNING  Goal: Discharge to home or other facility with appropriate resources  Description: INTERVENTIONS:  - Identify barriers to discharge w/patient and caregiver  - Arrange for needed discharge resources and transportation as appropriate  - Identify discharge learning needs (meds, wound care, etc )  - Refer to Case Management Department for coordinating discharge planning if the patient needs post-hospital services based on physician/advanced practitioner order or complex needs related to functional status, cognitive ability, or social support system  4/30/2021 2027 by Sydnee Holm RN  Outcome: Progressing  4/30/2021 2027 by Sydnee Holm RN  Outcome: Progressing  4/30/2021 2026 by Sydnee Holm RN  Outcome: Progressing     Problem: Knowledge Deficit  Goal: Patient/family/caregiver demonstrates understanding of disease process, treatment plan, medications, and discharge instructions  Description: Complete learning assessment and assess knowledge base    Interventions:  - Provide teaching at level of understanding  - Provide teaching via preferred learning methods  4/30/2021 2027 by Sydnee Holm RN  Outcome: Progressing  4/30/2021 2027 by Sydnee Holm RN  Outcome: Progressing  4/30/2021 2026 by Duke Avilez, RN  Outcome: Progressing

## 2021-05-01 NOTE — CASE MANAGEMENT
Evaluated the pt at the bedside  Pt was admitted to the hospital for neoplasm of the bladder  Explained the role of CM and the options of discharge planning with the pt  Pt states she lives with her spouse in a 1 story home with 4 ALYX  Pt states she is independent with ADL's and IADL's  Has not driven in one year d/t back pain  Spouse  Does the driving to the store and appointments  Pt states her PCP is Dr Libby Tipton  Pt utilizes [employer] in [facility]  Pt has been cleared by Dr Cleveland Ceballos for discharge  Spouse will transport  Derrick Cotter RN states he will remove the quiñonez and once pt voids she can go home  Patient/caregiver received discharge checklist   Content reviewed  Patient/caregiver encouraged to participate in discharge plan of care prior to discharge home  CM reviewed d/c planning process including the following: identifying help at home, patient preference for d/c planning needs, availability of treatment team to discuss questions or concerns patient and/or family may have regarding understanding medications and recognizing signs and symptoms once discharged  CM also encouraged patient to follow up with all recommended appointments after discharge  Patient advised of importance for patient and family to participate in managing patients medical well being

## 2021-05-01 NOTE — PLAN OF CARE
Problem: Potential for Falls  Goal: Patient will remain free of falls  Description: INTERVENTIONS:  - Assess patient frequently for physical needs  -  Identify cognitive and physical deficits and behaviors that affect risk of falls    -  [place] fall precautions as indicated by assessment   - Educate patient/family on patient safety including physical limitations  - Instruct patient to call for assistance with activity based on assessment  - Modify environment to reduce risk of injury  - Consider OT/PT consult to assist with strengthening/mobility  Outcome: Adequate for Discharge     Problem: GENITOURINARY - ADULT  Goal: Maintains or returns to baseline urinary function  Description: INTERVENTIONS:  - Assess urinary function  - Encourage oral fluids to ensure adequate hydration if ordered  - Administer IV fluids as ordered to ensure adequate hydration  - Administer ordered medications as needed  - Offer frequent toileting  - Follow urinary retention protocol if ordered  Outcome: Adequate for Discharge  Goal: Absence of urinary retention  Description: INTERVENTIONS:  - Assess patients ability to void and empty bladder  - Monitor I/O  - Bladder scan as needed  - Discuss with physician/AP medications to alleviate retention as needed  - Discuss catheterization for long term situations as appropriate  Outcome: Adequate for Discharge  Goal: Urinary catheter remains patent  Description: INTERVENTIONS:  - Assess patency of urinary catheter  - If patient has a chronic quiñonez, consider changing catheter if non-functioning  - Follow guidelines for intermittent irrigation of non-functioning urinary catheter  Outcome: Adequate for Discharge     Problem: METABOLIC, FLUID AND ELECTROLYTES - ADULT  Goal: Electrolytes maintained within normal limits  Description: INTERVENTIONS:  - Monitor labs and assess patient for signs and symptoms of electrolyte imbalances  - Administer electrolyte replacement as ordered  - Monitor response to electrolyte replacements, including repeat lab results as appropriate  - Instruct patient on fluid and nutrition as appropriate  Outcome: Adequate for Discharge  Goal: Fluid balance maintained  Description: INTERVENTIONS:  - Monitor labs   - Monitor I/O and WT  - Instruct patient on fluid and nutrition as appropriate  - Assess for signs & symptoms of volume excess or deficit  Outcome: Adequate for Discharge  Goal: Glucose maintained within target range  Description: INTERVENTIONS:  - Monitor Blood Glucose as ordered  - Assess for signs and symptoms of hyperglycemia and hypoglycemia  - Administer ordered medications to maintain glucose within target range  - Assess nutritional intake and initiate nutrition service referral as needed  Outcome: Adequate for Discharge     Problem: SKIN/TISSUE INTEGRITY - ADULT  Goal: Skin integrity remains intact  Description: INTERVENTIONS  - Identify patients at risk for skin breakdown  - Assess and monitor skin integrity  - Assess and monitor nutrition and hydration status  - Monitor labs (i e  albumin)  - Assess for incontinence   - Turn and reposition patient  - Assist with mobility/ambulation  - Relieve pressure over bony prominences  - Avoid friction and shearing  - Provide appropriate hygiene as needed including keeping skin clean and dry  - Evaluate need for skin moisturizer/barrier cream  - Collaborate with interdisciplinary team (i e  Nutrition, Rehabilitation, etc )   - Patient/family teaching  Outcome: Adequate for Discharge     Problem: HEMATOLOGIC - ADULT  Goal: Maintains hematologic stability  Description: INTERVENTIONS  - Assess for signs and symptoms of bleeding or hemorrhage  - Monitor labs  - Administer supportive blood products/factors as ordered and appropriate  Outcome: Adequate for Discharge     Problem: MUSCULOSKELETAL - ADULT  Goal: Maintain or return mobility to safest level of function  Description: INTERVENTIONS:  - Assess patient's ability to carry out ADLs; assess patient's baseline for ADL function and identify physical deficits which impact ability to perform ADLs (bathing, care of mouth/teeth, toileting, grooming, dressing, etc )  - Assess/evaluate cause of self-care deficits   - Assess range of motion  - Assess patient's mobility  - Assess patient's need for assistive devices and provide as appropriate  - Encourage maximum independence but intervene and supervise when necessary  - Involve family in performance of ADLs  - Assess for home care needs following discharge   - Consider OT consult to assist with ADL evaluation and planning for discharge  - Provide patient education as appropriate  Outcome: Adequate for Discharge     Problem: PAIN - ADULT  Goal: Verbalizes/displays adequate comfort level or baseline comfort level  Description: Interventions:  - Encourage patient to monitor pain and request assistance  - Assess pain using appropriate pain scale  - Administer analgesics based on type and severity of pain and evaluate response  - Implement non-pharmacological measures as appropriate and evaluate response  - Consider cultural and social influences on pain and pain management  - Notify physician/advanced practitioner if interventions unsuccessful or patient reports new pain  Outcome: Adequate for Discharge     Problem: INFECTION - ADULT  Goal: Absence or prevention of progression during hospitalization  Description: INTERVENTIONS:  - Assess and monitor for signs and symptoms of infection  - Monitor lab/diagnostic results  - Monitor all insertion sites, i e  indwelling lines, tubes, and drains  - Monitor endotracheal if appropriate and nasal secretions for changes in amount and color  - [place] appropriate cooling/warming therapies per order  - Administer medications as ordered  - Instruct and encourage patient and family to use good hand hygiene technique  - Identify and instruct in appropriate isolation precautions for identified infection/condition  Outcome: Adequate for Discharge     Problem: SAFETY ADULT  Goal: Patient will remain free of falls  Description: INTERVENTIONS:  - Assess patient frequently for physical needs  -  Identify cognitive and physical deficits and behaviors that affect risk of falls    -  [place] fall precautions as indicated by assessment   - Educate patient/family on patient safety including physical limitations  - Instruct patient to call for assistance with activity based on assessment  - Modify environment to reduce risk of injury  - Consider OT/PT consult to assist with strengthening/mobility  Outcome: Adequate for Discharge  Goal: Maintain or return to baseline ADL function  Description: INTERVENTIONS:  -  Assess patient's ability to carry out ADLs; assess patient's baseline for ADL function and identify physical deficits which impact ability to perform ADLs (bathing, care of mouth/teeth, toileting, grooming, dressing, etc )  - Assess/evaluate cause of self-care deficits   - Assess range of motion  - Assess patient's mobility; develop plan if impaired  - Assess patient's need for assistive devices and provide as appropriate  - Encourage maximum independence but intervene and supervise when necessary  - Involve family in performance of ADLs  - Assess for home care needs following discharge   - Consider OT consult to assist with ADL evaluation and planning for discharge  - Provide patient education as appropriate  Outcome: Adequate for Discharge  Goal: Maintain or return mobility status to optimal level  Description: INTERVENTIONS:  - Assess patient's baseline mobility status (ambulation, transfers, stairs, etc )    - Identify cognitive and physical deficits and behaviors that affect mobility  - Identify mobility aids required to assist with transfers and/or ambulation (gait belt, sit-to-stand, lift, walker, cane, etc )  - [place] fall precautions as indicated by assessment  - Record patient progress and toleration of activity level on Mobility SBAR; progress patient to next Phase/Stage  - Instruct patient to call for assistance with activity based on assessment  - Consider rehabilitation consult to assist with strengthening/weightbearing, etc   Outcome: Adequate for Discharge     Problem: DISCHARGE PLANNING  Goal: Discharge to home or other facility with appropriate resources  Description: INTERVENTIONS:  - Identify barriers to discharge w/patient and caregiver  - Arrange for needed discharge resources and transportation as appropriate  - Identify discharge learning needs (meds, wound care, etc )  - Refer to Case Management Department for coordinating discharge planning if the patient needs post-hospital services based on physician/advanced practitioner order or complex needs related to functional status, cognitive ability, or social support system  Outcome: Adequate for Discharge     Problem: Knowledge Deficit  Goal: Patient/family/caregiver demonstrates understanding of disease process, treatment plan, medications, and discharge instructions  Description: Complete learning assessment and assess knowledge base    Interventions:  - Provide teaching at level of understanding  - Provide teaching via preferred learning methods  Outcome: Adequate for Discharge     Problem: Prexisting or High Potential for Compromised Skin Integrity  Goal: Skin integrity is maintained or improved  Description: INTERVENTIONS:  - Identify patients at risk for skin breakdown  - Assess and monitor skin integrity  - Assess and monitor nutrition and hydration status  - Monitor labs   - Assess for incontinence   - Turn and reposition patient  - Assist with mobility/ambulation  - Relieve pressure over bony prominences  - Avoid friction and shearing  - Provide appropriate hygiene as needed including keeping skin clean and dry  - Evaluate need for skin moisturizer/barrier cream  - Collaborate with interdisciplinary team   - Patient/family teaching  - Consider wound care consult   Outcome: Adequate for Discharge

## 2021-05-01 NOTE — DISCHARGE INSTRUCTIONS
10% - bad control"> 10% - bad control,Hemoglobin A1c (HbA1c) greater than 10% indicating poor diabetic control,Haemoglobin A1c greater than 10% indicating poor diabetic control">   Diabetes Mellitus Type 2 in Adults, Ambulatory Care   GENERAL INFORMATION:   Diabetes mellitus type 2  is a disease that affects how your body uses glucose (sugar)  Insulin helps move sugar out of the blood so it can be used for energy  Normally, when the blood sugar level increases, the pancreas makes more insulin  Type 2 diabetes develops because either the body cannot make enough insulin, or it cannot use the insulin correctly  After many years, your pancreas may stop making insulin  Common symptoms include the following:   · More hunger or thirst than usual     · Frequent urination     · Weight loss without trying     · Blurred vision  Seek immediate care for the following symptoms:   · Severe abdominal pain, or pain that spreads to your back  You may also be vomiting  · Trouble staying awake or focusing    · Shaking or sweating    · Blurred or double vision    · Breath has a fruity, sweet smell    · Breathing is deep and labored, or rapid and shallow    · Heartbeat is fast and weak  Treatment for diabetes mellitus type 2  includes keeping your blood sugar at a normal level  You must eat the right foods, and exercise regularly  You may also need medicine if you cannot control your blood sugar level with nutrition and exercise  Manage diabetes mellitus type 2:   · Check your blood sugar level  You will be taught how to check a small drop of blood in a glucose monitor  Ask your healthcare provider when and how often to check during the day  Ask your healthcare provider what your blood sugar levels should be when you check them  · Keep track of carbohydrates (sugar and starchy foods)  Your blood sugar level can get too high if you eat too many carbohydrates   Your dietitian will help you plan meals and snacks that have the right amount of carbohydrates  · Eat low-fat foods  Some examples are skinless chicken and low-fat milk  · Eat less sodium (salt)  Some examples of high-sodium foods to limit are soy sauce, potato chips, and soup  Do not add salt to food you cook  Limit your use of table salt  · Eat high-fiber foods  Foods that are a good source of fiber include vegetables, whole grain bread, and beans  · Limit alcohol  Alcohol affects your blood sugar level and can make it harder to manage your diabetes  Women should limit alcohol to 1 drink a day  Men should limit alcohol to 2 drinks a day  A drink of alcohol is 12 ounces of beer, 5 ounces of wine, or 1½ ounces of liquor  · Get regular exercise  Exercise can help keep your blood sugar level steady, decrease your risk of heart disease, and help you lose weight  Exercise for at least 30 minutes, 5 days a week  Include muscle strengthening activities 2 days each week  Work with your healthcare provider to create an exercise plan  · Check your feet each day  for injuries or open sores  Ask your healthcare provider for activities you can do if you have an open sore  · Quit smoking  If you smoke, it is never too late to quit  Smoking can worsen the problems that may occur with diabetes  Ask your healthcare provider for information about how to stop smoking if you are having trouble quitting  · Ask about your weight:  Ask healthcare providers if you need to lose weight, and how much to lose  Ask them to help you with a weight loss program  Even a 10 to 15 pound weight loss can help you manage your blood sugar level  · Carry medical alert identification  Wear medical alert jewelry or carry a card that says you have diabetes  Ask your healthcare provider where to get these items  · Ask about vaccines  Diabetes puts you at risk of serious illness if you get the flu, pneumonia, or hepatitis   Ask your healthcare provider if you should get a flu, pneumonia, or hepatitis B vaccine, and when to get the vaccine  Follow up with your healthcare provider as directed:  Write down your questions so you remember to ask them during your visits  CARE AGREEMENT:   You have the right to help plan your care  Learn about your health condition and how it may be treated  Discuss treatment options with your caregivers to decide what care you want to receive  You always have the right to refuse treatment  The above information is an  only  It is not intended as medical advice for individual conditions or treatments  Talk to your doctor, nurse or pharmacist before following any medical regimen to see if it is safe and effective for you  © 2014 [address] is for End User's use only and may not be sold, redistributed or otherwise used for commercial purposes  All illustrations and images included in CareNotes® are the copyrighted property of [employer]  or Isac Tobin  Acute Urinary Retention in Women   WHAT YOU NEED TO KNOW:   What is acute urinary retention? Acute urinary retention (AUR) is when your bladder is full, but you cannot urinate  This condition happens suddenly, gets worse quickly, and lasts a short time  What causes AUR? · Weak bladder muscle    · Blockages, such as a stone or growth    · Nerve damage from diabetes, stroke, or spinal cord injuries    · Swelling or infection, including childbirth, pelvic surgery, or a urinary tract infection    · Certain medicines, such as narcotics, anesthesia, and antidepressants    What are the signs and symptoms of AUR?   · Discomfort or pain in your lower abdomen    · A bloated lower abdomen    · Urge to urinate after you just went    · A urine stream that stops and starts on its own    How is AUR diagnosed? Your healthcare provider will ask about your health history and the medicines you take  He will press or tap on your lower abdomen   You may need any of the following tests:  · A pelvic exam  will be done to check for blockages  A pelvic exam will also show if your bladder, uterus, or other organs have moved out of place  · A post void residual  test will show how much urine is left in your bladder after you urinate  You will be asked to urinate and then healthcare providers will use a small ultrasound machine to check the remaining urine in your bladder  · A neuro exam  may be done to test your strength, balance, and movement  A neuro exam looks for changes in your brain and nervous system  · Urine tests  may be needed to check for blood or infection  · Blood tests  may be needed to check for infection and kidney function  How is AUR treated? · A Alanis catheter  is a tube put into your bladder to drain urine into a bag  Keep the bag below your waist  This will prevent urine from flowing back into your bladder and causing an infection or other problems  Also, keep the tube free of kinks so the urine will drain properly  Do not pull on the catheter  This can cause pain and bleeding, and may cause the catheter to come out  · Antibiotics  help treat or prevent a bacterial infection  When should I contact my healthcare provider? · You have a fever  · You have pain when you urinate  · You see blood in your urine  · You have problems with your catheter  · You have questions or concerns about your condition or care  When should I seek immediate care or call 911? · You have severe abdominal pain  · You are breathing faster than usual     · Your heartbeat is faster than usual     · Your face, hands, feet, or ankles are swollen  CARE AGREEMENT:   You have the right to help plan your care  Learn about your health condition and how it may be treated  Discuss treatment options with your healthcare providers to decide what care you want to receive  You always have the right to refuse treatment   The above information is an  only  It is not intended as medical advice for individual conditions or treatments  Talk to your doctor, nurse or pharmacist before following any medical regimen to see if it is safe and effective for you  © Copyright [address] Information is for End User's use only and may not be sold, redistributed or otherwise used for commercial purposes   All illustrations and images included in CareNotes® are the copyrighted property of [employer]  or [address] [place] [address]

## 2021-05-03 ENCOUNTER — TELEPHONE (OUTPATIENT)
Dept: FAMILY MEDICINE CLINIC | Facility: CLINIC | Age: 86
End: 2021-05-03

## 2021-05-03 NOTE — TELEPHONE ENCOUNTER
Spoke with patient she states she is leaking moderate amounts of urine, she has had to change her disposable breifs about 4 times today  When she does urinate it burns coming out and through her back she does not have any discoloration or blood coming out  She did have a catheter taken out on Saturday and is limiting the amount of fluids she intakes,(6 cups at most) patient is not experiencing any other symptoms  Please advise

## 2021-05-04 NOTE — TELEPHONE ENCOUNTER
Spoke with patient she states she has not spoken to Dr Samuel Guzmán about this  Also that she feels drinking lemonade could be adding to the burning while urinating, so she will stop this   Patient states she will inform Dr Samuel Guzmán

## 2021-05-05 NOTE — TELEPHONE ENCOUNTER
Called and spoke with pt  Informed her of your recommendations  Pt states she will be seeing Dr Thalia Ramires tomorrow  Pt also states her discharge notes did tell her to resume warfarin, which she did  States no blood in urine   Pt will go to lab on Monday for INR>

## 2021-05-05 NOTE — TELEPHONE ENCOUNTER
Please make sure she does follow up with Dr Cesia Guerrero about this since he performed her bladder surgery  Also, did he advise when she can resume warfarin? If she has not restarted, I recommend she resumes at this time as long as there is no blood in urine  Resume previous dosing regimen and check INR Monday, 05/10   Thanks

## 2021-05-06 ENCOUNTER — TRANSCRIBE ORDERS (OUTPATIENT)
Dept: ADMINISTRATIVE | Facility: HOSPITAL | Age: 86
End: 2021-05-06

## 2021-05-06 DIAGNOSIS — C67.8 MALIGNANT NEOPLASM OF OVERLAPPING SITES OF BLADDER (HCC): Primary | ICD-10-CM

## 2021-05-10 ENCOUNTER — ANTICOAG VISIT (OUTPATIENT)
Dept: FAMILY MEDICINE CLINIC | Facility: CLINIC | Age: 86
End: 2021-05-10

## 2021-05-10 ENCOUNTER — APPOINTMENT (OUTPATIENT)
Dept: LAB | Facility: CLINIC | Age: 86
End: 2021-05-10
Payer: MEDICARE

## 2021-05-10 DIAGNOSIS — E55.9 VITAMIN D DEFICIENCY: ICD-10-CM

## 2021-05-10 DIAGNOSIS — M48.54XS COMPRESSION FRACTURE OF THORACIC SPINE, NON-TRAUMATIC, SEQUELA: ICD-10-CM

## 2021-05-12 ENCOUNTER — HOSPITAL ENCOUNTER (OUTPATIENT)
Dept: CT IMAGING | Facility: HOSPITAL | Age: 86
Discharge: HOME | End: 2021-05-12
Attending: UROLOGY
Payer: MEDICARE

## 2021-05-12 DIAGNOSIS — C67.8 MALIGNANT NEOPLASM OF OVERLAPPING SITES OF BLADDER (HCC): ICD-10-CM

## 2021-05-12 PROCEDURE — 74178 CT ABD&PLV WO CNTR FLWD CNTR: CPT

## 2021-05-12 RX ADMIN — IOHEXOL 100 ML: 350 INJECTION, SOLUTION INTRAVENOUS at 14:00

## 2021-05-14 DIAGNOSIS — I26.99 OTHER PULMONARY EMBOLISM WITHOUT ACUTE COR PULMONALE, UNSPECIFIED CHRONICITY (HCC): ICD-10-CM

## 2021-05-14 DIAGNOSIS — Z86.711 HISTORY OF PULMONARY EMBOLISM: ICD-10-CM

## 2021-05-14 RX ORDER — WARFARIN SODIUM 5 MG/1
5 TABLET ORAL
Qty: 90 TABLET | Refills: 3 | Status: SHIPPED | OUTPATIENT
Start: 2021-05-14 | End: 2021-10-04

## 2021-05-14 RX ORDER — WARFARIN SODIUM 7.5 MG/1
7.5 TABLET ORAL
Qty: 30 TABLET | Refills: 5 | Status: SHIPPED | OUTPATIENT
Start: 2021-05-14 | End: 2021-10-04

## 2021-05-14 NOTE — TELEPHONE ENCOUNTER
Patient requesting refill(s) of: warfarin 7 5 mg take 7 5 mg every Sat and Sun    Last filled:3/10/2020 #30 x 5  Last appt:4/21/21  Next appt:10/4/21  Pharmacy: [facility]    Patient requesting refill(s) of: warfarin 5 mg take Monday through Friday    Last filled: 3/16/2020 #90 x 3  Last appt:4/21/21  Next appt:10/4/21  Pharmacy: [facility]

## 2021-05-26 ENCOUNTER — APPOINTMENT (OUTPATIENT)
Dept: LAB | Facility: CLINIC | Age: 86
End: 2021-05-26
Payer: MEDICARE

## 2021-05-27 ENCOUNTER — TELEPHONE (OUTPATIENT)
Dept: FAMILY MEDICINE CLINIC | Facility: CLINIC | Age: 86
End: 2021-05-27

## 2021-05-27 ENCOUNTER — ANTICOAG VISIT (OUTPATIENT)
Dept: FAMILY MEDICINE CLINIC | Facility: CLINIC | Age: 86
End: 2021-05-27

## 2021-05-27 NOTE — TELEPHONE ENCOUNTER
Called and spoke with patient  She states she is taking 7 5 mg on Saturday, Sunday, and Wednesday and takes 5 mg on the other days  She states she is not missing doses  She states she was taking antibiotics after a surgery a few weeks ago but has since stopped and she is not eating differently  Please advise

## 2021-05-27 NOTE — TELEPHONE ENCOUNTER
Please have her increase to 7 5 on fridays and mondays, continue 5mg on tues and Thursday, repeat INR in 1 week

## 2021-06-03 ENCOUNTER — APPOINTMENT (OUTPATIENT)
Dept: LAB | Facility: CLINIC | Age: 86
End: 2021-06-03
Payer: MEDICARE

## 2021-06-04 ENCOUNTER — ANTICOAG VISIT (OUTPATIENT)
Dept: FAMILY MEDICINE CLINIC | Facility: CLINIC | Age: 86
End: 2021-06-04

## 2021-06-11 ENCOUNTER — OFFICE VISIT (OUTPATIENT)
Dept: OBGYN CLINIC | Facility: CLINIC | Age: 86
End: 2021-06-11
Payer: MEDICARE

## 2021-06-11 ENCOUNTER — APPOINTMENT (OUTPATIENT)
Dept: LAB | Facility: CLINIC | Age: 86
End: 2021-06-11
Payer: MEDICARE

## 2021-06-11 VITALS
WEIGHT: 149.8 LBS | HEART RATE: 93 BPM | BODY MASS INDEX: 24.93 KG/M2 | DIASTOLIC BLOOD PRESSURE: 69 MMHG | SYSTOLIC BLOOD PRESSURE: 117 MMHG

## 2021-06-11 DIAGNOSIS — M17.11 PRIMARY OSTEOARTHRITIS OF RIGHT KNEE: Primary | ICD-10-CM

## 2021-06-11 LAB — 25(OH)D3 SERPL-MCNC: 49.6 NG/ML (ref 30–100)

## 2021-06-11 PROCEDURE — 36415 COLL VENOUS BLD VENIPUNCTURE: CPT

## 2021-06-11 PROCEDURE — 20610 DRAIN/INJ JOINT/BURSA W/O US: CPT | Performed by: FAMILY MEDICINE

## 2021-06-11 PROCEDURE — 82306 VITAMIN D 25 HYDROXY: CPT

## 2021-06-11 NOTE — PROGRESS NOTES
[facility] SPECIALISTS [place] [address]  [facility] [id number] [phone]      Chief Complaint:  Chief Complaint   Patient presents with    Right Knee - Pain, Swelling       Vitals:  /69   Pulse 93   Wt 67 9 kg (149 lb 12 8 oz)   BMI 24 93 kg/m²     The following portions of the patient's history were reviewed and updated as appropriate: allergies, current medications, past family history, past medical history, past social history, past surgical history, and problem list       Subjective:   Patient ID: Andrew Valdez is a 80 y o  female  Here for Visco #1 R knee      Review of Systems   Constitutional: Negative for fatigue and fever  Respiratory: Negative for shortness of breath  Cardiovascular: Negative for chest pain  Gastrointestinal: Negative for abdominal pain and nausea  Musculoskeletal: Positive for arthralgias  Skin: Negative for rash and wound  Neurological: Negative for weakness and headaches  Objective:  Ortho Exam      Physical Exam  Constitutional:       Appearance: Normal appearance  She is normal weight  HENT:      Head: Normocephalic  Eyes:      Extraocular Movements: Extraocular movements intact  Neck:      Musculoskeletal: Normal range of motion  Pulmonary:      Effort: Pulmonary effort is normal    Skin:     General: Skin is warm and dry  Neurological:      General: No focal deficit present  Mental Status: She is alert and oriented to person, place, and time  Mental status is at baseline  Psychiatric:         Mood and Affect: Mood normal          Behavior: Behavior normal          Thought Content: Thought content normal          Judgment: Judgment normal          Large joint arthrocentesis: R knee  Universal Protocol:  Consent: Verbal consent obtained    Risks and benefits: risks, benefits and alternatives were discussed  Consent given by: patient  Time out: Immediately prior to procedure a "time out" was called to verify the correct patient, procedure, equipment, support staff and site/side marked as required  Timeout called at: 6/11/2021 11:18 AM   Site marked: the operative site was marked  Supporting Documentation  Indications: pain   Procedure Details  Location: knee - R knee  Preparation: Patient was prepped and draped in the usual sterile fashion  Needle size: 22 G  Ultrasound guidance: no  Approach: anterolateral  Medications administered: 30 mg sodium hyaluronate 30 mg/2 mL    Patient tolerance: patient tolerated the procedure well with no immediate complications  Dressing:  Sterile dressing applied            Assessment/Plan:  Assessment/Plan   There are no diagnoses linked to this encounter  No follow-ups on file       Forest Wang MD

## 2021-06-18 ENCOUNTER — OFFICE VISIT (OUTPATIENT)
Dept: OBGYN CLINIC | Facility: CLINIC | Age: 86
End: 2021-06-18
Payer: MEDICARE

## 2021-06-18 VITALS
DIASTOLIC BLOOD PRESSURE: 74 MMHG | HEART RATE: 60 BPM | WEIGHT: 149 LBS | BODY MASS INDEX: 24.83 KG/M2 | HEIGHT: 65 IN | SYSTOLIC BLOOD PRESSURE: 171 MMHG

## 2021-06-18 DIAGNOSIS — M17.11 PRIMARY OSTEOARTHRITIS OF RIGHT KNEE: Primary | ICD-10-CM

## 2021-06-18 PROCEDURE — 20610 DRAIN/INJ JOINT/BURSA W/O US: CPT | Performed by: FAMILY MEDICINE

## 2021-06-18 NOTE — PROGRESS NOTES
[facility] SPECIALISTS [place] [address]  [facility] [address]  [phone]      Chief Complaint:  Chief Complaint   Patient presents with    Right Knee - Follow-up       Vitals:  BP (!) 171/74   Pulse 60   Ht 5' 5" (1 651 m)   Wt 67 6 kg (149 lb)   BMI 24 79 kg/m²     The following portions of the patient's history were reviewed and updated as appropriate: allergies, current medications, past family history, past medical history, past social history, past surgical history, and problem list       Subjective:   Patient ID: Lenora Carrasco is a 80 y o  female  Here for visco #2 R knee      Review of Systems   Constitutional: Negative for fatigue and fever  Respiratory: Negative for shortness of breath  Cardiovascular: Negative for chest pain  Gastrointestinal: Negative for abdominal pain and nausea  Musculoskeletal: Positive for arthralgias and gait problem  Skin: Negative for rash and wound  Neurological: Negative for weakness and headaches  Objective:  Ortho Exam      Physical Exam  Constitutional:       Appearance: Normal appearance  She is normal weight  HENT:      Head: Normocephalic  Eyes:      Extraocular Movements: Extraocular movements intact  Pulmonary:      Effort: Pulmonary effort is normal    Musculoskeletal:      Cervical back: Normal range of motion  Skin:     General: Skin is warm and dry  Neurological:      General: No focal deficit present  Mental Status: She is alert and oriented to person, place, and time  Mental status is at baseline  Psychiatric:         Mood and Affect: Mood normal          Behavior: Behavior normal          Thought Content: Thought content normal          Judgment: Judgment normal          Large joint arthrocentesis: R knee  Universal Protocol:  Consent: Verbal consent obtained    Risks and benefits: risks, benefits and alternatives were discussed  Consent given by: patient  Time out: Immediately prior to procedure a "time out" was called to verify the correct patient, procedure, equipment, support staff and site/side marked as required  Timeout called at: 6/18/2021 11:42 AM   Site marked: the operative site was marked  Supporting Documentation  Indications: pain   Procedure Details  Location: knee - R knee  Preparation: Patient was prepped and draped in the usual sterile fashion  Needle size: 22 G  Ultrasound guidance: no  Approach: anterolateral  Medications administered: 30 mg sodium hyaluronate 30 mg/2 mL    Patient tolerance: patient tolerated the procedure well with no immediate complications  Dressing:  Sterile dressing applied            Assessment/Plan:  Assessment/Plan   Diagnoses and all orders for this visit:    Primary osteoarthritis of right knee    Other orders  -     Large joint arthrocentesis        No follow-ups on file       Mihir Lugo MD

## 2021-06-21 ENCOUNTER — APPOINTMENT (OUTPATIENT)
Dept: LAB | Facility: CLINIC | Age: 86
End: 2021-06-21
Payer: MEDICARE

## 2021-06-22 ENCOUNTER — ANTICOAG VISIT (OUTPATIENT)
Dept: FAMILY MEDICINE CLINIC | Facility: CLINIC | Age: 86
End: 2021-06-22

## 2021-06-25 ENCOUNTER — PROCEDURE VISIT (OUTPATIENT)
Dept: OBGYN CLINIC | Facility: CLINIC | Age: 86
End: 2021-06-25
Payer: MEDICARE

## 2021-06-25 VITALS
HEIGHT: 65 IN | BODY MASS INDEX: 24.83 KG/M2 | DIASTOLIC BLOOD PRESSURE: 85 MMHG | HEART RATE: 69 BPM | SYSTOLIC BLOOD PRESSURE: 160 MMHG | WEIGHT: 149 LBS

## 2021-06-25 DIAGNOSIS — M17.11 PRIMARY OSTEOARTHRITIS OF RIGHT KNEE: Primary | ICD-10-CM

## 2021-06-25 PROCEDURE — 20610 DRAIN/INJ JOINT/BURSA W/O US: CPT | Performed by: FAMILY MEDICINE

## 2021-06-25 PROCEDURE — 99213 OFFICE O/P EST LOW 20 MIN: CPT | Performed by: FAMILY MEDICINE

## 2021-06-25 NOTE — PROGRESS NOTES
[facility] SPECIALISTS [place] [address]  [facility] [address]  [phone]      Chief Complaint:  Chief Complaint   Patient presents with    Right Knee - Follow-up       Vitals:  /85 (BP Location: Left arm, Patient Position: Sitting, Cuff Size: Standard)   Pulse 69   Ht 5' 5" (1 651 m)   Wt 67 6 kg (149 lb)   BMI 24 79 kg/m²     The following portions of the patient's history were reviewed and updated as appropriate: allergies, current medications, past family history, past medical history, past social history, past surgical history, and problem list       Subjective:   Patient ID: Patience Dawkins is a 80 y o  female  Here for f/u Visco #3 R knee      Review of Systems   Constitutional: Negative for fatigue and fever  Respiratory: Negative for shortness of breath  Cardiovascular: Negative for chest pain  Gastrointestinal: Negative for abdominal pain and nausea  Musculoskeletal: Positive for arthralgias  Skin: Negative for rash and wound  Neurological: Negative for weakness and headaches  Objective:  Ortho Exam      Physical Exam  Constitutional:       Appearance: Normal appearance  She is normal weight  HENT:      Head: Normocephalic  Eyes:      Extraocular Movements: Extraocular movements intact  Pulmonary:      Effort: Pulmonary effort is normal    Musculoskeletal:         General: No tenderness  Cervical back: Normal range of motion  Skin:     General: Skin is warm and dry  Neurological:      General: No focal deficit present  Mental Status: She is alert and oriented to person, place, and time  Mental status is at baseline  Psychiatric:         Mood and Affect: Mood normal          Behavior: Behavior normal          Thought Content: Thought content normal          Judgment: Judgment normal        Large joint arthrocentesis: R knee  Universal Protocol:  Consent: Verbal consent obtained    Risks and benefits: risks, benefits and alternatives were discussed  Consent given by: patient  Time out: Immediately prior to procedure a "time out" was called to verify the correct patient, procedure, equipment, support staff and site/side marked as required  Timeout called at: 6/25/2021 11:22 AM   Site marked: the operative site was marked  Supporting Documentation  Indications: pain   Procedure Details  Location: knee - R knee  Preparation: Patient was prepped and draped in the usual sterile fashion  Needle size: 22 G  Ultrasound guidance: no  Approach: anterolateral  Medications administered: 30 mg sodium hyaluronate 30 mg/2 mL    Patient tolerance: patient tolerated the procedure well with no immediate complications  Dressing:  Sterile dressing applied              Assessment/Plan:  Assessment/Plan   Diagnoses and all orders for this visit:    Primary osteoarthritis of right knee    Other orders  -     Large joint arthrocentesis        Return if symptoms worsen or fail to improve       Evans Peres MD

## 2021-06-29 ENCOUNTER — OFFICE VISIT (OUTPATIENT)
Dept: NEPHROLOGY | Facility: CLINIC | Age: 86
End: 2021-06-29
Payer: MEDICARE

## 2021-06-29 VITALS
HEART RATE: 66 BPM | OXYGEN SATURATION: 98 % | BODY MASS INDEX: 24.32 KG/M2 | HEIGHT: 65 IN | DIASTOLIC BLOOD PRESSURE: 70 MMHG | SYSTOLIC BLOOD PRESSURE: 122 MMHG | WEIGHT: 146 LBS

## 2021-06-29 DIAGNOSIS — E55.9 VITAMIN D DEFICIENCY: ICD-10-CM

## 2021-06-29 DIAGNOSIS — I10 ESSENTIAL HYPERTENSION: ICD-10-CM

## 2021-06-29 DIAGNOSIS — N18.31 STAGE 3A CHRONIC KIDNEY DISEASE (HCC): ICD-10-CM

## 2021-06-29 DIAGNOSIS — I48.20 CHRONIC A-FIB (HCC): ICD-10-CM

## 2021-06-29 DIAGNOSIS — E11.69 TYPE 2 DIABETES MELLITUS WITH OTHER SPECIFIED COMPLICATION, WITHOUT LONG-TERM CURRENT USE OF INSULIN (HCC): Primary | ICD-10-CM

## 2021-06-29 DIAGNOSIS — E83.51 HYPOCALCEMIA: ICD-10-CM

## 2021-06-29 DIAGNOSIS — I27.82 OTHER CHRONIC PULMONARY EMBOLISM WITHOUT ACUTE COR PULMONALE (HCC): ICD-10-CM

## 2021-06-29 DIAGNOSIS — M48.54XS COMPRESSION FRACTURE OF THORACIC SPINE, NON-TRAUMATIC, SEQUELA: ICD-10-CM

## 2021-06-29 LAB — SL AMB POCT HEMOGLOBIN AIC: 7.6 (ref ?–6.5)

## 2021-06-29 PROCEDURE — 83036 HEMOGLOBIN GLYCOSYLATED A1C: CPT | Performed by: INTERNAL MEDICINE

## 2021-06-29 PROCEDURE — 99214 OFFICE O/P EST MOD 30 MIN: CPT | Performed by: INTERNAL MEDICINE

## 2021-06-29 NOTE — PROGRESS NOTES
[facility], [place]    Name: Jersey Vance  YOB: 1933      Assessment/Plan:           Problem List Items Addressed This Visit        Endocrine    Type II diabetes mellitus ([facility] [address] ) - Primary       Lab Results   Component Value Date    HGBA1C 7 6 (A) 06/29/2021   She has improvement in her A1c and has less nocturia due to better glycemic control         Relevant Orders    POCT hemoglobin A1c (Completed)       Cardiovascular and Mediastinum    Chronic a-fib ([facility] [address] )     Rate controlled         Essential hypertension     Well controlled         Other pulmonary embolism without acute cor pulmonale (HCC)       Musculoskeletal and Integument    Compression fracture of thoracic spine, non-traumatic, sequela       Genitourinary    Stage 3a chronic kidney disease ([facility] [address] )     Lab Results   Component Value Date    EGFR 60 05/01/2021    EGFR 43 04/23/2021    EGFR 50 03/01/2021    CREATININE 0 87 05/01/2021    CREATININE 1 15 04/23/2021    CREATININE 1 02 03/01/2021   Kidney function has improved to a creatinine of 0 85 ->eGFR 60 -> stage 2 chronic kidney disease            Other    Hypocalcemia     Citrical D slow release and take 2 each day                 Subjective:      Patient ID: Jersey Vance is a 80 y o  female  HPI  History of CKD 3 with a baseline creatinine of 1 -1 2 mg/dl, compression fracture of thoracic spine (she refused surgery) , PAF and essential hypertension and DM -2 without long term use of insulin and HLD  She complains of diaphoresis interfering with her sleep pattern  She had copious nocturia (8 times) but better at twice a night  Thyroid studies in March 2021 were fine        The following portions of the patient's history were reviewed and updated as appropriate: allergies, current medications, past family history, past medical history, past social history, past surgical history and problem list     Review of Systems   Constitutional: Positive for diaphoresis and fatigue  Not sleeping well as not using A/C and only uses a fan   HENT: Negative for hearing loss  Eyes: Negative for visual disturbance  Respiratory: Negative for cough and shortness of breath  Cardiovascular: Negative for chest pain, palpitations and leg swelling  Gastrointestinal: Positive for diarrhea  Negative for abdominal pain, blood in stool and constipation  Endocrine: Positive for polyuria  Genitourinary: Positive for hematuria  Negative for difficulty urinating, frequency and urgency  Nocturia x 2   Musculoskeletal: Positive for arthralgias and gait problem  Uses cane and has right sided pain below her ribs   Neurological: Positive for weakness  Negative for dizziness and light-headedness  Hematological: Does not bruise/bleed easily  Psychiatric/Behavioral: Positive for sleep disturbance  Negative for dysphoric mood           Social History     Socioeconomic History    Marital status: /Civil Union     Spouse name: None    Number of children: None    Years of education: None    Highest education level: None   Occupational History    Occupation: Retired    Tobacco Use    Smoking status: Former Smoker     Types: Cigarettes     Quit date: 3/19/2001     Years since quittin 2    Smokeless tobacco: Never Used   Vaping Use    Vaping Use: Never used   Substance and Sexual Activity    Alcohol use: Never    Drug use: Never    Sexual activity: Not Currently   Other Topics Concern    None   Social History Narrative    Rarely consumes alcohol - As per [employer]    Consumes on average 2 cups of regular coffee per day    Consumes on average 12 oz of soda per day     Social Determinants of Health     Financial Resource Strain:     Difficulty of Paying Living Expenses:    Food Insecurity:     Worried About Running Out of Food in the Last Year:     [address] in the Last Year:    Transportation Needs:     Lack of Transportation (Medical):  Lack of Transportation (Non-Medical):    Physical Activity:     Days of Exercise per Week:     Minutes of Exercise per Session:    Stress:     Feeling of Stress :    Social Connections:     Frequency of Communication with Friends and Family:     Frequency of Social Gatherings with Friends and Family:     Attends [employer] Services:     Active Member of Clubs or Organizations:     Attends Club or Organization Meetings:     Marital Status:    Intimate Partner Violence:     Fear of Current or Ex-Partner:     Emotionally Abused:     Physically Abused:     Sexually Abused:      Past Medical History:   Diagnosis Date    Abnormal gait     Accidental fall from chair, subsequent encounter     Atrial fibrillation ([place] [address] )     Benign essential hypertension     Bite of animal     Cataract     Cellulitis     Chronic kidney disease     Chronic kidney disease, stage 3 ([place] [address] )     Colon cancer ([place] [address] )     Colon polyp     Current tear of lateral cartilage or meniscus of knee     Diabetes mellitus ([place] [address] )     Disease of thyroid gland     Disorder of magnesium metabolism     Dvt femoral (deep venous thrombosis)     Embolism from thrombosis of vein of distal end of lower extremity     Essential hypertension     Fall     Hyperlipidemia     Hyperlipidemia     Hypertension     Hypothyroidism     Insomnia     Kidney stone     Knee joint effusion     Leukocytosis     Malaise and fatigue     MI (myocardial infarction) ([place] [address] )     Hospitalization     Orbital hemorrhage     Other sleep disorders     PAF (paroxysmal atrial fibrillation) ([place] [address] )     Presence of vena cava filter     Pulmonary embolism (HCC)     Tear film insufficiency     Type 2 diabetes mellitus ([place] [address] )     Unspecified osteoarthritis, unspecified site     Weight decreased      Past Surgical History:   Procedure Laterality Date    ABDOMINAL ADHESION SURGERY  2015    Had wound vac    ABDOMINAL SURGERY      APPENDECTOMY      CATARACT EXTRACTION      CHOLECYSTECTOMY      COLON SURGERY      COLOSTOMY  2011    Due to Ileus, then reanastomosis in 2002    COLOSTOMY TAKEDOWN/REVERSE KUMAR      EXCISION BLADDER MESH TRANSVESICLEPORT W/ LASER      HERNIA REPAIR      IVC FILTER INSERTION      KNEE SURGERY  2014    repair     MI CYSTOURETHROSCOPY,FULGUR >5 CM LESN N/A 4/30/2021    Procedure: CYSTOSCOPY, TRANSURETHRAL RESECTION OF BLADDER TUMOR (TURBT);   Surgeon: Concepción Randall MD;  Location: [facility] MAIN OR;  Service: Urology       Current Outpatient Medications:     acetaminophen (TYLENOL) 500 mg tablet, Take 500 mg by mouth every 6 (six) hours as needed, Disp: , Rfl:     B Complex Vitamins (B-COMPLEX/B-12 PO), Take by mouth daily , Disp: , Rfl:     Contour Next Test test strip, Test twice a day or as needed, Disp: 100 each, Rfl: 5    glipiZIDE (GLUCOTROL) 5 mg tablet, Take 1 tablet (5 mg total) by mouth 2 (two) times a day before meals, Disp: 90 tablet, Rfl: 3    levothyroxine 50 mcg tablet, Take 1 tablet (50 mcg total) by mouth daily, Disp: 90 tablet, Rfl: 3    lisinopril (ZESTRIL) 5 mg tablet, Take 1 tablet (5 mg total) by mouth daily, Disp: 90 tablet, Rfl: 2    lovastatin (MEVACOR) 20 mg tablet, Take 1 tablet (20 mg total) by mouth daily at bedtime, Disp: 90 tablet, Rfl: 3    magnesium gluconate (MAGONATE) 500 mg tablet, Take 500 mg by mouth daily , Disp: , Rfl:     metFORMIN (GLUCOPHAGE) 1000 MG tablet, Take 1 tablet (1,000 mg total) by mouth 2 (two) times a day with meals, Disp: 180 tablet, Rfl: 3    metoprolol tartrate (LOPRESSOR) 25 mg tablet, Take 1 tablet (25 mg total) by mouth every 12 (twelve) hours, Disp: 180 tablet, Rfl: 3    Microlet Lancets MISC, Test twice a day or as needed, Disp: 100 each, Rfl: 5    warfarin (COUMADIN) 5 mg tablet, Take 1 tablet (5 mg total) by mouth daily Takes 5mg  Mon, Tues, Wed, Thurs, Fri, Disp: 90 tablet, Rfl: 3    warfarin (COUMADIN) 7 5 mg tablet, Take 1 tablet (7 5 mg total) by mouth daily Takes 7 5 mg every Sat and Sun , Disp: 30 tablet, Rfl: 5    Lab Results   Component Value Date     04/18/2018    SODIUM 140 05/01/2021    K 4 0 05/01/2021     (H) 05/01/2021    CO2 24 05/01/2021    ANIONGAP 11 2 04/18/2018    AGAP 8 05/01/2021    BUN 19 05/01/2021    CREATININE 0 87 05/01/2021    GLUC 134 (H) 05/01/2021    GLUF 134 (H) 05/01/2021    CALCIUM 7 9 (L) 05/01/2021    AST 17 04/23/2021    ALT 19 04/23/2021    ALKPHOS 81 04/23/2021    PROT 6 7 04/18/2018    TP 7 3 04/23/2021    BILITOT 0 4 04/18/2018    TBILI 0 49 04/23/2021    EGFR 60 05/01/2021     Lab Results   Component Value Date    WBC 7 40 05/01/2021    HGB 13 3 05/01/2021    HCT 40 2 (L) 05/01/2021    MCV 97 05/01/2021     (L) 05/01/2021     Lab Results   Component Value Date    CHOLESTEROL 185 03/01/2021    CHOLESTEROL 201 (H) 03/06/2020    CHOLESTEROL 146 03/26/2019     Lab Results   Component Value Date    HDL 68 03/01/2021    HDL 80 03/06/2020    HDL 56 03/26/2019     Lab Results   Component Value Date    LDLCALC 94 03/01/2021    LDLCALC 90 03/06/2020    LDLCALC 69 03/26/2019     Lab Results   Component Value Date    TRIG 114 03/01/2021    TRIG 153 (H) 03/06/2020    TRIG 105 03/26/2019     No results found for: [place], [place]  Lab Results   Component Value Date    AXZ1POVSLVZE 1 350 03/01/2021     Lab Results   Component Value Date    CALCIUM 7 9 (L) 05/01/2021     No results found for: SPEP, UPEP  No results found for: MICROALBUR, [id number]  Vitamin D WNL  No urine studies  A1c 4/29 8 9%  A1c today 7 2%    Objective:      /70   Pulse 66   Ht 5' 5" (1 651 m)   Wt 66 2 kg (146 lb)   SpO2 98%   BMI 24 30 kg/m²          Physical Exam  Constitutional:       General: She is not in acute distress  Appearance: She is normal weight  She is not toxic-appearing  HENT:      Head: Normocephalic and atraumatic        Right Ear: External ear normal       Left Ear: External ear normal    Eyes:      Extraocular Movements: Extraocular movements intact  Conjunctiva/sclera: Conjunctivae normal       Pupils: Pupils are equal, round, and reactive to light  Neck:      Vascular: No carotid bruit  Cardiovascular:      Rate and Rhythm: Normal rate and regular rhythm  Pulmonary:      Effort: Pulmonary effort is normal  No respiratory distress  Breath sounds: Normal breath sounds  No wheezing  Abdominal:      General: Bowel sounds are normal  There is no distension  Palpations: Abdomen is soft  Tenderness: There is no abdominal tenderness  Musculoskeletal:         General: Normal range of motion  Cervical back: Normal range of motion  No rigidity  Right lower leg: No edema  Left lower leg: No edema  Lymphadenopathy:      Cervical: No cervical adenopathy  Skin:     General: Skin is warm and dry  Neurological:      General: No focal deficit present  Mental Status: She is alert  Mental status is at baseline  Cranial Nerves: No cranial nerve deficit  Motor: No weakness  Psychiatric:         Mood and Affect: Mood normal          Behavior: Behavior normal          Thought Content:  Thought content normal          Judgment: Judgment normal

## 2021-06-29 NOTE — ASSESSMENT & PLAN NOTE
Lab Results   Component Value Date    EGFR 60 05/01/2021    EGFR 43 04/23/2021    EGFR 50 03/01/2021    CREATININE 0 87 05/01/2021    CREATININE 1 15 04/23/2021    CREATININE 1 02 03/01/2021   Kidney function has improved to a creatinine of 0 85 ->eGFR 60 -> stage 2 chronic kidney disease

## 2021-07-06 ENCOUNTER — APPOINTMENT (OUTPATIENT)
Dept: LAB | Facility: CLINIC | Age: 86
End: 2021-07-06
Payer: MEDICARE

## 2021-07-07 ENCOUNTER — ANTICOAG VISIT (OUTPATIENT)
Dept: FAMILY MEDICINE CLINIC | Facility: CLINIC | Age: 86
End: 2021-07-07

## 2021-07-13 ENCOUNTER — APPOINTMENT (OUTPATIENT)
Dept: LAB | Facility: CLINIC | Age: 86
End: 2021-07-13
Payer: MEDICARE

## 2021-07-14 ENCOUNTER — ANTICOAG VISIT (OUTPATIENT)
Dept: FAMILY MEDICINE CLINIC | Facility: CLINIC | Age: 86
End: 2021-07-14

## 2021-07-14 DIAGNOSIS — I48.0 PAROXYSMAL ATRIAL FIBRILLATION (HCC): ICD-10-CM

## 2021-07-14 DIAGNOSIS — I26.99 OTHER PULMONARY EMBOLISM WITHOUT ACUTE COR PULMONALE, UNSPECIFIED CHRONICITY (HCC): Primary | ICD-10-CM

## 2021-07-14 NOTE — PROGRESS NOTES
INR below goal, confirmed adherence to regimen  Will increase to 7 5mg Mon, Wed, Thurs, Fri, Sat  5mg Tues

## 2021-08-30 DIAGNOSIS — E11.69 TYPE 2 DIABETES MELLITUS WITH OTHER SPECIFIED COMPLICATION, WITHOUT LONG-TERM CURRENT USE OF INSULIN (HCC): ICD-10-CM

## 2021-08-30 RX ORDER — LANCETS
EACH MISCELLANEOUS
Qty: 100 EACH | Refills: 5 | Status: SHIPPED | OUTPATIENT
Start: 2021-08-30

## 2021-08-30 RX ORDER — BLOOD SUGAR DIAGNOSTIC
STRIP MISCELLANEOUS
Qty: 100 EACH | Refills: 5 | Status: SHIPPED | OUTPATIENT
Start: 2021-08-30

## 2021-08-30 NOTE — TELEPHONE ENCOUNTER
Patient requesting refill(s) of: Lancets     Last filled: 8/13/2020 100 each 5 refills   Last appt: 4/21/21   Next appt: 10/4/21  Pharmacy: [facility]     Patient requesting refill(s) of: Test strips     Last filled: 8/13/2020 100 each 5 refills   Last appt: 4/21/21  Next appt: 10/4/21  Pharmacy: [facility]

## 2021-09-01 DIAGNOSIS — E11.65 TYPE 2 DIABETES MELLITUS WITH HYPERGLYCEMIA, WITHOUT LONG-TERM CURRENT USE OF INSULIN (HCC): ICD-10-CM

## 2021-09-01 RX ORDER — GLIPIZIDE 5 MG/1
5 TABLET ORAL
Qty: 180 TABLET | Refills: 3 | Status: SHIPPED | OUTPATIENT
Start: 2021-09-01 | End: 2021-09-09 | Stop reason: SDUPTHER

## 2021-09-01 NOTE — TELEPHONE ENCOUNTER
Patient requesting refill(s) of: Glucotrol 5mg     Last filled: 4/21/21  Last appt: 3/30/21  Next appt: 10/4/21  [facility]

## 2021-09-09 DIAGNOSIS — E11.65 TYPE 2 DIABETES MELLITUS WITH HYPERGLYCEMIA, WITHOUT LONG-TERM CURRENT USE OF INSULIN (HCC): ICD-10-CM

## 2021-09-09 RX ORDER — GLIPIZIDE 5 MG/1
5 TABLET ORAL
Qty: 180 TABLET | Refills: 3 | Status: SHIPPED | OUTPATIENT
Start: 2021-09-09 | End: 2022-07-18 | Stop reason: SDUPTHER

## 2021-09-09 NOTE — TELEPHONE ENCOUNTER
Pt stated that the Glipizide 5 mg was sent to the wrong pharmacy    She needs it sent to [facility]    Please resend    Please advise    Phone: [phone]

## 2021-09-29 ENCOUNTER — APPOINTMENT (OUTPATIENT)
Dept: LAB | Facility: CLINIC | Age: 86
End: 2021-09-29
Payer: MEDICARE

## 2021-09-29 DIAGNOSIS — I10 ESSENTIAL HYPERTENSION: ICD-10-CM

## 2021-09-29 DIAGNOSIS — E03.9 HYPOTHYROIDISM, UNSPECIFIED TYPE: ICD-10-CM

## 2021-09-29 DIAGNOSIS — N18.31 STAGE 3A CHRONIC KIDNEY DISEASE (HCC): ICD-10-CM

## 2021-09-29 DIAGNOSIS — D69.6 THROMBOCYTOPENIA (HCC): ICD-10-CM

## 2021-09-29 DIAGNOSIS — N18.30 STAGE 3 CHRONIC KIDNEY DISEASE, UNSPECIFIED WHETHER STAGE 3A OR 3B CKD (HCC): ICD-10-CM

## 2021-09-29 DIAGNOSIS — E55.9 VITAMIN D DEFICIENCY: ICD-10-CM

## 2021-09-29 DIAGNOSIS — E11.69 TYPE 2 DIABETES MELLITUS WITH OTHER SPECIFIED COMPLICATION, WITHOUT LONG-TERM CURRENT USE OF INSULIN (HCC): ICD-10-CM

## 2021-09-29 LAB
25(OH)D3 SERPL-MCNC: 57.4 NG/ML (ref 30–100)
ALBUMIN SERPL BCP-MCNC: 3.4 G/DL (ref 3.5–5)
ALP SERPL-CCNC: 60 U/L (ref 46–116)
ALT SERPL W P-5'-P-CCNC: 20 U/L (ref 12–78)
ANION GAP SERPL CALCULATED.3IONS-SCNC: 4 MMOL/L (ref 4–13)
AST SERPL W P-5'-P-CCNC: 18 U/L (ref 5–45)
BASOPHILS # BLD AUTO: 0.05 THOUSANDS/ΜL (ref 0–0.1)
BASOPHILS NFR BLD AUTO: 1 % (ref 0–1)
BILIRUB SERPL-MCNC: 0.5 MG/DL (ref 0.2–1)
BUN SERPL-MCNC: 21 MG/DL (ref 5–25)
CALCIUM ALBUM COR SERPL-MCNC: 9.3 MG/DL (ref 8.3–10.1)
CALCIUM SERPL-MCNC: 8.8 MG/DL (ref 8.3–10.1)
CHLORIDE SERPL-SCNC: 104 MMOL/L (ref 100–108)
CO2 SERPL-SCNC: 28 MMOL/L (ref 21–32)
CREAT SERPL-MCNC: 1.03 MG/DL (ref 0.6–1.3)
EOSINOPHIL # BLD AUTO: 0.07 THOUSAND/ΜL (ref 0–0.61)
EOSINOPHIL NFR BLD AUTO: 1 % (ref 0–6)
ERYTHROCYTE [DISTWIDTH] IN BLOOD BY AUTOMATED COUNT: 14.4 % (ref 11.6–15.1)
GFR SERPL CREATININE-BSD FRML MDRD: 49 ML/MIN/1.73SQ M
GLUCOSE P FAST SERPL-MCNC: 135 MG/DL (ref 65–99)
HCT VFR BLD AUTO: 47.2 % (ref 34.8–46.1)
HGB BLD-MCNC: 14.7 G/DL (ref 11.5–15.4)
IMM GRANULOCYTES # BLD AUTO: 0.01 THOUSAND/UL (ref 0–0.2)
IMM GRANULOCYTES NFR BLD AUTO: 0 % (ref 0–2)
LYMPHOCYTES # BLD AUTO: 1.3 THOUSANDS/ΜL (ref 0.6–4.47)
LYMPHOCYTES NFR BLD AUTO: 20 % (ref 14–44)
MCH RBC QN AUTO: 32 PG (ref 26.8–34.3)
MCHC RBC AUTO-ENTMCNC: 31.1 G/DL (ref 31.4–37.4)
MCV RBC AUTO: 103 FL (ref 82–98)
MONOCYTES # BLD AUTO: 0.5 THOUSAND/ΜL (ref 0.17–1.22)
MONOCYTES NFR BLD AUTO: 8 % (ref 4–12)
NEUTROPHILS # BLD AUTO: 4.45 THOUSANDS/ΜL (ref 1.85–7.62)
NEUTS SEG NFR BLD AUTO: 70 % (ref 43–75)
NRBC BLD AUTO-RTO: 0 /100 WBCS
PLATELET # BLD AUTO: 153 THOUSANDS/UL (ref 149–390)
PMV BLD AUTO: 11.8 FL (ref 8.9–12.7)
POTASSIUM SERPL-SCNC: 3.9 MMOL/L (ref 3.5–5.3)
PROT SERPL-MCNC: 7 G/DL (ref 6.4–8.2)
RBC # BLD AUTO: 4.6 MILLION/UL (ref 3.81–5.12)
SODIUM SERPL-SCNC: 136 MMOL/L (ref 136–145)
TSH SERPL DL<=0.05 MIU/L-ACNC: 2.91 UIU/ML (ref 0.36–3.74)
WBC # BLD AUTO: 6.38 THOUSAND/UL (ref 4.31–10.16)

## 2021-09-29 PROCEDURE — 80053 COMPREHEN METABOLIC PANEL: CPT

## 2021-09-29 PROCEDURE — 84443 ASSAY THYROID STIM HORMONE: CPT

## 2021-09-29 PROCEDURE — 85025 COMPLETE CBC W/AUTO DIFF WBC: CPT

## 2021-09-29 PROCEDURE — 82306 VITAMIN D 25 HYDROXY: CPT

## 2021-09-29 PROCEDURE — 36415 COLL VENOUS BLD VENIPUNCTURE: CPT

## 2021-10-04 ENCOUNTER — OFFICE VISIT (OUTPATIENT)
Dept: FAMILY MEDICINE CLINIC | Facility: CLINIC | Age: 86
End: 2021-10-04
Payer: MEDICARE

## 2021-10-04 VITALS
HEIGHT: 65 IN | WEIGHT: 144 LBS | BODY MASS INDEX: 23.99 KG/M2 | SYSTOLIC BLOOD PRESSURE: 124 MMHG | HEART RATE: 97 BPM | RESPIRATION RATE: 18 BRPM | TEMPERATURE: 97.2 F | OXYGEN SATURATION: 75 % | DIASTOLIC BLOOD PRESSURE: 66 MMHG

## 2021-10-04 DIAGNOSIS — N18.31 STAGE 3A CHRONIC KIDNEY DISEASE (HCC): ICD-10-CM

## 2021-10-04 DIAGNOSIS — E11.22 TYPE 2 DIABETES MELLITUS WITH STAGE 3A CHRONIC KIDNEY DISEASE, WITHOUT LONG-TERM CURRENT USE OF INSULIN (HCC): Primary | ICD-10-CM

## 2021-10-04 DIAGNOSIS — N32.89 BLADDER MASS: ICD-10-CM

## 2021-10-04 DIAGNOSIS — I26.99 OTHER PULMONARY EMBOLISM WITHOUT ACUTE COR PULMONALE, UNSPECIFIED CHRONICITY (HCC): ICD-10-CM

## 2021-10-04 DIAGNOSIS — N18.31 TYPE 2 DIABETES MELLITUS WITH STAGE 3A CHRONIC KIDNEY DISEASE, WITHOUT LONG-TERM CURRENT USE OF INSULIN (HCC): Primary | ICD-10-CM

## 2021-10-04 DIAGNOSIS — I48.0 PAROXYSMAL ATRIAL FIBRILLATION (HCC): ICD-10-CM

## 2021-10-04 DIAGNOSIS — I10 ESSENTIAL HYPERTENSION: ICD-10-CM

## 2021-10-04 LAB — SL AMB POCT HEMOGLOBIN AIC: 6.6 (ref ?–6.5)

## 2021-10-04 PROCEDURE — 99214 OFFICE O/P EST MOD 30 MIN: CPT | Performed by: INTERNAL MEDICINE

## 2021-10-04 PROCEDURE — 83036 HEMOGLOBIN GLYCOSYLATED A1C: CPT | Performed by: INTERNAL MEDICINE

## 2022-02-07 DIAGNOSIS — E11.65 TYPE 2 DIABETES MELLITUS WITH HYPERGLYCEMIA, WITHOUT LONG-TERM CURRENT USE OF INSULIN (HCC): ICD-10-CM

## 2022-02-07 NOTE — TELEPHONE ENCOUNTER
Patient requesting refill(s) of: Metformin     Last filled: 4/21/21  Last appt: 10/4/21  Next appt: 4/6/22  [facility]

## 2022-03-07 DIAGNOSIS — I10 ESSENTIAL HYPERTENSION: ICD-10-CM

## 2022-03-07 DIAGNOSIS — E78.00 PURE HYPERCHOLESTEROLEMIA: ICD-10-CM

## 2022-03-07 DIAGNOSIS — E03.9 ACQUIRED HYPOTHYROIDISM: ICD-10-CM

## 2022-03-07 DIAGNOSIS — I48.0 PAROXYSMAL ATRIAL FIBRILLATION (HCC): ICD-10-CM

## 2022-03-07 RX ORDER — LOVASTATIN 20 MG/1
20 TABLET ORAL
Qty: 90 TABLET | Refills: 3 | Status: SHIPPED | OUTPATIENT
Start: 2022-03-07

## 2022-03-07 RX ORDER — LEVOTHYROXINE SODIUM 0.05 MG/1
50 TABLET ORAL DAILY
Qty: 90 TABLET | Refills: 3 | Status: SHIPPED | OUTPATIENT
Start: 2022-03-07

## 2022-03-07 NOTE — TELEPHONE ENCOUNTER
Patient requesting refill(s) of: Lovastatin    Last filled: 4/21/21   Last appt: 10/4/21  Next appt: 4/6/22  Ph[facility]         Patient requesting refill(s) of: Levothyroxine     Last filled: 4/21/21        Patient requesting refill(s) of: Metoprolol     Last filled: 4/21/21

## 2022-03-16 DIAGNOSIS — R80.9 PROTEINURIA, UNSPECIFIED TYPE: ICD-10-CM

## 2022-03-16 RX ORDER — LISINOPRIL 5 MG/1
TABLET ORAL
Qty: 90 TABLET | Refills: 2 | Status: SHIPPED | OUTPATIENT
Start: 2022-03-16

## 2022-04-06 ENCOUNTER — TELEMEDICINE (OUTPATIENT)
Dept: FAMILY MEDICINE CLINIC | Facility: CLINIC | Age: 87
End: 2022-04-06
Payer: MEDICARE

## 2022-04-06 ENCOUNTER — TELEPHONE (OUTPATIENT)
Dept: LAB | Facility: HOSPITAL | Age: 87
End: 2022-04-06

## 2022-04-06 VITALS
DIASTOLIC BLOOD PRESSURE: 72 MMHG | BODY MASS INDEX: 22.3 KG/M2 | HEART RATE: 62 BPM | OXYGEN SATURATION: 97 % | SYSTOLIC BLOOD PRESSURE: 118 MMHG | RESPIRATION RATE: 20 BRPM | WEIGHT: 134 LBS | TEMPERATURE: 97 F

## 2022-04-06 DIAGNOSIS — M17.10 PRIMARY OSTEOARTHRITIS OF KNEE, UNSPECIFIED LATERALITY: ICD-10-CM

## 2022-04-06 DIAGNOSIS — I27.82 OTHER CHRONIC PULMONARY EMBOLISM WITHOUT ACUTE COR PULMONALE (HCC): ICD-10-CM

## 2022-04-06 DIAGNOSIS — M81.0 AGE-RELATED OSTEOPOROSIS WITHOUT CURRENT PATHOLOGICAL FRACTURE: ICD-10-CM

## 2022-04-06 DIAGNOSIS — I48.20 CHRONIC A-FIB (HCC): ICD-10-CM

## 2022-04-06 DIAGNOSIS — E11.22 TYPE 2 DIABETES MELLITUS WITH STAGE 3A CHRONIC KIDNEY DISEASE, WITHOUT LONG-TERM CURRENT USE OF INSULIN (HCC): ICD-10-CM

## 2022-04-06 DIAGNOSIS — N18.31 STAGE 3A CHRONIC KIDNEY DISEASE (HCC): Primary | ICD-10-CM

## 2022-04-06 DIAGNOSIS — Z00.00 MEDICARE ANNUAL WELLNESS VISIT, SUBSEQUENT: ICD-10-CM

## 2022-04-06 DIAGNOSIS — I10 ESSENTIAL HYPERTENSION: ICD-10-CM

## 2022-04-06 DIAGNOSIS — N18.31 TYPE 2 DIABETES MELLITUS WITH STAGE 3A CHRONIC KIDNEY DISEASE, WITHOUT LONG-TERM CURRENT USE OF INSULIN (HCC): ICD-10-CM

## 2022-04-06 DIAGNOSIS — M48.54XS COMPRESSION FRACTURE OF THORACIC SPINE, NON-TRAUMATIC, SEQUELA: ICD-10-CM

## 2022-04-06 DIAGNOSIS — E03.9 ACQUIRED HYPOTHYROIDISM: ICD-10-CM

## 2022-04-06 PROCEDURE — 99214 OFFICE O/P EST MOD 30 MIN: CPT | Performed by: INTERNAL MEDICINE

## 2022-04-06 PROCEDURE — 1123F ACP DISCUSS/DSCN MKR DOCD: CPT | Performed by: INTERNAL MEDICINE

## 2022-04-06 PROCEDURE — G0438 PPPS, INITIAL VISIT: HCPCS | Performed by: INTERNAL MEDICINE

## 2022-04-06 NOTE — PATIENT INSTRUCTIONS
Medicare Preventive Visit Patient Instructions  Thank you for completing your Welcome to Medicare Visit or Medicare Annual Wellness Visit today  Your next wellness visit will be due in one year (4/7/2023)  The screening/preventive services that you may require over the next 5-10 years are detailed below  Some tests may not apply to you based off risk factors and/or age  Screening tests ordered at today's visit but not completed yet may show as past due  Also, please note that scanned in results may not display below  Preventive Screenings:  Service Recommendations Previous Testing/Comments   Colorectal Cancer Screening  * Colonoscopy    * Fecal Occult Blood Test (FOBT)/Fecal Immunochemical Test (FIT)  * Fecal DNA/Cologuard Test  * Flexible Sigmoidoscopy Age: 54-65 years old   Colonoscopy: every 10 years (may be performed more frequently if at higher risk)  OR  FOBT/FIT: every 1 year  OR  Cologuard: every 3 years  OR  Sigmoidoscopy: every 5 years  Screening may be recommended earlier than age 48 if at higher risk for colorectal cancer  Also, an individualized decision between you and your healthcare provider will decide whether screening between the ages of 74-80 would be appropriate  Colonoscopy: Not on file  FOBT/FIT: Not on file  Cologuard: Not on file  Sigmoidoscopy: Not on file    Screening Not Indicated     Breast Cancer Screening Age: 36 years old  Frequency: every 1-2 years  Not required if history of left and right mastectomy Mammogram: Not on file        Cervical Cancer Screening Between the ages of 21-29, pap smear recommended once every 3 years  Between the ages of 33-67, can perform pap smear with HPV co-testing every 5 years     Recommendations may differ for women with a history of total hysterectomy, cervical cancer, or abnormal pap smears in past  Pap Smear: Not on file    Screening Not Indicated   Hepatitis C Screening Once for adults born between 1945 and 1965  More frequently in patients at high risk for Hepatitis C Hep C Antibody: Not on file        Diabetes Screening 1-2 times per year if you're at risk for diabetes or have pre-diabetes Fasting glucose: 135 mg/dL   A1C: 6 6    Screening Not Indicated  History Diabetes   Cholesterol Screening Once every 5 years if you don't have a lipid disorder  May order more often based on risk factors  Lipid panel: 03/01/2021    Screening Not Indicated  History Lipid Disorder     Other Preventive Screenings Covered by Medicare:  1  Abdominal Aortic Aneurysm (AAA) Screening: covered once if your at risk  You're considered to be at risk if you have a family history of AAA  2  Lung Cancer Screening: covers low dose CT scan once per year if you meet all of the following conditions: (1) Age 50-69; (2) No signs or symptoms of lung cancer; (3) Current smoker or have quit smoking within the last 15 years; (4) You have a tobacco smoking history of at least 30 pack years (packs per day multiplied by number of years you smoked); (5) You get a written order from a healthcare provider  3  Glaucoma Screening: covered annually if you're considered high risk: (1) You have diabetes OR (2) Family history of glaucoma OR (3)  aged 48 and older OR (3)  American aged 72 and older  3  Osteoporosis Screening: covered every 2 years if you meet one of the following conditions: (1) You're estrogen deficient and at risk for osteoporosis based off medical history and other findings; (2) Have a vertebral abnormality; (3) On glucocorticoid therapy for more than 3 months; (4) Have primary hyperparathyroidism; (5) On osteoporosis medications and need to assess response to drug therapy  · Last bone density test (DXA Scan): Not on file  5  HIV Screening: covered annually if you're between the age of 12-76  Also covered annually if you are younger than 13 and older than 72 with risk factors for HIV infection   For pregnant patients, it is covered up to 3 times per pregnancy  Immunizations:  Immunization Recommendations   Influenza Vaccine Annual influenza vaccination during flu season is recommended for all persons aged >= 6 months who do not have contraindications   Pneumococcal Vaccine (Prevnar and Pneumovax)  * Prevnar = PCV13  * Pneumovax = PPSV23   Adults 25-60 years old: 1-3 doses may be recommended based on certain risk factors  Adults 72 years old: Prevnar (PCV13) vaccine recommended followed by Pneumovax (PPSV23) vaccine  If already received PPSV23 since turning 65, then PCV13 recommended at least one year after PPSV23 dose  Hepatitis B Vaccine 3 dose series if at intermediate or high risk (ex: diabetes, end stage renal disease, liver disease)   Tetanus (Td) Vaccine - COST NOT COVERED BY MEDICARE PART B Following completion of primary series, a booster dose should be given every 10 years to maintain immunity against tetanus  Td may also be given as tetanus wound prophylaxis  Tdap Vaccine - COST NOT COVERED BY MEDICARE PART B Recommended at least once for all adults  For pregnant patients, recommended with each pregnancy  Shingles Vaccine (Shingrix) - COST NOT COVERED BY MEDICARE PART B  2 shot series recommended in those aged 48 and above     Health Maintenance Due:  There are no preventive care reminders to display for this patient  Immunizations Due:      Topic Date Due    DTaP,Tdap,and Td Vaccines (1 - Tdap) Never done    COVID-19 Vaccine (2 - Moderna 3-dose series) 04/29/2021    Influenza Vaccine (1) 09/01/2021     Advance Directives   What are advance directives? Advance directives are legal documents that state your wishes and plans for medical care  These plans are made ahead of time in case you lose your ability to make decisions for yourself  Advance directives can apply to any medical decision, such as the treatments you want, and if you want to donate organs  What are the types of advance directives?   There are many types of advance directives, and each state has rules about how to use them  You may choose a combination of any of the following:  · Living will: This is a written record of the treatment you want  You can also choose which treatments you do not want, which to limit, and which to stop at a certain time  This includes surgery, medicine, IV fluid, and tube feedings  · Durable power of  for healthcare [facility]): This is a written record that states who you want to make healthcare choices for you when you are unable to make them for yourself  This person, called a proxy, is usually a family member or a friend  You may choose more than 1 proxy  · Do not resuscitate (DNR) order:  A DNR order is used in case your heart stops beating or you stop breathing  It is a request not to have certain forms of treatment, such as CPR  A DNR order may be included in other types of advance directives  · Medical directive: This covers the care that you want if you are in a coma, near death, or unable to make decisions for yourself  You can list the treatments you want for each condition  Treatment may include pain medicine, surgery, blood transfusions, dialysis, IV or tube feedings, and a ventilator (breathing machine)  · Values history: This document has questions about your views, beliefs, and how you feel and think about life  This information can help others choose the care that you would choose  Why are advance directives important? An advance directive helps you control your care  Although spoken wishes may be used, it is better to have your wishes written down  Spoken wishes can be misunderstood, or not followed  Treatments may be given even if you do not want them  An advance directive may make it easier for your family to make difficult choices about your care  Fall Prevention    Fall prevention  includes ways to make your home and other areas safer  It also includes ways you can move more carefully to prevent a fall   Health conditions that cause changes in your blood pressure, vision, or muscle strength and coordination may increase your risk for falls  Medicines may also increase your risk for falls if they make you dizzy, weak, or sleepy  Fall prevention tips:   · Stand or sit up slowly  · Use assistive devices as directed  · Wear shoes that fit well and have soles that   · Wear a personal alarm  · Stay active  · Manage your medical conditions  Home Safety Tips:  · Add items to prevent falls in the bathroom  · Keep paths clear  · Install bright lights in your home  · Keep items you use often on shelves within reach  · Paint or place reflective tape on the edges of your stairs  Urinary Incontinence   Urinary incontinence (UI)  is when you lose control of your bladder  UI develops because your bladder cannot store or empty urine properly  The 3 most common types of UI are stress incontinence, urge incontinence, or both  Medicines:   · May be given to help strengthen your bladder control  Report any side effects of medication to your healthcare provider  Do pelvic muscle exercises often:  Your pelvic muscles help you stop urinating  Squeeze these muscles tight for 5 seconds, then relax for 5 seconds  Gradually work up to squeezing for 10 seconds  Do 3 sets of 15 repetitions a day, or as directed  This will help strengthen your pelvic muscles and improve bladder control  Train your bladder:  Go to the bathroom at set times, such as every 2 hours, even if you do not feel the urge to go  You can also try to hold your urine when you feel the urge to go  For example, hold your urine for 5 minutes when you feel the urge to go  As that becomes easier, hold your urine for 10 minutes  Self-care:   · Keep a UI record  Write down how often you leak urine and how much you leak  Make a note of what you were doing when you leaked urine  · Drink liquids as directed   You may need to limit the amount of liquid you drink to help control your urine leakage  Do not drink any liquid right before you go to bed  Limit or do not have drinks that contain caffeine or alcohol  · Prevent constipation  Eat a variety of high-fiber foods  Good examples are high-fiber cereals, beans, vegetables, and whole-grain breads  Walking is the best way to trigger your intestines to have a bowel movement  · Exercise regularly and maintain a healthy weight  Weight loss and exercise will decrease pressure on your bladder and help you control your leakage  · Use a catheter as directed  to help empty your bladder  A catheter is a tiny, plastic tube that is put into your bladder to drain your urine  · Go to behavior therapy as directed  Behavior therapy may be used to help you learn to control your urge to urinate  © Copyright [employer] 2018 Information is for End User's use only and may not be sold, redistributed or otherwise used for commercial purposes   All illustrations and images included in CareNotes® are the copyrighted property of [employer]  or [address] [place] [address] [employer]

## 2022-04-06 NOTE — PROGRESS NOTES
Virtual Regular Visit    Verification of patient location:    Patient is located in the following state in which I hold an active license [place]      Assessment/Plan:    Problem List Items Addressed This Visit        Endocrine    Type 2 diabetes mellitus with stage 3a chronic kidney disease ([facility] [address] )    Relevant Orders    HEMOGLOBIN A1C W/ EAG ESTIMATION       Cardiovascular and Mediastinum    Chronic a-fib ([facility] [address] )    Relevant Medications    apixaban (Eliquis) 2 5 mg    Other Relevant Orders    CBC and differential    Comprehensive metabolic panel    Essential hypertension    Relevant Orders    CBC and differential    Comprehensive metabolic panel    Other pulmonary embolism without acute cor pulmonale (HCC)    Relevant Orders    CBC and differential    Comprehensive metabolic panel       Musculoskeletal and Integument    Compression fracture of thoracic spine, non-traumatic, sequela    Osteoarthritis of knee       Genitourinary    Stage 3a chronic kidney disease ([facility] [address] )    Relevant Orders    CBC and differential    Comprehensive metabolic panel      Other Visit Diagnoses     Medicare annual wellness visit, subsequent    -  Primary    Acquired hypothyroidism        Relevant Orders    TSH, 3rd generation with Free T4 reflex               Reason for visit is   Chief Complaint   Patient presents with    Follow-up     knee is bothering her    Medicare Wellness Visit    Virtual Regular Visit        Encounter provider Soraida Horner MD    Provider located at [address] [place] [id number] [phone]      Recent Visits  No visits were found meeting these conditions    Showing recent visits within past 7 days and meeting all other requirements  Today's Visits  Date Type Provider Dept   04/06/22 Telemedicine Soraida Horner MD [facility]   Showing today's visits and meeting all other requirements  Future Appointments  No visits were found meeting these conditions  Showing future appointments within next 150 days and meeting all other requirements       The patient was identified by name and date of birth  Lizz Miller was informed that this is a telemedicine visit and that the visit is being conducted through [facility] and patient was informed this is a secure, HIPAA-complaint platform  She agrees to proceed     My office door was closed  No one else was in the room  She acknowledged consent and understanding of privacy and security of the video platform  The patient has agreed to participate and understands they can discontinue the visit at any time  Patient is aware this is a billable service  Subjective  Lizz Miller is a 80 y o  female    Virtual follow up diabetes, PE, hypothyroidism, HTN  Main concern today is right knee pain  States that she tripped in her kitchen recently and hit her patella, now having a lot of pain with ambulation  Using oil her granddaughter made as muscle rub, taking tylenol without much relief  Trouble sleeping at night, both falling asleep and staying asleep  Tried melatonin but it didn't work  Denies excessive caffeine intake, does admit to napping during the day          Past Medical History:   Diagnosis Date    Abnormal gait     Accidental fall from chair, subsequent encounter     Atrial fibrillation (HCC)     Benign essential hypertension     Bite of animal     Cataract     Cellulitis     Chronic kidney disease     Chronic kidney disease, stage 3 (HCC)     Colon cancer (HCC)     Colon polyp     Current tear of lateral cartilage or meniscus of knee     Diabetes mellitus ([address] )     Disease of thyroid gland     Disorder of magnesium metabolism     Dvt femoral (deep venous thrombosis)     Embolism from thrombosis of vein of distal end of lower extremity ([address]     Essential hypertension     Fall     Hyperlipidemia     Hyperlipidemia     Hypertension     Hypothyroidism  Insomnia     Kidney stone     Knee joint effusion     Leukocytosis     Malaise and fatigue     MI (myocardial infarction) (HCC)     Hospitalization     Orbital hemorrhage     Other sleep disorders     PAF (paroxysmal atrial fibrillation) (HCC)     Presence of vena cava filter     Pulmonary embolism (HCC)     Tear film insufficiency     Type 2 diabetes mellitus ([facility] [address] )     Unspecified osteoarthritis, unspecified site     Weight decreased        Past Surgical History:   Procedure Laterality Date    ABDOMINAL ADHESION SURGERY  2015    Had wound vac    ABDOMINAL SURGERY      APPENDECTOMY      CATARACT EXTRACTION      CHOLECYSTECTOMY      COLON SURGERY      COLOSTOMY  2011    Due to Ileus, then reanastomosis in 2002    COLOSTOMY TAKEDOWN/REVERSE KUMAR      EXCISION BLADDER MESH TRANSVESICLEPORT W/ LASER      HERNIA REPAIR      IVC FILTER INSERTION      KNEE SURGERY  2014    repair     NM CYSTOURETHROSCOPY,FULGUR >5 CM LESN N/A 4/30/2021    Procedure: CYSTOSCOPY, TRANSURETHRAL RESECTION OF BLADDER TUMOR (TURBT);   Surgeon: Manoj Taylor MD;  Location: [address] [place] [address];  Service: Urology       Current Outpatient Medications   Medication Sig Dispense Refill    apixaban (Eliquis) 2 5 mg Take 1 tablet (2 5 mg total) by mouth 2 (two) times a day 180 tablet 3    B Complex Vitamins (B-COMPLEX/B-12 PO) Take by mouth daily       Contour Next Test test strip Testing twice daily 100 each 5    glipiZIDE (GLUCOTROL) 5 mg tablet Take 1 tablet (5 mg total) by mouth 2 (two) times a day before meals 180 tablet 3    levothyroxine 50 mcg tablet Take 1 tablet (50 mcg total) by mouth daily 90 tablet 3    lisinopril (ZESTRIL) 5 mg tablet take 1 tablet by mouth once daily 90 tablet 2    lovastatin (MEVACOR) 20 mg tablet Take 1 tablet (20 mg total) by mouth daily at bedtime 90 tablet 3    magnesium gluconate (MAGONATE) 500 mg tablet Take 500 mg by mouth daily       metFORMIN (GLUCOPHAGE) 1000 MG tablet Take 1 tablet (1,000 mg total) by mouth 2 (two) times a day with meals 180 tablet 3    metoprolol tartrate (LOPRESSOR) 25 mg tablet Take 1 tablet (25 mg total) by mouth every 12 (twelve) hours 180 tablet 3    Microlet Lancets MISC Testing twice daily 100 each 5    acetaminophen (TYLENOL) 500 mg tablet Take 500 mg by mouth every 6 (six) hours as needed (Patient not taking: Reported on 10/4/2021)       No current facility-administered medications for this visit  No Known Allergies    Review of Systems   Constitutional: Positive for unexpected weight change  Negative for appetite change, chills and fever  HENT: Negative for dental problem and trouble swallowing  Respiratory: Negative for cough, chest tightness and shortness of breath  Cardiovascular: Negative for chest pain, palpitations and leg swelling  Gastrointestinal: Positive for diarrhea  Negative for abdominal pain, constipation, nausea and vomiting  Genitourinary: Positive for frequency  Musculoskeletal: Positive for arthralgias and gait problem  Neurological: Positive for light-headedness  Negative for dizziness, weakness, numbness and headaches  Psychiatric/Behavioral: Positive for sleep disturbance  Negative for dysphoric mood  The patient is not nervous/anxious  Video Exam    Vitals:    04/06/22 1454   BP: 118/72   Pulse: 62   Resp: 20   Temp: (!) 97 °F (36 1 °C)   SpO2: 97%   Weight: 60 8 kg (134 lb)       Physical Exam  Vitals reviewed  Constitutional:       General: She is not in acute distress  Appearance: She is normal weight  Pulmonary:      Effort: No respiratory distress  Neurological:      General: No focal deficit present  Mental Status: She is alert  Psychiatric:         Mood and Affect: Mood normal          Behavior: Behavior normal          Thought Content:  Thought content normal          Judgment: Judgment normal           I spent 20 minutes directly with the patient during this visit    VIRTUAL VISIT DISCLAIMER      Freddie Sanderson verbally agrees to participate in [employer]  Pt is aware that [employer] could be limited without vital signs or the ability to perform a full hands-on physical Heidy Nogueira understands she or the provider may request at any time to terminate the video visit and request the patient to seek care or treatment in person

## 2022-04-06 NOTE — PROGRESS NOTES
Assessment and Plan:     Problem List Items Addressed This Visit        Endocrine    Type 2 diabetes mellitus with stage 3a chronic kidney disease ([facility] [address] )    Relevant Orders    HEMOGLOBIN A1C W/ EAG ESTIMATION       Cardiovascular and Mediastinum    Chronic a-fib (HCC)    Relevant Medications    apixaban (Eliquis) 2 5 mg    Other Relevant Orders    CBC and differential    Comprehensive metabolic panel    Essential hypertension    Relevant Orders    CBC and differential    Comprehensive metabolic panel    Other pulmonary embolism without acute cor pulmonale (HCC)    Relevant Orders    CBC and differential    Comprehensive metabolic panel       Musculoskeletal and Integument    Compression fracture of thoracic spine, non-traumatic, sequela    Osteoarthritis of knee       Genitourinary    Stage 3a chronic kidney disease (HCC)    Relevant Orders    CBC and differential    Comprehensive metabolic panel    Vitamin D 25 hydroxy    PTH, intact      Other Visit Diagnoses     Medicare annual wellness visit, subsequent    -  Primary    Acquired hypothyroidism        Relevant Orders    TSH, 3rd generation with Free T4 reflex    Age-related osteoporosis without current pathological fracture        Relevant Orders    Vitamin D 25 hydroxy    PTH, intact           Preventive health issues were discussed with patient, and age appropriate screening tests were ordered as noted in patient's After Visit Summary  Personalized health advice and appropriate referrals for health education or preventive services given if needed, as noted in patient's After Visit Summary       History of Present Illness:     Patient presents for Medicare Annual Wellness visit    Patient Care Team:  Aric Diamond MD as PCP - General (Internal Medicine)     Problem List:     Patient Active Problem List   Diagnosis    Chronic a-fib ([facility] [address] )    Essential hypertension    History of pulmonary embolism    Type 2 diabetes mellitus with stage 3a chronic kidney disease ([facility] )    Compression fracture of thoracic spine, non-traumatic, sequela    Stage 3a chronic kidney disease ([facility]    Bladder mass    Embolism from thrombosis of vein of distal end of lower extremity ([facility] )    Hypercoagulable state, primary ([facility] )    Osteoarthritis of knee    Other pulmonary embolism without acute cor pulmonale (HCC)    Hypocalcemia      Past Medical and Surgical History:     Past Medical History:   Diagnosis Date    Abnormal gait     Accidental fall from chair, subsequent encounter     Atrial fibrillation (HCC)     Benign essential hypertension     Bite of animal     Cataract     Cellulitis     Chronic kidney disease     Chronic kidney disease, stage 3 (HCC)     Colon cancer ([facility])     Colon polyp     Current tear of lateral cartilage or meniscus of knee     Diabetes mellitus ([facility] )     Disease of thyroid gland     Disorder of magnesium metabolism     Dvt femoral (deep venous thrombosis)     Embolism from thrombosis of vein of distal end of lower extremity ([facility])     Essential hypertension     Fall     Hyperlipidemia     Hyperlipidemia     Hypertension     Hypothyroidism     Insomnia     Kidney stone     Knee joint effusion     Leukocytosis     Malaise and fatigue     MI (myocardial infarction) ([facility]  )     Hospitalization     Orbital hemorrhage     Other sleep disorders     PAF (paroxysmal atrial fibrillation) ([facility])     Presence of vena cava filter     Pulmonary embolism ([facility])     Tear film insufficiency     Type 2 diabetes mellitus ([facility] )     Unspecified osteoarthritis, unspecified site     Weight decreased      Past Surgical History:   Procedure Laterality Date    ABDOMINAL ADHESION SURGERY  2015    Had wound vac    ABDOMINAL SURGERY      APPENDECTOMY      CATARACT EXTRACTION      CHOLECYSTECTOMY      COLON SURGERY      COLOSTOMY  2011    Due to Ileus, then reanastomosis in 2002    COLOSTOMY TAKEDOWN/REVERSE KUMAR      EXCISION BLADDER MESH TRANSVESICLEPORT W/ LASER      HERNIA REPAIR      IVC FILTER INSERTION      KNEE SURGERY  2014    repair     AR CYSTOURETHROSCOPY,FULGUR >5 CM UNIQUE N/A 2021    Procedure: CYSTOSCOPY, TRANSURETHRAL RESECTION OF BLADDER TUMOR (TURBT);   Surgeon: Geraldine Moon MD;  Location: [facility] MAIN OR;  Service: Urology      Family History:     Family History   Problem Relation Age of Onset    Deep vein thrombosis Mother       Social History:     Social History     Socioeconomic History    Marital status: /Civil Union     Spouse name: None    Number of children: None    Years of education: None    Highest education level: None   Occupational History    Occupation: Retired    Tobacco Use    Smoking status: Former Smoker     Types: Cigarettes     Quit date: 3/19/2001     Years since quittin 0    Smokeless tobacco: Never Used   Vaping Use    Vaping Use: Never used   Substance and Sexual Activity    Alcohol use: Never    Drug use: Never    Sexual activity: Not Currently   Other Topics Concern    None   Social History Narrative    Rarely consumes alcohol - As per [address]    Consumes on average 2 cups of regular coffee per day    Consumes on average 12 oz of soda per day     Social Determinants of Health     Financial Resource Strain: Not on file   Food Insecurity: Not on file   Transportation Needs: Not on file   Physical Activity: Not on file   Stress: Not on file   Social Connections: Not on file   Intimate Partner Violence: Not on file   Housing Stability: Not on file      Medications and Allergies:     Current Outpatient Medications   Medication Sig Dispense Refill    apixaban (Eliquis) 2 5 mg Take 1 tablet (2 5 mg total) by mouth 2 (two) times a day 180 tablet 3    B Complex Vitamins (B-COMPLEX/B-12 PO) Take by mouth daily       Contour Next Test test strip Testing twice daily 100 each 5    glipiZIDE (GLUCOTROL) 5 mg tablet Take 1 tablet (5 mg total) by mouth 2 (two) times a day before meals 180 tablet 3    levothyroxine 50 mcg tablet Take 1 tablet (50 mcg total) by mouth daily 90 tablet 3    lisinopril (ZESTRIL) 5 mg tablet take 1 tablet by mouth once daily 90 tablet 2    lovastatin (MEVACOR) 20 mg tablet Take 1 tablet (20 mg total) by mouth daily at bedtime 90 tablet 3    magnesium gluconate (MAGONATE) 500 mg tablet Take 500 mg by mouth daily       metFORMIN (GLUCOPHAGE) 1000 MG tablet Take 1 tablet (1,000 mg total) by mouth 2 (two) times a day with meals 180 tablet 3    metoprolol tartrate (LOPRESSOR) 25 mg tablet Take 1 tablet (25 mg total) by mouth every 12 (twelve) hours 180 tablet 3    Microlet Lancets MISC Testing twice daily 100 each 5    acetaminophen (TYLENOL) 500 mg tablet Take 500 mg by mouth every 6 (six) hours as needed (Patient not taking: Reported on 10/4/2021)       No current facility-administered medications for this visit  No Known Allergies   Immunizations:     Immunization History   Administered Date(s) Administered    COVID-19 MODERNA VACC 0 5 ML IM 04/01/2021    INFLUENZA 10/26/2015, 09/01/2016, 08/17/2017, 09/02/2018    Pneumococcal Conjugate 13-Valent 12/31/2018    Pneumococcal Polysaccharide PPV23 10/23/2017      Health Maintenance: There are no preventive care reminders to display for this patient  Topic Date Due    DTaP,Tdap,and Td Vaccines (1 - Tdap) Never done    COVID-19 Vaccine (2 - Moderna 3-dose series) 04/29/2021    Influenza Vaccine (1) 09/01/2021      Medicare Health Risk Assessment:     /72   Pulse 62   Temp (!) 97 °F (36 1 °C)   Resp 20   Wt 60 8 kg (134 lb)   SpO2 97%   BMI 22 30 kg/m²      Last Medicare Wellness visit information reviewed, patient interviewed and updates made to the record today  Health Risk Assessment:   Patient rates overall health as poor  Patient feels that their physical health rating is slightly worse  Patient is dissatisfied with their life  Eyesight was rated as same  Hearing was rated as same  Patient feels that their emotional and mental health rating is slightly worse  Patients states they are never, rarely angry  Patient states they are often unusually tired/fatigued  Pain experienced in the last 7 days has been a lot  Patient's pain rating has been 10/10  Patient states that she has experienced weight loss or gain in last 6 months  Depression Screening:   PHQ-2 Score: 0      Fall Risk Screening: In the past year, patient has experienced: history of falling in past year    Number of falls: 1  Injured during fall?: No    Feels unsteady when standing or walking?: No    Worried about falling?: Yes      Urinary Incontinence Screening:   Patient has leaked urine accidently in the last six months  Home Safety:  Patient does not have trouble with stairs inside or outside of their home  Patient has working smoke alarms and has no working carbon monoxide detector  Home safety hazards include: none  Nutrition:   Current diet is Diabetic  Medications:   Patient is not currently taking any over-the-counter supplements  Patient is able to manage medications  Activities of Daily Living (ADLs)/Instrumental Activities of Daily Living (IADLs):   Walk and transfer into and out of bed and chair?: Yes  Dress and groom yourself?: Yes    Bathe or shower yourself?: Yes    Feed yourself? Yes  Do your laundry/housekeeping?: Yes  Manage your money, pay your bills and track your expenses?: Yes  Make your own meals?: Yes    Do your own shopping?: No    ADL comments: Granddaughter does shopping for them    Previous Hospitalizations:   Any hospitalizations or ED visits within the last 12 months?: No      Advance Care Planning:   Living will: Yes    Durable POA for healthcare:  Yes    Advanced directive: Yes      PREVENTIVE SCREENINGS      Cardiovascular Screening:    General: Screening Not Indicated and History Lipid Disorder      Diabetes Screening:     General: Screening Not Indicated and History Diabetes Colorectal Cancer Screening:     General: Screening Not Indicated      Breast Cancer Screening:     General: Screening Not Indicated      Cervical Cancer Screening:    General: Screening Not Indicated      Osteoporosis Screening:    General: Screening Not Indicated and History Osteoporosis      Abdominal Aortic Aneurysm (AAA) Screening:        General: Screening Not Indicated      Lung Cancer Screening:     General: Screening Not Indicated      Hepatitis C Screening:    General: Screening Not Indicated    Screening, Brief Intervention, and Referral to Treatment (SBIRT)    Screening  Typical number of drinks in a day: 0  Typical number of drinks in a week: 0  Interpretation: Low risk drinking behavior  Single Item Drug Screening:  How often have you used an illegal drug (including marijuana) or a prescription medication for non-medical reasons in the past year? never    Single Item Drug Screen Score: 0  Interpretation: Negative screen for possible drug use disorder    Brief Intervention  Alcohol & drug use screenings were reviewed  No concerns regarding substance use disorder identified         Darrell Isaacs MD

## 2022-04-18 ENCOUNTER — APPOINTMENT (OUTPATIENT)
Dept: LAB | Facility: HOSPITAL | Age: 87
End: 2022-04-18
Attending: INTERNAL MEDICINE
Payer: MEDICARE

## 2022-04-18 DIAGNOSIS — E03.9 ACQUIRED HYPOTHYROIDISM: ICD-10-CM

## 2022-04-18 DIAGNOSIS — I10 ESSENTIAL HYPERTENSION: ICD-10-CM

## 2022-04-18 DIAGNOSIS — E11.22 TYPE 2 DIABETES MELLITUS WITH STAGE 3A CHRONIC KIDNEY DISEASE, WITHOUT LONG-TERM CURRENT USE OF INSULIN (HCC): ICD-10-CM

## 2022-04-18 DIAGNOSIS — N18.31 TYPE 2 DIABETES MELLITUS WITH STAGE 3A CHRONIC KIDNEY DISEASE, WITHOUT LONG-TERM CURRENT USE OF INSULIN (HCC): ICD-10-CM

## 2022-04-18 DIAGNOSIS — I27.82 OTHER CHRONIC PULMONARY EMBOLISM WITHOUT ACUTE COR PULMONALE (HCC): ICD-10-CM

## 2022-04-18 DIAGNOSIS — M81.0 AGE-RELATED OSTEOPOROSIS WITHOUT CURRENT PATHOLOGICAL FRACTURE: ICD-10-CM

## 2022-04-18 DIAGNOSIS — N18.31 STAGE 3A CHRONIC KIDNEY DISEASE (HCC): ICD-10-CM

## 2022-04-18 DIAGNOSIS — I48.20 CHRONIC A-FIB (HCC): ICD-10-CM

## 2022-04-18 LAB
ALBUMIN SERPL BCP-MCNC: 4 G/DL (ref 3.5–5)
ALP SERPL-CCNC: 56 U/L (ref 34–104)
ALT SERPL W P-5'-P-CCNC: 10 U/L (ref 7–52)
ANION GAP SERPL CALCULATED.3IONS-SCNC: 8 MMOL/L (ref 4–13)
AST SERPL W P-5'-P-CCNC: 20 U/L (ref 13–39)
BASOPHILS # BLD AUTO: 0.04 THOUSANDS/ΜL (ref 0–0.1)
BASOPHILS NFR BLD AUTO: 1 % (ref 0–1)
BILIRUB SERPL-MCNC: 0.45 MG/DL (ref 0.2–1)
BUN SERPL-MCNC: 27 MG/DL (ref 5–25)
CALCIUM SERPL-MCNC: 9.1 MG/DL (ref 8.4–10.2)
CHLORIDE SERPL-SCNC: 105 MMOL/L (ref 96–108)
CO2 SERPL-SCNC: 28 MMOL/L (ref 21–32)
CREAT SERPL-MCNC: 1.1 MG/DL (ref 0.6–1.3)
EOSINOPHIL # BLD AUTO: 0.08 THOUSAND/ΜL (ref 0–0.61)
EOSINOPHIL NFR BLD AUTO: 1 % (ref 0–6)
ERYTHROCYTE [DISTWIDTH] IN BLOOD BY AUTOMATED COUNT: 14.3 % (ref 11.6–15.1)
GFR SERPL CREATININE-BSD FRML MDRD: 44 ML/MIN/1.73SQ M
GLUCOSE P FAST SERPL-MCNC: 125 MG/DL (ref 65–99)
HCT VFR BLD AUTO: 45.4 % (ref 34.8–46.1)
HGB BLD-MCNC: 14.3 G/DL (ref 11.5–15.4)
IMM GRANULOCYTES # BLD AUTO: 0.01 THOUSAND/UL (ref 0–0.2)
IMM GRANULOCYTES NFR BLD AUTO: 0 % (ref 0–2)
LYMPHOCYTES # BLD AUTO: 1.19 THOUSANDS/ΜL (ref 0.6–4.47)
LYMPHOCYTES NFR BLD AUTO: 19 % (ref 14–44)
MCH RBC QN AUTO: 32.1 PG (ref 26.8–34.3)
MCHC RBC AUTO-ENTMCNC: 31.5 G/DL (ref 31.4–37.4)
MCV RBC AUTO: 102 FL (ref 82–98)
MONOCYTES # BLD AUTO: 0.52 THOUSAND/ΜL (ref 0.17–1.22)
MONOCYTES NFR BLD AUTO: 8 % (ref 4–12)
NEUTROPHILS # BLD AUTO: 4.52 THOUSANDS/ΜL (ref 1.85–7.62)
NEUTS SEG NFR BLD AUTO: 71 % (ref 43–75)
NRBC BLD AUTO-RTO: 0 /100 WBCS
PLATELET # BLD AUTO: 149 THOUSANDS/UL (ref 149–390)
PMV BLD AUTO: 12 FL (ref 8.9–12.7)
POTASSIUM SERPL-SCNC: 4.2 MMOL/L (ref 3.5–5.3)
PROT SERPL-MCNC: 6.9 G/DL (ref 6.4–8.4)
RBC # BLD AUTO: 4.45 MILLION/UL (ref 3.81–5.12)
SODIUM SERPL-SCNC: 141 MMOL/L (ref 135–147)
TSH SERPL DL<=0.05 MIU/L-ACNC: 2.11 UIU/ML (ref 0.45–4.5)
WBC # BLD AUTO: 6.36 THOUSAND/UL (ref 4.31–10.16)

## 2022-04-18 PROCEDURE — 83970 ASSAY OF PARATHORMONE: CPT

## 2022-04-18 PROCEDURE — 82306 VITAMIN D 25 HYDROXY: CPT

## 2022-04-18 PROCEDURE — 85025 COMPLETE CBC W/AUTO DIFF WBC: CPT

## 2022-04-18 PROCEDURE — 84443 ASSAY THYROID STIM HORMONE: CPT

## 2022-04-18 PROCEDURE — 83036 HEMOGLOBIN GLYCOSYLATED A1C: CPT

## 2022-04-18 PROCEDURE — 80053 COMPREHEN METABOLIC PANEL: CPT

## 2022-04-19 LAB
25(OH)D3 SERPL-MCNC: 68.1 NG/ML (ref 30–100)
EST. AVERAGE GLUCOSE BLD GHB EST-MCNC: 154 MG/DL
HBA1C MFR BLD: 7 %
PTH-INTACT SERPL-MCNC: 92.3 PG/ML (ref 18.4–80.1)

## 2022-07-18 DIAGNOSIS — E11.65 TYPE 2 DIABETES MELLITUS WITH HYPERGLYCEMIA, WITHOUT LONG-TERM CURRENT USE OF INSULIN (HCC): ICD-10-CM

## 2022-07-18 RX ORDER — GLIPIZIDE 5 MG/1
5 TABLET ORAL
Qty: 180 TABLET | Refills: 3 | Status: SHIPPED | OUTPATIENT
Start: 2022-07-18

## 2022-07-18 NOTE — TELEPHONE ENCOUNTER
Patient requesting refill(s) of: glipizide 5 mg BID    Last filled: 9/9/2021 #180 x 3  Last appt: 10/4/2021  Next appt: 10/10/2022  [facility]

## 2022-09-06 DIAGNOSIS — E11.69 TYPE 2 DIABETES MELLITUS WITH OTHER SPECIFIED COMPLICATION, WITHOUT LONG-TERM CURRENT USE OF INSULIN (HCC): ICD-10-CM

## 2022-09-06 RX ORDER — PERPHENAZINE 16 MG/1
TABLET, FILM COATED ORAL
Qty: 100 EACH | Refills: 5 | Status: SHIPPED | OUTPATIENT
Start: 2022-09-06 | End: 2022-09-12 | Stop reason: SDUPTHER

## 2022-09-06 RX ORDER — LANCETS
EACH MISCELLANEOUS
Qty: 100 EACH | Refills: 5 | Status: SHIPPED | OUTPATIENT
Start: 2022-09-06 | End: 2022-09-12 | Stop reason: SDUPTHER
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
## 2022-09-12 DIAGNOSIS — E11.69 TYPE 2 DIABETES MELLITUS WITH OTHER SPECIFIED COMPLICATION, WITHOUT LONG-TERM CURRENT USE OF INSULIN (HCC): ICD-10-CM

## 2022-09-12 RX ORDER — PERPHENAZINE 16 MG/1
TABLET, FILM COATED ORAL
Qty: 100 EACH | Refills: 5 | Status: SHIPPED | OUTPATIENT
Start: 2022-09-12

## 2022-09-12 RX ORDER — LANCETS
EACH MISCELLANEOUS
Qty: 100 EACH | Refills: 5 | Status: SHIPPED | OUTPATIENT
Start: 2022-09-12

## 2022-09-12 NOTE — TELEPHONE ENCOUNTER
Patient requesting refill(s) of:  Microlet Lancets MISC  Last filled:8/30/21  Last appt:10/10/22  Next appt:4/6/22  Pharmacy:[facility]     Patient requesting refill(s) of:  Contour Next Test test strip  Last filled:8/30/21    Patient called back and asked for her diabetic supplies to be sent through [facility] in [place] she will call them to have them deliver the order to her instead,

## 2022-10-10 ENCOUNTER — APPOINTMENT (OUTPATIENT)
Dept: LAB | Facility: CLINIC | Age: 87
End: 2022-10-10
Payer: MEDICARE

## 2022-10-10 ENCOUNTER — OFFICE VISIT (OUTPATIENT)
Dept: FAMILY MEDICINE CLINIC | Facility: CLINIC | Age: 87
End: 2022-10-10
Payer: MEDICARE

## 2022-10-10 VITALS
TEMPERATURE: 96.8 F | HEIGHT: 65 IN | BODY MASS INDEX: 22.49 KG/M2 | DIASTOLIC BLOOD PRESSURE: 84 MMHG | WEIGHT: 135 LBS | RESPIRATION RATE: 18 BRPM | HEART RATE: 60 BPM | OXYGEN SATURATION: 98 % | SYSTOLIC BLOOD PRESSURE: 140 MMHG

## 2022-10-10 DIAGNOSIS — N18.31 TYPE 2 DIABETES MELLITUS WITH STAGE 3A CHRONIC KIDNEY DISEASE, WITHOUT LONG-TERM CURRENT USE OF INSULIN (HCC): Primary | ICD-10-CM

## 2022-10-10 DIAGNOSIS — I27.82 OTHER CHRONIC PULMONARY EMBOLISM WITHOUT ACUTE COR PULMONALE (HCC): ICD-10-CM

## 2022-10-10 DIAGNOSIS — E11.22 TYPE 2 DIABETES MELLITUS WITH STAGE 3A CHRONIC KIDNEY DISEASE, WITHOUT LONG-TERM CURRENT USE OF INSULIN (HCC): ICD-10-CM

## 2022-10-10 DIAGNOSIS — N18.31 TYPE 2 DIABETES MELLITUS WITH STAGE 3A CHRONIC KIDNEY DISEASE, WITHOUT LONG-TERM CURRENT USE OF INSULIN (HCC): ICD-10-CM

## 2022-10-10 DIAGNOSIS — I48.20 CHRONIC A-FIB (HCC): ICD-10-CM

## 2022-10-10 DIAGNOSIS — E11.22 TYPE 2 DIABETES MELLITUS WITH STAGE 3A CHRONIC KIDNEY DISEASE, WITHOUT LONG-TERM CURRENT USE OF INSULIN (HCC): Primary | ICD-10-CM

## 2022-10-10 DIAGNOSIS — C67.8 MALIGNANT NEOPLASM OF OVERLAPPING SITES OF BLADDER (HCC): ICD-10-CM

## 2022-10-10 DIAGNOSIS — E03.9 HYPOTHYROIDISM, UNSPECIFIED TYPE: ICD-10-CM

## 2022-10-10 DIAGNOSIS — D69.6 THROMBOCYTOPENIA (HCC): ICD-10-CM

## 2022-10-10 DIAGNOSIS — I10 ESSENTIAL HYPERTENSION: ICD-10-CM

## 2022-10-10 DIAGNOSIS — Z23 ENCOUNTER FOR IMMUNIZATION: ICD-10-CM

## 2022-10-10 PROBLEM — I82.4Z9 EMBOLISM FROM THROMBOSIS OF VEIN OF DISTAL END OF LOWER EXTREMITY (HCC): Status: RESOLVED | Noted: 2017-03-27 | Resolved: 2022-10-10

## 2022-10-10 LAB
ALBUMIN SERPL BCP-MCNC: 3.8 G/DL (ref 3.5–5)
ALP SERPL-CCNC: 69 U/L (ref 46–116)
ALT SERPL W P-5'-P-CCNC: 20 U/L (ref 12–78)
ANION GAP SERPL CALCULATED.3IONS-SCNC: 7 MMOL/L (ref 4–13)
AST SERPL W P-5'-P-CCNC: 18 U/L (ref 5–45)
BACTERIA UR QL AUTO: ABNORMAL /HPF
BASOPHILS # BLD AUTO: 0.04 THOUSANDS/ΜL (ref 0–0.1)
BASOPHILS NFR BLD AUTO: 1 % (ref 0–1)
BILIRUB SERPL-MCNC: 0.43 MG/DL (ref 0.2–1)
BILIRUB UR QL STRIP: NEGATIVE
BUN SERPL-MCNC: 33 MG/DL (ref 5–25)
CALCIUM SERPL-MCNC: 8.8 MG/DL (ref 8.3–10.1)
CHLORIDE SERPL-SCNC: 107 MMOL/L (ref 96–108)
CLARITY UR: CLEAR
CO2 SERPL-SCNC: 25 MMOL/L (ref 21–32)
COLOR UR: YELLOW
CREAT SERPL-MCNC: 1.39 MG/DL (ref 0.6–1.3)
EOSINOPHIL # BLD AUTO: 0.02 THOUSAND/ΜL (ref 0–0.61)
EOSINOPHIL NFR BLD AUTO: 0 % (ref 0–6)
ERYTHROCYTE [DISTWIDTH] IN BLOOD BY AUTOMATED COUNT: 14.3 % (ref 11.6–15.1)
GFR SERPL CREATININE-BSD FRML MDRD: 33 ML/MIN/1.73SQ M
GLUCOSE P FAST SERPL-MCNC: 146 MG/DL (ref 65–99)
GLUCOSE UR STRIP-MCNC: ABNORMAL MG/DL
HCT VFR BLD AUTO: 46.5 % (ref 34.8–46.1)
HGB BLD-MCNC: 14.5 G/DL (ref 11.5–15.4)
HGB UR QL STRIP.AUTO: NEGATIVE
HYALINE CASTS #/AREA URNS LPF: ABNORMAL /LPF
IMM GRANULOCYTES # BLD AUTO: 0.01 THOUSAND/UL (ref 0–0.2)
IMM GRANULOCYTES NFR BLD AUTO: 0 % (ref 0–2)
KETONES UR STRIP-MCNC: NEGATIVE MG/DL
LEUKOCYTE ESTERASE UR QL STRIP: ABNORMAL
LYMPHOCYTES # BLD AUTO: 1.59 THOUSANDS/ΜL (ref 0.6–4.47)
LYMPHOCYTES NFR BLD AUTO: 22 % (ref 14–44)
MCH RBC QN AUTO: 31.7 PG (ref 26.8–34.3)
MCHC RBC AUTO-ENTMCNC: 31.2 G/DL (ref 31.4–37.4)
MCV RBC AUTO: 102 FL (ref 82–98)
MONOCYTES # BLD AUTO: 0.67 THOUSAND/ΜL (ref 0.17–1.22)
MONOCYTES NFR BLD AUTO: 9 % (ref 4–12)
MUCOUS THREADS UR QL AUTO: ABNORMAL
NEUTROPHILS # BLD AUTO: 4.98 THOUSANDS/ΜL (ref 1.85–7.62)
NEUTS SEG NFR BLD AUTO: 68 % (ref 43–75)
NITRITE UR QL STRIP: NEGATIVE
NON-SQ EPI CELLS URNS QL MICRO: ABNORMAL /HPF
NRBC BLD AUTO-RTO: 0 /100 WBCS
PH UR STRIP.AUTO: 5.5 [PH]
PLATELET # BLD AUTO: 173 THOUSANDS/UL (ref 149–390)
PMV BLD AUTO: 12.1 FL (ref 8.9–12.7)
POTASSIUM SERPL-SCNC: 4.2 MMOL/L (ref 3.5–5.3)
PROT SERPL-MCNC: 7.4 G/DL (ref 6.4–8.4)
PROT UR STRIP-MCNC: ABNORMAL MG/DL
RBC # BLD AUTO: 4.58 MILLION/UL (ref 3.81–5.12)
RBC #/AREA URNS AUTO: ABNORMAL /HPF
SODIUM SERPL-SCNC: 139 MMOL/L (ref 135–147)
SP GR UR STRIP.AUTO: 1.03 (ref 1–1.03)
T4 FREE SERPL-MCNC: 1.59 NG/DL (ref 0.76–1.46)
TSH SERPL DL<=0.05 MIU/L-ACNC: 0.21 UIU/ML (ref 0.45–4.5)
UROBILINOGEN UR STRIP-ACNC: <2 MG/DL
WBC # BLD AUTO: 7.31 THOUSAND/UL (ref 4.31–10.16)
WBC #/AREA URNS AUTO: ABNORMAL /HPF

## 2022-10-10 PROCEDURE — 90662 IIV NO PRSV INCREASED AG IM: CPT

## 2022-10-10 PROCEDURE — 83036 HEMOGLOBIN GLYCOSYLATED A1C: CPT

## 2022-10-10 PROCEDURE — G0008 ADMIN INFLUENZA VIRUS VAC: HCPCS

## 2022-10-10 PROCEDURE — 84443 ASSAY THYROID STIM HORMONE: CPT

## 2022-10-10 PROCEDURE — 36415 COLL VENOUS BLD VENIPUNCTURE: CPT

## 2022-10-10 PROCEDURE — 82043 UR ALBUMIN QUANTITATIVE: CPT

## 2022-10-10 PROCEDURE — 82570 ASSAY OF URINE CREATININE: CPT

## 2022-10-10 PROCEDURE — 99214 OFFICE O/P EST MOD 30 MIN: CPT | Performed by: INTERNAL MEDICINE

## 2022-10-10 PROCEDURE — 84439 ASSAY OF FREE THYROXINE: CPT

## 2022-10-10 PROCEDURE — 81001 URINALYSIS AUTO W/SCOPE: CPT

## 2022-10-10 PROCEDURE — 85025 COMPLETE CBC W/AUTO DIFF WBC: CPT

## 2022-10-10 PROCEDURE — 80053 COMPREHEN METABOLIC PANEL: CPT

## 2022-10-10 NOTE — PROGRESS NOTES
[facility]        NAME: Jethro Castellon is a 80 y o  female  : 1933    MRN: [id number]  DATE: 2022  TIME: 7:36 AM    Assessment and Plan   1  Type 2 diabetes mellitus with stage 3a chronic kidney disease, without long-term current use of insulin (HCC)  -     CBC and differential; Future  -     Comprehensive metabolic panel; Future  -     HEMOGLOBIN A1C W/ EAG ESTIMATION; Future  -     Microalbumin / creatinine urine ratio; Future; Expected date: 10/10/2022    2  Essential hypertension  -     CBC and differential; Future  -     Comprehensive metabolic panel; Future  -     Microalbumin / creatinine urine ratio; Future; Expected date: 10/10/2022    3  Chronic a-fib (HCC)  -     CBC and differential; Future  -     Comprehensive metabolic panel; Future    4  Other chronic pulmonary embolism without acute cor pulmonale (HCC)  -     CBC and differential; Future  -     Comprehensive metabolic panel; Future    5  Hypothyroidism, unspecified type  -     TSH, 3rd generation with Free T4 reflex; Future    6  Malignant neoplasm of overlapping sites of bladder (HCC)  -     CBC and differential; Future  -     Comprehensive metabolic panel; Future  -     Urinalysis with microscopic; Future    7  Thrombocytopenia (HCC)  -     CBC and differential; Future  -     Comprehensive metabolic panel; Future    8  Encounter for immunization  -     influenza vaccine, high-dose, PF 0 7 mL (FLUZONE HIGH-DOSE)           Chief Complaint     Chief Complaint   Patient presents with   • Follow-up     Pt states she is stressed and had an episode of chest pain last night  States her BP was high  Denies SOB, arm or jaw pain         History of Present Illness       81yo female with diabetes, history of PE, hypothyroidism here for 6 month follow up  Still living alone with her  in [facility]  They did drive here today but it was arduous getting out of the house  They are otherwise essentially home-bound  Granddaughter orders groceries online, they have all medications delivered by pharmacy  Reports a lot of anxiety, difficulty sleeping  Had an episode of chest pain and palpitations last night while lying in bed  Feels anxious about who would care for her  if she passes  Feeling better today, no pain or SOB with exertion  Review of Systems   Review of Systems   Constitutional: Positive for appetite change and fatigue  Negative for chills and fever  Eyes: Negative for visual disturbance  Respiratory: Negative for cough and shortness of breath  Cardiovascular: Positive for chest pain  Negative for palpitations and leg swelling  Gastrointestinal: Positive for constipation  Negative for abdominal pain, diarrhea, nausea and vomiting  Genitourinary: Positive for frequency and urgency  Negative for hematuria  Musculoskeletal: Positive for arthralgias, back pain and neck pain  Neurological: Positive for dizziness and light-headedness  Psychiatric/Behavioral: Positive for sleep disturbance  The patient is nervous/anxious            Current Medications       Current Outpatient Medications:   •  apixaban (Eliquis) 2 5 mg, Take 1 tablet (2 5 mg total) by mouth 2 (two) times a day, Disp: 180 tablet, Rfl: 3  •  Contour Next Test test strip, Testing twice daily, Disp: 100 each, Rfl: 5  •  glipiZIDE (GLUCOTROL) 5 mg tablet, Take 1 tablet (5 mg total) by mouth 2 (two) times a day before meals, Disp: 180 tablet, Rfl: 3  •  levothyroxine 50 mcg tablet, Take 1 tablet (50 mcg total) by mouth daily, Disp: 90 tablet, Rfl: 3  •  lisinopril (ZESTRIL) 5 mg tablet, take 1 tablet by mouth once daily, Disp: 90 tablet, Rfl: 2  •  lovastatin (MEVACOR) 20 mg tablet, Take 1 tablet (20 mg total) by mouth daily at bedtime, Disp: 90 tablet, Rfl: 3  •  metFORMIN (GLUCOPHAGE) 1000 MG tablet, Take 1 tablet (1,000 mg total) by mouth 2 (two) times a day with meals, Disp: 180 tablet, Rfl: 3  •  metoprolol tartrate (LOPRESSOR) 25 mg tablet, Take 1 tablet (25 mg total) by mouth every 12 (twelve) hours, Disp: 180 tablet, Rfl: 3  •  Microlet Lancets MISC, Testing twice daily, Disp: 100 each, Rfl: 5  •  acetaminophen (TYLENOL) 500 mg tablet, Take 500 mg by mouth every 6 (six) hours as needed (Patient not taking: No sig reported), Disp: , Rfl:   •  B Complex Vitamins (B-COMPLEX/B-12 PO), Take by mouth daily  (Patient not taking: Reported on 10/10/2022), Disp: , Rfl:   •  magnesium gluconate (MAGONATE) 500 mg tablet, Take 500 mg by mouth daily , Disp: , Rfl:     Current Allergies     Allergies as of 10/10/2022   • (No Known Allergies)            The following portions of the patient's history were reviewed and updated as appropriate: allergies, current medications, past family history, past medical history, past social history, past surgical history and problem list      Past Medical History:   Diagnosis Date   • Abnormal gait    • Accidental fall from chair, subsequent encounter    • Atrial fibrillation ([facility])    • Benign essential hypertension    • Bite of animal    • Cataract    • Cellulitis    • Chronic kidney disease    • Chronic kidney disease, stage 3 ([facility])    • Colon cancer ([facility] )    • Colon polyp    • Current tear of lateral cartilage or meniscus of knee    • Diabetes mellitus ([address] )    • Disease of thyroid gland    • Disorder of magnesium metabolism    • Dvt femoral (deep venous thrombosis)    • Embolism from thrombosis of vein of distal end of lower extremity ([address] )    • Essential hypertension    • Fall    • Hyperlipidemia    • Hyperlipidemia    • Hypertension    • Hypothyroidism    • Insomnia    • Kidney stone    • Knee joint effusion    • Leukocytosis    • Malaise and fatigue    • MI (myocardial infarction) ([facility] )     Hospitalization    • Orbital hemorrhage    • Other sleep disorders    • PAF (paroxysmal atrial fibrillation) ([facility])    • Presence of vena cava filter    • Pulmonary embolism ([facility])    • Tear film insufficiency    • Type 2 diabetes mellitus ([facility] )    • Unspecified osteoarthritis, unspecified site    • Weight decreased        Past Surgical History:   Procedure Laterality Date   • ABDOMINAL ADHESION SURGERY  2015    Had wound vac   • ABDOMINAL SURGERY     • APPENDECTOMY     • CATARACT EXTRACTION     • CHOLECYSTECTOMY     • COLON SURGERY     • COLOSTOMY  2011    Due to Ileus, then reanastomosis in 2002   • COLOSTOMY TAKEDOWN/REVERSE KUMRA     • EXCISION BLADDER MESH TRANSVESICLEPORT W/ LASER     • HERNIA REPAIR     • IVC FILTER INSERTION     • KNEE SURGERY  2014    repair    • AK CYSTOURETHROSCOPY,FULGUR >5 CM LESN N/A 4/30/2021    Procedure: CYSTOSCOPY, TRANSURETHRAL RESECTION OF BLADDER TUMOR (TURBT); Surgeon: Allison Glasgow MD;  Location: [address] [place] [address] MAIN OR;  Service: Urology       Family History   Problem Relation Age of Onset   • Deep vein thrombosis Mother          Medications have been verified  Objective   /84   Pulse 60   Temp (!) 96 8 °F (36 °C)   Resp 18   Ht 5' 5" (1 651 m)   Wt 61 2 kg (135 lb)   SpO2 98%   BMI 22 47 kg/m²        Physical Exam     Physical Exam  Vitals reviewed  Constitutional:       General: She is not in acute distress  Appearance: She is normal weight  Cardiovascular:      Rate and Rhythm: Normal rate and regular rhythm  Heart sounds: No murmur heard  No friction rub  No gallop  Pulmonary:      Effort: Pulmonary effort is normal  No respiratory distress  Breath sounds: Normal breath sounds  No wheezing, rhonchi or rales  Abdominal:      General: Abdomen is flat  A surgical scar is present  Bowel sounds are normal       Palpations: Abdomen is soft  Neurological:      General: No focal deficit present  Mental Status: She is alert  Motor: Weakness (hip flexors) present  Gait: Gait abnormal (ambulates with walker)     Psychiatric:         Mood and Affect: Mood and affect normal          Speech: Speech normal          Behavior: Behavior normal  Behavior is cooperative               Results:  Lab Results   Component Value Date    SODIUM 139 10/10/2022    K 4 2 10/10/2022     10/10/2022    CO2 25 10/10/2022    BUN 33 (H) 10/10/2022    CREATININE 1 39 (H) 10/10/2022    GLUC 134 (H) 05/01/2021    CALCIUM 8 8 10/10/2022       Lab Results   Component Value Date    HGBA1C 7 0 (H) 10/10/2022       Lab Results   Component Value Date    WBC 7 31 10/10/2022    HGB 14 5 10/10/2022    HCT 46 5 (H) 10/10/2022     (H) 10/10/2022     10/10/2022

## 2022-10-11 DIAGNOSIS — N18.31 STAGE 3A CHRONIC KIDNEY DISEASE (HCC): Primary | ICD-10-CM

## 2022-10-11 DIAGNOSIS — E03.9 ACQUIRED HYPOTHYROIDISM: ICD-10-CM

## 2022-10-11 LAB
CREAT UR-MCNC: 280 MG/DL
EST. AVERAGE GLUCOSE BLD GHB EST-MCNC: 154 MG/DL
HBA1C MFR BLD: 7 %
MICROALBUMIN UR-MCNC: 329 MG/L (ref 0–20)
MICROALBUMIN/CREAT 24H UR: 118 MG/G CREATININE (ref 0–30)

## 2022-10-11 RX ORDER — LEVOTHYROXINE SODIUM 0.03 MG/1
25 TABLET ORAL DAILY
Qty: 90 TABLET | Refills: 1 | Status: SHIPPED | OUTPATIENT
Start: 2022-10-11

## 2022-11-28 DIAGNOSIS — N18.31 STAGE 3A CHRONIC KIDNEY DISEASE (HCC): Primary | ICD-10-CM

## 2022-11-28 DIAGNOSIS — E83.51 HYPOCALCEMIA: ICD-10-CM

## 2022-11-28 DIAGNOSIS — R80.9 PROTEINURIA, UNSPECIFIED TYPE: ICD-10-CM

## 2022-11-28 RX ORDER — LISINOPRIL 5 MG/1
TABLET ORAL
Qty: 90 TABLET | Refills: 2 | Status: SHIPPED | OUTPATIENT
Start: 2022-11-28

## 2022-11-29 ENCOUNTER — TELEPHONE (OUTPATIENT)
Dept: LAB | Facility: HOSPITAL | Age: 87
End: 2022-11-29

## 2022-11-30 ENCOUNTER — TELEPHONE (OUTPATIENT)
Dept: NEPHROLOGY | Facility: CLINIC | Age: 87
End: 2022-11-30

## 2022-11-30 DIAGNOSIS — R80.9 PROTEINURIA, UNSPECIFIED TYPE: ICD-10-CM

## 2022-11-30 RX ORDER — LISINOPRIL 5 MG/1
5 TABLET ORAL DAILY
Qty: 90 TABLET | Refills: 2 | Status: CANCELLED | OUTPATIENT
Start: 2022-11-30

## 2022-12-12 ENCOUNTER — APPOINTMENT (OUTPATIENT)
Dept: LAB | Facility: HOSPITAL | Age: 87
End: 2022-12-12
Attending: INTERNAL MEDICINE

## 2022-12-12 DIAGNOSIS — E83.51 HYPOCALCEMIA: ICD-10-CM

## 2022-12-12 DIAGNOSIS — E03.9 ACQUIRED HYPOTHYROIDISM: ICD-10-CM

## 2022-12-12 DIAGNOSIS — N18.31 STAGE 3A CHRONIC KIDNEY DISEASE (HCC): ICD-10-CM

## 2022-12-12 LAB
25(OH)D3 SERPL-MCNC: 64 NG/ML (ref 30–100)
ALBUMIN SERPL BCP-MCNC: 4.1 G/DL (ref 3.5–5)
ALP SERPL-CCNC: 64 U/L (ref 34–104)
ALT SERPL W P-5'-P-CCNC: 12 U/L (ref 7–52)
ANION GAP SERPL CALCULATED.3IONS-SCNC: 10 MMOL/L (ref 4–13)
AST SERPL W P-5'-P-CCNC: 21 U/L (ref 13–39)
BASOPHILS # BLD AUTO: 0.04 THOUSANDS/ÂΜL (ref 0–0.1)
BASOPHILS NFR BLD AUTO: 1 % (ref 0–1)
BILIRUB SERPL-MCNC: 0.51 MG/DL (ref 0.2–1)
BUN SERPL-MCNC: 24 MG/DL (ref 5–25)
CALCIUM SERPL-MCNC: 9.1 MG/DL (ref 8.4–10.2)
CHLORIDE SERPL-SCNC: 103 MMOL/L (ref 96–108)
CO2 SERPL-SCNC: 27 MMOL/L (ref 21–32)
CREAT SERPL-MCNC: 1.13 MG/DL (ref 0.6–1.3)
EOSINOPHIL # BLD AUTO: 0.04 THOUSAND/ÂΜL (ref 0–0.61)
EOSINOPHIL NFR BLD AUTO: 1 % (ref 0–6)
ERYTHROCYTE [DISTWIDTH] IN BLOOD BY AUTOMATED COUNT: 13.8 % (ref 11.6–15.1)
GFR SERPL CREATININE-BSD FRML MDRD: 43 ML/MIN/1.73SQ M
GLUCOSE P FAST SERPL-MCNC: 153 MG/DL (ref 65–99)
HCT VFR BLD AUTO: 44.5 % (ref 34.8–46.1)
HGB BLD-MCNC: 14.4 G/DL (ref 11.5–15.4)
IMM GRANULOCYTES # BLD AUTO: 0.03 THOUSAND/UL (ref 0–0.2)
IMM GRANULOCYTES NFR BLD AUTO: 1 % (ref 0–2)
LYMPHOCYTES # BLD AUTO: 1.09 THOUSANDS/ÂΜL (ref 0.6–4.47)
LYMPHOCYTES NFR BLD AUTO: 17 % (ref 14–44)
MAGNESIUM SERPL-MCNC: 1.9 MG/DL (ref 1.9–2.7)
MCH RBC QN AUTO: 32.4 PG (ref 26.8–34.3)
MCHC RBC AUTO-ENTMCNC: 32.4 G/DL (ref 31.4–37.4)
MCV RBC AUTO: 100 FL (ref 82–98)
MONOCYTES # BLD AUTO: 0.47 THOUSAND/ÂΜL (ref 0.17–1.22)
MONOCYTES NFR BLD AUTO: 7 % (ref 4–12)
NEUTROPHILS # BLD AUTO: 4.7 THOUSANDS/ÂΜL (ref 1.85–7.62)
NEUTS SEG NFR BLD AUTO: 73 % (ref 43–75)
NRBC BLD AUTO-RTO: 0 /100 WBCS
PHOSPHATE SERPL-MCNC: 3.7 MG/DL (ref 2.3–4.1)
PLATELET # BLD AUTO: 156 THOUSANDS/UL (ref 149–390)
PMV BLD AUTO: 12 FL (ref 8.9–12.7)
POTASSIUM SERPL-SCNC: 4.2 MMOL/L (ref 3.5–5.3)
PROT SERPL-MCNC: 7 G/DL (ref 6.4–8.4)
PTH-INTACT SERPL-MCNC: 76 PG/ML (ref 18.4–80.1)
RBC # BLD AUTO: 4.45 MILLION/UL (ref 3.81–5.12)
SODIUM SERPL-SCNC: 140 MMOL/L (ref 135–147)
T4 FREE SERPL-MCNC: 1.62 NG/DL (ref 0.76–1.46)
TSH SERPL DL<=0.05 MIU/L-ACNC: 0.43 UIU/ML (ref 0.45–4.5)
WBC # BLD AUTO: 6.37 THOUSAND/UL (ref 4.31–10.16)

## 2022-12-19 DIAGNOSIS — E11.65 TYPE 2 DIABETES MELLITUS WITH HYPERGLYCEMIA, WITHOUT LONG-TERM CURRENT USE OF INSULIN (HCC): ICD-10-CM

## 2022-12-19 NOTE — TELEPHONE ENCOUNTER
Patient requesting refill(s) of: metformin 1000 mg BID    Last filled: 2/7/2022 #180 x 3  Last appt: 10/10/2022  Next appt: 4/10/2023  Pharmacy: [facility]

## 2022-12-21 ENCOUNTER — OFFICE VISIT (OUTPATIENT)
Dept: NEPHROLOGY | Facility: CLINIC | Age: 87
End: 2022-12-21

## 2022-12-21 VITALS
HEIGHT: 65 IN | BODY MASS INDEX: 22.16 KG/M2 | WEIGHT: 133 LBS | DIASTOLIC BLOOD PRESSURE: 64 MMHG | SYSTOLIC BLOOD PRESSURE: 110 MMHG | OXYGEN SATURATION: 99 % | HEART RATE: 67 BPM

## 2022-12-21 DIAGNOSIS — E11.22 TYPE 2 DIABETES MELLITUS WITH STAGE 3A CHRONIC KIDNEY DISEASE, WITHOUT LONG-TERM CURRENT USE OF INSULIN (HCC): ICD-10-CM

## 2022-12-21 DIAGNOSIS — N18.31 TYPE 2 DIABETES MELLITUS WITH STAGE 3A CHRONIC KIDNEY DISEASE, WITHOUT LONG-TERM CURRENT USE OF INSULIN (HCC): ICD-10-CM

## 2022-12-21 DIAGNOSIS — C67.8 MALIGNANT NEOPLASM OF OVERLAPPING SITES OF BLADDER (HCC): ICD-10-CM

## 2022-12-21 DIAGNOSIS — R63.4 LOSS OF WEIGHT: ICD-10-CM

## 2022-12-21 DIAGNOSIS — I10 ESSENTIAL HYPERTENSION: ICD-10-CM

## 2022-12-21 DIAGNOSIS — I48.20 CHRONIC A-FIB (HCC): ICD-10-CM

## 2022-12-21 DIAGNOSIS — E11.69 TYPE 2 DIABETES MELLITUS WITH OTHER SPECIFIED COMPLICATION, WITHOUT LONG-TERM CURRENT USE OF INSULIN (HCC): ICD-10-CM

## 2022-12-21 DIAGNOSIS — N18.32 STAGE 3B CHRONIC KIDNEY DISEASE (HCC): Primary | ICD-10-CM

## 2022-12-21 RX ORDER — MELATONIN
1000 DAILY
COMMUNITY

## 2022-12-21 RX ORDER — WARFARIN SODIUM 5 MG/1
5 TABLET ORAL
COMMUNITY
End: 2022-12-21 | Stop reason: ALTCHOICE

## 2022-12-21 RX ORDER — PHENOL 1.4 %
600 AEROSOL, SPRAY (ML) MUCOUS MEMBRANE 2 TIMES DAILY WITH MEALS
COMMUNITY

## 2022-12-21 RX ORDER — UBIDECARENONE 75 MG
1000 CAPSULE ORAL DAILY
COMMUNITY

## 2022-12-21 NOTE — ASSESSMENT & PLAN NOTE
Lab Results   Component Value Date    EGFR 43 12/12/2022    EGFR 33 10/10/2022    EGFR 44 04/18/2022    CREATININE 1 13 12/12/2022    CREATININE 1 39 (H) 10/10/2022    CREATININE 1 10 04/18/2022   Has stage IIIB A2 chronic kidney disease with a normal ultrasound  Likely related to age related nephron loss and diabetes for > 25 years  She avoids NSAIDS and takes Tylenol  Continue lisinopril for renal protection  I have reviewed her medications for potential nephrotoxins    She has adequate GFR to take metformin  Would reduce the dose however due to her advanced age to 500 mg twice a day  Would not start an SGLT inhibitor due to her advanced age, risk of UTI and dizziness from potential diuresis

## 2022-12-21 NOTE — PROGRESS NOTES
[address] [facility], [place]    Name: Fabi Lugo  YOB: 1933      Assessment/Plan:       Problem List Items Addressed This Visit        Endocrine    Type 2 diabetes mellitus with stage 3a chronic kidney disease ([facility] )       Lab Results   Component Value Date    HGBA1C 7 0 (H) 10/10/2022   Under your supervision         Relevant Medications    metFORMIN (GLUCOPHAGE) 500 mg tablet       Cardiovascular and Mediastinum    Chronic a-fib ([facility] )     Has good rate control and uses Eliquis for [facility]         Essential hypertension       Genitourinary    Malignant neoplasm of overlapping sites of bladder ([facility] )     Followed by Dr Mark Rivas closely         Stage 3b chronic kidney disease [facility]) - Primary     Lab Results   Component Value Date    EGFR 43 12/12/2022    EGFR 33 10/10/2022    EGFR 44 04/18/2022    CREATININE 1 13 12/12/2022    CREATININE 1 39 (H) 10/10/2022    CREATININE 1 10 04/18/2022   Has stage IIIB A2 chronic kidney disease with a normal ultrasound  Likely related to age related nephron loss and diabetes for > 25 years  She avoids NSAIDS and takes Tylenol  Continue lisinopril for renal protection  I have reviewed her medications for potential nephrotoxins  She has adequate GFR to take metformin  Would reduce the dose however due to her advanced age to 500 mg twice a day  Would not start an SGLT inhibitor due to her advanced age, risk of UTI and dizziness from potential diuresis              Other    Loss of weight     She was 146 lbs in June 2021  This may be due to dietary changes  Continue to follow        Other Visit Diagnoses     Type 2 diabetes mellitus with other specified complication, without long-term current use of insulin (HCC)        Relevant Medications    metFORMIN (GLUCOPHAGE) 500 mg tablet            Subjective:      Patient ID: Fabi Lugo is a 80 y o  female  referred by Dr Derek Holly    HPI She reports a 26 lb weight loss since last visit   She is getting frozen dinners delivered and eating less  She had seen Dr Vanessa Calle and she had a cystoscopy  She has DM -2, hypertension, hypothyroidism and chronic atrial fibrillation  The following portions of the patient's history were reviewed and updated as appropriate: allergies, current medications, past family history, past medical history, past social history, past surgical history and problem list     Review of Systems   Constitutional: Positive for fatigue and unexpected weight change  HENT: Positive for hearing loss  Respiratory: Negative for cough and shortness of breath  Cardiovascular: Negative for chest pain  Gastrointestinal: Negative for abdominal pain  History of colon cancer with colostomy ans subsequent reversal   She has episodes of fecal incontinence if she doesn't get tot he bathroom on time and wears disposable underwear   Endocrine: Positive for polyuria  Genitourinary: Positive for frequency and urgency  Negative for hematuria  Nocturia 8-10 times a night  Does not drink al lot of water int he evening  Minimal urgency   Musculoskeletal: Positive for arthralgias and gait problem  Uses a walker   Neurological: Positive for weakness and light-headedness  Has difficulty walking and has chronic right knee pain  Hematological: Bruises/bleeds easily     Psychiatric/Behavioral:        Says she is bored as she stays at home all the time         Social History     Socioeconomic History   • Marital status: /Civil Union     Spouse name: None   • Number of children: None   • Years of education: None   • Highest education level: None   Occupational History   • Occupation: Retired    Tobacco Use   • Smoking status: Former     Types: Cigarettes     Quit date: 3/19/2001     Years since quittin 7   • Smokeless tobacco: Never   Vaping Use   • Vaping Use: Never used   Substance and Sexual Activity   • Alcohol use: Never   • Drug use: Never   • Sexual activity: Not Currently   Other Topics Concern   • None   Social History Narrative    Rarely consumes alcohol - As per [employer]    Consumes on average 2 cups of regular coffee per day    Consumes on average 12 oz of soda per day     Social Determinants of Health     Financial Resource Strain: Not on file   Food Insecurity: Not on file   Transportation Needs: Not on file   Physical Activity: Not on file   Stress: Not on file   Social Connections: Not on file   Intimate Partner Violence: Not on file   Housing Stability: Not on file     Past Medical History:   Diagnosis Date   • Abnormal gait    • Accidental fall from chair, subsequent encounter    • Atrial fibrillation ([place] [address] )    • Benign essential hypertension    • Bite of animal    • Cataract    • Cellulitis    • Chronic kidney disease    • Chronic kidney disease, stage 3 ([place] [address] )    • Colon cancer ([place] [address] )    • Colon polyp    • Current tear of lateral cartilage or meniscus of knee    • Diabetes mellitus ([place] [address] )    • Disease of thyroid gland    • Disorder of magnesium metabolism    • Dvt femoral (deep venous thrombosis)    • Embolism from thrombosis of vein of distal end of lower extremity ([place] [address] )    • Essential hypertension    • Fall    • Hyperlipidemia    • Hyperlipidemia    • Hypertension    • Hypothyroidism    • Insomnia    • Kidney stone    • Knee joint effusion    • Leukocytosis    • Malaise and fatigue    • MI (myocardial infarction) ([place] [address] )     Hospitalization    • Orbital hemorrhage    • Other sleep disorders    • PAF (paroxysmal atrial fibrillation) (HCC)    • Presence of vena cava filter    • Pulmonary embolism (HCC)    • Tear film insufficiency    • Type 2 diabetes mellitus ([place] [address] )    • Unspecified osteoarthritis, unspecified site    • Weight decreased      Past Surgical History:   Procedure Laterality Date   • ABDOMINAL ADHESION SURGERY  2015    Had wound vac   • ABDOMINAL SURGERY     • APPENDECTOMY     • CATARACT EXTRACTION     • CHOLECYSTECTOMY     • COLON SURGERY     • COLOSTOMY  2011    Due to Ileus, then reanastomosis in 2002   • COLOSTOMY TAKEDOWN/REVERSE KUMAR     • EXCISION BLADDER MESH TRANSVESICLEPORT W/ LASER     • HERNIA REPAIR     • IVC FILTER INSERTION     • KNEE SURGERY  2014    repair    • CO CYSTOURETHROSCOPY,FULGUR >5 CM LESN N/A 4/30/2021    Procedure: CYSTOSCOPY, TRANSURETHRAL RESECTION OF BLADDER TUMOR (TURBT);   Surgeon: Toma Peña MD;  Location: [facility] MAIN OR;  Service: Urology       Current Outpatient Medications:   •  apixaban (Eliquis) 2 5 mg, Take 1 tablet (2 5 mg total) by mouth 2 (two) times a day, Disp: 180 tablet, Rfl: 3  •  calcium carbonate (OS-CARLTON) 600 MG tablet, Take 600 mg by mouth 2 (two) times a day with meals, Disp: , Rfl:   •  cholecalciferol (VITAMIN D3) 1,000 units tablet, Take 1,000 Units by mouth daily, Disp: , Rfl:   •  Contour Next Test test strip, Testing twice daily, Disp: 100 each, Rfl: 5  •  cyanocobalamin (VITAMIN B-12) 100 mcg tablet, Take 1,000 mcg by mouth daily, Disp: , Rfl:   •  glipiZIDE (GLUCOTROL) 5 mg tablet, Take 1 tablet (5 mg total) by mouth 2 (two) times a day before meals, Disp: 180 tablet, Rfl: 3  •  levothyroxine 25 mcg tablet, Take 1 tablet (25 mcg total) by mouth daily, Disp: 90 tablet, Rfl: 1  •  lisinopril (ZESTRIL) 5 mg tablet, take 1 tablet by mouth once daily, Disp: 90 tablet, Rfl: 2  •  lovastatin (MEVACOR) 20 mg tablet, Take 1 tablet (20 mg total) by mouth daily at bedtime, Disp: 90 tablet, Rfl: 3  •  magnesium gluconate (MAGONATE) 500 mg tablet, Take 500 mg by mouth daily , Disp: , Rfl:   •  metFORMIN (GLUCOPHAGE) 500 mg tablet, Take 1 tablet (500 mg total) by mouth 2 (two) times a day with meals, Disp: 180 tablet, Rfl: 4  •  metoprolol tartrate (LOPRESSOR) 25 mg tablet, Take 1 tablet (25 mg total) by mouth every 12 (twelve) hours, Disp: 180 tablet, Rfl: 3  •  Microlet Lancets MISC, Testing twice daily, Disp: 100 each, Rfl: 5  •  warfarin (COUMADIN) 5 mg tablet, Take 5 mg by mouth daily, Disp: , Rfl:   • acetaminophen (TYLENOL) 500 mg tablet, Take 500 mg by mouth every 6 (six) hours as needed (Patient not taking: Reported on 10/4/2021), Disp: , Rfl:   •  B Complex Vitamins (B-COMPLEX/B-12 PO), Take by mouth daily  (Patient not taking: Reported on 10/10/2022), Disp: , Rfl:     Lab Results   Component Value Date     04/18/2018    SODIUM 140 12/12/2022    K 4 2 12/12/2022     12/12/2022    CO2 27 12/12/2022    ANIONGAP 11 2 04/18/2018    AGAP 10 12/12/2022    BUN 24 12/12/2022    CREATININE 1 13 12/12/2022    GLUC 134 (H) 05/01/2021    GLUF 153 (H) 12/12/2022    CALCIUM 9 1 12/12/2022    AST 21 12/12/2022    ALT 12 12/12/2022    ALKPHOS 64 12/12/2022    PROT 6 7 04/18/2018    TP 7 0 12/12/2022    BILITOT 0 4 04/18/2018    TBILI 0 51 12/12/2022    EGFR 43 12/12/2022     Lab Results   Component Value Date    WBC 6 37 12/12/2022    HGB 14 4 12/12/2022    HCT 44 5 12/12/2022     (H) 12/12/2022     12/12/2022     Lab Results   Component Value Date    CHOLESTEROL 185 03/01/2021    CHOLESTEROL 201 (H) 03/06/2020    CHOLESTEROL 146 03/26/2019     Lab Results   Component Value Date    HDL 68 03/01/2021    HDL 80 03/06/2020    HDL 56 03/26/2019     Lab Results   Component Value Date    LDLCALC 94 03/01/2021    LDLCALC 90 03/06/2020    LDLCALC 69 03/26/2019     Lab Results   Component Value Date    TRIG 114 03/01/2021    TRIG 153 (H) 03/06/2020    TRIG 105 03/26/2019     No results found for: [place], [place]  Lab Results   Component Value Date    [id number] 0 430 (L) 12/12/2022     Lab Results   Component Value Date    PTH 76 0 12/12/2022    CALCIUM 9 1 12/12/2022    PHOS 3 7 12/12/2022     No results found for: SPEP, UPEP  No results found for: MICROALBUR, [id number]   mg/g 10/10/22   mg/g  3/21     KIDNEYS:  Symmetric and slightly small in size  Right kidney:  8 9 x 4 3 cm  Left kidney:  9 4 x 5 0 cm      Right kidney  Normal echogenicity and contour  No suspicious masses detected     No hydronephrosis  No shadowing calculi  No perinephric fluid collections      Left kidney  Normal echogenicity and contour  No suspicious masses detected  No hydronephrosis  No shadowing calculi  No perinephric fluid collections      URETERS:  Nonvisualized    Objective:      /64 (BP Location: Left arm, Patient Position: Sitting)   Pulse 67   Ht 5' 5" (1 651 m)   Wt 60 3 kg (133 lb)   SpO2 99%   BMI 22 13 kg/m²          Physical Exam

## 2023-01-30 DIAGNOSIS — E78.00 PURE HYPERCHOLESTEROLEMIA: ICD-10-CM

## 2023-01-30 DIAGNOSIS — I48.0 PAROXYSMAL ATRIAL FIBRILLATION (HCC): ICD-10-CM

## 2023-01-30 DIAGNOSIS — I10 ESSENTIAL HYPERTENSION: ICD-10-CM

## 2023-01-30 RX ORDER — LOVASTATIN 20 MG/1
20 TABLET ORAL
Qty: 90 TABLET | Refills: 3 | Status: SHIPPED | OUTPATIENT
Start: 2023-01-30

## 2023-01-30 NOTE — TELEPHONE ENCOUNTER
Patient requesting refill(s) of: metoprolol tartrate 25 mg Q12H    Last filled: 3/7/2022 #180 x 3      Patient requesting refill(s) of: lovastatin 20 mg daily    Last filled: 3/7/2022 #90 x 3  Last appt: 10/10/2022  Next appt: 4/10/2023  [facility]

## 2023-02-13 NOTE — TELEPHONE ENCOUNTER
The office of Nephrology called and would like to know if Anny Dao will be maintaining her coumadin    Please advise    Phone: [phone]
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
## 2023-04-08 PROBLEM — S32.030A COMPRESSION FRACTURE OF L3 VERTEBRA (HCC): Status: ACTIVE | Noted: 2023-04-08

## 2023-04-10 PROBLEM — G93.40 ENCEPHALOPATHY: Status: ACTIVE | Noted: 2023-04-10

## 2023-04-10 PROBLEM — R91.8 LUNG MASS: Status: ACTIVE | Noted: 2023-04-10

## 2023-04-13 ENCOUNTER — HOSPITAL ENCOUNTER (INPATIENT)
Facility: HOSPITAL | Age: 88
LOS: 14 days | Discharge: HOME HEALTH | End: 2023-04-27
Attending: FAMILY MEDICINE | Admitting: FAMILY MEDICINE

## 2023-04-13 DIAGNOSIS — R63.4 LOSS OF WEIGHT: Primary | ICD-10-CM

## 2023-04-13 DIAGNOSIS — N18.31 TYPE 2 DIABETES MELLITUS WITH STAGE 3A CHRONIC KIDNEY DISEASE, WITHOUT LONG-TERM CURRENT USE OF INSULIN (HCC): ICD-10-CM

## 2023-04-13 DIAGNOSIS — E11.22 TYPE 2 DIABETES MELLITUS WITH STAGE 3A CHRONIC KIDNEY DISEASE, WITHOUT LONG-TERM CURRENT USE OF INSULIN (HCC): ICD-10-CM

## 2023-04-13 DIAGNOSIS — M48.54XS COMPRESSION FRACTURE OF THORACIC SPINE, NON-TRAUMATIC, SEQUELA: ICD-10-CM

## 2023-04-13 DIAGNOSIS — I10 ESSENTIAL HYPERTENSION: ICD-10-CM

## 2023-04-13 DIAGNOSIS — R91.8 LUNG MASS: ICD-10-CM

## 2023-04-13 DIAGNOSIS — I48.20 CHRONIC A-FIB (HCC): ICD-10-CM

## 2023-04-13 LAB
GLUCOSE SERPL-MCNC: 161 MG/DL (ref 65–140)
GLUCOSE SERPL-MCNC: 196 MG/DL (ref 65–140)

## 2023-04-13 RX ORDER — LISINOPRIL 5 MG/1
5 TABLET ORAL DAILY
Status: DISCONTINUED | OUTPATIENT
Start: 2023-04-14 | End: 2023-04-27 | Stop reason: HOSPADM

## 2023-04-13 RX ORDER — AMOXICILLIN 250 MG
1 CAPSULE ORAL
Status: DISCONTINUED | OUTPATIENT
Start: 2023-04-13 | End: 2023-04-17

## 2023-04-13 RX ORDER — INSULIN GLARGINE 100 [IU]/ML
5 INJECTION, SOLUTION SUBCUTANEOUS EVERY MORNING
Status: DISCONTINUED | OUTPATIENT
Start: 2023-04-14 | End: 2023-04-14 | Stop reason: ALTCHOICE

## 2023-04-13 RX ORDER — CALCIUM CARBONATE 500(1250)
1 TABLET ORAL 2 TIMES DAILY WITH MEALS
Status: DISCONTINUED | OUTPATIENT
Start: 2023-04-13 | End: 2023-04-27 | Stop reason: HOSPADM

## 2023-04-13 RX ORDER — GLIPIZIDE 5 MG/1
5 TABLET ORAL
Status: DISCONTINUED | OUTPATIENT
Start: 2023-04-13 | End: 2023-04-27 | Stop reason: HOSPADM

## 2023-04-13 RX ORDER — LEVOTHYROXINE SODIUM 0.03 MG/1
25 TABLET ORAL DAILY
Status: DISCONTINUED | OUTPATIENT
Start: 2023-04-14 | End: 2023-04-27 | Stop reason: HOSPADM

## 2023-04-13 RX ORDER — MELATONIN
1000 DAILY
Status: DISCONTINUED | OUTPATIENT
Start: 2023-04-14 | End: 2023-04-27 | Stop reason: HOSPADM

## 2023-04-13 RX ORDER — INSULIN LISPRO 100 [IU]/ML
1-5 INJECTION, SOLUTION INTRAVENOUS; SUBCUTANEOUS
Status: DISCONTINUED | OUTPATIENT
Start: 2023-04-13 | End: 2023-04-14 | Stop reason: ALTCHOICE

## 2023-04-13 RX ORDER — ACETAMINOPHEN 325 MG/1
650 TABLET ORAL EVERY 6 HOURS PRN
Status: DISCONTINUED | OUTPATIENT
Start: 2023-04-13 | End: 2023-04-27 | Stop reason: HOSPADM

## 2023-04-13 RX ORDER — POLYETHYLENE GLYCOL 3350 17 G/17G
17 POWDER, FOR SOLUTION ORAL DAILY PRN
Status: DISCONTINUED | OUTPATIENT
Start: 2023-04-13 | End: 2023-04-17

## 2023-04-13 RX ORDER — ONDANSETRON 4 MG/1
4 TABLET, ORALLY DISINTEGRATING ORAL EVERY 6 HOURS PRN
Status: DISCONTINUED | OUTPATIENT
Start: 2023-04-13 | End: 2023-04-27 | Stop reason: HOSPADM

## 2023-04-13 RX ORDER — LANOLIN ALCOHOL/MO/W.PET/CERES
3 CREAM (GRAM) TOPICAL
Status: DISCONTINUED | OUTPATIENT
Start: 2023-04-13 | End: 2023-04-27 | Stop reason: HOSPADM

## 2023-04-13 RX ORDER — PRAVASTATIN SODIUM 20 MG
20 TABLET ORAL
Status: DISCONTINUED | OUTPATIENT
Start: 2023-04-13 | End: 2023-04-27 | Stop reason: HOSPADM

## 2023-04-13 RX ORDER — UREA 10 %
500 LOTION (ML) TOPICAL DAILY
Status: DISCONTINUED | OUTPATIENT
Start: 2023-04-14 | End: 2023-04-27 | Stop reason: HOSPADM

## 2023-04-13 RX ORDER — LIDOCAINE 50 MG/G
1 PATCH TOPICAL DAILY
Status: DISCONTINUED | OUTPATIENT
Start: 2023-04-14 | End: 2023-04-27 | Stop reason: HOSPADM

## 2023-04-13 RX ORDER — FAMOTIDINE 20 MG/1
10 TABLET, FILM COATED ORAL DAILY
Status: DISCONTINUED | OUTPATIENT
Start: 2023-04-13 | End: 2023-04-27 | Stop reason: HOSPADM

## 2023-04-13 RX ORDER — MAGNESIUM HYDROXIDE/ALUMINUM HYDROXICE/SIMETHICONE 120; 1200; 1200 MG/30ML; MG/30ML; MG/30ML
30 SUSPENSION ORAL EVERY 4 HOURS PRN
Status: DISCONTINUED | OUTPATIENT
Start: 2023-04-13 | End: 2023-04-27 | Stop reason: HOSPADM

## 2023-04-13 RX ORDER — DOCUSATE SODIUM 100 MG/1
100 CAPSULE, LIQUID FILLED ORAL 2 TIMES DAILY
Status: DISCONTINUED | OUTPATIENT
Start: 2023-04-13 | End: 2023-04-27 | Stop reason: HOSPADM

## 2023-04-13 RX ADMIN — PRAVASTATIN SODIUM 20 MG: 20 TABLET ORAL at 17:27

## 2023-04-13 RX ADMIN — DOCUSATE SODIUM 100 MG: 100 CAPSULE, LIQUID FILLED ORAL at 17:27

## 2023-04-13 RX ADMIN — INSULIN LISPRO 1 UNITS: 100 INJECTION, SOLUTION INTRAVENOUS; SUBCUTANEOUS at 21:06

## 2023-04-13 RX ADMIN — METOPROLOL TARTRATE 25 MG: 25 TABLET, FILM COATED ORAL at 21:06

## 2023-04-13 RX ADMIN — INSULIN LISPRO 1 UNITS: 100 INJECTION, SOLUTION INTRAVENOUS; SUBCUTANEOUS at 17:28

## 2023-04-13 RX ADMIN — FAMOTIDINE 10 MG: 20 TABLET, FILM COATED ORAL at 17:27

## 2023-04-13 RX ADMIN — SENNOSIDES AND DOCUSATE SODIUM 1 TABLET: 50; 8.6 TABLET ORAL at 21:06

## 2023-04-13 RX ADMIN — CALCIUM 1 TABLET: 500 TABLET ORAL at 17:27

## 2023-04-13 RX ADMIN — Medication 3 MG: at 21:06

## 2023-04-13 RX ADMIN — APIXABAN 2.5 MG: 2.5 TABLET, FILM COATED ORAL at 17:27

## 2023-04-13 RX ADMIN — GLIPIZIDE 5 MG: 5 TABLET ORAL at 17:27

## 2023-04-13 NOTE — TREATMENT PLAN
Individualized Plan of [id number] Tess[address] LARA Long 80 y o  female MRN: [id number]  Unit/Bed#: -01 Encounter: [id number]     PATIENT INFORMATION  ADMISSION DATE: 4/13/2023  1:05 PM EARLINE CATEGORY:Orthopedic Disorders:  08 9  Other Orthopedic L3 compression fracture   ADMISSION DIAGNOSIS: Compression fracture of L3 vertebra ([facility] [address] ) [S32 030A]  EXPECTED LOS: 10-14 days     MEDICAL/FUNCTIONAL PROGNOSIS  Based on my assessment of the patient's medical conditions and current functional status, the prognosis for attaining medical and functional goals or the IRF stay is:  Good    Cardiopulmonary function: Ensure cardiopulmonary stability and optimize cardiopulmonary function not only at rest but with activity as patient's activity level significantly increases in acute rehab compared with prior to transfer in preparation for safe discharge from [facility]  Must closely and frequently monitor blood pressure and HR to ensure adequate cardiac output during ADLs and ambulation as patient is at increased risk for orthostatic hypotension/syncope and potential injury if not monitored for and managed adequately  Blood pressure management:    Frequent monitoring of blood pressure with appropriate adjustments in blood pressure medication management to optimize blood pressure control and prevent/limit renal complications  Monitoring impact of blood pressure and side-effects of blood pressure medications at rest and with activity  DM: Patient will improve/maintain blood sugar control to ensure optimal healing and decrease risks of complications associated with DM through medication monitoring, adjustments as indicated, and optimal dietary intake and education    Pain management:  Pain will improve with frequent evaluation of pain, careful adjustments in medications, frequent re-evaluation of patient's pain and medical/neurologic status to ensure optimal pain control, avoidance of potential serious and even life-threatening side-effects and drug interactions, as well as weaning pain medications as soon as possible to decrease risk of short and long-term use  Cognitive impairment: intensive skilled therapies including speech language pathology and occupation therapy to evaluate and treat deficits in cognition  Close oversight and management by rehab specialized physician of cognitive deficits and potential confounding factors (prevention of, monitoring for, and if found treatment of possible complications such as infections, as well as optimally managing sleep, mood, and medications which can negatively impact cognition and functional recovery)  Orthopedic Disorder: L3 compression fracture causing impaired mobility, ADLs, and gait:  intensive skilled therapies with physical therapy and occupational therapy with close oversight and management by rehab specialized physician in acute rehabilitation setting to most expeditiously and effectively improve functional mobility, transfers, upper and lower body strengthening, conditioning, balance, and gait training with appropriate assistive device  Patient will have optimal supervision and management of patient's underlying orthopedic disorder with specialized rehabilitation physician during this period of recovery to ensure most expeditious and optimal recovery with decreased risks of fall/injury and other complications including acute worsening of ortho disorder, decrease risk of VTE, PNA, and skin ulceration  Inpatient rehabilitation education/teaching: To be provided to patient and typically family/caregiver (if able to be identified) by all skilled therapists, rehab nursing, case management, and rehab specialized physician to ensure optimal recovery and decrease risks of complications in both acute rehabilitation setting as well as after discharge     Chronic kidney failure management and blood pressure management: Frequent measurements and evaluation of urinary intake, urinary output, and labs which include BUN/Cr and electrolytes with appropriate adjustments in medication and when necessary order additional testing  Frequent monitoring of blood pressure with appropriate adjustments in blood pressure medication management to optimize blood pressure control and prevent/limit renal complications  Skin wounds: Appropriate skin checks for wound/skin evaluation including evaluation of healing, worsening of wounds, or signs of infection  Wound care management from rehab nursing, wound care nursing, physicians  Ensure frequent appropriate turning, positioning in bed, in chair, when mobilizing, and when appropriate with use of appropriate devices to optimize healing and decrease risk of worsening or new skin breakdown  Medical Goals: Patient will be medically stable for discharge to [facility] upon completion of rehab program and Patient will be able to manage medical conditions and comorbid conditions with medications and follow up upon completion of rehab program    [address]: Home - with supervision and Home - Assistance    ANTICIPATED FOLLOW-UP SERVICE:   Outpatient Therapy Services: PT and OT      Home Health Services: PT, OT and Nursing    DISCIPLINE SPECIFIC PLANS:  Required Disciplines & Services: Rehabillitation Nursing, Case Management and Diabetes Education    REQUIRED THERAPY:  Therapy Hours per Day Days per Week Total Days   Physical Therapy 1 5 5 5   Occupational Therapy 1 5 5 5   NOTE: Additional therapy time(s) may be added as appropriate to meet patient needs and to achieve functional goals      Patient will either participate in above therapy regimen or participate in 900 minutes of therapy within 7 day week consisting of PT and OT due to the following medical procedure/condition:Orthopedic Disorders:  08 9  Other Orthopedic L3 compression fracture    ANTICIPATED FUNCTIONAL OUTCOMES:  ADL:  Will return to premorbid levels   Bladder/Bowel: Patient will return to premorbid level for bladder/bowel management upon completion of rehab program   Transfers:  Will return to premorbid levels   Locomotion:  will return to premorbid levels   Cognitive:  will return to premorbid levels      DISCHARGE PLANNING NEEDS  Equipment needs: Discharge needs to be reviewed with team      REHAB ANTICIPATED PARTICIPATION RESTRICTIONS:  Assist with Bathing Shower, Assist with Mobility, Assist with Tub Shower Transfer, Inability to Drive, Rquires Assist with ADLS, Requires Assit with Homemaking, Requires Assit with Steps, Requires modified diet and Weight Bearing as tolerated

## 2023-04-13 NOTE — ASSESSMENT & PLAN NOTE
· S/p fall at home; unclear if head strike   · CTH: No acute intracranial abnormality  · CT C/A/P: acute mild inferior endplate L3 compression fracture without central canal compromise or posterior element involvement   No other acute traumatic injuries seen  · Lumbar spine brace- follow up with PCP for further management   · Likely to remain in brace 4-6 weeks

## 2023-04-13 NOTE — PLAN OF CARE
Problem: PAIN - ADULT  Goal: Verbalizes/displays adequate comfort level or baseline comfort level  Description: Interventions:  - Encourage patient to monitor pain and request assistance  - Assess pain using appropriate pain scale  - Administer analgesics based on type and severity of pain and evaluate response  - Implement non-pharmacological measures as appropriate and evaluate response  - Consider cultural and social influences on pain and pain management  - Notify physician/advanced practitioner if interventions unsuccessful or patient reports new pain  Outcome: Progressing     Problem: INFECTION - ADULT  Goal: Absence or prevention of progression during hospitalization  Description: INTERVENTIONS:  - Assess and monitor for signs and symptoms of infection  - Monitor lab/diagnostic results  - Monitor all insertion sites, i e  indwelling lines, tubes, and drains  - Monitor endotracheal if appropriate and nasal secretions for changes in amount and color  - [place] appropriate cooling/warming therapies per order  - Administer medications as ordered  - Instruct and encourage patient and family to use good hand hygiene technique  - Identify and instruct in appropriate isolation precautions for identified infection/condition  Outcome: Progressing     Problem: INFECTION - ADULT  Goal: Absence of fever/infection during neutropenic period  Description: INTERVENTIONS:  - Monitor WBC    Outcome: Progressing       Problem: DISCHARGE PLANNING  Goal: Discharge to home or other facility with appropriate resources  Description: INTERVENTIONS:  - Identify barriers to discharge w/patient and caregiver  - Arrange for needed discharge resources and transportation as appropriate  - Identify discharge learning needs (meds, wound care, etc )  - Arrange for interpretive services to assist at discharge as needed  - Refer to Case Management Department for coordinating discharge planning if the patient needs post-hospital services based on physician/advanced practitioner order or complex needs related to functional status, cognitive ability, or social support system  Outcome: Progressing     Problem: Knowledge Deficit  Goal: Patient/family/caregiver demonstrates understanding of disease process, treatment plan, medications, and discharge instructions  Description: Complete learning assessment and assess knowledge base    Interventions:  - Provide teaching at level of understanding  - Provide teaching via preferred learning methods  Outcome: Progressing     Problem: SAFETY ADULT  Goal: Maintain or return to baseline ADL function  Description: INTERVENTIONS:  -  Assess patient's ability to carry out ADLs; assess patient's baseline for ADL function and identify physical deficits which impact ability to perform ADLs (bathing, care of mouth/teeth, toileting, grooming, dressing, etc )  - Assess/evaluate cause of self-care deficits   - Assess range of motion  - Assess patient's mobility; develop plan if impaired  - Assess patient's need for assistive devices and provide as appropriate  - Encourage maximum independence but intervene and supervise when necessary  - Involve family in performance of ADLs  - Assess for home care needs following discharge   - Consider OT consult to assist with ADL evaluation and planning for discharge  - Provide patient education as appropriate  Outcome: Progressing

## 2023-04-13 NOTE — ASSESSMENT & PLAN NOTE
Lab Results   Component Value Date    HGBA1C 7 0 (H) 10/10/2022       Recent Labs     04/26/23  0917 04/26/23  1105 04/26/23  1608 04/27/23  0728   POCGLU 197* 218* 93 97       Blood Sugar Average: Last 72 hrs:  · (P) 139 [id number]   · Continue home Glipizide 5 mg BID and Metformin 500 mg BID   · Carb controlled diet   · Follow up as outpatient with PCP

## 2023-04-13 NOTE — ASSESSMENT & PLAN NOTE
· Continue Metoprolol 25 mg Q12H [address] for rate control  · Continue Eliquis 2 5 mg BID  · Patient states she has both these medications at home   Informed patient to call us or PCP if she needs any refills

## 2023-04-13 NOTE — H&P
PHYSICAL MEDICINE AND REHABILITATION H&P/ADMISSION NOTE  Buffy Pope 80 y o  female MRN: [id number]  Unit/Bed#: -01 Encounter: [id number]     Rehab Diagnosis: Impairment of mobility, safety and Activities of Daily Living (ADLs) due to Orthopedic Disorders:  08 9  Other Orthopedic L3 compression fracture  Etiologic: Acute mild inferior endplate L3 compression fracture without central canal compromise or posterior element involvement    History of Present Illness:   Buffy Pope is a 80 y o  female with a PMH significant for T2DM, HTN, Chronic a-fib and CKD who presented to the [facility] on 04/08/23 s/p a fall at home  She fell backwards and landed on her neck with questionable head strike  CTH with no acute intracranial abnormality  CT spine cervical with no cervical spine fracture or traumatic malalignment  CT C/A/P with acute mild inferior endplate L3 compression fracture without central canal compromise or posterior element involvement  Incidentally found to have a presumed bronchogenic malignancy at the right hilum and multifocal bladder tumors  A lumbar spine brace was ordered  Found to have encephalopathy  Chest xray without acute findings, she was afebrile with no leukocytosis  UA negative  Plan:     Rehabilitation  • Functional deficits: impaired mobility, self care, BLE weakness, pain   • Begin PT/OT/SLP  Rehabilitation goals are to achieve a modified independent level with mobility and self care  Prognosis is good  ELOS is 10-14 days  Estimated discharge is home       DVT prophylaxis  • Eliquis 2 5 mg BID     Pain  • Tylenol 650 mg Q6H PRN for mild pain   • Lidocaine patch Q12H Daily    Bladder plan  • Continent    Bowel plan  • Continent  • Last BM 4/13/23   • Continue Colace BID, Senna daily HS, Miralax daily PRN     Code Status  • Full code- confirmed with patient    Skin care  · Monitor skin daily  · Apply skin nourishing cream  · Reposition Q2H to offload pressure  · Elevate heels off bed       Lung mass  Assessment & Plan  · Incidentally found on CT imaging : presumed bronchogenic malignancy at the right hilum and multofocal bladder tumors which should also be regarded as malignancy until proven otherwise   · Oncology consulted while in acute care  · Further management to be done as an outpatient per family preference   · Oncology would recommend cystoscopy with Urology to obtain tissue; IR lung biopsy, Outpatient PET CT  · Arrangements to be made via Oncology for a workup in approximately 2 weeks    Compression fracture of L3 vertebra (HCC)  Assessment & Plan  · S/p fall at home; unclear if head strike   · CTH: No acute intracranial abnormality  · CT C/A/P: acute mild inferior endplate L3 compression fracture without central canal compromise or posterior element involvement  No other acute traumatic injuries seen  · Lumbar spine brace to be worn with ambulation, in upright position ; unclear timeline  Likely 4-6 weeks    · Acute chomprehensive interdisciplinary inpatient rehabilitation to include intensive skilled therapies as outlines with oversight and management by a rehabilitation Physician Assistant overseen by rehabilitation physician as well as inpatient rehabilitation nursing, case management and weekly interdisciplinary team meeting    Malignant neoplasm of overlapping sites of bladder [facility])  Assessment & Plan  · Known history of bladder cancer  · Follows with Dr Jules Ponce   · Last testing done by Urology 5/2021  · Recommend following up as an outpatient with Urology and Oncology    Type 2 diabetes mellitus with stage 3a chronic kidney disease [facility])  Assessment & Plan  Lab Results   Component Value Date    HGBA1C 7 0 (H) 10/10/2022       Recent Labs     04/12/23  1549 04/12/23  2115 04/13/23  0745 04/13/23  1050   POCGLU 188* 171* 153* 359*       Blood Sugar Average: Last 72 hrs:  · Monitor accu cheks  · Noted to have several elevated readings while in acute care  Initiated on Lantus 5 units every morning   · Continue SSI, Lantus 5 units QAM, restart home Glipizide 5 mg BID  · Monitor and adjust regimen as needed  · Carb controlled diet     Essential hypertension  Assessment & Plan  · Monitor vitals  · Continue Metoprolol 25 mg Q12H, Lisinopril 5 mg daily    Chronic a-fib (HCC)  Assessment & Plan  · Continue Metoprolol 25 mg Q12H [address] for rate control  · Continue Eliquis 2 5 mg BID        Subjective: Patient admits to back discomfort and BLE weakness  She denies chest pain, shortness of breath, abdominal pain, constipation, difficulty urinating  She had no questions at this time  Review of Systems   Constitutional: Negative  HENT: Negative  Respiratory: Negative  Negative for shortness of breath  Cardiovascular: Negative  Negative for chest pain  Gastrointestinal: Negative  Negative for abdominal pain and constipation  Genitourinary: Negative  Negative for difficulty urinating  Musculoskeletal: Positive for back pain  Neurological: Positive for weakness  BLE   Psychiatric/Behavioral: Negative  Function:  Prior level of function and living situation:  Patient resides in a single family home  She can reside on one level  There are 4 steps to enter the home  She is  and lives with her spouse  Spouse is wheelchair bound and cannot help assist patient  PTA she was independent with ADLs, needed some help with steps, cognition needed some help  Patient uses RW in home and standard walker outside   Family provides transportation   1-4 reported falls within the last 6 months     Patient will have 24 hour supervision at home, unsure of physical assistance   Will need to clarify if children can help assist      Previous DME: Commode, grab bars, RW, single point cane     Current level of function:  Physical Therapy: transfers mod-max, ambulation mod   Occupational Therapy: grooming, UB bathing s/mao, LB bathing mod, UB dressing min "  Speech Therapy: n/a    Physical Exam:  /58 (BP Location: Left arm)   Pulse 69   Temp 98 2 °F (36 8 °C) (Temporal)   Resp 17   Ht 5' 4\" (1 626 m)   Wt 62 kg (136 lb 11 oz)   SpO2 95%   BMI 23 46 kg/m²      No intake or output data in the 24 hours ending 04/13/23 1412    Body mass index is 23 46 kg/m²  Physical Exam  Vitals and nursing note reviewed  Constitutional:       General: She is not in acute distress  Appearance: She is not ill-appearing  HENT:      Head: Normocephalic and atraumatic  Eyes:      Extraocular Movements: Extraocular movements intact  Pupils: Pupils are equal, round, and reactive to light  Cardiovascular:      Rate and Rhythm: Normal rate and regular rhythm  Pulses: Normal pulses  Heart sounds: Normal heart sounds  No murmur heard  Pulmonary:      Effort: Pulmonary effort is normal  No respiratory distress  Breath sounds: Normal breath sounds  Abdominal:      General: Bowel sounds are normal  There is no distension  Palpations: Abdomen is soft  Musculoskeletal:      Right lower leg: No edema  Left lower leg: No edema  Skin:     General: Skin is warm and dry  Neurological:      General: No focal deficit present  Mental Status: She is alert and oriented to person, place, and time  Motor: Weakness present  Comments: BLE weakness: unable to lift either leg off bed L>R   2/5 BLE   Dorsi/plantar flexion 4/5   BUE 4/5    Psychiatric:         Mood and Affect: Mood normal          Behavior: Behavior normal             Labs, medications, and imaging personally reviewed      Laboratory:    Lab Results   Component Value Date    SODIUM 138 04/13/2023    K 3 8 04/13/2023     04/13/2023    CO2 28 04/13/2023    BUN 33 (H) 04/13/2023    CREATININE 1 35 (H) 04/13/2023    GLUC 153 (H) 04/13/2023    CALCIUM 9 4 04/13/2023     Lab Results   Component Value Date    WBC 7 21 04/13/2023    HGB 15 0 04/13/2023    HCT 46 3 (H) " 04/13/2023    MCV 98 04/13/2023     04/13/2023     Lab Results   Component Value Date    INR 0 99 04/08/2023    INR 1 02 04/18/2022    INR 1 68 (H) 07/13/2021    PROTIME 13 1 04/08/2023    PROTIME 13 3 04/18/2022    PROTIME 19 7 (H) 07/13/2021         Current Facility-Administered Medications:   •  acetaminophen (TYLENOL) tablet 650 mg, 650 mg, Oral, Q6H PRN, Dav Claudio MD  •  aluminum-magnesium hydroxide-simethicone (MYLANTA) oral suspension 30 mL, 30 mL, Oral, Q4H PRN, Dav Claudio MD  •  apixaban Naomia Alda) tablet 2 5 mg, 2 5 mg, Oral, BID, Dav Claudio MD  •  calcium carbonate (OYSTER SHELL,OSCAL) 500 mg tablet 1 tablet, 1 tablet, Oral, BID With Meals, Dav Claudio MD  •  [START ON 4/14/2023] cholecalciferol (VITAMIN D3) tablet 1,000 Units, 1,000 Units, Oral, Daily, Dav Claudio MD  •  [START ON 4/14/2023] cyanocobalamin (VITAMIN B-12) tablet 1,000 mcg, 1,000 mcg, Oral, Daily, Dav Claudio MD  •  docusate sodium (COLACE) capsule 100 mg, 100 mg, Oral, BID, Dav Claudio MD  •  famotidine (PEPCID) tablet 10 mg, 10 mg, Oral, Daily, Dav Claudio MD  •  glipiZIDE (GLUCOTROL) tablet 5 mg, 5 mg, Oral, BID AC, Shani Haines PA-C  •  [START ON 4/14/2023] insulin glargine (LANTUS) subcutaneous injection 5 Units 0 05 mL, 5 Units, Subcutaneous, QAM, Dav Claudio MD  •  insulin lispro (HumaLOG) 100 units/mL subcutaneous injection 1-5 Units, 1-5 Units, Subcutaneous, TID AC **AND** Fingerstick Glucose (POCT), , , TID AC, Dav Claudio MD  •  insulin lispro (HumaLOG) 100 units/mL subcutaneous injection 1-5 Units, 1-5 Units, Subcutaneous, HS, Dav Claudio MD  •  [START ON 4/14/2023] levothyroxine tablet 25 mcg, 25 mcg, Oral, Daily, Dav Claudio MD  •  [START ON 4/14/2023] lidocaine (LIDODERM) 5 % patch 1 patch, 1 patch, Topical, Daily, Dav Claudio MD  •  [START ON 4/14/2023] lisinopril (ZESTRIL) tablet 5 mg, 5 mg, Oral, Daily, Dav Claudio MD  •  [START ON 4/14/2023] magnesium gluconate (MAGONATE) tablet 500 mg, 500 mg, Oral, Daily, Natalia Ledesma MD  •  melatonin tablet 3 mg, 3 mg, Oral, HS, Natalia Ledesma MD  •  metoprolol tartrate (LOPRESSOR) tablet 25 mg, 25 mg, Oral, Q12H, Natalia Ledesma MD  •  ondansetron (ZOFRAN-ODT) dispersible tablet 4 mg, 4 mg, Oral, Q6H PRN, Natalia Ledesma MD  •  polyethylene glycol (MIRALAX) packet 17 g, 17 g, Oral, Daily PRN, Natalia Ledesma MD  •  pravastatin (PRAVACHOL) tablet 20 mg, 20 mg, Oral, Daily With Trevor Perales MD  •  senna-docusate sodium (SENOKOT S) 8 6-50 mg per tablet 1 tablet, 1 tablet, Oral, HS, Natalia Ledesma MD    No Known Allergies     Patient Active Problem List    Diagnosis Date Noted   • Lung mass 04/10/2023   • Encephalopathy 04/10/2023   • Fall at home 04/08/2023   • Compression fracture of L3 vertebra ([facility] ) 04/08/2023   • Stage 3b chronic kidney disease ([facility] ) 12/21/2022   • Loss of weight 12/21/2022   • Malignant neoplasm of overlapping sites of bladder ([facility] ) 10/10/2022   • Hypocalcemia 06/29/2021   • Other pulmonary embolism without acute cor pulmonale ([facility] ) 04/21/2021   • Bladder mass 03/31/2021   • Compression fracture of thoracic spine, non-traumatic, sequela 02/28/2020   • Stage 3a chronic kidney disease ([facility] ) 02/28/2020   • Essential hypertension 06/05/2019   • History of pulmonary embolism 06/05/2019   • Osteoarthritis of knee 05/01/2018   • Hypercoagulable state, primary ([facility] ) 03/27/2017   • Chronic a-fib ([facility] ) 02/20/2016   • Type 2 diabetes mellitus with stage 3a chronic kidney disease ([facility] ) 02/20/2016     Past Medical History:   Diagnosis Date   • Abnormal gait    • Accidental fall from chair, subsequent encounter    • Atrial fibrillation ([facility] )    • Benign essential hypertension    • Bite of animal    • Cataract    • Cellulitis    • Chronic kidney disease    • Chronic kidney disease, stage 3 (HCC)    • Colon cancer ([facility] )    • Colon polyp    • Current tear of lateral cartilage or meniscus of knee    • Diabetes mellitus ([facility] ) • Disease of thyroid gland    • Disorder of magnesium metabolism    • Dvt femoral (deep venous thrombosis)    • Embolism from thrombosis of vein of distal end of lower extremity ([place] [address] )    • Essential hypertension    • Fall    • Hyperlipidemia    • Hyperlipidemia    • Hypertension    • Hypothyroidism    • Insomnia    • Kidney stone    • Knee joint effusion    • Leukocytosis    • Malaise and fatigue    • MI (myocardial infarction) ([place] [address] )     Hospitalization    • Orbital hemorrhage    • Other sleep disorders    • PAF (paroxysmal atrial fibrillation) (HCC)    • Presence of vena cava filter    • Pulmonary embolism (HCC)    • Tear film insufficiency    • Type 2 diabetes mellitus ([place] [address] )    • Unspecified osteoarthritis, unspecified site    • Weight decreased      Past Surgical History:   Procedure Laterality Date   • ABDOMINAL ADHESION SURGERY      Had wound vac   • ABDOMINAL SURGERY     • APPENDECTOMY     • CATARACT EXTRACTION     • CHOLECYSTECTOMY     • COLON SURGERY     • COLOSTOMY      Due to Ileus, then reanastomosis in    • COLOSTOMY TAKEDOWN/REVERSE KUMAR     • EXCISION BLADDER MESH TRANSVESICLEPORT W/ LASER     • HERNIA REPAIR     • IVC FILTER INSERTION     • KNEE SURGERY      repair    • ID CYSTOURETHROSCOPY W/DEST &/RMVL TUMOR LARGE N/A 2021    Procedure: CYSTOSCOPY, TRANSURETHRAL RESECTION OF BLADDER TUMOR (TURBT);   Surgeon: Army Freida MD;  Location: [facility] MAIN OR;  Service: Urology     Social History     Socioeconomic History   • Marital status: /Civil Union     Spouse name: Not on file   • Number of children: Not on file   • Years of education: Not on file   • Highest education level: Not on file   Occupational History   • Occupation: Retired    Tobacco Use   • Smoking status: Former     Types: Cigarettes     Quit date: 3/19/2001     Years since quittin 0   • Smokeless tobacco: Never   Vaping Use   • Vaping Use: Never used   Substance and Sexual Activity   • Alcohol use: Never • Drug use: Never   • Sexual activity: Not Currently   Other Topics Concern   • Not on file   Social History Narrative    Rarely consumes alcohol - As per [employer]    Consumes on average 2 cups of regular coffee per day    Consumes on average 12 oz of soda per day     Social Determinants of Health     Financial Resource Strain: Not on file   Food Insecurity: No Food Insecurity   • Worried About Running Out of Food in the Last Year: Never true   • Ran Out of Food in the Last Year: Never true   Transportation Needs: No Transportation Needs   • Lack of Transportation (Medical): No   • Lack of Transportation (Non-Medical): No   Physical Activity: Not on file   Stress: Not on file   Social Connections: Not on file   Intimate Partner Violence: Not on file   Housing Stability: Low Risk    • Unable to Pay for Housing in the Last Year: No   • Number of Places Lived in the Last Year: 1   • Unstable Housing in the Last Year: No     Social History     Tobacco Use   Smoking Status Former   • Types: Cigarettes   • Quit date: 3/19/2001   • Years since quittin 0   Smokeless Tobacco Never     Social History     Substance and Sexual Activity   Alcohol Use Never     Family History   Problem Relation Age of Onset   • Deep vein thrombosis Mother          Medical Necessity Criteria for [facility] Admission: Chronic Kidney Disease, Hypertension, Bowel/Bladder Management and Diabetes requiring close blood glucose monitoring  In addition, the preadmission screen, post-admission physical evaluation, overall plan of care and admissions order demonstrate a reasonable expectation that the following criteria were met at the time of admission to the [facility]  1  The patient requires active and ongoing therapeutic intervention of multiple therapy disciplines (physical therapy, occupational therapy, speech-language pathology, or prosthetics/orthotics), one of which is physical or occupational therapy      2  Patient requires an intensive rehabilitation therapy program, as defined in Chapter 1, section 110 2 2 of the CMS Medicare Policy Manual  This intensive rehabilitation therapy program will consist of at least 3 hours of therapy per day at least 5 days per week or at least 15 hours of intensive rehabilitation therapy within a 7 consecutive day period, beginning with the date of admission to the [facility]  3  The patient is reasonably expected to actively participate in, and benefit significantly from, the intensive rehabilitation therapy program as defined in Chapter 1, section 110 2 2 of the CMS Medicare Policy Manual at this time of admission to the [facility]  She can reasonably be expected to make measurable improvement (that will be of practical value to improve the patient’s functional capacity or adaptation to impairments) as a result of the rehabilitation treatment, as defined in section 110 3, and such improvement can be expected to be made within the prescribed period of time  As noted in the CMS Medicare Policy Manual, the patient need not be expected to achieve complete independence in the domain of self-care nor be expected to return to his or her prior level of functioning in order to meet this standard  4  The patient must require physician supervision by a rehabilitation physician  As such, a rehabilitation physician will conduct face-to-face visits with the patient at least 3 days per week throughout the patient’s stay in the [facility] to assess the patient both medically and functionally, as well as to modify the course of treatment as needed to maximize the patient’s capacity to benefit from the rehabilitation process    5  The patient requires an intensive and coordinated interdisciplinary approach to providing rehabilitation, as defined in Chapter 1, section 110 2 5 of the CMS Medicare Policy Manual  This will be achieved through periodic team conferences, conducted at least once in a 7-day period, and comprising of an interdisciplinary team of medical professionals consisting of: a rehabilitation physician, registered nurse,  and/or , and a licensed/certified therapist from each therapy discipline involved in treating the patient  Changes Since Pre-admission Assessment: None -This patient's participation in rehab continues to be reasonable, necessary and appropriate  CMS Required Post-Admission Physician Evaluation Elements  History and Physical, including medical history, functional history and active comorbidities as in above text  Post-Admission Physician Evaluation:  The patient has the potential to make improvement and is in need of physical, occupational, and/or therapy services  The patient may also need nutritional services  Given the patient's complex medical condition and risk of further medical complications, rehabilitative services cannot be safely provided at a lower level of care, such as a skilled nursing facility  I have reviewed the patient's functional and medical status at the time of the preadmission screening and they are the same as on the day of this admission  I acknowledge that I have personally performed a full physical examination on this patient within 24 hours of admission  The patient and/or family demonstrated understanding the rehabilitation program and the discharge process after we discussed them  Agree in entirety: yes  Minor adaptions: none    Major changes: none    Landy Yanez PA-C    ** Please Note: Fluency Direct voice to text software may have been used in the creation of this document   **      I have spent a total time of 75 minutes on 04/13/23 in caring for this patient including Prognosis, Risks and benefits of tx options, Instructions for management, Patient and family education, Importance of tx compliance, Risk factor reductions, Impressions, Counseling / Coordination of care, Documenting in the medical record, Reviewing / ordering tests, medicine, procedures  , Obtaining or reviewing history   and Communicating with other healthcare professionals

## 2023-04-13 NOTE — ASSESSMENT & PLAN NOTE
· Known history of bladder cancer  · Follows with Dr Celina Haro   · Last testing done by Urology 5/2021  · Recommend following up as an outpatient with Urology and Oncology  · Put in discharge to follow up with Dr Celina Haro within a month

## 2023-04-13 NOTE — ASSESSMENT & PLAN NOTE
· Incidentally found on CT imaging : presumed bronchogenic malignancy at the right hilum and multofocal bladder tumors which should also be regarded as malignancy until proven otherwise   · Oncology consulted while in acute care  · Further management to be done as an outpatient per family preference   · Oncology would recommend cystoscopy with Urology to obtain tissue; IR lung biopsy, Outpatient PET CT  · Arrangements to be made via Oncology for a workup in approximately 2 weeks  · Ambulatory referral made at discharge

## 2023-04-14 LAB
ANION GAP SERPL CALCULATED.3IONS-SCNC: 9 MMOL/L (ref 4–13)
BASOPHILS # BLD AUTO: 0.05 THOUSANDS/ÂΜL (ref 0–0.1)
BASOPHILS NFR BLD AUTO: 1 % (ref 0–1)
BUN SERPL-MCNC: 33 MG/DL (ref 5–25)
CALCIUM SERPL-MCNC: 9.3 MG/DL (ref 8.4–10.2)
CHLORIDE SERPL-SCNC: 102 MMOL/L (ref 96–108)
CO2 SERPL-SCNC: 28 MMOL/L (ref 21–32)
CREAT SERPL-MCNC: 1.17 MG/DL (ref 0.6–1.3)
EOSINOPHIL # BLD AUTO: 0.17 THOUSAND/ÂΜL (ref 0–0.61)
EOSINOPHIL NFR BLD AUTO: 2 % (ref 0–6)
ERYTHROCYTE [DISTWIDTH] IN BLOOD BY AUTOMATED COUNT: 13.9 % (ref 11.6–15.1)
GFR SERPL CREATININE-BSD FRML MDRD: 41 ML/MIN/1.73SQ M
GLUCOSE P FAST SERPL-MCNC: 91 MG/DL (ref 65–99)
GLUCOSE SERPL-MCNC: 101 MG/DL (ref 65–140)
GLUCOSE SERPL-MCNC: 247 MG/DL (ref 65–140)
GLUCOSE SERPL-MCNC: 290 MG/DL (ref 65–140)
GLUCOSE SERPL-MCNC: 91 MG/DL (ref 65–140)
HCT VFR BLD AUTO: 45.4 % (ref 34.8–46.1)
HGB BLD-MCNC: 14.5 G/DL (ref 11.5–15.4)
IMM GRANULOCYTES # BLD AUTO: 0.03 THOUSAND/UL (ref 0–0.2)
IMM GRANULOCYTES NFR BLD AUTO: 0 % (ref 0–2)
LYMPHOCYTES # BLD AUTO: 1.71 THOUSANDS/ÂΜL (ref 0.6–4.47)
LYMPHOCYTES NFR BLD AUTO: 22 % (ref 14–44)
MCH RBC QN AUTO: 31.5 PG (ref 26.8–34.3)
MCHC RBC AUTO-ENTMCNC: 31.9 G/DL (ref 31.4–37.4)
MCV RBC AUTO: 99 FL (ref 82–98)
MONOCYTES # BLD AUTO: 0.74 THOUSAND/ÂΜL (ref 0.17–1.22)
MONOCYTES NFR BLD AUTO: 10 % (ref 4–12)
NEUTROPHILS # BLD AUTO: 4.97 THOUSANDS/ÂΜL (ref 1.85–7.62)
NEUTS SEG NFR BLD AUTO: 65 % (ref 43–75)
NRBC BLD AUTO-RTO: 0 /100 WBCS
PLATELET # BLD AUTO: 182 THOUSANDS/UL (ref 149–390)
PMV BLD AUTO: 12.1 FL (ref 8.9–12.7)
POTASSIUM SERPL-SCNC: 3.8 MMOL/L (ref 3.5–5.3)
RBC # BLD AUTO: 4.6 MILLION/UL (ref 3.81–5.12)
SODIUM SERPL-SCNC: 139 MMOL/L (ref 135–147)
WBC # BLD AUTO: 7.67 THOUSAND/UL (ref 4.31–10.16)

## 2023-04-14 RX ADMIN — METOPROLOL TARTRATE 25 MG: 25 TABLET, FILM COATED ORAL at 21:00

## 2023-04-14 RX ADMIN — APIXABAN 2.5 MG: 2.5 TABLET, FILM COATED ORAL at 08:18

## 2023-04-14 RX ADMIN — INSULIN GLARGINE 5 UNITS: 100 INJECTION, SOLUTION SUBCUTANEOUS at 08:23

## 2023-04-14 RX ADMIN — GLIPIZIDE 5 MG: 5 TABLET ORAL at 17:41

## 2023-04-14 RX ADMIN — SENNOSIDES AND DOCUSATE SODIUM 1 TABLET: 50; 8.6 TABLET ORAL at 21:00

## 2023-04-14 RX ADMIN — CALCIUM 1 TABLET: 500 TABLET ORAL at 17:41

## 2023-04-14 RX ADMIN — DOCUSATE SODIUM 100 MG: 100 CAPSULE, LIQUID FILLED ORAL at 17:41

## 2023-04-14 RX ADMIN — GLIPIZIDE 5 MG: 5 TABLET ORAL at 08:17

## 2023-04-14 RX ADMIN — FAMOTIDINE 10 MG: 20 TABLET, FILM COATED ORAL at 08:17

## 2023-04-14 RX ADMIN — ACETAMINOPHEN 650 MG: 325 TABLET ORAL at 08:27

## 2023-04-14 RX ADMIN — METOPROLOL TARTRATE 25 MG: 25 TABLET, FILM COATED ORAL at 08:18

## 2023-04-14 RX ADMIN — LISINOPRIL 5 MG: 5 TABLET ORAL at 08:19

## 2023-04-14 RX ADMIN — Medication 1000 UNITS: at 08:18

## 2023-04-14 RX ADMIN — Medication 3 MG: at 21:00

## 2023-04-14 RX ADMIN — LEVOTHYROXINE SODIUM 25 MCG: 25 TABLET ORAL at 05:11

## 2023-04-14 RX ADMIN — ACETAMINOPHEN 650 MG: 325 TABLET ORAL at 21:00

## 2023-04-14 RX ADMIN — Medication 500 MG: at 08:17

## 2023-04-14 RX ADMIN — PRAVASTATIN SODIUM 20 MG: 20 TABLET ORAL at 17:49

## 2023-04-14 RX ADMIN — DOCUSATE SODIUM 100 MG: 100 CAPSULE, LIQUID FILLED ORAL at 08:17

## 2023-04-14 RX ADMIN — INSULIN LISPRO 2 UNITS: 100 INJECTION, SOLUTION INTRAVENOUS; SUBCUTANEOUS at 12:31

## 2023-04-14 RX ADMIN — LIDOCAINE 5% 1 PATCH: 700 PATCH TOPICAL at 08:14

## 2023-04-14 RX ADMIN — CYANOCOBALAMIN TAB 500 MCG 1000 MCG: 500 TAB at 08:18

## 2023-04-14 RX ADMIN — APIXABAN 2.5 MG: 2.5 TABLET, FILM COATED ORAL at 17:41

## 2023-04-14 RX ADMIN — CALCIUM 1 TABLET: 500 TABLET ORAL at 08:18

## 2023-04-14 NOTE — PROGRESS NOTES
04/14/23 0700   Patient Data   Rehab Impairment Impairment of mobility, safety and Activities of Daily Living (ADLs) due to Orthopedic Disorders:  08 9  Other Orthopedic L3 compression fracture   Etiologic Diagnosis Acute mild inferior endplate L3 compression fracture without central canal compromise or posterior element involvement, PMH significant for T2DM, HTN, Chronic a-fib and CKD   Date of Onset 04/08/23   Support System   Name [address]   Relationship Son   Home Setup   Type of Home Single Level   Method of Entry Stairs   Number of Stairs 5   First Floor Bathroom Full;Tub   Available Equipment Roller Walker;Rollator   Baseline Information   Transportation Family/friends drive   Prior IADL Participation   Money Management Estimate Costs;Estimate Change;Combine Bills;Manage Checkbook   Meal Preparation Partial Participation  (meal delivery, however, pt reports not meals on wheels)   Laundry Full Participation   Home Cleaning Full Participation   Prior Level of Function   Self-Care 3  Independent - Patient completed the activities by him/herself, with or without an assistive device, with no assistance from a helper  Functional Cognition 3  Independent - Patient completed the activities by him/herself, with or without an assistive device, with no assistance from a helper  Prior Assistance Needed for Driving; Shopping   Prior Device Used D  [employer] in the Last Year   Number of falls in the past 12 months 1   Type of Injury Associated with Fall Injury  (reason for current admission)   Restrictions/Precautions   Precautions Fall Risk;Bed/chair alarms;Cognitive;Pain;Spinal precautions   Weight Bearing Restrictions No   ROM Restrictions   (spinal precautions)   Braces or Orthoses LSO   Pain Assessment   Pain Assessment Tool 0-10   Pain Score 6   Pain Location/Orientation Orientation: Lower; Location: Back   Eating Assessment   Food To Mouth Yes   Able To Cut No   Positioning Upright;Out of Bed   Meal Assessed Breakfast   Opens Packages No   Type of Assistance Needed Supervision   Eating CARE Score 4   Oral Hygiene   Type of Assistance Needed Supervision;Verbal cues   Comment pt requires setup and cues for sequencing attempting to apply lotion to toothbrush because unable to open toothpaste   Oral Hygiene CARE Score 4   Grooming   Able To Initiate Tasks;Comb/Brush Hair;Wash/Dry Face;Brush/Clean Teeth;Wash/Dry Hands   Limitation Noted In Problem Solving; Safety; Sequencing;Strength   Findings supervision with cues   Tub/Shower Transfer   Reason Not Assessed Sponge Bath   Shower/Bathe Self   Type of Assistance Needed Physical assistance   Physical Assistance Level 25% or less   Shower/Bathe Self CARE Score 3   Bathing   Assessed Bath Style Sponge Bath   Anticipated D/C Bath Style Shower;Sponge Bath   Able to [place] No   Able to [employer] Temperature No   Able to Wash/Rinse/Dry (body part) Left Arm;Right Arm;L Upper Leg;R Upper Leg;Chest;Abdomen;Perineal Area   Limitations Noted in Balance; Endurance;Problem Solving;ROM;Safety; Sequencing;Strength  (spinal prec)   Positioning Seated;Standing   Dressing/Undressing Clothing   Remove UB Clothes Pullover Shirt   Don UB Clothes Pullover Shirt   Type of Assistance Needed Physical assistance   Physical Assistance Level 25% or less   Upper Body Dressing CARE Score 3   Remove LB Clothes Undergarment;Socks   Don LB Clothes Pants; Undergarment;Socks   Type of Assistance Needed Physical assistance   Physical Assistance Level 76% or more   Comment unable to bend forward due to spinal prec and pain and unable to stand without support to pull pants up and [place]   Lower Body Dressing CARE Score 2   Limitations Noted In Balance; Endurance;Problem Solving; Safety;Strength; Sequencing;ROM  (spinal prec)   Positioning Supported Sit;Standing   Putting On/Taking Off Footwear   Type of Assistance Needed Physical assistance   Physical Assistance Level 76% or more   Putting On/Taking Off Footwear CARE Score 2   Toileting Hygiene   Type of Assistance Needed Physical assistance   Physical Assistance Level 76% or more   Toileting Hygiene CARE Score 2   Toilet Transfer   Surface Assessed Raised Toilet   Transfer Technique Standard   Limitations Noted In Balance; Endurance;Problem Solving;ROM;Safety;LE Strength; Sequencing   Adaptive Equipment Grab Bar;Walker   Type of Assistance Needed Physical assistance   Physical Assistance Level 26%-50%   Comment assist with clothing management   Toilet Transfer CARE Score 3   Toileting   Able to Pull Clothing down no, up no  Manage Hygiene Bladder   Limitations Noted In Balance;Problem Solving;ROM;Safety; Sequencing;LE Strength   Adaptive Equipment Grab Bar   Transfer Bed/Chair/Wheelchair   Type of Assistance Needed Physical assistance   Physical Assistance Level 26%-50%   Chair/Bed-to-Chair Transfer CARE Score 3   Lying to Sitting on Side of Bed   Type of Assistance Needed Physical assistance   Physical Assistance Level 51%-75%   Comment increased time, assist with cues for spinal prec   Lying to Sitting on Side of Bed CARE Score 2   Sit to Stand   Type of Assistance Needed Physical assistance   Physical Assistance Level 26%-50%   Sit to Stand CARE Score 3   Picking Up Object   Reason if not Attempted Safety concerns   Picking Up Object CARE Score 88   Comprehension   Comprehension (FIM) 4 - Understands basic info/conversation 75-90% of time   Expression   Expression (FIM) 5 - Needs help/cues only RARELY (< 10% of the time)   Social Interaction   Social Interaction (FIM) 5 - Interacts appropriately with others 90% of time   Problem Solving   Problem solving (FIM) 4 - Solves basic problems 75-89% of time   Memory   Memory (FIM) 4 - Recognizes/recalls/performs 75-89%   RUE Assessment   RUE Assessment WFL  (MMT 4-/5 grossly sh to hand)   LUE Assessment   LUE Assessment WFL  (MMT 4-/5 grossly sh to hand)   Coordination   Movements are Fluid and Coordinated 1   Sensation   Light Touch No apparent deficits   Propioception No apparent deficits   Cognition   Overall Cognitive Status Impaired   Arousal/Participation Alert; Responsive; Cooperative   Attention Attends with cues to redirect   Orientation Level Oriented to person;Oriented to time;Oriented to situation   Memory Decreased short term memory;Decreased recall of recent events;Decreased recall of precautions   Following Commands Follows one step commands with increased time or repetition   Therapeutic Exercise   Therapeutic Exercise/Activity fxl mobility to and from the BR with min A and RW   Discharge Information   Vocational Plan Retired/not working  (enjoys working around the house and helps )   Patient's Discharge Plan Home with  who pt helps care for   Patient's Rehab Expectations unable to state   Impressions Pt presents following hospitalization due to Acute mild inferior endplate L3 compression fracture without central canal compromise or posterior element involvement, PMH significant for T2DM, HTN, Chronic a-fib, hx bladder ca and CKD  Pt requires assist due to decreased balance, safety, endurance, strength/ ROM with pain, spinal prec and LSO use  Pt will beenfit from skilled OT services to increase independence with daily tasks     OT Therapy Minutes   OT Time In 0700   OT Time Out 0832   OT Total Time (minutes) 92   OT Mode of treatment - Individual (minutes) 92

## 2023-04-14 NOTE — CASE MANAGEMENT
Initial assessment & orientation to [facility]C with Pt, who expressed understanding & agreement  Message left for Pt's son  Pt stated she resides with her spouse, who is WC bound, in a SS home, 5 steps in  She reported that her family provides assistance  She uses [facility] or friends for rides to appts  She has a RW  Discussed [address] with Pt & Pt's emergency contact, who expressed understanding & agreement  SW will continue to monitor & assist as needed with [address]  IMM reviewed, signed & submitted for scanning

## 2023-04-14 NOTE — PROGRESS NOTES
Physical Medicine and Rehabilitation Progress Note  Lizbeth Sorto 80 y o  female MRN: [id number]  Unit/Bed#: -01 Encounter: [id number]    HPI: Lizbeth Sorto is a 80 y o  female with a PMH significant for T2DM, HTN, Chronic a-fib and CKD who presented to the [facility] on 04/08/23 s/p a fall at home  She fell backwards and landed on her neck with questionable head strike  CTH with no acute intracranial abnormality  CT spine cervical with no cervical spine fracture or traumatic malalignment  CT C/A/P with acute mild inferior endplate L3 compression fracture without central canal compromise or posterior element involvement  Incidentally found to have a presumed bronchogenic malignancy at the right hilum and multifocal bladder tumors  A lumbar spine brace was ordered  Found to have encephalopathy  Chest xray without acute findings, she was afebrile with no leukocytosis  UA negative  Subjective: Has some mild back pain today but otherwise feels good    ROS: A 10 point ROS was performed; negative except as noted above  Assessment/Plan:  Rehabilitation  • Functional deficits: impaired mobility, self care, BLE weakness, pain   • Begin PT/OT/SLP  Rehabilitation goals are to achieve a modified independent level with mobility and self care  Prognosis is good  ELOS is 10-14 days    Estimated discharge is home       DVT prophylaxis  • Eliquis 2 5 mg BID      Pain  • Tylenol 650 mg Q6H PRN for mild pain   • Lidocaine patch Q12H Daily     Bladder plan  • Continent     Bowel plan  • Continent  • Last BM 4/13/23   • Continue Colace BID, Senna daily HS, Miralax daily PRN      Code Status  • Full code- confirmed with patient     Skin care  • Monitor skin daily  • Apply skin nourishing cream  • Reposition Q2H to offload pressure  • Elevate heels off bed     Lung mass  Assessment & Plan  • Incidentally found on CT imaging : presumed bronchogenic malignancy at the right hilum and multofocal bladder tumors which should also be regarded as malignancy until proven otherwise   • Oncology consulted while in acute care  • Further management to be done as an outpatient per family preference   • Oncology would recommend cystoscopy with Urology to obtain tissue; IR lung biopsy, Outpatient PET CT  • Arrangements to be made via Oncology for a workup in approximately 2 weeks     Compression fracture of L3 vertebra (HCC)  Assessment & Plan  • S/p fall at home; unclear if head strike   • CTH: No acute intracranial abnormality  • CT C/A/P: acute mild inferior endplate L3 compression fracture without central canal compromise or posterior element involvement  No other acute traumatic injuries seen  • Lumbar spine brace to be worn with ambulation, in upright position ; unclear timeline  Likely 4-6 weeks    • Acute chomprehensive interdisciplinary inpatient rehabilitation to include intensive skilled therapies as outlines with oversight and management by a rehabilitation Physician Assistant overseen by rehabilitation physician as well as inpatient rehabilitation nursing, case management and weekly interdisciplinary team meeting     Malignant neoplasm of overlapping sites of bladder [facility])  Assessment & Plan  • Known history of bladder cancer  • Follows with Dr Rosey Gerardo   • Last testing done by Urology 5/2021  • Recommend following up as an outpatient with Urology and Oncology     Type 2 diabetes mellitus with stage 3a chronic kidney disease ([address] )  Assessment & Plan consult the hospitalist              Scheduled Meds:  Current Facility-Administered Medications   Medication Dose Route Frequency Provider Last Rate   • acetaminophen  650 mg Oral Q6H PRN Mirna Aguiar MD     • aluminum-magnesium hydroxide-simethicone  30 mL Oral Q4H PRN Mirna Aguiar MD     • apixaban  2 5 mg Oral BID Mirna Aguiar MD     • calcium carbonate  1 tablet Oral BID With Meals Mirna Aguiar MD     • cholecalciferol  1,000 Units Oral Daily iCndy Click Patricia Irving MD     • cyanocobalamin  1,000 mcg Oral Daily Zechariah Malave MD     • docusate sodium  100 mg Oral BID Zechariah Malave MD     • famotidine  10 mg Oral Daily Zechariah Malave MD     • glipiZIDE  5 mg Oral BID AC Melvin Nguyen PA-C     • insulin glargine  5 Units Subcutaneous QAM Zechariah Malave MD     • insulin lispro  1-5 Units Subcutaneous TID [facility] Zechariah Malave MD     • insulin lispro  1-5 Units Subcutaneous HS Zechariah Malave MD     • levothyroxine  25 mcg Oral Daily Zechariah Malave MD     • lidocaine  1 patch Topical Daily Zechariah Malave MD     • lisinopril  5 mg Oral Daily Zechariah Malave MD     • magnesium gluconate  500 mg Oral Daily Zechariah Malave MD     • melatonin  3 mg Oral HS Zechariah Malave MD     • metoprolol tartrate  25 mg Oral Q12H Zechariah Malave MD     • ondansetron  4 mg Oral Q6H PRN Zechariah Malave MD     • polyethylene glycol  17 g Oral Daily PRN Zechariah Malave MD     • pravastatin  20 mg Oral Daily With Criss Peralta MD     • senna-docusate sodium  1 tablet Oral HS Zechariah Malave MD         Objective:    Functional Update:  Mobility: Moderate assistance  Transfers: Moderate assistance  ADLs: Minimal assistance except for lower body dressing moderate assistance          Physical Exam:  Temp:  [97 8 °F (36 6 °C)-98 2 °F (36 8 °C)] 98 °F (36 7 °C)  HR:  [69-88] 73  Resp:  [17-18] 18  BP: (125-154)/(58-67) 154/67  SpO2:  [95 %-96 %] 95 %    General:   Constitutional:       General: She is not in acute distress  Appearance: She is not ill-appearing  HENT:      Head: Normocephalic and atraumatic  Eyes:      Extraocular Movements: Extraocular movements intact  Pupils: Pupils are equal, round, and reactive to light  Cardiovascular:      Rate and Rhythm: Normal rate and regular rhythm  Pulses: Normal pulses  Heart sounds: Normal heart sounds  No murmur heard  Pulmonary:      Effort: Pulmonary effort is normal  No respiratory distress        Breath sounds: Normal breath sounds  Abdominal:      General: Bowel sounds are normal  There is no distension  Palpations: Abdomen is soft  Musculoskeletal:      Right lower leg: No edema  Left lower leg: No edema  Skin:     General: Skin is warm and dry  Neurological:      General: No focal deficit present  Mental Status: She is alert and oriented to person, place, and time  Motor: Weakness present  Comments: BLE weakness: unable to lift either leg off bed L>R   2/5 BLE   Dorsi/plantar flexion 4/5   BUE 4/5    Psychiatric:         Mood and Affect: Mood normal          Behavior: Behavior normal            Diagnostic Studies: Reviewed  No orders to display       Laboratory: Labs reviewed  Results from last 7 days   Lab Units 04/14/23  0512 04/13/23  0500 04/12/23  0530   HEMOGLOBIN g/dL 14 5 15 0 15 2   HEMATOCRIT % 45 4 46 3* 46 1   WBC Thousand/uL 7 67 7 21 7 52     Results from last 7 days   Lab Units 04/14/23  0513 04/13/23  0500 04/12/23  0530 04/09/23  0519 04/08/23  1548   BUN mg/dL 33* 33* 29*   < > 21   SODIUM mmol/L 139 138 139   < > 136   POTASSIUM mmol/L 3 8 3 8 3 9   < > 3 6   CHLORIDE mmol/L 102 100 102   < > 98   CREATININE mg/dL 1 17 1 35* 1 19   < > 1 07   AST U/L  --   --   --   --  14   ALT U/L  --   --   --   --  13    < > = values in this interval not displayed  Results from last 7 days   Lab Units 04/08/23  1548   PROTIME seconds 13 1   INR  0 99        ** Please Note: Fluency Direct voice to text software may have been used in the creation of this document   **

## 2023-04-14 NOTE — DISCHARGE INSTR - OTHER ORDERS
Skin Care Plan:   1  Offloading (tan waffle) cushion to chair when OOB  2  Elevate heels off the bed with pillows   3  Turn and reposition every two hours  Shift your weight often when sitting in the chair  4  Hydraguard to bilateral heels and bilateral buttocks twice daily for skin protection/hydration  5  Apply lotion to your body daily

## 2023-04-14 NOTE — PROGRESS NOTES
04/14/23 1000   Patient Data   Rehab Impairment :  Impairment of mobility, safety and Activities of Daily Living (ADLs) due to Orthopedic Disorders:  08 9  Other Orthopedic L3 complression fracture   Etiologic Diagnosis Acute mild inferior endplate L3 compression fracture without central canal compromise or posterior element involvement  Date of Onset 04/08/23   Home Setup   Type of Home Single Level   Method of Entry Stairs  (L HR)   Number of Stairs 5   Number of Stairs in Home   (FF to basement (pt does not use))   First Floor Bathroom Full;Tub   Available Equipment Roller Walker   Baseline Information   Transportation Family/friends drive   Prior Device(s) Used Roller Walker   Prior Level of Function   Self-Care 3  Independent - Patient completed the activities by him/herself, with or without an assistive device, with no assistance from a helper  Indoor-Mobility (Ambulation) 3  Independent - Patient completed the activities by him/herself, with or without an assistive device, with no assistance from a helper  Stairs 3  Independent - Patient completed the activities by him/herself, with or without an assistive device, with no assistance from a helper  Functional Cognition 3  Independent - Patient completed the activities by him/herself, with or without an assistive device, with no assistance from a helper  Prior Assistance Needed for Driving; Shopping   Prior Device Used JANE Yang in the Last Year   Number of falls in the past 12 months 1   Type of Injury Associated with Fall Injury  (current admission)   Psychosocial   Psychosocial (WDL) WDL   Restrictions/Precautions   Precautions Bed/chair alarms;Cognitive; Fall Risk;Pain;Spinal precautions   Weight Bearing Restrictions No   ROM Restrictions   (spinal precautions)   Braces or Orthoses LSO   Pain Assessment   Pain Assessment Tool 0-10   Pain Score 7   Pain Location/Orientation Orientation: Lower; Location: Back   Toilet Transfer   Reason if not Attempted Activity not applicable   Toilet Transfer CARE Score 9   Transfer Bed/Chair/Wheelchair   Limitations Noted In Balance; Endurance; Sequencing;LE Strength   Adaptive Equipment Roller Walker   Findings Yessenia RW; VC for sequencing, hand placement, and forward weight shift   Type of Assistance Needed Physical assistance;Verbal cues   Physical Assistance Level 26%-50%   Chair/Bed-to-Chair Transfer CARE Score 3   Roll Left and Right   Comment Pt OOB   Reason if not Attempted Activity not applicable   Roll Left and Right CARE Score 9   Sit to Lying   Comment Pt OOB   Reason if not Attempted Activity not applicable   Sit to Lying CARE Score 9   Lying to Sitting on Side of Bed   Comment Pt OOB   Reason if not Attempted Activity not applicable   Lying to Sitting on Side of Bed CARE Score 9   Sit to Stand   Type of Assistance Needed Physical assistance;Verbal cues   Physical Assistance Level 26%-50%   Comment Yessenia RW; VC for sequencing, hand placement, and forward weight shift   Sit to Stand CARE Score 3   Picking Up Object   Reason if not Attempted Safety concerns   Picking Up Object CARE Score 88   Car Transfer   Reason if not Attempted Environmental limitations   Car Transfer CARE Score 10   Ambulation   Primary Mode of Locomotion Prior to Admission Walk   Distance Walked (feet) 106 ft   Assist Device Roller Walker   Gait Pattern Inconsistant Bibi; Slow Bibi;Decreased foot clearance; Forward Flexion;Narrow ZARA; Decreased L stance   Limitations Noted In Balance; Endurance; Heel Strike;Posture; Safety;Speed;Strength   Provided Assistance with: Balance;Direction   Walk Assist Level Contact Guard   Findings CGA RW   Walk 10 Feet   Type of Assistance Needed Incidental touching   Physical Assistance Level 25% or less   Comment CGA RW   Walk 10 Feet CARE Score 3   Walk 50 Feet with Two Turns   Type of Assistance Needed Incidental touching   Physical Assistance Level 25% or less   Comment CGA RW   Walk 50 Feet with Two Turns CARE Score 3   Walk 150 Feet   Reason if not Attempted Safety concerns   Walk 150 Feet CARE Score 88   Walking 10 Feet on Uneven Surfaces   Reason if not Attempted Activity not applicable   Walking 10 Feet on Uneven Surfaces CARE Score 9   Wheelchair mobility   Findings Not applicable   Wheel 50 Feet with Two Turns   Reason if not Attempted Activity not applicable   Wheel 50 Feet with Two Turns CARE Score 9   Wheel 150 Feet   Reason if not Attempted Activity not applicable   Wheel 162 Feet CARE Score 9   Curb or Single Stair   Reason if not Attempted Safety concerns   1 Step (Curb) CARE Score 88   4 Steps   Reason if not Attempted Safety concerns   4 Steps CARE Score 88   12 Steps   Reason if not Attempted Safety concerns   12 Steps CARE Score 88   RLE Assessment   RLE Assessment X   Strength RLE   RLE Overall Strength 3+/5   R Hip Flexion 3-/5   R Knee Flexion 3+/5   R Knee Extension 3-/5   R Ankle Dorsiflexion 3+/5   R Ankle Plantar Flexion 4/5   LLE Assessment   LLE Assessment WFL   Coordination   Movements are Fluid and Coordinated 1   Sensation   Light Touch No apparent deficits   Propioception No apparent deficits   Cognition   Overall Cognitive Status Impaired   Arousal/Participation Alert; Responsive; Cooperative   Attention Attends with cues to redirect   Orientation Level Oriented X4   Memory Decreased recall of precautions   Following Commands Follows one step commands with increased time or repetition   Discharge [address] Retired/not working   Patient's Discharge Plan Home with    Patient's Rehab Expectations Get home, walk better, and take care of my    Impressions Pt is 80 y o  F seen for PT evaluation on 4/14/2023 s/p admit to [address] on 4/13/2023 following fall causing acute mild inferior endplate L3 compression fracture without central canal compromise or posterior element involvement  Orders placed at admission   Comorbidities affecting pt's physical performance at time of assessment include: Essential hypertension, history of pulmonary embolism, type 2 diabetes mellitus with stage 3a chronic kidney disease, compression fracture of thoracic spine, non-traumatic, sequela, stage 3a chronic kidney disease, bladder mass, hypercoagulable state, primary, osteoarthritis of knee, other pulmonary embolism without acute cor pulmonale, hypocalcemia, malignant neoplasm of overlapping sites of bladder,  Stage 3b chronic kidney disease,  lung mass, encephalopathy  PLOF prior to admission was independent for household mobility with RW  Personal factors affecting pt at time of IE include: spinal precautions, decreased endurance, weakness, decreased flexibility, decreased cognition, decreased balance, and pain  Please find objective findings from PT assessment regarding body systems outlined above with impairments and limitations including weakness, pain, decreased cognition, impaired balance, decreased cognition, decreased endurance, decreased activity tolerance and fall risk, as well as mobility assessment  Pt's clinical presentation warrants participation in multidisciplinary acute rehab program at 3 or greater hours of therapy per day  Pt to benefit from continued PT tx to address deficits as defined above and maximize level of functional independent mobility and consistency  PT for improved safe return home with maximized functional mobility  PT Therapy Minutes   PT Time In 1000   PT Time Out 1100   PT Total Time (minutes) 60   PT Mode of treatment - Individual (minutes) 60   PT Mode of treatment - Concurrent (minutes) 0   PT Mode of treatment - Group (minutes) 0   PT Mode of treatment - Co-treat (minutes) 0   PT Mode of Treatment - Total time(minutes) 60 minutes   PT Cumulative Minutes 60   Cumulative Minutes   Cumulative therapy minutes 60     Seated LE TE: 3x10, B/L LEs, 1# on L, HEP issued    Patient remains OOB in chair, all needs in reach    Alarm in place and activated  Encouraged use of call bell, patient verbalizes understanding

## 2023-04-14 NOTE — WOUND OSTOMY CARE
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, compression fracture of L-3 vertebra, lung mass, and encephalopathy  Assessment Findings:   Patient OOB in the wheelchair  She is an assist to stand to the walker  Primary RN present  Patient is incontinent of urine, not incontinent of stool  She is wearing a disposable brief  She performs hygiene care with OT, is able to feed herself is alert and oriented, cooperative with assessment  Allevyn life silicone bordered foam dressing in place  1  POA-stage 2 to the right upper buttocks  Noted as stage 1 on admission  Per image on admission, small area of epidermal separation noted to the right upper buttock at the sacral region  Periwound hyperpigmented and blanchable  Noted much less erythema to the buttocks than on admission  Appears to be resolving erythema    Skin and Wound Care Plan:   1  Offloading cushion to chair when OOB  2  Elevate heels off the bed with pillows   3  Turn and reposition every two hours  4  Hydraguard to bilateral heels and lower buttocks  BID and PRN  5  Skin nourishing cream daily  6  Allevyn life silicone bordered dressing to the sacrum-upper buttocks, rayshawn with T, date, peel back daily for skin assessment, reapply and change every 3 days and PRN    Wounds:  Wound 04/13/23 Pressure Injury Coccyx (Active)   Wound Image   04/14/23 1233   Wound Description Intact; Pink 04/14/23 1233   Pressure Injury Stage 2 04/14/23 1233   Lucretia-wound Assessment Intact; Hyperpigmented 04/14/23 1233   Wound Length (cm) 0 7 cm 04/14/23 1233   Wound Width (cm) 0 7 cm 04/14/23 1233   Wound Depth (cm) 0 cm 04/14/23 1233   Wound Surface Area (cm^2) 0 49 cm^2 04/14/23 1233   Wound Volume (cm^3) 0 cm^3 04/14/23 1233   Calculated Wound Volume (cm^3) 0 cm^3 04/14/23 1233   Drainage Amount None 04/14/23 1233   Dressing Foam, Silicon (eg  Allevyn, etc) 04/14/23 1233   Dressing Changed Other (Comment) 04/14/23 1233   Patient Tolerance Tolerated well 04/14/23 1233   Dressing Status Clean;Dry; Intact 04/13/23 1915     Reviewed plan of care with primary RN [facility]  Recommendations written as orders  Wound care team to follow weekly while admitted  Questions or concerns [address] [facility] Nurse    Liliane CRISTOBALN, RN, [employer]

## 2023-04-14 NOTE — PROGRESS NOTES
04/14/23 1130   Pain Assessment   Pain Assessment Tool 0-10   Pain Score 7   Pain Location/Orientation Orientation: Lower; Location: Back   Restrictions/Precautions   Precautions Bed/chair alarms;Cognitive; Fall Risk;Pain;Spinal precautions   Weight Bearing Restrictions No   ROM Restrictions   (spinal precautions)   Braces or Orthoses LSO   Eating   Type of Assistance Needed Set-up / clean-up   Physical Assistance Level No physical assistance   Eating CARE Score 5   Sit to Stand   Type of Assistance Needed Incidental touching   Comment CG   Sit to Stand CARE Score 4   Functional Standing Tolerance   Time 3m30s during clothespin activity   Comments stood with fair dynamic standing balance and unilateral support from one UE on table   Exercise Tools   Other Exercise Tool 1 fxl reacher activity retrieving clothespins from floor and placing in basket on table, pt used RUE but was encouraged by OT to use LUE on L side to avoid twisting, pt declined   Other Exercise Tool 2 sanderbox x15 all planes of motion with bilat UE   Cognition   Overall Cognitive Status Impaired   Arousal/Participation Alert; Responsive; Cooperative   Attention Attends with cues to redirect   Orientation Level Oriented to person;Oriented to time;Oriented to situation   Memory Decreased short term memory;Decreased recall of recent events;Decreased recall of precautions   Following Commands Follows one step commands without difficulty   Assessment   Treatment Assessment Pt seen for brief OT session this AM focusing on standing tolerance, activity tolerance, and ROM/strength; details in respective setcions  Pt with fair tolerance for activities and took rest breaks as needed  Pt required VC's to adhere to spinal precautions, particularly no twisting  Set up required for lunch tray at end of session   Pt's barriers to d/c include decreased strength throughout but especially bilat LE s/p lumbar fx, decreased balance, spinal precautions with decreased carryover and LSO brace, impaired problem solving, attention, and safety awareness, and decreased activity tolerance; all affect independence in self care and fxl transfers  Pt would benefit from continued skilled OT servcies in order to address listed barriers and prepare for safe d/c  Prognosis Good   Problem List Decreased strength;Decreased range of motion;Decreased endurance; Impaired balance;Decreased cognition; Impaired judgement;Decreased safety awareness;Orthopedic restrictions;Pain   Plan   Treatment/Interventions ADL retraining;Functional transfer training;LE strengthening/ROM; Therapeutic exercise; Endurance training;Cognitive reorientation;Patient/family training;Equipment eval/education; Compensatory technique education;OT   Progress Progressing toward goals   OT Therapy Minutes   OT Time In 1130   OT Time Out 1202   OT Total Time (minutes) 32   OT Mode of treatment - Individual (minutes) 32

## 2023-04-14 NOTE — CONSULTS
Consultation - Internal Medicine    Lambert Moreno 80 y o  female MRN: [id number]  Unit/Bed#: [facility] 213-01 Encounter: [id number]      A/P:  1  Diabetes Mellitus: most recent A1c of 7 0 will continue oral medication and discontinue insulin at this time, patient would prefer only utilizing oral therapy   2  Afib/PE: patient currently on renally adjusted dose of eliquis 2 5 mg bid   3  Hypertension: appears stable on metoprolol and lisinopril, continue to monitor   4  Compression fracture: utilizing brace while oob, continue to manage pain effectively all therapy as per Physiatry   5  CKD: stable at stage 3, gfr of 41  6  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: with new area of concern to lung, has been following with Dr Ruby Watson, Urology in the past  As per EMR, case was discussed with patients son in the acute care setting, will complete STR before additional appointments or decisions made  Thank you for allowing us to participate in the care of your patient  Please feel free to contact us regarding the care of this patient, or any other questions/concerns that may be applicable      Patient Active Problem List   Diagnosis   • Chronic a-fib (HCC)   • Essential hypertension   • History of pulmonary embolism   • Type 2 diabetes mellitus with stage 3a chronic kidney disease (HCC)   • Compression fracture of thoracic spine, non-traumatic, sequela   • Stage 3a chronic kidney disease (HCC)   • Bladder mass   • Hypercoagulable state, primary ([address] )   • Osteoarthritis of knee   • Other pulmonary embolism without acute cor pulmonale (HCC)   • Hypocalcemia   • Malignant neoplasm of overlapping sites of bladder ([address] )   • Stage 3b chronic kidney disease (HCC)   • Loss of weight   • Fall at home   • Compression fracture of L3 vertebra (HCC)   • Lung mass   • Encephalopathy       History of Present Illness   Physician Requesting Consult: Marcin Hutton MD  Reason for Consult / Principal Problem: Medical Management   Hx and PE limited by:   HPI: Keke Driver is a 80y o  year old female who presents with  PMH significant for T2DM, HTN, Chronic a-fib and CKD who presented to the [facility] on 04/08/23 s/p a fall at home  She fell backwards and landed on her neck with questionable head strike  CTH with no acute intracranial abnormality  CT spine cervical with no cervical spine fracture or traumatic malalignment  CT C/A/P with acute mild inferior endplate L3 compression fracture without central canal compromise or posterior element involvement  Incidentally found to have a presumed bronchogenic malignancy at the right hilum and multifocal bladder tumors  A lumbar spine brace was ordered  She requires STR and was accepted and transferred to the [facility] [place] [facility]  History obtained from chart review and the patient    Constitutional ROS- Denies  fever, chills, night sweats, weight changes  Positive fatigue   HEENT ROS- Denies history of eye surgeries, glaucoma, headaches  Endocrine ROS- Positive history diabetes mellitus and thyroid disease  Cardiovascular ROS- Denies chest pain, palpitation, dyspnea exertion, orthopnea, claudication  Pulmonary ROS- Denies history of COPD, asthma  Denies cough, hemoptysis, shortness of breath  GI ROS- Denies abdominal pain, diarrhea, nausea, swallowing problems, vomiting, constipation, blood in stools, fecal incontinence  Hematological ROS- Denies history of easy bruising, blood clots, bleeding or blood transfusions  Genitourinary ROS- Denies recent hematuria, pyuria, flank pain, change in urinary stream, decreased urinary output, increased urinary frequency, nocturia, foamy urine, or urinary incontinence  Lymphatic ROS- Denies lymphadenopathy  Musculoskeletal ROS- Denies history of gout, muscle weakness, joint pain  Dermatological ROS- Denies rash, wounds, ulcers, itching, jaundice  Psychiatric ROS- Denies anxiety, depression, hallucinations, disorientation    Neurological ROS- No stroke or TIA symptoms  Denies dizziness, paresthesias, history of stroke, history of peripheral neuropathy  Historical Information   Past Medical History:   Diagnosis Date   • Abnormal gait    • Accidental fall from chair, subsequent encounter    • Atrial fibrillation (HCC)    • Benign essential hypertension    • Bite of animal    • Cataract    • Cellulitis    • Chronic kidney disease    • Chronic kidney disease, stage 3 (HCC)    • Colon cancer ([place] [address] )    • Colon polyp    • Current tear of lateral cartilage or meniscus of knee    • Diabetes mellitus ([place] [address] )    • Disease of thyroid gland    • Disorder of magnesium metabolism    • Dvt femoral (deep venous thrombosis)    • Embolism from thrombosis of vein of distal end of lower extremity ([place] [address] )    • Essential hypertension    • Fall    • Hyperlipidemia    • Hyperlipidemia    • Hypertension    • Hypothyroidism    • Insomnia    • Kidney stone    • Knee joint effusion    • Leukocytosis    • Malaise and fatigue    • MI (myocardial infarction) ([place] [address] )     Hospitalization    • Orbital hemorrhage    • Other sleep disorders    • PAF (paroxysmal atrial fibrillation) (formerly [facility])    • Presence of vena cava filter    • Pulmonary embolism (formerly [facility])    • Tear film insufficiency    • Type 2 diabetes mellitus ([place] [address] )    • Unspecified osteoarthritis, unspecified site    • Weight decreased      Past Surgical History:   Procedure Laterality Date   • ABDOMINAL ADHESION SURGERY  2015    Had wound vac   • ABDOMINAL SURGERY     • APPENDECTOMY     • CATARACT EXTRACTION     • CHOLECYSTECTOMY     • COLON SURGERY     • COLOSTOMY  2011    Due to Ileus, then reanastomosis in 2002   • COLOSTOMY TAKEDOWN/REVERSE KUMAR     • EXCISION BLADDER MESH TRANSVESICLEPORT W/ LASER     • HERNIA REPAIR     • IVC FILTER INSERTION     • KNEE SURGERY  2014    repair    • AZ CYSTOURETHROSCOPY W/DEST &/RMVL TUMOR LARGE N/A 4/30/2021    Procedure: CYSTOSCOPY, TRANSURETHRAL RESECTION OF BLADDER TUMOR (TURBT);   Surgeon: Emanuel Burton MD; Location: [facility] MAIN OR;  Service: Urology     Social History   Social History     Substance and Sexual Activity   Alcohol Use Never     Social History     Substance and Sexual Activity   Drug Use Never     Social History     Tobacco Use   Smoking Status Former   • Types: Cigarettes   • Quit date: 3/19/2001   • Years since quittin 0   Smokeless Tobacco Never     Family History   Problem Relation Age of Onset   • Deep vein thrombosis Mother        Meds/Allergies   all current active meds have been reviewed, current meds:   Current Facility-Administered Medications   Medication Dose Route Frequency   • acetaminophen (TYLENOL) tablet 650 mg  650 mg Oral Q6H PRN   • aluminum-magnesium hydroxide-simethicone (MYLANTA) oral suspension 30 mL  30 mL Oral Q4H PRN   • apixaban (ELIQUIS) tablet 2 5 mg  2 5 mg Oral BID   • calcium carbonate (OYSTER SHELL,OSCAL) 500 mg tablet 1 tablet  1 tablet Oral BID With Meals   • cholecalciferol (VITAMIN D3) tablet 1,000 Units  1,000 Units Oral Daily   • cyanocobalamin (VITAMIN B-12) tablet 1,000 mcg  1,000 mcg Oral Daily   • docusate sodium (COLACE) capsule 100 mg  100 mg Oral BID   • famotidine (PEPCID) tablet 10 mg  10 mg Oral Daily   • glipiZIDE (GLUCOTROL) tablet 5 mg  5 mg Oral BID AC   • insulin glargine (LANTUS) subcutaneous injection 5 Units 0 05 mL  5 Units Subcutaneous QAM   • insulin lispro (HumaLOG) 100 units/mL subcutaneous injection 1-5 Units  1-5 Units Subcutaneous TID AC   • insulin lispro (HumaLOG) 100 units/mL subcutaneous injection 1-5 Units  1-5 Units Subcutaneous HS   • levothyroxine tablet 25 mcg  25 mcg Oral Daily   • lidocaine (LIDODERM) 5 % patch 1 patch  1 patch Topical Daily   • lisinopril (ZESTRIL) tablet 5 mg  5 mg Oral Daily   • magnesium gluconate (MAGONATE) tablet 500 mg  500 mg Oral Daily   • melatonin tablet 3 mg  3 mg Oral HS   • metoprolol tartrate (LOPRESSOR) tablet 25 mg  25 mg Oral Q12H   • ondansetron (ZOFRAN-ODT) dispersible tablet 4 mg  4 mg Oral Q6H PRN   • polyethylene glycol (MIRALAX) packet 17 g  17 g Oral Daily PRN   • pravastatin (PRAVACHOL) tablet 20 mg  20 mg Oral Daily With Dinner   • senna-docusate sodium (SENOKOT S) 8 6-50 mg per tablet 1 tablet  1 tablet Oral HS    and PTA meds:    Medications Prior to Admission   Medication   • acetaminophen (TYLENOL) 325 mg tablet   • apixaban (Eliquis) 2 5 mg   • calcium carbonate (OS-CARLTON) 600 MG tablet   • cholecalciferol (VITAMIN D3) 1,000 units tablet   • Contour Next Test test strip   • cyanocobalamin (VITAMIN B-12) 100 mcg tablet   • glipiZIDE (GLUCOTROL) 5 mg tablet   • levothyroxine 25 mcg tablet   • lidocaine (LIDODERM) 5 %   • lisinopril (ZESTRIL) 5 mg tablet   • lovastatin (MEVACOR) 20 mg tablet   • magnesium gluconate (MAGONATE) 500 mg tablet   • melatonin 3 mg   • metFORMIN (GLUCOPHAGE) 500 mg tablet   • metoprolol tartrate (LOPRESSOR) 25 mg tablet   • Microlet Lancets MISC         No Known Allergies    Objective     Intake/Output Summary (Last 24 hours) at 4/14/2023 1600  Last data filed at 4/14/2023 1300  Gross per 24 hour   Intake 195 ml   Output --   Net 195 ml       Invasive Devices:        Physical Exam  Vitals and nursing note reviewed  Constitutional:       General: She is not in acute distress  Appearance: Normal appearance  She is normal weight  She is not ill-appearing  HENT:      Head: Normocephalic and atraumatic  Right Ear: External ear normal       Left Ear: External ear normal       Nose: Nose normal  No congestion or rhinorrhea  Mouth/Throat:      Mouth: Mucous membranes are moist       Pharynx: Oropharynx is clear  No oropharyngeal exudate or posterior oropharyngeal erythema  Eyes:      Extraocular Movements: Extraocular movements intact  Conjunctiva/sclera: Conjunctivae normal       Pupils: Pupils are equal, round, and reactive to light  Cardiovascular:      Rate and Rhythm: Normal rate  Rhythm irregular  Pulses: Normal pulses        Heart sounds: Normal heart sounds  Pulmonary:      Effort: Pulmonary effort is normal       Comments: Diminished throughout   Abdominal:      General: Abdomen is flat  Bowel sounds are normal       Palpations: Abdomen is soft  Musculoskeletal:         General: Normal range of motion  Cervical back: Normal range of motion and neck supple  No rigidity or tenderness  Right lower leg: No edema  Left lower leg: No edema  Skin:     General: Skin is warm and dry  Neurological:      Mental Status: She is alert and oriented to person, place, and time  Comments: With periods of forgetfulness    Psychiatric:         Mood and Affect: Mood normal          Behavior: Behavior normal          Thought Content: Thought content normal          Judgment: Judgment normal            I/O last 3 completed shifts: In: 76 [P O :75]  Out: -     Vitals:    04/14/23 0701   BP: 154/67   Pulse: 73   Resp: 18   Temp: 98 °F (36 7 °C)   SpO2: 95%         Current Weight: Weight - Scale: 62 kg (136 lb 11 oz)  First Weight: Weight - Scale: 62 kg (136 lb 11 oz)    Lab Results:  I have personally reviewed pertinent labs      CBC:   Lab Results   Component Value Date    WBC 7 67 04/14/2023    HGB 14 5 04/14/2023    HCT 45 4 04/14/2023    MCV 99 (H) 04/14/2023     04/14/2023    MCH 31 5 04/14/2023    MCHC 31 9 04/14/2023    RDW 13 9 04/14/2023    MPV 12 1 04/14/2023    NRBC 0 04/14/2023     CMP:   Lab Results   Component Value Date    K 3 8 04/14/2023     04/14/2023    CO2 28 04/14/2023    BUN 33 (H) 04/14/2023    CREATININE 1 17 04/14/2023    CALCIUM 9 3 04/14/2023    EGFR 41 04/14/2023     Phosphorus: No results found for: PHOS  Magnesium: No results found for: MG  Urinalysis: No results found for: COLORU, CLARITYU, SPECGRAV, PHUR, LEUKOCYTESUR, NITRITE, PROTEINUA, GLUCOSEU, KETONESU, BILIRUBINUR, BLOODU  Ionized Calcium: No results found for: CAION  Coagulation: No results found for: PT, INR, APTT  Troponin: No results found for: TROPONINI  ABG: No results found for: PHART, HWU5JUH, PO2ART, YTK0DVS, X3GVTYJI, BEART, SOURCE    Results from last 7 days   Lab Units 04/14/23  0513 04/13/23  0500 04/12/23  0530 04/09/23  0519 04/08/23  1548   POTASSIUM mmol/L 3 8 3 8 3 9   < > 3 6   CHLORIDE mmol/L 102 100 102   < > 98   CO2 mmol/L 28 28 27   < > 29   BUN mg/dL 33* 33* 29*   < > 21   CREATININE mg/dL 1 17 1 35* 1 19   < > 1 07   CALCIUM mg/dL 9 3 9 4 9 1   < > 9 2   ALK PHOS U/L  --   --   --   --  75   ALT U/L  --   --   --   --  13   AST U/L  --   --   --   --  14    < > = values in this interval not displayed  Radiology review:  No results found  EKG, Pathology, and Other Studies: I have reviewed pertinent studies       Counseling / Coordination of Care  Total ADDITIONAL floor / unit time spent today 50  minutes  Greater than 50% of total time was spent with the patient and / or family counseling and / or coordination of care  Adela Fernandes      This consultation note was produced in part using a dictation device which may document imprecise wording from author's original intent

## 2023-04-14 NOTE — PCC CARE MANAGEMENT
"Initial assessment & orientation to [facility] with Pt, who expressed understanding & agreement  Message left for Pt's son  Pt stated she resides with her spouse, who is WC bound, in a SS home, 5 steps in  She reported that her family provides assistance  She uses [facility] or friends for rides to appts  She has a RW  Discussed [address] with Pt & Pt's emergency contact, who expressed understanding & agreement  SW will continue to monitor & assist as needed with [address]  IMM reviewed, signed & submitted for scanning  4/18/23 -Tx team recommendations reviewed with patient & Pt's son, who expressed understanding & agreement  Target DC date is 4/27 with [facility] (Nsg, PT, OT, ST, HHA); a list of providers was provided to Pt & a referral will be made based on Pt preference of available agencies in the demographic area  Discussed in detail with son the importance of family support when Pt is [address] home; discussed that Pt will need assistance with ADL's, meals, care of spouse, etc  Pt's son stated that although he is an hour away, that he and his wife have been assisting & will continue to do so  Pt's son changed the cholostomy bag for Pt's spouse, but his father empties the bag, etc  Pt's son stated that his father is \"in better shape than her\", and reported that Pt's spouse transfers independently, gets the mail, prepares meals (they are microwavable meals provided by a [employer] organization)  Pt's son stated the family would like to honor their wishes to be in their home together at this time; they have already considered [facility] & it not an option at this time  Pt's son has spoken with Pt's neighbors as well & they are developing a plan to ensure Pt & her spouse's needs are met  SW will place a referral for [facility] program as well  SW will continue to monitor & assist as needed with Tx & DC planning  4/25/23 - Tx team recommendations reviewed with patient & Pt's son, who expressed understanding & agreement   Target DC " date is Thursday, 4/27 with [facility] (Nsg, PT, OT, ST, HHA); a list of providers was provided to Pt & a referral made to [facility] based on Pt preferences from choice list; [facility] expected within 24-48 hours of DC  Pt's son will transport her home around 3:30-4 PM on 4/27 via regular car, which team has determined to be a safe mode of transport; son stated that his truck is a mid-size vehicle  A referral to the Option program will be made on Thursday morning  SW will continue to monitor & assist as needed with Tx & DC planning

## 2023-04-14 NOTE — PLAN OF CARE
Problem: PAIN - ADULT  Goal: Verbalizes/displays adequate comfort level or baseline comfort level  Description: Interventions:  - Encourage patient to monitor pain and request assistance  - Assess pain using appropriate pain scale  - Administer analgesics based on type and severity of pain and evaluate response  - Implement non-pharmacological measures as appropriate and evaluate response  - Consider cultural and social influences on pain and pain management  - Notify physician/advanced practitioner if interventions unsuccessful or patient reports new pain  Outcome: Progressing     Problem: INFECTION - ADULT  Goal: Absence or prevention of progression during hospitalization  Description: INTERVENTIONS:  - Assess and monitor for signs and symptoms of infection  - Monitor lab/diagnostic results  - Administer medications as ordered  - Instruct and encourage patient and family to use good hand hygiene technique  - Identify and instruct in appropriate isolation precautions for identified infection/condition  Outcome: Progressing  Goal: Absence of fever/infection during neutropenic period  Description: INTERVENTIONS:  - Monitor WBC    Outcome: Progressing     Problem: SAFETY ADULT  Goal: Patient will remain free of falls  Description: INTERVENTIONS:  - Educate patient/family on patient safety including physical limitations  - Instruct patient to call for assistance with activity   - Consult OT/PT to assist with strengthening/mobility   - Keep Call bell within reach  - Keep bed low and locked with side rails adjusted as appropriate  - Keep care items and personal belongings within reach  - Initiate and maintain comfort rounds  - Make Fall Risk Sign visible to staff  - Apply yellow socks and bracelet for high fall risk patients  - Consider moving patient to room near nurses station  Outcome: Progressing  Goal: Maintain or return to baseline ADL function  Description: INTERVENTIONS:  -  Assess patient's ability to carry out ADLs; assess patient's baseline for ADL function and identify physical deficits which impact ability to perform ADLs (bathing, care of mouth/teeth, toileting, grooming, dressing, etc )  - Assess/evaluate cause of self-care deficits   - Assess range of motion  - Assess patient's mobility; develop plan if impaired  - Assess patient's need for assistive devices and provide as appropriate  - Encourage maximum independence but intervene and supervise when necessary  - Involve family in performance of ADLs  - Assess for home care needs following discharge   - Consider OT consult to assist with ADL evaluation and planning for discharge  - Provide patient education as appropriate  Outcome: Progressing  Goal: Maintains/Returns to pre admission functional level  Description: INTERVENTIONS:  - Set and communicate daily mobility goal to care team and patient/family/caregiver     - Collaborate with rehabilitation services on mobility goals if consulted  - Out of bed for toileting  - Record patient progress and toleration of activity level   Outcome: Progressing     Problem: DISCHARGE PLANNING  Goal: Discharge to home or other facility with appropriate resources  Description: INTERVENTIONS:  - Identify barriers to discharge w/patient and caregiver  - Arrange for needed discharge resources and transportation as appropriate  - Identify discharge learning needs (meds, wound care, etc )  - Arrange for interpretive services to assist at discharge as needed  - Refer to Case Management Department for coordinating discharge planning if the patient needs post-hospital services based on physician/advanced practitioner order or complex needs related to functional status, cognitive ability, or social support system  Outcome: Progressing     Problem: Knowledge Deficit  Goal: Patient/family/caregiver demonstrates understanding of disease process, treatment plan, medications, and discharge instructions  Description: Complete learning assessment and assess knowledge base  Interventions:  - Provide teaching at level of understanding  - Provide teaching via preferred learning methods  Outcome: Progressing     Problem: Prexisting or High Potential for Compromised Skin Integrity  Goal: Skin integrity is maintained or improved  Description: INTERVENTIONS:  - Identify patients at risk for skin breakdown  - Assess and monitor skin integrity  - Assess and monitor nutrition and hydration status  - Monitor labs   - Assess for incontinence   - Turn and reposition patient  - Assist with mobility/ambulation  - Relieve pressure over bony prominences  - Avoid friction and shearing  - Provide appropriate hygiene as needed including keeping skin clean and dry  - Evaluate need for skin moisturizer/barrier cream  - Collaborate with interdisciplinary team   - Patient/family teaching  - Consider wound care consult   Outcome: Progressing     Problem: Nutrition/Hydration-ADULT  Goal: Nutrient/Hydration intake appropriate for improving, restoring or maintaining nutritional needs  Description: Monitor and assess patient's nutrition/hydration status for malnutrition  Collaborate with interdisciplinary team and initiate plan and interventions as ordered  Monitor patient's weight and dietary intake as ordered or per policy  Utilize nutrition screening tool and intervene as necessary  Determine patient's food preferences and provide high-protein, high-caloric foods as appropriate       INTERVENTIONS:  - Monitor oral intake, urinary output, labs, and treatment plans  - Assess nutrition and hydration status and recommend course of action  - Evaluate amount of meals eaten  - Assist patient with eating if necessary   - Allow adequate time for meals  - Recommend/ encourage appropriate diets, oral nutritional supplements, and vitamin/mineral supplements  - Assess need for intravenous fluids  - Provide specific nutrition/hydration education as appropriate  - Include patient/family/caregiver in decisions related to nutrition  Outcome: Progressing

## 2023-04-14 NOTE — NUTRITION
"   04/14/23 1414   Biochemical Data,Medical Tests, and Procedures   Biochemical Data/Medical Tests/Procedures Lab values reviewed; Meds reviewed   Labs (Comment) 4/14 BUN:33  10/10/22 A1c:7 0%   Meds (Comment) eliquis, CaCO3, Vit B12, colace, pepcid, glipizide, lantus, humalog, levothyroxine, Magonate, zofran, lopressor, pravachol   Nutrition-Focused Physical Exam   Nutrition-Focused Physical Exam Findings RN skin assessment reviewed  (Stage 1 coccyx per documentation )   Nutrition-Focused Physical Exam Findings Back brace when OOB  LBM 4/13  Medical-Related Concerns AFib, cellulitis, CKD, colon cancer, DM, disease of thyroid gland, DVT, HLD, HTN, MI, PAF, PE   Adequacy of Intake   Nutrition Modality PO   Feeding Route   PO Independent   Current PO Intake   Current Diet Order CCD 2, 2gm Na diet thin liquids   Current Meal Intake %   Estimated calorie intake compared to estimated need Nutrient needs met  PES Statement   Problem Clinical   Biochemical (2) Altered nutrition-related laboratory values (specify) NC-2 2  (glucose)   Related to Other (Comment)  (DM)   As evidenced by: Other (Comment)  (elevated glucose at times)   Recommendations/Interventions   Malnutrition/BMI Present No  (does not meet criteria)   Summary Consult: PCM  Wound care  CCD 2, 2gm Na diet thin liquids  Meal completions 100%  Patient states, \"I'm not really hungry but I'm eating\"  She states she tries to watch her sugar intake due to her DM  She states she no longer cooks for herself and receives one meal [facility]  She states she checks her BG at home  4/13/#; 12/21/#; 10/10/#; 10/4/#  No significant weight changes  Weight has been stable  Stage 1 coccyx per documentation  Back brace when OOB  LBM 4/13  Monitor need for nutrition supplement     Interventions/Recommendations Continue current diet order   Education Assessment   Education Education not indicated at this time   Patient Nutrition " Goals   Goal Glucose WNL; Meet PO needs   Goal Status Initiated   Timeframe to complete goal by next f/u   Nutrition Complexity Risk   Nutrition complexity level Moderate risk   Follow up date 04/20/23

## 2023-04-14 NOTE — NURSING NOTE
Patient resting comfortably in bed at this time  No signs of distress noted  Patient remains confused and forgetful overnight with frequent redirection required  Bed alarm and close supervision in place to promote patient safety  Patient was incontinent of urine overnight assist provided to toileting tasks at those times  Patient was able to stand pivot transfer with one assist and a walker overnight  Back brace in use when patient was out of bed  Patient encouraged to reposition frequently to promote skin integrity  Heels elevated off of bed on pillows  Call bell within reach  Will continue to monitor patient and follow plan of care

## 2023-04-15 LAB
GLUCOSE SERPL-MCNC: 165 MG/DL (ref 65–140)
GLUCOSE SERPL-MCNC: 287 MG/DL (ref 65–140)
GLUCOSE SERPL-MCNC: 394 MG/DL (ref 65–140)

## 2023-04-15 RX ORDER — OXYCODONE HYDROCHLORIDE 5 MG/1
5 TABLET ORAL EVERY 4 HOURS PRN
Status: DISCONTINUED | OUTPATIENT
Start: 2023-04-15 | End: 2023-04-27 | Stop reason: HOSPADM

## 2023-04-15 RX ADMIN — OXYCODONE HYDROCHLORIDE 5 MG: 5 TABLET ORAL at 20:55

## 2023-04-15 RX ADMIN — METOPROLOL TARTRATE 25 MG: 25 TABLET, FILM COATED ORAL at 09:15

## 2023-04-15 RX ADMIN — DOCUSATE SODIUM 100 MG: 100 CAPSULE, LIQUID FILLED ORAL at 17:03

## 2023-04-15 RX ADMIN — FAMOTIDINE 10 MG: 20 TABLET, FILM COATED ORAL at 09:14

## 2023-04-15 RX ADMIN — LISINOPRIL 5 MG: 5 TABLET ORAL at 09:14

## 2023-04-15 RX ADMIN — LEVOTHYROXINE SODIUM 25 MCG: 25 TABLET ORAL at 06:15

## 2023-04-15 RX ADMIN — APIXABAN 2.5 MG: 2.5 TABLET, FILM COATED ORAL at 17:03

## 2023-04-15 RX ADMIN — PRAVASTATIN SODIUM 20 MG: 20 TABLET ORAL at 16:53

## 2023-04-15 RX ADMIN — GLIPIZIDE 5 MG: 5 TABLET ORAL at 07:33

## 2023-04-15 RX ADMIN — SENNOSIDES AND DOCUSATE SODIUM 1 TABLET: 50; 8.6 TABLET ORAL at 21:00

## 2023-04-15 RX ADMIN — Medication 1000 UNITS: at 09:14

## 2023-04-15 RX ADMIN — Medication 2.5 MG: at 09:15

## 2023-04-15 RX ADMIN — METOPROLOL TARTRATE 25 MG: 25 TABLET, FILM COATED ORAL at 20:55

## 2023-04-15 RX ADMIN — CALCIUM 1 TABLET: 500 TABLET ORAL at 09:15

## 2023-04-15 RX ADMIN — APIXABAN 2.5 MG: 2.5 TABLET, FILM COATED ORAL at 09:14

## 2023-04-15 RX ADMIN — GLIPIZIDE 5 MG: 5 TABLET ORAL at 16:53

## 2023-04-15 RX ADMIN — LIDOCAINE 5% 1 PATCH: 700 PATCH TOPICAL at 09:15

## 2023-04-15 RX ADMIN — DOCUSATE SODIUM 100 MG: 100 CAPSULE, LIQUID FILLED ORAL at 09:14

## 2023-04-15 RX ADMIN — Medication 500 MG: at 09:14

## 2023-04-15 RX ADMIN — CALCIUM 1 TABLET: 500 TABLET ORAL at 16:53

## 2023-04-15 RX ADMIN — CYANOCOBALAMIN TAB 500 MCG 1000 MCG: 500 TAB at 09:14

## 2023-04-15 RX ADMIN — Medication 3 MG: at 21:00

## 2023-04-15 NOTE — PROGRESS NOTES
04/15/23 1033   Pain Assessment   Pain Assessment Tool 0-10   Pain Score 7   Pain Location/Orientation Orientation: Lower; Location: Back   Restrictions/Precautions   Precautions Bed/chair alarms; Fall Risk;Cognitive;Spinal precautions   ROM Restrictions   (Spinal precautions)   Braces or Orthoses LSO   Grooming   Able To Initiate Tasks;Comb/Brush Hair   Limitation Noted In Coordination;Problem Solving; Safety; Sequencing;Strength   Findings Seated at sink   Lying to Sitting on Side of Bed   Type of Assistance Needed Physical assistance   Physical Assistance Level 51%-75%   Comment Log roll; Increased timed; VC for spinal precautions   Lying to Sitting on Side of Bed CARE Score 2   Sit to Stand   Type of Assistance Needed Physical assistance   Physical Assistance Level 25% or less   Comment Min A; VC for hand placement   Sit to Stand CARE Score 3   Functional Standing Tolerance   Activity In standing position with unilateral UE support, Pt completed card match activity on slanted board; CGA; Pt stood for up to 7 minutes and 40 seconds   Exercise Tools   Exercise Tools Yes   Theraputty Pt utilized B/L hands to manipulate Yellow Theraputty to find and obtain small items from within putty   Other Exercise Tool 1 B/L UE supination/pronation, wrist flexion/extension, small Yellow Flexbar, 2x15   Other Exercise Tool 2 Seated, sandboxer B/L UE all available planes, 20x   Other Exercise Tool 3 B/L UE elbow AROM, 1# DB, 2x15   Cognition   Overall Cognitive Status Impaired   Arousal/Participation Alert; Cooperative   Attention Attends with cues to redirect   Memory Decreased long term memory;Decreased recall of precautions   Following Commands Follows one step commands with increased time or repetition   Additional Activities   Additional Activities Comments   (In seated position, Pt utilized reacher with alteranating R and L UE to obtain indiacted color peg from floor level and place on table surface; VC for use of reacher with R/L UE to maintain precautions)   Assessment   Treatment Assessment Pt received resting supine in bed and agreeable to participation with OT session  Pt benefited from verbal cues and reminders regarding consistently maintaining spinal precautions; Intermittent verbal and visual cues for redirection  Good tolerence with standing task to complete card match actvitity to support progress towards improved activity tolerence  Pt would benefit from continued participation with OT services to address barriers with limited strength, balance, coordinaiton, activity tolerence, overall safety and effective carryover with spinal precautions and LSO; To support progress towards improved safety and independence with fucntiaonl transfers, mobility and self care tasks to help facilitate safe discharge     OT Therapy Minutes   OT Time In 8263   OT Time Out 1203   OT Total Time (minutes) 90   OT Mode of treatment - Individual (minutes) 90

## 2023-04-15 NOTE — PROGRESS NOTES
04/15/23 0700   Pain Assessment   Pain Assessment Tool 0-10   Pain Score 6   Pain Location/Orientation Location: Back;Orientation: Lower;Orientation: Bilateral   Pain Onset/Description Onset: Ongoing   Patient's Stated Pain Goal No pain   Restrictions/Precautions   Precautions Bed/chair alarms; Fall Risk;Cognitive;Spinal precautions;Pain   Weight Bearing Restrictions No   Braces or Orthoses LSO   Cognition   Overall Cognitive Status Impaired   Arousal/Participation Alert; Cooperative   Attention Attends with cues to redirect   Orientation Level Oriented to person   Memory Decreased short term memory   Following Commands Follows one step commands with increased time or repetition   Comments pt lying supine in bed   Subjective   Subjective agreeable to PT; pt states she doesnt want to wear LSO bc it hurts her back   Roll Left and Right   Type of Assistance Needed Incidental touching   Physical Assistance Level 25% or less   Comment bed rails; Roll Left and Right CARE Score 3   Lying to Sitting on Side of Bed   Type of Assistance Needed Physical assistance   Physical Assistance Level 51%-75%   Comment mod A; log roll   Lying to Sitting on Side of Bed CARE Score 2   Sit to Stand   Type of Assistance Needed Physical assistance   Physical Assistance Level 26%-50%   Comment min A   Sit to Stand CARE Score 3   Bed-Chair Transfer   Type of Assistance Needed Physical assistance   Physical Assistance Level 26%-50%   Comment min A   Chair/Bed-to-Chair Transfer CARE Score 3   Transfer Bed/Chair/Wheelchair   Limitations Noted In Balance;Confidence; Endurance;Pain Management;UE Strength;LE Strength   Adaptive Equipment Roller Walker   Stand Pivot Minimal Assist   Sit to Stand Minimal Assist   Stand to Sit Minimal Assist   Supine to Sit Moderate Assist   Car Transfer   Reason if not Attempted Environmental limitations   Car Transfer CARE Score 10   Walk 10 Feet   Type of Assistance Needed Incidental touching   Physical Assistance Level 25% or less   Comment CGA   Walk 10 Feet CARE Score 3   Walk 50 Feet with Two Turns   Type of Assistance Needed Incidental touching   Physical Assistance Level 25% or less   Comment CGA   Walk 50 Feet with Two Turns CARE Score 3   Walk 150 Feet   Reason if not Attempted Safety concerns   Walk 150 Feet CARE Score 88   Walking 10 Feet on Uneven Surfaces   Type of Assistance Needed Physical assistance   Physical Assistance Level 26%-50%   Comment min A verna RW   Walking 10 Feet on Uneven Surfaces CARE Score 3   Ambulation   Primary Mode of Locomotion Prior to Admission Walk   Distance Walked (feet) 120 ft   Assist Device Roller Walker   Gait Pattern Inconsistant Bibi; Slow Bibi;Decreased foot clearance; Forward Flexion;Narrow ZARA; Decreased L stance   Limitations Noted In Balance; Endurance; Heel Strike;Posture; Safety;Speed;Strength   Provided Assistance with: Balance;Direction   Walk Assist Level Contact Guard   Findings CGA [facility] RW   Wheel 50 Feet with Two Turns   Reason if not Attempted Activity not applicable   Wheel 50 Feet with Two Turns CARE Score 9   Wheel 150 Feet   Reason if not Attempted Activity not applicable   Wheel 688 Feet CARE Score 9   Wheelchair mobility   Findings n/a   Curb or Single Stair   Style negotiated Single stair   Type of Assistance Needed Physical assistance   Physical Assistance Level 26%-50%   Comment min A   1 Step (Curb) CARE Score 3   4 Steps   Type of Assistance Needed Physical assistance   Physical Assistance Level 26%-50%   Comment min A   4 Steps CARE Score 3   12 Steps   Reason if not Attempted Safety concerns   12 Steps CARE Score 88   Stairs   Type Stairs   # of Steps 4   Weight Bearing Precautions WBAT   Assist Devices Bilateral Rail   Findings min A; non reciprocal   Picking Up Object   Reason if not Attempted Safety concerns   Picking Up Object CARE Score 88   Therapeutic Interventions   Strengthening seated/supine TE to b/l LE x 30 reps   Balance transfers/gait Other stairs   Assessment   Treatment Assessment pt was lying in bed; performed supine b/l LE TE x 30 reps; rolling to right wiht CGA and bed rail   sat at eob x 5 min iwht fair balance; donned LSO brace wiht mod A; sit to stand min A; ambulated 120 ft wiht slow guevara and RW wiht CGA  performed seated TE to b/l LE x 30 reps; performed up/down 4 steps with b/l HR and min A   pt did well today with TE and gait/transfesr/stairs  she is making gains in PT but does require verbal cuing for hand placement on transfers  she will conitnue to benefit from skilled PT services to reach goals set in PT  Problem List Decreased strength;Decreased range of motion;Decreased endurance; Impaired balance;Decreased cognition; Impaired judgement;Decreased safety awareness;Orthopedic restrictions;Pain   PT Barriers   Physical Impairment Decreased strength;Decreased range of motion;Decreased endurance; Impaired balance;Decreased cognition; Impaired judgement;Decreased safety awareness;Orthopedic restrictions;Pain   Functional Limitation Walking;Transfers;Standing;Stair negotiation   Plan   Treatment/Interventions Functional transfer training;LE strengthening/ROM; Elevations; Therapeutic exercise; Endurance training;Patient/family training;Bed mobility;Gait training   Progress Progressing toward goals   PT Therapy Minutes   PT Time In 0700   PT Time Out 0830   PT Total Time (minutes) 90   PT Mode of treatment - Individual (minutes) 90     SUPINE TE b/l LE :  3x10, B/L LEs  Heel slides -  Hip ABd slides  Hip Add - rubin  SAQ -   HC and HS stretch      SEATED TE b/l LE:  Hip flexion  LAQ  Hip ADD

## 2023-04-15 NOTE — NURSING NOTE
Pt confused at bedside, attempting to get OOB  Reoriented pt to environment and night time to get some rest  Pt mood is irritable with staff  Reinforced use of call bell for safety  Bed alarm intact  Will continue to monitor and follow plan of care

## 2023-04-15 NOTE — NURSING NOTE
04/15/23 Patient with increased pain in lower back today  Oxy 2 5mg effective without increased confusion noted  Feels that the back brace is more painful but education provided on importance of use  Assist of 1-2 to transfer depending on pain and following commands  Glucose before dinner was 394 but did have a piece of cheesecake brought in by family from home before it was checked  Sliding scale insulin was D/C'd on Friday due to being restarted on oral meds  Will have MD review accuchecks and consider adjusting meds on Monday   [place] [facility]

## 2023-04-15 NOTE — NURSING NOTE
Pt resting in bed  Confusion noted overnight, easily reoriented  Impulsive  Large amount of incont of urine this morning, pt transfers mod assist x1 to bsc  Pull-up present, b&b program encouraged  Pt does have urgency at times and feels she needs to go often as she has stated  Pt refused to wear TLSO brace when OOB, education provided regarding importance of wearing  Verbal cues provided during transfers for safety  Medicated 1x for pain to back area refer to [facility]  Will continue to monitor and follow plan of care, bed alarm intact for safety

## 2023-04-16 LAB
GLUCOSE SERPL-MCNC: 181 MG/DL (ref 65–140)
GLUCOSE SERPL-MCNC: 266 MG/DL (ref 65–140)
GLUCOSE SERPL-MCNC: 289 MG/DL (ref 65–140)
GLUCOSE SERPL-MCNC: 295 MG/DL (ref 65–140)

## 2023-04-16 RX ADMIN — Medication 500 MG: at 08:45

## 2023-04-16 RX ADMIN — APIXABAN 2.5 MG: 2.5 TABLET, FILM COATED ORAL at 17:24

## 2023-04-16 RX ADMIN — CALCIUM 1 TABLET: 500 TABLET ORAL at 07:49

## 2023-04-16 RX ADMIN — SENNOSIDES AND DOCUSATE SODIUM 1 TABLET: 50; 8.6 TABLET ORAL at 21:01

## 2023-04-16 RX ADMIN — Medication 3 MG: at 21:00

## 2023-04-16 RX ADMIN — FAMOTIDINE 10 MG: 20 TABLET, FILM COATED ORAL at 08:48

## 2023-04-16 RX ADMIN — DOCUSATE SODIUM 100 MG: 100 CAPSULE, LIQUID FILLED ORAL at 08:45

## 2023-04-16 RX ADMIN — DOCUSATE SODIUM 100 MG: 100 CAPSULE, LIQUID FILLED ORAL at 17:24

## 2023-04-16 RX ADMIN — OXYCODONE HYDROCHLORIDE 5 MG: 5 TABLET ORAL at 04:59

## 2023-04-16 RX ADMIN — CYANOCOBALAMIN TAB 500 MCG 1000 MCG: 500 TAB at 08:45

## 2023-04-16 RX ADMIN — Medication 1000 UNITS: at 08:45

## 2023-04-16 RX ADMIN — APIXABAN 2.5 MG: 2.5 TABLET, FILM COATED ORAL at 08:45

## 2023-04-16 RX ADMIN — PRAVASTATIN SODIUM 20 MG: 20 TABLET ORAL at 16:36

## 2023-04-16 RX ADMIN — LEVOTHYROXINE SODIUM 25 MCG: 25 TABLET ORAL at 05:00

## 2023-04-16 RX ADMIN — METOPROLOL TARTRATE 25 MG: 25 TABLET, FILM COATED ORAL at 21:01

## 2023-04-16 RX ADMIN — GLIPIZIDE 5 MG: 5 TABLET ORAL at 16:36

## 2023-04-16 RX ADMIN — CALCIUM 1 TABLET: 500 TABLET ORAL at 16:36

## 2023-04-16 RX ADMIN — LISINOPRIL 5 MG: 5 TABLET ORAL at 08:45

## 2023-04-16 RX ADMIN — GLIPIZIDE 5 MG: 5 TABLET ORAL at 07:49

## 2023-04-16 RX ADMIN — METOPROLOL TARTRATE 25 MG: 25 TABLET, FILM COATED ORAL at 08:45

## 2023-04-16 RX ADMIN — Medication 2.5 MG: at 16:36

## 2023-04-16 RX ADMIN — LIDOCAINE 5% 1 PATCH: 700 PATCH TOPICAL at 08:46

## 2023-04-16 NOTE — NURSING NOTE
Pt resting in bed, complains of pain to back medicated for pain refer to [facility]  Confusion noted overnight- disoriented at times but able to reorient to environment  Incont of urine at times in pull-up but does void  BSC used overnight  Pt refuses to wear TLSO OOB, encouraged and educated to wear  Pt requires cueing during transfers for safety  Will continue to monitor and follow plan of care, bed alarm intact

## 2023-04-16 NOTE — PROGRESS NOTES
OT Daily Progress       04/16/23 0830   Pain Assessment   Pain Assessment Tool 0-10   Pain Score No Pain  (no pain in back at rest while in bed)   Lower Body Dressing   Type of Assistance Needed Physical assistance   Physical Assistance Level 51%-75%   Comment mod A to dev pants over feet with reacher   Lower Body Dressing CARE Score 2   Lying to Sitting on Side of Bed   Type of Assistance Needed Physical assistance   Physical Assistance Level 25% or less   Comment min A   Lying to Sitting on Side of Bed CARE Score 3   Sit to Stand   Type of Assistance Needed Physical assistance   Physical Assistance Level 25% or less   Comment min A/CG A from bed   Sit to Stand CARE Score 3   Bed-Chair Transfer   Type of Assistance Needed Incidental touching   Physical Assistance Level No physical assistance   Comment CG A   Chair/Bed-to-Chair Transfer CARE Score 4   Therapeutic Excerise-Strength   UE Strength Yes   Cognition   Arousal/Participation Alert; Responsive; Cooperative   Attention Within functional limits   Following Commands Follows one step commands without difficulty   Activity Tolerance   Activity Tolerance Patient tolerated treatment well   Assessment   Treatment Assessment Pt seen for follow up visit  Pt rec'd in bed, awake, alert  Agreeable to OT  No pain at rest  Bed mob, transfers, self cares as noted  Mod A to dev LSO brace while seated  Reviewed spine precautions with patient  Mobilizes from pt room to OT gym with CG A and RW  Yellow T putty for bilat hand pinch, object isolation with 100% accuracy with increased time  Funct mob back to patient room at end of session  Doffs LSO brace with min A  EOB to supine with CG A  Left with needs in reach  Bruno fair, reports increased pain with funct mob  Improving act bruno  Will continue to progress from OT to max I and safety with self care piror to discharge  Progress as status allows     OT Therapy Minutes   OT Time In 0830   OT Time Out 0943   OT Total Time (minutes) 73   OT Mode of treatment - Individual (minutes) 73

## 2023-04-17 ENCOUNTER — PATIENT OUTREACH (OUTPATIENT)
Dept: HEMATOLOGY ONCOLOGY | Facility: CLINIC | Age: 88
End: 2023-04-17

## 2023-04-17 DIAGNOSIS — R91.8 LUNG MASS: ICD-10-CM

## 2023-04-17 DIAGNOSIS — C67.8 MALIGNANT NEOPLASM OF OVERLAPPING SITES OF BLADDER (HCC): Primary | ICD-10-CM

## 2023-04-17 DIAGNOSIS — N32.89 BLADDER MASS: ICD-10-CM

## 2023-04-17 PROBLEM — N39.0 UTI (URINARY TRACT INFECTION): Status: ACTIVE | Noted: 2023-04-17

## 2023-04-17 LAB
GLUCOSE SERPL-MCNC: 117 MG/DL (ref 65–140)
GLUCOSE SERPL-MCNC: 178 MG/DL (ref 65–140)
GLUCOSE SERPL-MCNC: 211 MG/DL (ref 65–140)
GLUCOSE SERPL-MCNC: 350 MG/DL (ref 65–140)

## 2023-04-17 RX ORDER — SULFAMETHOXAZOLE AND TRIMETHOPRIM 400; 80 MG/1; MG/1
1 TABLET ORAL EVERY 12 HOURS SCHEDULED
Status: COMPLETED | OUTPATIENT
Start: 2023-04-17 | End: 2023-04-23

## 2023-04-17 RX ORDER — AMOXICILLIN 250 MG
1 CAPSULE ORAL 2 TIMES DAILY
Status: DISCONTINUED | OUTPATIENT
Start: 2023-04-17 | End: 2023-04-27 | Stop reason: HOSPADM

## 2023-04-17 RX ORDER — BISACODYL 10 MG
10 SUPPOSITORY, RECTAL RECTAL DAILY PRN
Status: DISCONTINUED | OUTPATIENT
Start: 2023-04-17 | End: 2023-04-24

## 2023-04-17 RX ORDER — INSULIN LISPRO 100 [IU]/ML
1-5 INJECTION, SOLUTION INTRAVENOUS; SUBCUTANEOUS
Status: DISCONTINUED | OUTPATIENT
Start: 2023-04-17 | End: 2023-04-27 | Stop reason: HOSPADM

## 2023-04-17 RX ORDER — POLYETHYLENE GLYCOL 3350 17 G/17G
17 POWDER, FOR SOLUTION ORAL DAILY
Status: DISCONTINUED | OUTPATIENT
Start: 2023-04-17 | End: 2023-04-21

## 2023-04-17 RX ORDER — SULFAMETHOXAZOLE AND TRIMETHOPRIM 800; 160 MG/1; MG/1
1 TABLET ORAL EVERY 12 HOURS SCHEDULED
Status: DISCONTINUED | OUTPATIENT
Start: 2023-04-17 | End: 2023-04-17

## 2023-04-17 RX ADMIN — FAMOTIDINE 10 MG: 20 TABLET, FILM COATED ORAL at 08:14

## 2023-04-17 RX ADMIN — POLYETHYLENE GLYCOL 3350 17 G: 17 POWDER, FOR SOLUTION ORAL at 09:59

## 2023-04-17 RX ADMIN — Medication 1000 UNITS: at 08:15

## 2023-04-17 RX ADMIN — CALCIUM 1 TABLET: 500 TABLET ORAL at 07:21

## 2023-04-17 RX ADMIN — GLIPIZIDE 5 MG: 5 TABLET ORAL at 16:22

## 2023-04-17 RX ADMIN — Medication 500 MG: at 08:15

## 2023-04-17 RX ADMIN — INSULIN LISPRO 3 UNITS: 100 INJECTION, SOLUTION INTRAVENOUS; SUBCUTANEOUS at 11:54

## 2023-04-17 RX ADMIN — METOPROLOL TARTRATE 25 MG: 25 TABLET, FILM COATED ORAL at 21:15

## 2023-04-17 RX ADMIN — LISINOPRIL 5 MG: 5 TABLET ORAL at 08:15

## 2023-04-17 RX ADMIN — SULFAMETHOXAZOLE AND TRIMETHOPRIM 1 TABLET: 400; 80 TABLET ORAL at 13:53

## 2023-04-17 RX ADMIN — SENNOSIDES AND DOCUSATE SODIUM 1 TABLET: 50; 8.6 TABLET ORAL at 17:15

## 2023-04-17 RX ADMIN — BISACODYL 10 MG: 10 SUPPOSITORY RECTAL at 19:58

## 2023-04-17 RX ADMIN — PRAVASTATIN SODIUM 20 MG: 20 TABLET ORAL at 16:22

## 2023-04-17 RX ADMIN — DOCUSATE SODIUM 100 MG: 100 CAPSULE, LIQUID FILLED ORAL at 17:15

## 2023-04-17 RX ADMIN — LEVOTHYROXINE SODIUM 25 MCG: 25 TABLET ORAL at 05:00

## 2023-04-17 RX ADMIN — APIXABAN 2.5 MG: 2.5 TABLET, FILM COATED ORAL at 08:15

## 2023-04-17 RX ADMIN — Medication 3 MG: at 21:15

## 2023-04-17 RX ADMIN — METOPROLOL TARTRATE 25 MG: 25 TABLET, FILM COATED ORAL at 08:15

## 2023-04-17 RX ADMIN — DOCUSATE SODIUM 100 MG: 100 CAPSULE, LIQUID FILLED ORAL at 08:14

## 2023-04-17 RX ADMIN — SULFAMETHOXAZOLE AND TRIMETHOPRIM 1 TABLET: 400; 80 TABLET ORAL at 21:15

## 2023-04-17 RX ADMIN — APIXABAN 2.5 MG: 2.5 TABLET, FILM COATED ORAL at 17:15

## 2023-04-17 RX ADMIN — LIDOCAINE 5% 1 PATCH: 700 PATCH TOPICAL at 08:12

## 2023-04-17 RX ADMIN — GLIPIZIDE 5 MG: 5 TABLET ORAL at 07:21

## 2023-04-17 RX ADMIN — CALCIUM 1 TABLET: 500 TABLET ORAL at 16:22

## 2023-04-17 RX ADMIN — METFORMIN HYDROCHLORIDE 500 MG: 500 TABLET ORAL at 16:22

## 2023-04-17 RX ADMIN — CYANOCOBALAMIN TAB 500 MCG 1000 MCG: 500 TAB at 08:14

## 2023-04-17 NOTE — ASSESSMENT & PLAN NOTE
· UA obtained while in acute care  · Culture resulted over the weekend- >100,000 cfu/ml Enterobacter cloacae, <10,000 cfu/ml proteus species   · Bactrim completed

## 2023-04-17 NOTE — PCC PHYSICAL THERAPY
4/19/2023:  Patient seen by [facility] PT following fall with resulting L3 fx, now WBAT with LSO in place  Presents  with decreased ROM/strength, decreased balance and safety, decreased endurance, and pain  Patient min A bed mobility, min A transfers with RW, CGA  ambulation up to 104 feet on level and unlevel surfaces with RW, min A negotiation of 7 steps with bilateral handrails  Patient would benefit from continued inpatient [facility] PT to increase function, safety, and increased independence in prep for safe d/c to home  4/24/2023:  Patient seen by [facility] PT following fall with resulting L3 fx, now WBAT with LSO in place  Presents  with decreased cognition, decreased ROM/strength, decreased balance and safety, decreased endurance, and pain  Patient S bed mobility, S/CGA transfers with RW, Cl S/CGA  ambulation up to 236 feet on level and unlevel surfaces with RW, CGA negotiation of 7 steps with bilateral handrails  Patient would benefit from continued inpatient [facility] PT to increase function, safety, and increased independence in prep for safe d/c to home

## 2023-04-17 NOTE — PROGRESS NOTES
PM&R PROGRESS NOTE:  Casie Mark 80 y o  female MRN: [id number]  Unit/Bed#: -01 Encounter: [id number]        Rehabilitation Diagnosis: Impairment of mobility, safety and Activities of Daily Living (ADLs) due to Orthopedic Disorders:  08 9  Other Orthopedic L3 compression fracture    HPI: Casie Mark is a 80 y o  female with a PMH significant for T2DM, HTN, Chronic a-fib and CKD who presented to the [address] on 04/08/23 s/p a fall at home  She fell backwards and landed on her neck with questionable head strike  CTH with no acute intracranial abnormality  CT spine cervical with no cervical spine fracture or traumatic malalignment  CT C/A/P with acute mild inferior endplate L3 compression fracture without central canal compromise or posterior element involvement  Incidentally found to have a presumed bronchogenic malignancy at the right hilum and multifocal bladder tumors  A lumbar spine brace was ordered  Found to have encephalopathy  Chest xray without acute findings, she was afebrile with no leukocytosis  UA negative         SUBJECTIVE: Patient seen face to face  Admits to back discomfort from brace  States she feels uncomfortable sitting in the wheelchair  Informed patient we will get a recliner for her to sit in  She is getting lidocaine patches to the area and does not wish to take anything stronger at the moment due to not wanting to be sedated  Otherwise, she feels she is doing well with therapy and wishes to return home soon  ASSESSMENT: Stable, progressing      PLAN:    Rehabilitation  • Functional deficits:  Impaired mobility, self care, BLE weakness, pain  • Continue current rehabilitation plan of care to maximize function      • Functional update:   o PT:  Roll L-R: independent   Lying to sitting on side of bed: cg  Sit-stand: min  Bed-chair transfer: cg   Ambulation: cg  Stairs: min  o OT:  Toileting hygiene: cg  Toilet transfer: cg   o ST: pending today's note • Estimated Discharge: To be discussed this week      Pain  • Tylenol 650 mg Q6H PRN for mild pain  • Lidocaine patch Q12H Daily     DVT prophylaxis  • Eliquis 2 5 mg BID    Bladder plan  • Continent    Bowel plan  • Constipated  • Last BM 4/13/23  • Continue colace and Senna BID  • Move Miralax to daily PRN   • Will order dulcolax rectal suppository and request nursing to administer after dinner if no BM    Skin care  · Monitor skin daily  · Apply skin nourishing cream  · Reposition Q2H to offload pressure  · Elevate heels off bed   · Hydraguard to b/l heels and lower buttocks BID and PRN  · Stage 2 right upper buttocks: Allevyn life silicone bordered dressing to the sarum-upper buttocks  Check daily, reapply and change Q3days or PRN      UTI (urinary tract infection)  Assessment & Plan  · UA obtained while in acute care  · Culture resulted over the weekend- >100,000 cfu/ml Enterobacter cloacae, <10,000 cfu/ml proteus species   · Initiate Bactrim 400-80 mg Q12H [address] x7 days       Lung mass  Assessment & Plan  · Incidentally found on CT imaging : presumed bronchogenic malignancy at the right hilum and multofocal bladder tumors which should also be regarded as malignancy until proven otherwise   · Oncology consulted while in acute care  · Further management to be done as an outpatient per family preference   · Oncology would recommend cystoscopy with Urology to obtain tissue; IR lung biopsy, Outpatient PET CT  · Arrangements to be made via Oncology for a workup in approximately 2 weeks    Compression fracture of L3 vertebra (HCC)  Assessment & Plan  · S/p fall at home; unclear if head strike   · CTH: No acute intracranial abnormality  · CT C/A/P: acute mild inferior endplate L3 compression fracture without central canal compromise or posterior element involvement  No other acute traumatic injuries seen  · Lumbar spine brace to be worn with ambulation, in upright position ; unclear timeline   Likely 4-6 weeks   · Acute chomprehensive interdisciplinary inpatient rehabilitation to include intensive skilled therapies as outlines with oversight and management by a rehabilitation Physician Assistant overseen by rehabilitation physician as well as inpatient rehabilitation nursing, case management and weekly interdisciplinary team meeting    Malignant neoplasm of overlapping sites of bladder [facility])  Assessment & Plan  · Known history of bladder cancer  · Follows with Dr Henri Garcia   · Last testing done by Urology 5/2021  · Recommend following up as an outpatient with Urology and Oncology    Type 2 diabetes mellitus with stage 3a chronic kidney disease [facility])  Assessment & Plan  Lab Results   Component Value Date    HGBA1C 7 0 (H) 10/10/2022       Recent Labs     04/16/23  1134 04/16/23  1634 04/16/23 2023 04/17/23  0755   POCGLU 289* 295* 266* 178*       Blood Sugar Average: Last 72 hrs:  · (P) 244 [id number]   · Monitor accu cheks  · Continue SSI, Lantus 5 units QAM  · Restarted home Glipizide 5 mg BID  · Restarted home Metformin 500 mg BID today   · Monitor and adjust regimen as needed  · Carb controlled diet     Essential hypertension  Assessment & Plan  · Monitor vitals  · Continue Metoprolol 25 mg Q12H, Lisinopril 5 mg daily    Chronic a-fib (HCC)  Assessment & Plan  · Continue Metoprolol 25 mg Q12H [facility] & intermediate for rate control  · Continue Eliquis 2 5 mg BID        Appreciate IM consultants medical co-management  Labs, medications, and imaging personally reviewed  ROS:  A ten point review of systems was completed and pertinent positives are listed in subjective section  All other systems reviewed were negative  Review of Systems   Constitutional: Negative  Respiratory: Negative  Negative for shortness of breath  Cardiovascular: Negative  Negative for chest pain  Gastrointestinal: Negative  Negative for abdominal pain  Genitourinary: Negative  Negative for difficulty urinating     Musculoskeletal: "Positive for back pain  Back discomfort from brace  Especially while sitting in the wheelchair   Neurological: Negative  Psychiatric/Behavioral: Negative  OBJECTIVE:   /70 (BP Location: Right arm)   Pulse 65   Temp 98 1 °F (36 7 °C) (Temporal)   Resp 16   Ht 5' 4\" (1 626 m)   Wt 61 8 kg (136 lb 3 9 oz)   SpO2 95%   BMI 23 39 kg/m²     Physical Exam  Vitals reviewed  Constitutional:       General: She is not in acute distress  Appearance: She is not ill-appearing  HENT:      Head: Normocephalic and atraumatic  Eyes:      Pupils: Pupils are equal, round, and reactive to light  Cardiovascular:      Rate and Rhythm: Normal rate and regular rhythm  Pulses: Normal pulses  Heart sounds: Normal heart sounds  No murmur heard  Pulmonary:      Effort: Pulmonary effort is normal  No respiratory distress  Breath sounds: Normal breath sounds  Abdominal:      General: Bowel sounds are normal  There is no distension  Palpations: Abdomen is soft  Musculoskeletal:      Right lower leg: No edema  Left lower leg: No edema  Skin:     General: Skin is warm and dry  Neurological:      General: No focal deficit present  Mental Status: She is alert and oriented to person, place, and time     Psychiatric:         Mood and Affect: Mood normal          Behavior: Behavior normal           Lab Results   Component Value Date    WBC 7 67 04/14/2023    HGB 14 5 04/14/2023    HCT 45 4 04/14/2023    MCV 99 (H) 04/14/2023     04/14/2023     Lab Results   Component Value Date    SODIUM 139 04/14/2023    K 3 8 04/14/2023     04/14/2023    CO2 28 04/14/2023    BUN 33 (H) 04/14/2023    CREATININE 1 17 04/14/2023    GLUC 91 04/14/2023    CALCIUM 9 3 04/14/2023     Lab Results   Component Value Date    INR 0 99 04/08/2023    INR 1 02 04/18/2022    INR 1 68 (H) 07/13/2021    PROTIME 13 1 04/08/2023    PROTIME 13 3 04/18/2022    PROTIME 19 7 (H) 07/13/2021 " Current Facility-Administered Medications:   •  acetaminophen (TYLENOL) tablet 650 mg, 650 mg, Oral, Q6H PRN, Mariangel Mahoney MD, 650 mg at 04/14/23 2100  •  aluminum-magnesium hydroxide-simethicone (MYLANTA) oral suspension 30 mL, 30 mL, Oral, Q4H PRN, Mariangel Mahoney MD  •  apixaban Jim Scrivener) tablet 2 5 mg, 2 5 mg, Oral, BID, Mariangel Mahoney MD, 2 5 mg at 04/17/23 0815  •  bisacodyl (DULCOLAX) rectal suppository 10 mg, 10 mg, Rectal, Daily PRN, Charmaine Lo PA-C  •  calcium carbonate (OYSTER SHELL,OSCAL) 500 mg tablet 1 tablet, 1 tablet, Oral, BID With Meals, Mariangel Mahoney MD, 1 tablet at 04/17/23 8985  •  cholecalciferol (VITAMIN D3) tablet 1,000 Units, 1,000 Units, Oral, Daily, Mariangel Mahoney MD, 1,000 Units at 04/17/23 0815  •  cyanocobalamin (VITAMIN B-12) tablet 1,000 mcg, 1,000 mcg, Oral, Daily, Mariangel Mahoney MD, 1,000 mcg at 04/17/23 1866  •  docusate sodium (COLACE) capsule 100 mg, 100 mg, Oral, BID, Mariangel Mahoney MD, 100 mg at 04/17/23 7413  •  famotidine (PEPCID) tablet 10 mg, 10 mg, Oral, Daily, Mariangel Mahoney MD, 10 mg at 04/17/23 0113  •  glipiZIDE (GLUCOTROL) tablet 5 mg, 5 mg, Oral, BID AC, Charmaine Lo PA-C, 5 mg at 04/17/23 6415  •  insulin lispro (HumaLOG) 100 units/mL subcutaneous injection 1-5 Units, 1-5 Units, Subcutaneous, TID AC, 3 Units at 04/17/23 1154 **AND** Fingerstick Glucose (POCT), , , TID AC, Charmaine Lo PA-C  •  levothyroxine tablet 25 mcg, 25 mcg, Oral, Daily, Mariangel Mahoney MD, 25 mcg at 04/17/23 0500  •  lidocaine (LIDODERM) 5 % patch 1 patch, 1 patch, Topical, Daily, Mariangel Mahoney MD, 1 patch at 04/17/23 1568  •  lisinopril (ZESTRIL) tablet 5 mg, 5 mg, Oral, Daily, Mariangel Mahoney MD, 5 mg at 04/17/23 0815  •  magnesium gluconate (MAGONATE) tablet 500 mg, 500 mg, Oral, Daily, Mariangel Mahoney MD, 500 mg at 04/17/23 0815  •  melatonin tablet 3 mg, 3 mg, Oral, HS, Mariangel Mahoney MD, 3 mg at 04/16/23 2100  •  metFORMIN (GLUCOPHAGE) tablet 500 mg, 500 mg, Oral, BID With Meals, Tracie Hough PA-C  •  metoprolol tartrate (LOPRESSOR) tablet 25 mg, 25 mg, Oral, Q12H, Brenna Ramírez MD, 25 mg at 04/17/23 0815  •  ondansetron (ZOFRAN-ODT) dispersible tablet 4 mg, 4 mg, Oral, Q6H PRN, Brenna Ramírez MD  •  oxyCODONE (ROXICODONE) split tablet 2 5 mg, 2 5 mg, Oral, Q4H PRN, 2 5 mg at 04/16/23 1636 **OR** oxyCODONE (ROXICODONE) IR tablet 5 mg, 5 mg, Oral, Q4H PRN, Bill Murphy PA-C, 5 mg at 04/16/23 0459  •  polyethylene glycol (MIRALAX) packet 17 g, 17 g, Oral, Daily, Tracie Hough PA-C, 17 g at 04/17/23 3704  •  pravastatin (PRAVACHOL) tablet 20 mg, 20 mg, Oral, Daily With Nubia Kowalski MD, 20 mg at 04/16/23 1636  •  senna-docusate sodium (SENOKOT S) 8 6-50 mg per tablet 1 tablet, 1 tablet, Oral, BID, Tracie Hough PA-C  •  sulfamethoxazole-trimethoprim (BACTRIM) 400-80 mg per tablet 1 tablet, 1 tablet, Oral, Q12H [address], Tracie Hough PA-C    Past Medical History:   Diagnosis Date   • Abnormal gait    • Accidental fall from chair, subsequent encounter    • Atrial fibrillation ([facility] )    • Benign essential hypertension    • Bite of animal    • Cataract    • Cellulitis    • Chronic kidney disease    • Chronic kidney disease, stage 3 (HCC)    • Colon cancer ([facility] )    • Colon polyp    • Current tear of lateral cartilage or meniscus of knee    • Diabetes mellitus ([facility] )    • Disease of thyroid gland    • Disorder of magnesium metabolism    • Dvt femoral (deep venous thrombosis)    • Embolism from thrombosis of vein of distal end of lower extremity ([facility] )    • Essential hypertension    • Fall    • Hyperlipidemia    • Hyperlipidemia    • Hypertension    • Hypothyroidism    • Insomnia    • Kidney stone    • Knee joint effusion    • Leukocytosis    • Malaise and fatigue    • MI (myocardial infarction) ([facility] )     Hospitalization    • Orbital hemorrhage    • Other sleep disorders    • PAF (paroxysmal atrial fibrillation) (HCC)    • Presence of vena cava filter    • Pulmonary embolism ([place] [address] )    • Tear film insufficiency    • Type 2 diabetes mellitus (HCC)    • Unspecified osteoarthritis, unspecified site    • Weight decreased        Patient Active Problem List    Diagnosis Date Noted   • UTI (urinary tract infection) 04/17/2023   • Lung mass 04/10/2023   • Encephalopathy 04/10/2023   • Fall at home 04/08/2023   • Compression fracture of L3 vertebra ([facility] ) 04/08/2023   • Stage 3b chronic kidney disease ([place] [address] ) 12/21/2022   • Loss of weight 12/21/2022   • Malignant neoplasm of overlapping sites of bladder ([place] [address] ) 10/10/2022   • Hypocalcemia 06/29/2021   • Other pulmonary embolism without acute cor pulmonale ([place] [address] ) 04/21/2021   • Bladder mass 03/31/2021   • Compression fracture of thoracic spine, non-traumatic, sequela 02/28/2020   • Stage 3a chronic kidney disease ([place] [address] ) 02/28/2020   • Essential hypertension 06/05/2019   • History of pulmonary embolism 06/05/2019   • Osteoarthritis of knee 05/01/2018   • Hypercoagulable state, primary ([place] [address] ) 03/27/2017   • Chronic a-fib ([place] [address] ) 02/20/2016   • Type 2 diabetes mellitus with stage 3a chronic kidney disease ([place] [address] ) 02/20/2016          Zahida Birmingham PA-C    I have spent a total time of 35 minutes on 04/17/23 in caring for this patient including Diagnostic results, Instructions for management, Patient and family education, Impressions, Counseling / Coordination of care, Documenting in the medical record, Reviewing / ordering tests, medicine, procedures  , Obtaining or reviewing history   and Communicating with other healthcare professionals   ** Please Note:  voice to text software may have been used in the creation of this document   Although proof errors in transcription or interpretation are a potential of such software**

## 2023-04-17 NOTE — PROGRESS NOTES
04/17/23 1236   Pain Assessment   Pain Assessment Tool 0-10   Pain Score 6   Pain Location/Orientation Orientation: Lower; Location: Back   Restrictions/Precautions   Precautions Bed/chair alarms; Fall Risk;Cognitive;Pain;Spinal precautions   Weight Bearing Restrictions No   ROM Restrictions   (spinal precautions)   Braces or Orthoses LSO   Sit to Lying   Type of Assistance Needed Physical assistance   Physical Assistance Level 26%-50%   Comment assist to get bilat LE's entirely onto bed   Sit to Lying CARE Score 3   Sit to Stand   Type of Assistance Needed Incidental touching   Comment CG   Sit to Stand CARE Score 4   Bed-Chair Transfer   Type of Assistance Needed Physical assistance   Physical Assistance Level 25% or less   Chair/Bed-to-Chair Transfer CARE Score 3   Transfer Bed/Chair/Wheelchair   Limitations Noted In Balance; Endurance;Pain Management;LE Strength   Cognition   Overall Cognitive Status Impaired   Arousal/Participation Alert; Responsive; Cooperative   Attention Attends with cues to redirect   Orientation Level Oriented X4   Memory Decreased recall of precautions   Following Commands Follows one step commands without difficulty   Additional Activities   Additional Activities Comments cognitive activity: games of Virtual 3-D Display for Smartphones with OT, pt required initial orientation to game play rules and then consistent VC's during first game to ensure accuracy, pt required less VC's during second game with one VC to correct an error   Assessment   Treatment Assessment Pt seen for brief OT session this PM focusing on cognitive retraining and fxl transfers  Pt completed all activities with good tolerance but did report increasing back pain near end of session 2* LSO brace  Pt returned to supine at end of session  Pt seen for brief OT session this PM focusing on cognitive retraining and fxl transfers  Pt completed all activities with good tolerance but did report increasing back pain near end of session 2* LSO brace   Pt returned to supine at end of session  Prognosis Good   Problem List Decreased strength;Decreased range of motion;Decreased endurance; Impaired balance;Decreased cognition; Impaired judgement;Decreased safety awareness;Orthopedic restrictions;Pain   Plan   Treatment/Interventions ADL retraining;Functional transfer training;LE strengthening/ROM; Therapeutic exercise; Endurance training;Cognitive reorientation;Patient/family training;Equipment eval/education; Compensatory technique education;OT   Progress Progressing toward goals   OT Therapy Minutes   OT Time In 1236   OT Time Out 1312   OT Total Time (minutes) 36   OT Mode of treatment - Individual (minutes) 36

## 2023-04-17 NOTE — PROGRESS NOTES
Outreach to Dr Bruno Ambriz office to make sure he is aware of pt's recent hospitalization and status and to see if pt has any upcoming f/u's scheduled  Pt has not been seen since 8/11/2022 and had telemedicine visit  Was encouraged to call for cystoscopy but she declined stating she was not symptomatic and did not have transportation    Dr Jack Campos Fax: [phone]    Call to pt's son, Law Gaviria and introduced myself as Nurse Navigator and explained my role in his mother's care with coordinating appts, being a point of contact, providing support and connecting him with available resources as needed  General assessment completed but limited due him being at work and pt currently in [facility] and evaluated for any barriers to care  Transportation will be a concern and will have a better idea of patient's ability as time at [facility] progresses  Reviewed [facility] but Law Gaviria is not sure if pt will be able to get to the bus independently  He is the closest family member and is an hour away  His mother and father reside together and father is in a wheelchair  He is currently looking for in-home assistance for him  Reviewed that pt will need to see Dr Meyer to address the bladder masses but he would like to see her ability once d/c is being planned from rehab  Neither her nor her spouse drive  He's not sure if she'll even be able to make [facility] consult but advised I will follow closely  Referral to Oncology Social Work placed  Answered questions to pt's satisfaction  Reviewed upcoming appts  Provided support and encouraged to call with any questions or concerns      Future Appointments   Date Time Provider Kalia Bhagat   5/2/2023 11:20 AM Igor Esteban MD Hem Onc [facility]   6/28/2023  1:00 PM Sharon Neil, [address] [place] [address] [facility]

## 2023-04-17 NOTE — NURSING NOTE
Pt started on Bactrim by Bobbi STAFFORD for UTI  Pt denies any UTI symptoms other than urinary frequency at night  States of pain 4 out of 10 in lower back and tingling in toes of both feet  Refused need for pain med  Pain relief with lidoderm patch application as ordered to lower back  Tolerated therapy  Same plan of care continued

## 2023-04-17 NOTE — NURSING NOTE
Patient resting comfortably in bed at this time  No signs of distress noted  Patient remains forgetful at times overnight  Bed alarm and close supervision in place to promote patient safety  Patient was incontinent of urine overnight assist provided to toileting tasks at those times  Patient was able to stand pivot transfer with one assist and a walker overnight  Back brace in use when patient was out of bed  Patient encouraged to reposition frequently to promote skin integrity  Heels elevated off of bed on pillows  Call bell within reach  Will continue to monitor patient and follow plan of care

## 2023-04-17 NOTE — PROGRESS NOTES
[facility]     04/17/23 0935   Patient Data   Rehab Impairment History of Present Illness Chart Review:   Germania Sanderson is a 80 y o  female with a PMH significant for T2DM, HTN, Chronic a-fib and CKD who presented to the [facility] on 04/08/23 s/p a fall at home  She fell backwards and landed on her neck with questionable head strike  CTH with no acute intracranial abnormality  CT spine cervical with no cervical spine fracture or traumatic malalignment  CT C/A/P with acute mild inferior endplate L3 compression fracture without central canal compromise or posterior element involvement  Incidentally found to have a presumed bronchogenic malignancy at the right hilum and multifocal bladder tumors  A lumbar spine brace was ordered  Found to have encephalopathy  Chest xray without acute findings, she was afebrile with no leukocytosis  UA negative  \"   Support System   Name Lucinda Keithsegundo   Relationship   (Pt lives at home with her , neighbor's help assist with [place] care, mail, transportation )   Multiple Support Systems   (4 son's ( they live in [place] [facility] [facility] [place]) - the closest son lives ~1 hour away )   [address]   (retired xray technician )   Transportation Family/friends drive   Prior IADL Participation   Money Management   (patient manages independently)   Meal Preparation Partial Participation  (home delivery meals \"patient reports it's meals for the eldery but it's not meals on wheels\")   Laundry Full Participation   Home Cleaning Partial Participation  (shares with )   Psychosocial   Psychosocial (WDL) WDL   Patient Behaviors/Mood Calm; Cooperative   Restrictions/Precautions   Precautions Bed/chair alarms;Cognitive; Fall Risk;Pain;Spinal precautions   Pain Assessment   Pain Assessment Tool 0-10   Pain Score 4   Pain Location/Orientation Orientation: Left; Location: Abdomen   Eating Assessment   Swallow Precautions   (Pt denies any " "difficulty swallowing, no difficulty reported by staff)   Type of Assistance Needed Set-up / clean-up   Physical Assistance Level No physical assistance   Eating CARE Score 5   Comprehension   Assist Devices Glasses  (patient reports \"I don't use my prescription glasses\"  She does have reading glasses at bedside  Pt denies any difficulty with hearing but does report inconsistent \"ringing in her ears\" with onset ~ a few months ago )     Cognitive Linguisitic Assessments   Cognitive Linquistic Quick Test (CLQT) assessment started, to be completed next session  Comprehension   Assist Devices Glasses  (patient reports \"I don't use my prescription glasses\"  She does have reading glasses at bedside  Pt denies any difficulty with hearing but does report inconsistent \"ringing in her ears\" with onset ~ a few months ago )   Memory Skills   Orientation Level Oriented X4   Speech/Swallow Mechanism Exam   Vocal Quality Hoarse vocal quality with onset ~ 2-3 weeks ago  Pt with hypophonic vocal quality with voice fading out into a whisper by the end of her sentence making intelligbility difficult  Pt does typically need to raise her volume of her voice and repeat often at home as her  is [facility]  Pt able to sustain /ah/ ranging 8-16 seconds  Pt reporting vocal irritation after sustaining /ah/  Able to sustain /s/ sounds for 10 seconds and /z/ sound for 10 73   Recommendations   Further Evaluations ENT  (Pt  recent vocal changes as well as tinnitus onset )      Cognition   Overall Cognitive Status Impaired   Orientation Level Oriented X4   Memory   (Pt reports difficulty with memory which she feels \"may be normal with her age\"  )   Discharge Information   Vocational Plan Retired/not working   Patient's Discharge Plan to return back home with her      Patient's Rehab Expectations To get back home and take care of my    SLP Therapy Minutes   SLP Time In 0935   SLP Time Out 1030   SLP Total Time (minutes) 54   SLP Mode " of treatment - Individual (minutes) 55   SLP Mode of treatment - Concurrent (minutes) 0   SLP Mode of treatment - Group (minutes) 0   SLP Mode of treatment - Co-treat (minutes) 0   SLP Mode of Treatment - Total time(minutes) 55 minutes   SLP Cumulative Minutes 55   Cumulative Minutes   Cumulative therapy minutes 205

## 2023-04-17 NOTE — PROGRESS NOTES
04/17/23 0659   Pain Assessment   Pain Assessment Tool 0-10   Pain Score 5   Pain Location/Orientation Orientation: Mid;Orientation: Lower; Location: Back   Pain Onset/Description Onset: Ongoing   Patient's Stated Pain Goal No pain   Hospital Pain Intervention(s) Medication (See MAR); Repositioned; Ambulation/increased activity   Restrictions/Precautions   Precautions Bed/chair alarms;Cognitive; Fall Risk;Pain;Spinal precautions   Weight Bearing Restrictions No   Braces or Orthoses LSO   Cognition   Overall Cognitive Status Impaired   Arousal/Participation Alert; Responsive   Attention Within functional limits   Orientation Level Oriented to person   Memory Decreased recall of precautions;Decreased long term memory   Following Commands Follows one step commands without difficulty   Roll Left and Right   Type of Assistance Needed Independent   Physical Assistance Level No physical assistance   Comment bedrails   Roll Left and Right CARE Score 6   Lying to Sitting on Side of Bed   Type of Assistance Needed Physical assistance   Physical Assistance Level 25% or less   Comment cues for spinal precautions   Lying to Sitting on Side of Bed CARE Score 3   Sit to Stand   Type of Assistance Needed Physical assistance   Physical Assistance Level 25% or less   Comment Yessenia, cues for hand placement   Sit to Stand CARE Score 3   Bed-Chair Transfer   Type of Assistance Needed Physical assistance   Physical Assistance Level 25% or less   Chair/Bed-to-Chair Transfer CARE Score 3   Transfer Bed/Chair/Wheelchair   Limitations Noted In Balance;Confidence; Endurance;Pain Management;UE Strength;LE Strength   Walk 10 Feet   Type of Assistance Needed Physical assistance   Physical Assistance Level 25% or less   Comment CGA   Walk 10 Feet CARE Score 3   Walk 50 Feet with Two Turns   Type of Assistance Needed Physical assistance   Physical Assistance Level 25% or less   Comment CGA   Walk 50 Feet with Two Turns CARE Score 3   Walking 10 Feet on Uneven Surfaces   Type of Assistance Needed Physical assistance   Physical Assistance Level 26%-50%   Comment Yessenia   Walking 10 Feet on Uneven Surfaces CARE Score 3   Ambulation   Primary Mode of Locomotion Prior to Admission Walk   Distance Walked (feet) 104 ft   Assist Device Roller Walker   Gait Pattern Inconsistant Bibi; Slow Bibi;Decreased foot clearance;Narrow ZARA;Step to; Improper weight shift   Limitations Noted In Balance; Endurance; Heel Strike; Safety;Speed;Strength   Provided Assistance with: Balance;Direction   Walk Assist Level Contact Guard   Does the patient walk? 2  Yes   Curb or Single Stair   Style negotiated Single stair   Type of Assistance Needed Physical assistance   Physical Assistance Level 26%-50%   Comment Yessenia   1 Step (Curb) CARE Score 3   4 Steps   Type of Assistance Needed Physical assistance   Physical Assistance Level 26%-50%   Comment Yessenia   4 Steps CARE Score 3   Stairs   Type Stairs   # of Steps 7   Weight Bearing Precautions WBAT   Assist Devices Bilateral Rail   Findings Yessenia   Toilet Transfer   Type of Assistance Needed Physical assistance   Physical Assistance Level 25% or less   Comment assist with clothing management   Toilet Transfer CARE Score 3   Toilet Transfer   Surface Assessed Bedside Commode  (commode over toilet)   Limitations Noted In Balance;Problem Solving; Safety;UE Strength;LE Strength   Adaptive Equipment   (handrails)   Positioning Concerns Raised Seat;Cognition; Safety   Therapeutic Interventions   Strengthening supine ther ex   Balance gait, transfers   Other stairs   Assessment   Treatment Assessment Pt seen for PT treatment session this date with interventions consisting of gait training w/ emphasis on improving pt's ability to ambulate level surfaces x 104 ftx1 with CGA provided by therapist with RW, Therapeutic exercise consisting of: AROM 2 sets of 15 reps B LE in supine position, therapeutic activity consisting of training: bed mobility, supine<>sit transfers, sit<>stand transfers, stand pivot transfers towards L direction and toilet transfers and navigating 7 stairs w/ B handrail with step to pattern with min A  Pt agreeable to PT treatment session upon arrival, pt found supine in bed w/ HOB elevated, in no apparent distress and responsive  In comparison to previous session, pt with improvements in amount of steps navigated  Post session: chair alarm engaged, all needs in reach and RN notified of session findings/recommendations  Continue to recommend skilled PT services in order to maximize pt's functional independence and safety w/ mobility  Pt continues to be functioning below baseline level  PT will continue to see pt during current hospitalization in order to address the deficits listed above and provide interventions consistent w/ POC in effort to achieve STGs  PT Barriers   Physical Impairment Decreased strength;Decreased range of motion;Decreased endurance; Impaired balance;Decreased mobility; Decreased cognition; Impaired judgement;Decreased safety awareness;Orthopedic restrictions;Pain   Functional Limitation Transfers; Walking;Standing;Stair negotiation   Plan   Treatment/Interventions Functional transfer training;LE strengthening/ROM; Therapeutic exercise; Endurance training;Cognitive reorientation;Equipment eval/education; Bed mobility;Gait training;Spoke to nursing   Progress Progressing toward goals   PT Therapy Minutes   PT Time In 0659   PT Time Out 0829   PT Total Time (minutes) 90   PT Mode of treatment - Individual (minutes) 90   PT Mode of treatment - Concurrent (minutes) 0   PT Mode of treatment - Group (minutes) 0   PT Mode of treatment - Co-treat (minutes) 0   PT Mode of Treatment - Total time(minutes) 90 minutes   PT Cumulative Minutes 150

## 2023-04-17 NOTE — PROGRESS NOTES
04/17/23 1030   Pain Assessment   Pain Assessment Tool 0-10   Pain Score 4   Pain Location/Orientation Orientation: Lower; Location: Back   Restrictions/Precautions   Precautions Bed/chair alarms;Cognitive; Fall Risk;Pain;Spinal precautions   Weight Bearing Restrictions No   ROM Restrictions   (spinal precautions)   Braces or Orthoses LSO   Sit to Stand   Type of Assistance Needed Incidental touching   Comment CG   Sit to Stand CARE Score 4   Toileting Hygiene   Type of Assistance Needed Physical assistance   Physical Assistance Level 25% or less   Comment assist for hygiene thoroughness   Toileting Hygiene CARE Score 3   Toileting   Able to Pull Clothing down yes, up yes  (with incidental assist from OT)   Able to [place] Bladder   Limitations Noted In Balance;Problem Solving; Safety;ROM;LE Strength   Adaptive Equipment Grab Bar   Toilet Transfer   Type of Assistance Needed Incidental touching   Comment CG   Toilet Transfer CARE Score 4   Toilet Transfer   Surface Assessed Raised Toilet   Transfer Technique Standard   Limitations Noted In Balance; Endurance;ROM;Safety;UE Strength;LE Strength   Adaptive Equipment Grab Bar;Walker   Functional Standing Tolerance   Time 3m15s during card matching activity   Comments pt with fair dynamic balance and used one hand on table for unilateral support; pt sat to complete putting cards into pockets and stood to remove   Exercise Tools   Other Exercise Tool 1 sanderbox x15 all planes of motion using bilat UE   Other Exercise Tool 2 UE strengthening in bilat UE using 1# dumbbell, 2x15 or 1x30: elbow flexion/extension, protraction/retraction, internal/external rotation, pronation/supination, shoulder flexion/extension, shoulder elevation/depression, ABD/ADD   Cognition   Overall Cognitive Status Impaired   Arousal/Participation Alert; Responsive; Cooperative   Attention Attends with cues to redirect   Orientation Level Oriented X4   Memory Decreased recall of precautions   Following Commands Follows one step commands without difficulty   Assessment   Treatment Assessment Pt agreeable to OT session this AM  Received sitting upright in w/c  Pt completed fxl mobility to bathroom with RW and CG; toileting and transfer completed with Yessenia and CG, respectively  Pt then completed ROM/strength, standing tolerance, and activity tolerance exercises; details in respective sections  Pt with overall good tolerance for these exercises and took rest breaks as needed; pt did fatigue during last half of therapy session and also reported increasing pain in low back as a result of LSO brace  Pt required VC's during session to remain attending to tasks, especially to count repetitions  Pt continues with barriers to d/c of decreased strength throughout, decreased balance, spinal precaution with pain at fx site and LSO, impaired cognition including safety awareness, attention, and working memory, and decreased activity tolerance; all affect independence in self care and fxl transfers  Pt would benefit from continued skilled OT services in order to address listed barriers and prepare for safe d/c  Prognosis Good   Problem List Decreased strength;Decreased range of motion;Decreased endurance; Impaired balance;Decreased cognition; Impaired judgement;Decreased safety awareness;Orthopedic restrictions;Pain   Plan   Treatment/Interventions ADL retraining;Functional transfer training;LE strengthening/ROM; Endurance training; Therapeutic exercise;Cognitive reorientation;Patient/family training;Equipment eval/education; Compensatory technique education;OT   Progress Progressing toward goals   OT Therapy Minutes   OT Time In 1030   OT Time Out 1200   OT Total Time (minutes) 90   OT Mode of treatment - Individual (minutes) 60   OT Mode of treatment - Concurrent (minutes) 30

## 2023-04-18 LAB
GLUCOSE SERPL-MCNC: 133 MG/DL (ref 65–140)
GLUCOSE SERPL-MCNC: 160 MG/DL (ref 65–140)
GLUCOSE SERPL-MCNC: 197 MG/DL (ref 65–140)
GLUCOSE SERPL-MCNC: 249 MG/DL (ref 65–140)

## 2023-04-18 RX ADMIN — METFORMIN HYDROCHLORIDE 500 MG: 500 TABLET ORAL at 17:28

## 2023-04-18 RX ADMIN — METOPROLOL TARTRATE 25 MG: 25 TABLET, FILM COATED ORAL at 21:00

## 2023-04-18 RX ADMIN — INSULIN LISPRO 1 UNITS: 100 INJECTION, SOLUTION INTRAVENOUS; SUBCUTANEOUS at 17:28

## 2023-04-18 RX ADMIN — APIXABAN 2.5 MG: 2.5 TABLET, FILM COATED ORAL at 08:58

## 2023-04-18 RX ADMIN — SULFAMETHOXAZOLE AND TRIMETHOPRIM 1 TABLET: 400; 80 TABLET ORAL at 08:59

## 2023-04-18 RX ADMIN — INSULIN LISPRO 2 UNITS: 100 INJECTION, SOLUTION INTRAVENOUS; SUBCUTANEOUS at 11:23

## 2023-04-18 RX ADMIN — Medication 500 MG: at 08:58

## 2023-04-18 RX ADMIN — INSULIN LISPRO 1 UNITS: 100 INJECTION, SOLUTION INTRAVENOUS; SUBCUTANEOUS at 08:57

## 2023-04-18 RX ADMIN — CALCIUM 1 TABLET: 500 TABLET ORAL at 17:28

## 2023-04-18 RX ADMIN — CYANOCOBALAMIN TAB 500 MCG 1000 MCG: 500 TAB at 08:58

## 2023-04-18 RX ADMIN — METFORMIN HYDROCHLORIDE 500 MG: 500 TABLET ORAL at 08:58

## 2023-04-18 RX ADMIN — LIDOCAINE 5% 1 PATCH: 700 PATCH TOPICAL at 08:58

## 2023-04-18 RX ADMIN — CALCIUM 1 TABLET: 500 TABLET ORAL at 08:58

## 2023-04-18 RX ADMIN — Medication 3 MG: at 21:00

## 2023-04-18 RX ADMIN — LEVOTHYROXINE SODIUM 25 MCG: 25 TABLET ORAL at 05:24

## 2023-04-18 RX ADMIN — FAMOTIDINE 10 MG: 20 TABLET, FILM COATED ORAL at 08:58

## 2023-04-18 RX ADMIN — SENNOSIDES AND DOCUSATE SODIUM 1 TABLET: 50; 8.6 TABLET ORAL at 08:58

## 2023-04-18 RX ADMIN — PRAVASTATIN SODIUM 20 MG: 20 TABLET ORAL at 17:28

## 2023-04-18 RX ADMIN — GLIPIZIDE 5 MG: 5 TABLET ORAL at 17:28

## 2023-04-18 RX ADMIN — LISINOPRIL 5 MG: 5 TABLET ORAL at 08:58

## 2023-04-18 RX ADMIN — DOCUSATE SODIUM 100 MG: 100 CAPSULE, LIQUID FILLED ORAL at 08:58

## 2023-04-18 RX ADMIN — METOPROLOL TARTRATE 25 MG: 25 TABLET, FILM COATED ORAL at 08:58

## 2023-04-18 RX ADMIN — SULFAMETHOXAZOLE AND TRIMETHOPRIM 1 TABLET: 400; 80 TABLET ORAL at 21:00

## 2023-04-18 RX ADMIN — SENNOSIDES AND DOCUSATE SODIUM 1 TABLET: 50; 8.6 TABLET ORAL at 17:28

## 2023-04-18 RX ADMIN — DOCUSATE SODIUM 100 MG: 100 CAPSULE, LIQUID FILLED ORAL at 17:28

## 2023-04-18 RX ADMIN — GLIPIZIDE 5 MG: 5 TABLET ORAL at 08:58

## 2023-04-18 RX ADMIN — POLYETHYLENE GLYCOL 3350 17 G: 17 POWDER, FOR SOLUTION ORAL at 08:58

## 2023-04-18 RX ADMIN — Medication 1000 UNITS: at 08:58

## 2023-04-18 RX ADMIN — APIXABAN 2.5 MG: 2.5 TABLET, FILM COATED ORAL at 17:28

## 2023-04-18 NOTE — WOUND OSTOMY CARE
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, compression fracture of L-3 vertebra, lung mass, and encephalopathy         Assessment:   1)Bilateral heels intact Allevyn heel foams on as preventative measure  2)Buttocks dry and intact small area of dry intact drainage right inner upper buttocks  Allevyn foam applied  Seated out of bed in w/c  Able to stand with min assist, wearing adult brief  Plan to dc from wound care if wound continues to resolve  Skin and Wound Care Plan:   1  Offloading cushion to chair when OOB  2  Elevate heels off the bed with pillows   3  Turn and reposition every two hours  4  Hydraguard to bilateral heels and lower buttocks  BID and PRN  5  Skin nourishing cream daily  6  Allevyn life silicone bordered dressing to the sacrum-upper buttocks, rayshawn with T, date, peel back daily for skin assessment, reapply and change every 3 days and PRN    Wound 04/13/23 Pressure Injury Coccyx (Active)   Wound Image   04/18/23 0916   Wound Description Dry;Beefy red 04/18/23 0916   Pressure Injury Stage 2 04/18/23 0916   Lucretia-wound Assessment Dry; Intact; Hyperpigmented 04/18/23 0916   Wound Length (cm) 0 2 cm 04/18/23 0916   Wound Width (cm) 0 2 cm 04/18/23 0916   Wound Surface Area (cm^2) 0 04 cm^2 04/18/23 0916   Drainage Amount None 04/18/23 0916   Treatments Site care 04/18/23 0916   Dressing Foam, Silicon (eg  Allevyn, etc) 04/18/23 0916   Dressing Changed Reinforced 04/18/23 0916   Patient Tolerance Tolerated well 04/18/23 0916   Dressing Status Clean;Dry; Intact 04/18/23 0916     Call or tigertext with any questions  Wound Care will continue to follow    Bacilio ARMIJO RN

## 2023-04-18 NOTE — PCC OCCUPATIONAL THERAPY
4/18/2023: Pt participates in ADLs,transfers/ mobility and BUE therex  Pt is progressing towards goals with current LOF as listed above  Pt requires assist due to decreased balance, safety, endurance, strength/ ROM with spinal prec/ LSO and pain  Pt will benefit from continued skilled OT services to increase independence with daily tasks  4/24/23: Pt participates in ADLs,transfers/ mobility, light homemaking and BUE therex  Pt is progressing towards goals with current LOF as listed above  Pt requires assist due to decreased balance, safety, endurance, strength/ ROM with spinal prec/ LSO and pain  Pt will benefit from continued skilled OT services to increase independence with daily tasks

## 2023-04-18 NOTE — PCC SPEECH THERAPY
4/18 Pt tolerating regular solids/thin liquids, swallow evaluation not indicated  s/p voice evaluation Hoarse vocal quality with onset ~ 2-3 weeks ago  Pt with hypophonic vocal quality with voice fading out into a whisper by the end of her sentence making intelligbility difficult  Recommend ENT consult (Pt  recent vocal changes as well as tinnitus onset  )  Cognitive assessment started, will be completed later this date  Pt does report changes in memory but she is unsure if this is age related or not  4/25   CURRENT FIM LEVELS   Comprehension (FIM) 6 - Has only MILD difficulty with complex/abstract info   Expression   Expression (FIM) 6 - Expresses complex/abstract but requires:  more time   Social Interaction   Social Interaction (FIM) 6 - Interacts appropriately with others BUT requires extra  time   Problem Solving   Problem solving (FIM) 5 - Solves basic problems 90% of time   Memory   Memory (FIM) 5 - Recalls/performs request 90% of time     Assessment complete 4/18/23 - The Cognitive Linguistic Quick Test (CLQT) is designed to measure an individual’s five cognitive domains (attention, memory, executive functions, language, and visuospatial skills)  This norm-referenced tool has been standardized on adults ages 25 through 80years old with neurological impairment as a result of CVA, TBI, or dementia  The following results were obtained during the administration of the assessment  Cognitive Domain: Score: Range of Severity:   Attention 42/215 Moderate   Memory 133/185 Mild   Executive Functions 13/40 Moderate   Language  30/37 WNL   Visuospatial Skills  29/105 Moderate   Clock Drawing: 10/13 Mild      Patient scored below Criterion Cut Scores on the following subtests: Symbol Cancellation, Clock Drawing, Symbol Trails, Design Memory and Design Generation

## 2023-04-18 NOTE — PCC NURSING
4/17/23 Patient is a recent admission to [facility] after a fall at home resulted in an L3 compression fracture  Patient remains confused and forgetful at times worsening at night bed alarm and close supervision in place to promote patient safety  Lungs are clear but diminished on room air  Patient was noted to have constipation with suppository administration successful for a bowel movement today  Patient with urinary incontinence noted at times pullup in use  Patient noted to have a UTI from a sample obtained during acute care hospital patient stated on oral antibiotics today  Patient with a pressure injury noted to the sacrum/buttocks area allevyn in place ordered to change every three days  Bilateral heel allevyns in place for protection  Patient restarted on oral diabetes medications as she was taking at home will continue to monitor blood sugars during the transition  Sliding scale insulin ordered with meals  Back brace to be worn while patient is out of bed  Patient is able to ambulate with one assist and a walker to the bathroom overnight  Pain controlled with a lidoderm patch on the back and as needed oxycodone  4/24/23 L3 compression fx, a&ox3-4/forgetful but does get increased confusion at night time  Bed alarm present for safety  HRR, lungs clear no edema present  Supervision for toileting, cont bowel, incont of urine at times pull-up present  Pressure injury to sacrum/buttocks allevyn in place  LSO brace when OOB, encouragement to wear brace, supervision with RW  Pt requires increased time to complete activities r/t pain  Pt does not always take pain medication, but states has back pain, Lidoderm patch  Repositioning offers relief  Accu checks AC with sliding scale coverage and PO pills to control DM

## 2023-04-18 NOTE — TEAM CONFERENCE
Acute RehabilitationTeam Conference Note  Date: 4/18/2023   Time: 10:37 AM       Patient Name:  Lizbeth Sorto       Medical Record Number: [id number]   YOB: 1933  Sex: Female          Room/Bed:  [facility] 213/[facility] 213-01  Payor Info:  Payor: MEDICARE / Plan: MEDICARE A AND B / Product Type: Medicare A & B Fee for Service /      Admitting Diagnosis: Compression fracture of L3 vertebra ([facility] ) [S32 030A]   Admit Date/Time:  4/13/2023  1:05 PM  Admission Comments: No comment available     Primary Diagnosis:  Compression fracture of L3 vertebra (HCC)  Principal Problem: Compression fracture of L3 vertebra ([facility] )    Patient Active Problem List    Diagnosis Date Noted   • UTI (urinary tract infection) 04/17/2023   • Lung mass 04/10/2023   • Encephalopathy 04/10/2023   • Fall at home 04/08/2023   • Compression fracture of L3 vertebra ([facility] ) 04/08/2023   • Stage 3b chronic kidney disease ([facility] ) 12/21/2022   • Loss of weight 12/21/2022   • Malignant neoplasm of overlapping sites of bladder ([facility] ) 10/10/2022   • Hypocalcemia 06/29/2021   • Other pulmonary embolism without acute cor pulmonale (HCC) 04/21/2021   • Bladder mass 03/31/2021   • Compression fracture of thoracic spine, non-traumatic, sequela 02/28/2020   • Stage 3a chronic kidney disease ([facility] ) 02/28/2020   • Essential hypertension 06/05/2019   • History of pulmonary embolism 06/05/2019   • Osteoarthritis of knee 05/01/2018   • Hypercoagulable state, primary ([facility] ) 03/27/2017   • Chronic a-fib ([facility] ) 02/20/2016   • Type 2 diabetes mellitus with stage 3a chronic kidney disease ([facility] ) 02/20/2016       Physical Therapy:    Weight Bearing Status: Weight Bearing as Tolerated (LSO in place)  Transfers: Minimal Assistance  Amulation Distance (ft): 104 feet  Ambulation: Minimal Assistance  Assistive Device for Ambulation: Roller Walker  Number of Stairs: 7  Assistive Device for Stairs: Bilateral [employer]  Stair Assistance: Minimal Assistance  Discharge Recommendations: Home with:  [address] with[de-identified] Family Support, First Floor Setup, Home Physical Therapy    4/19/2023:  Patient seen by ARU PT following fall with resulting L3 fx, now WBAT with LSO in place  Presents  with decreased ROM/strength, decreased balance and safety, decreased endurance, and pain  Patient min A bed mobility, min A transfers with RW, CGA A ambulation up to 104 feet on level and unlevel surfaces with RW, min A negotiation of 7 steps with bilateral handrails  Patient would benefit from continued inpatient ARC PT to increase function, safety, and increased independence in prep for safe d/c to home  Occupational Therapy:          No notes on file    Speech Therapy:  Mode of Communication: Verbal  Cognition: Exceptions to WNL (pending CLQT results upon completion later this date)     Orientation: Person, Place, Time, Situation  Swallowing: Within Defined Limits  Diet Recommendations: Regular Diet, Thin  Discharge Recommendations: Home with:  [address] with[de-identified] Family Support, Home Speech Therapy  4/18 Pt tolerating regular solids/thin liquids, swallow evaluation not indicated  s/p voice evaluation Hoarse vocal quality with onset ~ 2-3 weeks ago  Pt with hypophonic vocal quality with voice fading out into a whisper by the end of her sentence making intelligbility difficult  Recommend ENT consult (Pt  recent vocal changes as well as tinnitus onset  )  Cognitive assessment started, will be completed later this date  Pt does report changes in memory but she is unsure if this is age related or not  Nursing Notes:  Appetite: Good  Diet Type: Diabetic, 2 gram sodium diet                      Diet Patient/Family Education Complete: Yes    Type of Wound (LDA):  Wound                    Type of Wound Patient/Family Education: Yes  Bladder: 3 - Moderate Assistance     Bladder Patient/Family Education: Yes  Bowel: 3 - Moderate Assistance     Bowel Patient/Family Education: Yes  Pain Location/Orientation: Location: Back  Pain Score: 0                       Hospital Pain Intervention(s): Medication (See MAR), Repositioned  Pain Patient/Family Education: Yes  Medication Management/Safety  Injectable: Insulin  Safe Administration: No  Reason for non-safe administration: not attempted  Medication Patient/Family Education Complete: Yes    4/17/23 Patient is a recent admission to [facility] after a fall at home resulted in an L3 compression fracture  Patient remains confused and forgetful at times worsening at night bed alarm and close supervision in place to promote patient safety  Lungs are clear but diminished on room air  Patient was noted to have constipation with suppository administration successful for a bowel movement today  Patient with urinary incontinence noted at times pullup in use  Patient noted to have a UTI from a sample obtained during acute care hospital patient stated on oral antibiotics today  Patient with a pressure injury noted to the sacrum/buttocks area allevyn in place ordered to change every three days  Bilateral heel allevyns in place for protection  Patient restarted on oral diabetes medications as she was taking at home will continue to monitor blood sugars during the transition  Sliding scale insulin ordered with meals  Back brace to be worn while patient is out of bed  Patient is able to ambulate with one assist and a walker to the bathroom overnight  Pain controlled with a lidoderm patch on the back and as needed oxycodone  Case Management:     Discharge Planning  Living Arrangements: Lives w/ Spouse/significant other  Support Systems: Son  Assistance Needed: No  Type of Current Residence: Private residence  Current Home Care Services: No  Initial assessment & orientation to [facility] with Pt, who expressed understanding & agreement  Message left for Pt's son  Pt stated she resides with her spouse, who is WC bound, in a  home, 5 steps in  She reported that her family provides assistance   She uses [employer] or friends for rides to appts  She has a RW  Discussed [address] with Pt & Pt's emergency contact, who expressed understanding & agreement  SW will continue to monitor & assist as needed with [address]  IMM reviewed, signed & submitted for scanning  Is the patient actively participating in therapies? yes  List any modifications to the treatment plan: na    Barriers Interventions   L3 compression fx TLSO brace, spinal precautions   DM, UTI, HTN, atrial fib Medical management and oversight, currently on insulin, plan to transition back to po meds   Stage 2 sacrum Local care   pain Medication management   Decreased ROM and strength Therapeutic exercise, therapeutic activity   Decreased expression/cog ST strategies, recommend follow-up with ENT   Decreased end, balance ADL, transfer, gait training     Is the patient making expected progress toward goals?  yes  List any update or changes to goals: na    Medical Goals: Patient will be able to manage medical conditions and comorbid conditions with medications and follow up upon completion of rehab program    Weekly Team Goals:   Rehab Team Goals  ADL Team Goal: Patient will require supervision with ADLs with least restrictive device upon completion of rehab program  Bowel/Bladder Team Goal: Patient will return to premorbid level for bladder/bowel management upon completion of rehab program  Transfer Team Goal: Patient will be independent with transfers with least restrictive device upon completion of rehab program  Locomotion Team Goal: Patient will be independent with locomotion with least restrictive device upon completion of rehab program  Cognitive Team Goal: Patient will return to premorbid level of cognitive activity upon completion of rehab program     Mod I bed mobility, transfers, and mobility  S bathing and dressing  Mod I toileting     Health and Wellness: to be able to return home and assist with caring for spouse    Discussion: Plan for return home with spouse with family support with [facility] for PT, ST, OT, nursing, and aides   May benefit from referral to option program       Anticipated Discharge Date:  April 27, 2023

## 2023-04-18 NOTE — PROGRESS NOTES
"   04/18/23 1230   Pain Assessment   Pain Assessment Tool 0-10   Pain Score 5   Pain Location/Orientation Location: Back   Restrictions/Precautions   Precautions Bed/chair alarms;Cognitive; Fall Risk;Spinal precautions;Pain   Weight Bearing Restrictions No   ROM Restrictions No   Braces or Orthoses LSO   Cognition   Overall Cognitive Status Impaired   Arousal/Participation Alert; Responsive; Cooperative   Attention Attends with cues to redirect   Orientation Level Oriented X4   Memory Decreased long term memory;Decreased recall of precautions;Decreased recall of recent events;Decreased short term memory   Following Commands Follows one step commands with increased time or repetition   Subjective   Subjective \"I have difficulty with remembering those kind of things (long term memory)\"   Roll Left and Right   Comment Pt OOB in chair   Sit to Lying   Comment Pt OOB in chair   Lying to Sitting on Side of Bed   Comment Pt OOB in chair   Sit to Stand   Type of Assistance Needed Incidental touching;Verbal cues   Physical Assistance Level 25% or less   Comment CGA RW; VC for sequencing and forward weight shift   Sit to Stand CARE Score 3   Bed-Chair Transfer   Type of Assistance Needed Incidental touching   Physical Assistance Level 25% or less   Comment CGA RW; VC for sequencing and safety precautions   Chair/Bed-to-Chair Transfer CARE Score 3   Car Transfer   Reason if not Attempted Environmental limitations   Car Transfer CARE Score 10   Walk 10 Feet   Type of Assistance Needed Supervision   Physical Assistance Level No physical assistance   Comment Cl S RW   Walk 10 Feet CARE Score 4   Walk 50 Feet with Two Turns   Type of Assistance Needed Supervision   Physical Assistance Level No physical assistance   Comment Cl S RW   Walk 50 Feet with Two Turns CARE Score 4   Walk 150 Feet   Reason if not Attempted Safety concerns   Walk 150 Feet CARE Score 88   Walking 10 Feet on Uneven Surfaces   Comment Not completed this session " Ambulation   Primary Mode of Locomotion Prior to Admission Walk   Distance Walked (feet) 134 ft   Assist Device Roller Walker   Gait Pattern Inconsistant Bibi; Slow Bibi;Decreased foot clearance; Forward Flexion;Narrow ZARA; Improper weight shift   Limitations Noted In Balance;Device Management; Endurance; Heel Strike;Posture; Safety;Speed;Strength   Provided Assistance with: Balance;Direction   Walk Assist Level Close Supervision   Findings Cl S RW   Does the patient walk? 2  Yes   Wheelchair mobility   Findings N/A   Curb or Single Stair   Style negotiated Single stair   Type of Assistance Needed Incidental touching   Physical Assistance Level 25% or less   Comment CGA up/down 7 steps B/L HR   1 Step (Curb) CARE Score 3   4 Steps   Type of Assistance Needed Incidental touching   Physical Assistance Level 25% or less   Comment CGA up/down 7 steps B/L HR   4 Steps CARE Score 3   12 Steps   Reason if not Attempted Safety concerns   12 Steps CARE Score 88   Picking Up Object   Reason if not Attempted Safety concerns   Picking Up Object CARE Score 88   Toilet Transfer   Type of Assistance Needed Incidental touching   Physical Assistance Level 25% or less   Comment CGA RW, grab bar   Toilet Transfer CARE Score 3   Therapeutic Interventions   Strengthening Seated LE TE   Other Gait training, transfer training, stair training   Assessment   Treatment Assessment Pt agreeable to PT this PM, received sitting upright in chair  Pt had limited tolerance to upright mobility this session 2/2 low back pain  Pt able to ambulate 134' at close S with RW, requiring VC for proper RW use and safety  Pt negotiated up/down 7 steps at CGA level with B/L HR, demonstrating good eccentric knee control when stepping down  Pt completed seated ther ex with good tolerance, requiring occasional VC for slow and controlled repetitions  Appeared to require increased frequency of cues for redirection to task  Cues for technique for safety   Will continue with current PT POC to improve deficits and promote return to PLOF  Problem List Decreased strength;Decreased range of motion;Decreased endurance; Impaired balance;Decreased safety awareness;Decreased cognition;Pain;Orthopedic restrictions   PT Barriers   Physical Impairment Decreased strength;Decreased range of motion;Decreased endurance; Impaired balance;Decreased mobility; Decreased cognition; Impaired judgement;Decreased safety awareness;Orthopedic restrictions;Pain   Functional Limitation Transfers; Walking;Standing;Stair negotiation   Plan   Treatment/Interventions Functional transfer training;LE strengthening/ROM; Therapeutic exercise; Endurance training;Cognitive reorientation;Patient/family training;Equipment eval/education; Bed mobility;Gait training   Progress Progressing toward goals   PT Therapy Minutes   PT Time In 1230   PT Time Out 1400   PT Total Time (minutes) 90   PT Mode of treatment - Individual (minutes) 30   PT Mode of treatment - Concurrent (minutes) 60   PT Mode of treatment - Group (minutes) 0   PT Mode of treatment - Co-treat (minutes) 0   PT Mode of Treatment - Total time(minutes) 90 minutes   PT Cumulative Minutes 240     Seated LE TE:  3x10, B/L LEs, 1#  March  LAQ  Hip ABd - red T-band  Hip ADd - Meron  GS  HR  TR  HS Curls - red T-band    Patient remains OOB in chair, all needs in reach  Alarm in place and activated  Encouraged use of call bell, patient verbalizes understanding

## 2023-04-18 NOTE — PROGRESS NOTES
[facility] DAILY TREATMENT NOTE   04/18/23 1130   Pain Assessment   Pain Assessment Tool 0-10   Pain Score 5   Pain Location/Orientation Location: Back   Restrictions/Precautions   Precautions Bed/chair alarms;Cognitive; Fall Risk;Visual deficit;Spinal precautions;Pain   Cognitive Linguisitic Assessments   Cognitive Linquistic Quick Test (CLQT) assessment complete    The Cognitive Linguistic Quick Test (CLQT) is designed to measure an individual’s five cognitive domains (attention, memory, executive functions, language, and visuospatial skills)  This norm-referenced tool has been standardized on adults ages 25 through 80years old with neurological impairment as a result of CVA, TBI, or dementia  The following results were obtained during the administration of the assessment  Cognitive Domain: Score: Range of Severity:   Attention 42/215 Moderate   Memory 133/185 Mild   Executive Functions 13/40 Moderate   Language  30/37 WNL   Visuospatial Skills  29/105 Moderate   Clock Drawing: 10/13 Mild     Patient scored below Criterion Cut Scores on the following subtests: Symbol Cancellation, Clock Drawing, Symbol Trails, Design Memory and Design Generation       Comprehension   Comprehension (FIM) 5 - Needs slowed speech to understand   Expression   Expression (FIM) 6 - Expresses complex/abstract but requires:  more time   Social Interaction   Social Interaction (FIM) 6 - Interacts appropriately with others BUT requires extra  time   Problem Solving   Problem solving (FIM) 4 - Solves basic problems 75-89% of time   Memory   Memory (FIM) 4 - Recognizes/recalls/performs 75-89%   SLP Therapy Minutes   SLP Time In 1130   SLP Time Out 1205   SLP Total Time (minutes) 35   SLP Mode of treatment - Individual (minutes) 35   SLP Mode of treatment - Concurrent (minutes) 0   SLP Mode of treatment - Group (minutes) 0   SLP Mode of treatment - Co-treat (minutes) 0   SLP Mode of Treatment - Total time(minutes) 35 minutes   SLP Cumulative Minutes 90

## 2023-04-18 NOTE — PROGRESS NOTES
04/18/23 0900   Pain Assessment   Pain Assessment Tool 0-10   Pain Score 3   Pain Location/Orientation Orientation: Lower; Location: Back   Restrictions/Precautions   Precautions Bed/chair alarms; Fall Risk;Cognitive;Pain;Spinal precautions   Weight Bearing Restrictions No   ROM Restrictions No   Braces or Orthoses LSO   Oral Hygiene   Type of Assistance Needed Set-up / clean-up   Oral Hygiene CARE Score 5   Grooming   Able To Initiate Tasks;Comb/Brush Hair;Wash/Dry Face;Brush/Clean Teeth;Wash/Dry Hands   Limitation Noted In Safety;Strength   Findings setup w/c level   Shower/Bathe Self   Type of Assistance Needed Physical assistance; Adaptive equipment   Physical Assistance Level 25% or less   Shower/Bathe Self CARE Score 3   Bathing   Assessed Bath Style Sponge Bath   Anticipated D/C Bath Style Sponge Bath; Shower   Able to Paresh [place] No   Able to [employer] Temperature No   Able to Wash/Rinse/Dry (body part) Left Arm;Right Arm;L Upper Leg;R Upper Leg;L Lower Leg/Foot;R Lower Leg/Foot; Abdomen; Chest;Perineal Area   Limitations Noted in Balance; Endurance;Problem Solving;ROM;Safety;Strength; Sequencing   Positioning Seated;Standing   Adaptive Equipment Longhand Sponge;Longhand Reacher   Findings  LH sppnge and reacher issued   Tub/Shower Transfer   Reason Not Assessed Sponge Bath   Upper Body Dressing   Type of Assistance Needed Physical assistance   Physical Assistance Level 26%-50%   Comment max a LSO and supervision shirt   Upper Body Dressing CARE Score 3   Lower Body Dressing   Type of Assistance Needed Physical assistance; Adaptive equipment   Physical Assistance Level 26%-50%   Lower Body Dressing CARE Score 3   Putting On/Taking Off Footwear   Type of Assistance Needed Physical assistance   Physical Assistance Level 26%-50%   Putting On/Taking Off Footwear CARE Score 3   Dressing/Undressing Clothing   Remove UB Clothes Pullover Shirt; Other  (LSO)   Don UB Clothes Pullover Shirt  (LSO)   Remove LB Clothes Undergarment;Socks   Don LB Clothes Pants; Undergarment;Socks   Limitations Noted In Balance; Endurance;Problem Solving; Safety; Sequencing;Strength;ROM   Adaptive Equipment Reacher;Sock Aide   Positioning Supported Sit;Standing   Findings reacher and sock aide issued for use   Sit to Stand   Type of Assistance Needed Physical assistance   Physical Assistance Level 25% or less   Sit to Stand CARE Score 3   Exercise Tools   Other Exercise Tool 1 gripper with pegs LUE following retrieval from the floor using the reacher in the [address]; Responsive; Cooperative   Attention Attends with cues to redirect   Orientation Level Oriented to person;Oriented to place;Oriented to time;Oriented to situation   Memory Decreased long term memory;Decreased short term memory;Decreased recall of recent events;Decreased recall of precautions   Following Commands Follows one step commands with increased time or repetition   Assessment   Treatment Assessment Pt presents seated in w/c following breakfast agreeable to OT session including ADLs, declining a shower, transfers/ standing and BUE therex  Pt tolerates session without complaints and is making gains towards goals with A/E LB ADLs completion  Pt requires assist due to decreased balance, safety, endurance, ROM/ strength with spinal prec/ LSO and cognition  Pt will benefit from continued skilled OT services to increase independence with daily tasks  Problem List Decreased strength;Decreased range of motion;Decreased endurance; Impaired balance;Decreased safety awareness;Decreased cognition;Pain;Orthopedic restrictions   Plan   Treatment/Interventions ADL retraining;Functional transfer training; Therapeutic exercise; Endurance training;Cognitive reorientation;Patient/family training;Equipment eval/education; Compensatory technique education   Progress Progressing toward goals   OT Therapy Minutes   OT Time In 0900   OT Time Out 1030   OT Total Time (minutes) 90   OT Mode of treatment - Individual (minutes) 90   Therapy Time missed   Time missed?  No

## 2023-04-18 NOTE — PROGRESS NOTES
"PM&R PROGRESS NOTE:  Keke Driver 80 y o  female MRN: [id number]  Unit/Bed#: -01 Encounter: [id number]        Rehabilitation Diagnosis: Impairment of mobility, safety and Activities of Daily Living (ADLs) due to Orthopedic Disorders:  08 9  Other Orthopedic L3 compression fracture    HPI: Keke Driver is a 80 y o  female with a PMH significant for T2DM, HTN, Chronic a-fib and CKD who presented to the [facility] [address] on 04/08/23 s/p a fall at home  She fell backwards and landed on her neck with questionable head strike  CTH with no acute intracranial abnormality  CT spine cervical with no cervical spine fracture or traumatic malalignment  CT C/A/P with acute mild inferior endplate L3 compression fracture without central canal compromise or posterior element involvement  Incidentally found to have a presumed bronchogenic malignancy at the right hilum and multifocal bladder tumors  A lumbar spine brace was ordered  Found to have encephalopathy  Chest xray without acute findings, she was afebrile with no leukocytosis  UA negative         SUBJECTIVE: Patient seen and evaluated  Recliner provided and patient admits she feels more comfortable sitting in the recliner compared to the wheelchair  She admits to discomfort to the back with activity  She states \"I just want to go home  \" Patient primarily takes care of her spouse and is concerned about him  According to SW, patient's son is currently taking care of the spouse  Explained to patient we want to get her functioning as close to baseline as possible before discharge  Patient did express understanding  ASSESSMENT: Stable, progressing      PLAN:    Rehabilitation  • Functional deficits:  Impaired mobility, self care, BLE weakness, pain  • Continue current rehabilitation plan of care to maximize function      • Functional update:   o PT: updated below  o OT:Updated below   o ST: updated below  Physical Therapy Occupational Therapy Speech Therapy " Weight Bearing Status: Weight Bearing as Tolerated (LSO in place)  Transfers: Minimal Assistance  Amulation Distance (ft): 104 feet  Ambulation: Minimal Assistance  Assistive Device for Ambulation: Roller Walker  Number of Stairs: 7  Assistive Device for Stairs: Bilateral Hand Rails  Stair Assistance: Minimal Assistance  Discharge Recommendations: Home with:  76 Tony Verdugo with[de-identified] Family Support, First Floor Setup, Home Physical Therapy   Eating: Supervision  Grooming: Supervision  Bathing: Minimal Assistance  Bathing: Minimal Assistance  Upper Body Dressing: Minimal Assistance  Lower Body Dressing: Minimal Assistance, Moderate Assistance  Toileting: Minimal Assistance  Tub/Shower Transfer:  (TBA)  Toilet Transfer: Minimal Assistance  Cognition: Within Defined Limits  Orientation: Person, Place, Time, Situation   Mode of Communication: Verbal  Cognition: Exceptions to WNL (pending CLQT results upon completion later this date)     Orientation: Person, Place, Time, Situation  Swallowing: Within Defined Limits  Diet Recommendations: Regular Diet, Thin  Discharge Recommendations: Home with:  76 Tony Verdugo with[de-identified] Family Support, Home Speech Therapy         This patient was discussed by the Interdisciplinary Team in weekly case conference today  The care of the patient was extensively discussed with all care providers and an appropriate rehabilitation plan was formulated unique for this patient  Barriers were identified preventing progression of therapy and appropriate interventions were discussed with each discipline  Please see the team note for input from all disciplines regarding barriers, intervention, and discharge planning        • Estimated Discharge: 4/27/23 with [facility] PT/OT/ST/N/A  • Discussion about possibly benefiting from option program  SW will work on referral        Pain  • Tylenol 650 mg Q6H PRN for mild pain  • Lidocaine patch Q12H Daily     DVT prophylaxis  • Eliquis 2 5 mg BID    Bladder plan  • Continent     Bowel plan  • Continent   • Last BM 4/18/23  • Continue colace and Senna BID  • Miralax daily  • Dulcolax rectal suppository daily PRN     Skin care  · Monitor skin daily  · Apply skin nourishing cream  · Reposition Q2H to offload pressure  · Elevate heels off bed   · Hydraguard to b/l heels and lower buttocks BID and PRN  · Stage 2 right upper buttocks: Allevyn life silicone bordered dressing to the sarum-upper buttocks   Check daily, reapply and change Q3days or PRN      UTI (urinary tract infection)  Assessment & Plan  · UA obtained while in acute care  · Culture resulted over the weekend- >100,000 cfu/ml Enterobacter cloacae, <10,000 cfu/ml proteus species   · Continue Bactrim 400-80 mg Q12H [address] x6 days       Lung mass  Assessment & Plan  · Incidentally found on CT imaging : presumed bronchogenic malignancy at the right hilum and multofocal bladder tumors which should also be regarded as malignancy until proven otherwise   · Oncology consulted while in acute care  · Further management to be done as an outpatient per family preference   · Oncology would recommend cystoscopy with Urology to obtain tissue; IR lung biopsy, Outpatient PET CT  · Arrangements to be made via Oncology for a workup in approximately 2 weeks    Malignant neoplasm of overlapping sites of bladder [facility])  Assessment & Plan  · Known history of bladder cancer  · Follows with Dr Lamar Garcia   · Last testing done by Urology 5/2021  · Recommend following up as an outpatient with Urology and Oncology    Type 2 diabetes mellitus with stage 3a chronic kidney disease [facility])  Assessment & Plan  Lab Results   Component Value Date    HGBA1C 7 0 (H) 10/10/2022       Recent Labs     04/17/23  1117 04/17/23  1630 04/17/23  1959 04/18/23  0720   POCGLU 350* 117 211* 160*       Blood Sugar Average: Last 72 hrs:  · (P) 299 [id number]   · Monitor accu cheks  · Continue SSI  · Continue home Glipizide 5 mg BID and Metformin 500 mg BID today   · Monitor and adjust regimen as needed  · Carb controlled diet     Essential hypertension  Assessment & Plan  · Monitor vitals  · Continue Metoprolol 25 mg Q12H, Lisinopril 5 mg daily    Chronic a-fib (HCC)  Assessment & Plan  · Continue Metoprolol 25 mg Q12H [facility] for rate control  · Continue Eliquis 2 5 mg BID    * Compression fracture of L3 vertebra (HCC)  Assessment & Plan  · S/p fall at home; unclear if head strike   · CTH: No acute intracranial abnormality  · CT C/A/P: acute mild inferior endplate L3 compression fracture without central canal compromise or posterior element involvement  No other acute traumatic injuries seen  · Lumbar spine brace to be worn with ambulation, in upright position ; unclear timeline  Likely 4-6 weeks  · Acute chomprehensive interdisciplinary inpatient rehabilitation to include intensive skilled therapies as outlines with oversight and management by a rehabilitation Physician Assistant overseen by rehabilitation physician as well as inpatient rehabilitation nursing, case management and weekly interdisciplinary team meeting        Appreciate IM consultants medical co-management  Labs, medications, and imaging personally reviewed  ROS:  A ten point review of systems was completed and pertinent positives are listed in subjective section  All other systems reviewed were negative  Review of Systems   Constitutional: Positive for fatigue  Tired after therapy   HENT: Negative  Respiratory: Negative  Negative for shortness of breath  Cardiovascular: Negative  Negative for chest pain  Gastrointestinal: Negative  Negative for abdominal pain and constipation  Genitourinary: Negative  Negative for difficulty urinating  Musculoskeletal: Positive for arthralgias and back pain  Discomfort to the back with activity   H/o 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, knees - c/o discomfort to these areas, especially with activity    Neurological: Negative  Psychiatric/Behavioral: Negative           OBJECTIVE: "  /66 (BP Location: Right arm)   Pulse 64   Temp 97 8 °F (36 6 °C) (Temporal)   Resp 18   Ht 5' 4\" (1 626 m)   Wt 61 8 kg (136 lb 3 9 oz)   SpO2 96%   BMI 23 39 kg/m²     Physical Exam  Vitals reviewed  Constitutional:       General: She is not in acute distress  Appearance: She is not ill-appearing  HENT:      Head: Normocephalic and atraumatic  Eyes:      Pupils: Pupils are equal, round, and reactive to light  Cardiovascular:      Rate and Rhythm: Normal rate and regular rhythm  Pulses: Normal pulses  Heart sounds: Normal heart sounds  No murmur heard  Pulmonary:      Effort: Pulmonary effort is normal  No respiratory distress  Breath sounds: Normal breath sounds  Abdominal:      General: Bowel sounds are normal  There is no distension  Palpations: Abdomen is soft  Musculoskeletal:      Right lower leg: No edema  Left lower leg: No edema  Skin:     General: Skin is warm and dry  Neurological:      General: No focal deficit present  Mental Status: She is alert and oriented to person, place, and time     Psychiatric:         Mood and Affect: Mood normal          Behavior: Behavior normal           Lab Results   Component Value Date    WBC 7 67 04/14/2023    HGB 14 5 04/14/2023    HCT 45 4 04/14/2023    MCV 99 (H) 04/14/2023     04/14/2023     Lab Results   Component Value Date    SODIUM 139 04/14/2023    K 3 8 04/14/2023     04/14/2023    CO2 28 04/14/2023    BUN 33 (H) 04/14/2023    CREATININE 1 17 04/14/2023    GLUC 91 04/14/2023    CALCIUM 9 3 04/14/2023     Lab Results   Component Value Date    INR 0 99 04/08/2023    INR 1 02 04/18/2022    INR 1 68 (H) 07/13/2021    PROTIME 13 1 04/08/2023    PROTIME 13 3 04/18/2022    PROTIME 19 7 (H) 07/13/2021           Current Facility-Administered Medications:   •  acetaminophen (TYLENOL) tablet 650 mg, 650 mg, Oral, Q6H PRN, Natalia Ledesma MD, 650 mg at 04/14/23 2100  •  aluminum-magnesium " hydroxide-simethicone (MYLANTA) oral suspension 30 mL, 30 mL, Oral, Q4H PRN, Marcin Hutton MD  •  apixaban Myrl Rend) tablet 2 5 mg, 2 5 mg, Oral, BID, Marcin Hutton MD, 2 5 mg at 04/18/23 1822  •  bisacodyl (DULCOLAX) rectal suppository 10 mg, 10 mg, Rectal, Daily PRN, Jake Baca PA-C, 10 mg at 04/17/23 1958  •  calcium carbonate (OYSTER SHELL,OSCAL) 500 mg tablet 1 tablet, 1 tablet, Oral, BID With Meals, Marcin Hutton MD, 1 tablet at 04/18/23 9654  •  cholecalciferol (VITAMIN D3) tablet 1,000 Units, 1,000 Units, Oral, Daily, Marcin Hutton MD, 1,000 Units at 04/18/23 0469  •  cyanocobalamin (VITAMIN B-12) tablet 1,000 mcg, 1,000 mcg, Oral, Daily, Marcin Hutton MD, 1,000 mcg at 04/18/23 3164  •  docusate sodium (COLACE) capsule 100 mg, 100 mg, Oral, BID, Marcin Hutton MD, 100 mg at 04/18/23 1958  •  famotidine (PEPCID) tablet 10 mg, 10 mg, Oral, Daily, Marcin Hutton MD, 10 mg at 04/18/23 0858  •  glipiZIDE (GLUCOTROL) tablet 5 mg, 5 mg, Oral, BID AC, Jake Baca PA-C, 5 mg at 04/18/23 1209  •  insulin lispro (HumaLOG) 100 units/mL subcutaneous injection 1-5 Units, 1-5 Units, Subcutaneous, TID AC, 1 Units at 04/18/23 0857 **AND** Fingerstick Glucose (POCT), , , TID Jake ADAN PA-C  •  levothyroxine tablet 25 mcg, 25 mcg, Oral, Daily, Marcin Hutton MD, 25 mcg at 04/18/23 0524  •  lidocaine (LIDODERM) 5 % patch 1 patch, 1 patch, Topical, Daily, Marcin Hutton MD, 1 patch at 04/18/23 0858  •  lisinopril (ZESTRIL) tablet 5 mg, 5 mg, Oral, Daily, Marcin Hutton MD, 5 mg at 04/18/23 2755  •  magnesium gluconate (MAGONATE) tablet 500 mg, 500 mg, Oral, Daily, Marcin Hutton MD, 500 mg at 04/18/23 7457  •  melatonin tablet 3 mg, 3 mg, Oral, HS, Marcin Hutton MD, 3 mg at 04/17/23 2115  •  metFORMIN (GLUCOPHAGE) tablet 500 mg, 500 mg, Oral, BID With Meals, Jake Baca PA-C, 500 mg at 04/18/23 2881  •  metoprolol tartrate (LOPRESSOR) tablet 25 mg, 25 mg, Oral, Q12H, Marcin Hutton MD, 25 mg at 04/18/23 3942  •  ondansetron (ZOFRAN-ODT) dispersible tablet 4 mg, 4 mg, Oral, Q6H PRN, Blayne Guo MD  •  oxyCODONE (ROXICODONE) split tablet 2 5 mg, 2 5 mg, Oral, Q4H PRN, 2 5 mg at 04/16/23 1636 **OR** oxyCODONE (ROXICODONE) IR tablet 5 mg, 5 mg, Oral, Q4H PRN, Sushila Rosenthal PA-C, 5 mg at 04/16/23 0459  •  polyethylene glycol (MIRALAX) packet 17 g, 17 g, Oral, Daily, Marvin Farnsworth PA-C, 17 g at 04/18/23 2266  •  pravastatin (PRAVACHOL) tablet 20 mg, 20 mg, Oral, Daily With Tianna Menendez MD, 20 mg at 04/17/23 1622  •  senna-docusate sodium (SENOKOT S) 8 6-50 mg per tablet 1 tablet, 1 tablet, Oral, BID, Marvin Farnsworth PA-C, 1 tablet at 04/18/23 7907  •  sulfamethoxazole-trimethoprim (BACTRIM) 400-80 mg per tablet 1 tablet, 1 tablet, Oral, Q12H [address], Marvin Farnsworth PA-C, 1 tablet at 04/18/23 6050    Past Medical History:   Diagnosis Date   • Abnormal gait    • Accidental fall from chair, subsequent encounter    • Atrial fibrillation (HCC)    • Benign essential hypertension    • Bite of animal    • Cataract    • Cellulitis    • Chronic kidney disease    • Chronic kidney disease, stage 3 (HCC)    • Colon cancer (HCC)    • Colon polyp    • Current tear of lateral cartilage or meniscus of knee    • Diabetes mellitus ([facility] [address] )    • Disease of thyroid gland    • Disorder of magnesium metabolism    • Dvt femoral (deep venous thrombosis)    • Embolism from thrombosis of vein of distal end of lower extremity ([address] )    • Essential hypertension    • Fall    • Hyperlipidemia    • Hyperlipidemia    • Hypertension    • Hypothyroidism    • Insomnia    • Kidney stone    • Knee joint effusion    • Leukocytosis    • Malaise and fatigue    • MI (myocardial infarction) ([facility] [address] )     Hospitalization    • Orbital hemorrhage    • Other sleep disorders    • PAF (paroxysmal atrial fibrillation) ([facility])    • Presence of vena cava filter    • Pulmonary embolism (HCC)    • Tear film insufficiency    • Type 2 diabetes mellitus ([facility] )    • Unspecified osteoarthritis, unspecified site    • Weight decreased        Patient Active Problem List    Diagnosis Date Noted   • UTI (urinary tract infection) 04/17/2023   • Lung mass 04/10/2023   • Encephalopathy 04/10/2023   • Fall at home 04/08/2023   • Compression fracture of L3 vertebra ([place] [address] ) 04/08/2023   • Stage 3b chronic kidney disease ([place] [address] ) 12/21/2022   • Loss of weight 12/21/2022   • Malignant neoplasm of overlapping sites of bladder ([place] [address] ) 10/10/2022   • Hypocalcemia 06/29/2021   • Other pulmonary embolism without acute cor pulmonale ([place] [address] ) 04/21/2021   • Bladder mass 03/31/2021   • Compression fracture of thoracic spine, non-traumatic, sequela 02/28/2020   • Stage 3a chronic kidney disease ([place] [address] ) 02/28/2020   • Essential hypertension 06/05/2019   • History of pulmonary embolism 06/05/2019   • Osteoarthritis of knee 05/01/2018   • Hypercoagulable state, primary ([place] [address] ) 03/27/2017   • Chronic a-fib ([place] [address] ) 02/20/2016   • Type 2 diabetes mellitus with stage 3a chronic kidney disease ([place] [address] ) 02/20/2016          Debbie Vincent PA-C    I have spent a total time of 45 minutes on 04/18/23 in caring for this patient including Instructions for management, Patient and family education, Impressions, Counseling / Coordination of care, Documenting in the medical record, Reviewing / ordering tests, medicine, procedures  , Obtaining or reviewing history   and Communicating with other healthcare professionals   ** Please Note:  voice to text software may have been used in the creation of this document   Although proof errors in transcription or interpretation are a potential of such software**

## 2023-04-18 NOTE — PROGRESS NOTES
Progress Note - Internal Medicine   Clyde Rosa 80 y o  female MRN: [id number]  Unit/Bed#: [facility] 213-01 Encounter: [id number]    A/P:  1  Diabetes Mellitus: most recent A1c of 7 0 will continue oral medication and resume ssc as patient had readings in the 300's   2  Afib/PE: patient currently on renally adjusted dose of eliquis 2 5 mg bid   3  Hypertension: appears stable on metoprolol and lisinopril, continue to monitor   4  Compression fracture: utilizing brace while oob, continue to manage pain effectively, patient does not wish stronger medication, all therapy as per Physiatry   5  CKD: stable at stage 3, gfr of 41  6  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: with new area of concern to lung, has been following with Dr Lamar Garcia, Urology in the past  As per EMR, case was discussed with patients son in the acute care setting, will complete STR before additional appointments or decisions made  Follow up reason for today's visit: DM/hypertension     Compression fracture of L3 vertebra [facility])    Patient Active Problem List   Diagnosis   • Chronic a-fib ([address] )   • Essential hypertension   • History of pulmonary embolism   • Type 2 diabetes mellitus with stage 3a chronic kidney disease ([address] )   • Compression fracture of thoracic spine, non-traumatic, sequela   • Stage 3a chronic kidney disease ([address] )   • Bladder mass   • Hypercoagulable state, primary ([address] )   • Osteoarthritis of knee   • Other pulmonary embolism without acute cor pulmonale (HCC)   • Hypocalcemia   • Malignant neoplasm of overlapping sites of bladder ([address] )   • Stage 3b chronic kidney disease (HCC)   • Loss of weight   • Fall at home   • Compression fracture of L3 vertebra (HCC)   • Lung mass   • Encephalopathy   • UTI (urinary tract infection)         Subjective:   Patient seen and evaluated in her room with therapy  No acute distress, but uncomfortable with  brace but does not wish stronger medication therapy   On ROS denies any fever, chills, chest pain, sob, "n/v/d or abdominal pain  Care has been collaborated with nursing, therapy and Physiatry  Objective:     Vitals: Blood pressure 145/66, pulse 64, temperature 97 8 °F (36 6 °C), temperature source Temporal, resp  rate 18, height 5' 4\" (1 626 m), weight 61 8 kg (136 lb 3 9 oz), SpO2 96 %  ,Body mass index is 23 39 kg/m²  Weight (last 2 days)     Date/Time Weight    04/17/23 0500 61 8 (136 24)            Intake/Output Summary (Last 24 hours) at 4/18/2023 1757  Last data filed at 4/18/2023 1740  Gross per 24 hour   Intake 1280 ml   Output --   Net 1280 ml     I/O last 3 completed shifts: In: 5 [P O :540]  Out: -          Physical Exam: /66 (BP Location: Right arm)   Pulse 64   Temp 97 8 °F (36 6 °C) (Temporal)   Resp 18   Ht 5' 4\" (1 626 m)   Wt 61 8 kg (136 lb 3 9 oz)   SpO2 96%   BMI 23 39 kg/m²     General Appearance:    Alert, cooperative, no distress, appears stated age   Head:    Normocephalic, without obvious abnormality, atraumatic   Eyes:    Conjunctiva/corneas clear   Ears:    Normal external ears   Nose:   Nares normal, septum midline, mucosa normal, no drainage    or sinus tenderness   Throat:   Lips, mucosa, and tongue normal; teeth and gums normal   Neck:   Supple, symmetrical, trachea midline, no adenopathy;        thyroid:  No enlargement/tenderness/nodules; no carotid    bruit or JVD   Back:     Symmetric, no curvature, ROM normal, no CVA tenderness   Lungs:     Clear to auscultation bilaterally, respirations unlabored   Chest wall:    No tenderness or deformity   Heart:    Regular rate and rhythm, S1 and S2 normal, no murmur, rub   or gallop   Abdomen:     Soft, non-tender, bowel sounds active   Extremities:   Extremities normal, atraumatic, no cyanosis or edema   Skin:   Skin color, texture, turgor normal, no rashes or lesions   Lymph nodes:   Cervical normal   Neurologic:   CNII-XII intact           Lab, Imaging and other studies: I have personally reviewed pertinent labs    CBC: " No results found for: WBC, HGB, HCT, MCV, PLT, ADJUSTEDWBC, MCH, MCHC, RDW, MPV, NRBC  CMP: No results found for: NA, K, CL, CO2, ANIONGAP, BUN, CREATININE, GLUCOSE, CALCIUM, AST, ALT, ALKPHOS, PROT, BILITOT, EGFR      Results from last 7 days   Lab Units 04/14/23  0513 04/13/23  0500 04/12/23  0530   POTASSIUM mmol/L 3 8 3 8 3 9   CHLORIDE mmol/L 102 100 102   CO2 mmol/L 28 28 27   BUN mg/dL 33* 33* 29*   CREATININE mg/dL 1 17 1 35* 1 19   CALCIUM mg/dL 9 3 9 4 9 1         Phosphorus: No results found for: PHOS  Magnesium: No results found for: MG  Urinalysis: No results found for: COLORU, CLARITYU, SPECGRAV, PHUR, LEUKOCYTESUR, NITRITE, PROTEINUA, GLUCOSEU, KETONESU, BILIRUBINUR, BLOODU  Ionized Calcium: No results found for: CAION  Coagulation: No results found for: PT, INR, APTT  Troponin: No results found for: TROPONINI  ABG: No results found for: PHART, VNV3XPO, PO2ART, PFT2JEM, E6OVQQOM, BEART, SOURCE  Radiology review:     IMAGING  No results found      Current Facility-Administered Medications   Medication Dose Route Frequency   • acetaminophen (TYLENOL) tablet 650 mg  650 mg Oral Q6H PRN   • aluminum-magnesium hydroxide-simethicone (MYLANTA) oral suspension 30 mL  30 mL Oral Q4H PRN   • apixaban (ELIQUIS) tablet 2 5 mg  2 5 mg Oral BID   • bisacodyl (DULCOLAX) rectal suppository 10 mg  10 mg Rectal Daily PRN   • calcium carbonate (OYSTER SHELL,OSCAL) 500 mg tablet 1 tablet  1 tablet Oral BID With Meals   • cholecalciferol (VITAMIN D3) tablet 1,000 Units  1,000 Units Oral Daily   • cyanocobalamin (VITAMIN B-12) tablet 1,000 mcg  1,000 mcg Oral Daily   • docusate sodium (COLACE) capsule 100 mg  100 mg Oral BID   • famotidine (PEPCID) tablet 10 mg  10 mg Oral Daily   • glipiZIDE (GLUCOTROL) tablet 5 mg  5 mg Oral BID AC   • insulin lispro (HumaLOG) 100 units/mL subcutaneous injection 1-5 Units  1-5 Units Subcutaneous TID AC   • levothyroxine tablet 25 mcg  25 mcg Oral Daily   • lidocaine (LIDODERM) 5 % patch 1 patch  1 patch Topical Daily   • lisinopril (ZESTRIL) tablet 5 mg  5 mg Oral Daily   • magnesium gluconate (MAGONATE) tablet 500 mg  500 mg Oral Daily   • melatonin tablet 3 mg  3 mg Oral HS   • metFORMIN (GLUCOPHAGE) tablet 500 mg  500 mg Oral BID With Meals   • metoprolol tartrate (LOPRESSOR) tablet 25 mg  25 mg Oral Q12H   • ondansetron (ZOFRAN-ODT) dispersible tablet 4 mg  4 mg Oral Q6H PRN   • oxyCODONE (ROXICODONE) split tablet 2 5 mg  2 5 mg Oral Q4H PRN    Or   • oxyCODONE (ROXICODONE) IR tablet 5 mg  5 mg Oral Q4H PRN   • polyethylene glycol (MIRALAX) packet 17 g  17 g Oral Daily   • pravastatin (PRAVACHOL) tablet 20 mg  20 mg Oral Daily With Dinner   • senna-docusate sodium (SENOKOT S) 8 6-50 mg per tablet 1 tablet  1 tablet Oral BID   • sulfamethoxazole-trimethoprim (BACTRIM) 400-80 mg per tablet 1 tablet  1 tablet Oral Q12H [address]     Medications Discontinued During This Encounter   Medication Reason   • insulin lispro (HumaLOG) 100 units/mL subcutaneous injection 1-5 Units Alternate therapy   • insulin lispro (HumaLOG) 100 units/mL subcutaneous injection 1-5 Units Alternate therapy   • insulin glargine (LANTUS) subcutaneous injection 5 Units 0 05 mL Alternate therapy   • sulfamethoxazole-trimethoprim (BACTRIM DS) 800-160 mg per tablet 1 tablet    • polyethylene glycol (MIRALAX) packet 17 g    • senna-docusate sodium (SENOKOT S) 8 6-50 mg per tablet 1 tablet        MAYE Thompson      This progress note was produced in part using a dictation device which may document imprecise wording from author's original intent

## 2023-04-18 NOTE — PROGRESS NOTES
[facility] DAILY TREATMENT NOTE    04/18/23 1400   Pain Assessment   Pain Assessment Tool 0-10   Speech/Language/Cognition Assessmetn   Treatment Assessment patient able to recall 3 activities she completed in PT right prior to this session but unable to recall what she did in OT earlier this morning  Pt with some difficuly discussing LTM time frames regarding time frames raising her children and locations she has lived along the years  Reviewed CLQT results and POC goals  Pt aware of memory difficulty; however, discussion spent on increased attention difficulties and how that can impact memory performance  Working towards compensation strategies  Pt able to complete spirometer via teach back  Able to complte spirometer up to 1250  Vocal quality overall continues to be hoarse; however, improved volume overall today with less episodes of voice fading into a whisper in conversation     SLP Therapy Minutes   SLP Time In 1400   SLP Time Out 1430   SLP Total Time (minutes) 30   SLP Mode of treatment - Individual (minutes) 30   SLP Mode of treatment - Concurrent (minutes) 0   SLP Mode of treatment - Group (minutes) 0   SLP Mode of treatment - Co-treat (minutes) 0   SLP Mode of Treatment - Total time(minutes) 30 minutes   SLP Cumulative Minutes 85

## 2023-04-18 NOTE — CASE MANAGEMENT
"Tx team recommendations reviewed with patient & Pt's son, who expressed understanding & agreement  Target DC date is 4/27 with [facility] (Nsg, PT, OT, ST, HHA); a list of providers was provided to Pt & a referral will be made based on Pt preference of available agencies in the demographic area  Discussed in detail with son the importance of family support when Pt is [address] home; discussed that Pt will need assistance with ADL's, meals, care of spouse, etc  Pt's son stated that although he is an hour away, that he and his wife have been assisting & will continue to do so  Pt's son changed the cholostomy bag for Pt's spouse, but his father empties the bag, etc  Pt's son stated that his father is \"in better shape than her\", and reported that Pt's spouse transfers independently, gets the mail, prepares meals (they are microwavable meals provided by a [employer] organization)  Pt's son stated the family would like to honor their wishes to be in their home together at this time; they have already considered [facility] & it not an option at this time  Pt's son has spoken with Pt's neighbors as well & they are developing a plan to ensure Pt & her spouse's needs are met  SW will place a referral for [facility] as well  SW will continue to monitor & assist as needed with Tx & DC planning          "

## 2023-04-18 NOTE — NURSING NOTE
Patient awake and alert  Answers questions appropriately when interacting   Able to verbalize needs effectively  OOB with assist of 1 with RW with LSO brace in place  Denies complaints of pain or discomfort  Participated in therapy sessions as scheduled and tolerated same well  Call bell in reach at bedside

## 2023-04-18 NOTE — NURSING NOTE
Patient resting comfortably in bed at this time   No signs of distress noted   Patient remains forgetful at times overnight   Bed alarm and close supervision in place to promote patient safety   Patient with occassional incontinent episodes of urine overnight assist provided to toileting tasks at those times   Suppository was effective for a bowel movement overnight    Patient was able to stand pivot transfer with one assist and a walker overnight   Back brace in use when patient was out of bed   Patient encouraged to reposition frequently to promote skin integrity   Heels elevated off of bed on pillows   Call bell within reach   Will continue to monitor patient and follow plan of care

## 2023-04-19 LAB
GLUCOSE SERPL-MCNC: 113 MG/DL (ref 65–140)
GLUCOSE SERPL-MCNC: 174 MG/DL (ref 65–140)
GLUCOSE SERPL-MCNC: 204 MG/DL (ref 65–140)
GLUCOSE SERPL-MCNC: 205 MG/DL (ref 65–140)

## 2023-04-19 RX ADMIN — GLIPIZIDE 5 MG: 5 TABLET ORAL at 08:38

## 2023-04-19 RX ADMIN — LIDOCAINE 5% 1 PATCH: 700 PATCH TOPICAL at 08:40

## 2023-04-19 RX ADMIN — SENNOSIDES AND DOCUSATE SODIUM 1 TABLET: 50; 8.6 TABLET ORAL at 17:24

## 2023-04-19 RX ADMIN — GLIPIZIDE 5 MG: 5 TABLET ORAL at 17:24

## 2023-04-19 RX ADMIN — SULFAMETHOXAZOLE AND TRIMETHOPRIM 1 TABLET: 400; 80 TABLET ORAL at 08:40

## 2023-04-19 RX ADMIN — Medication 3 MG: at 21:21

## 2023-04-19 RX ADMIN — CYANOCOBALAMIN TAB 500 MCG 1000 MCG: 500 TAB at 08:38

## 2023-04-19 RX ADMIN — CALCIUM 1 TABLET: 500 TABLET ORAL at 17:24

## 2023-04-19 RX ADMIN — Medication 500 MG: at 08:38

## 2023-04-19 RX ADMIN — DOCUSATE SODIUM 100 MG: 100 CAPSULE, LIQUID FILLED ORAL at 08:38

## 2023-04-19 RX ADMIN — METFORMIN HYDROCHLORIDE 500 MG: 500 TABLET ORAL at 08:44

## 2023-04-19 RX ADMIN — DOCUSATE SODIUM 100 MG: 100 CAPSULE, LIQUID FILLED ORAL at 17:24

## 2023-04-19 RX ADMIN — METOPROLOL TARTRATE 25 MG: 25 TABLET, FILM COATED ORAL at 08:39

## 2023-04-19 RX ADMIN — CALCIUM 1 TABLET: 500 TABLET ORAL at 08:46

## 2023-04-19 RX ADMIN — FAMOTIDINE 10 MG: 20 TABLET, FILM COATED ORAL at 08:39

## 2023-04-19 RX ADMIN — INSULIN LISPRO 1 UNITS: 100 INJECTION, SOLUTION INTRAVENOUS; SUBCUTANEOUS at 12:19

## 2023-04-19 RX ADMIN — SENNOSIDES AND DOCUSATE SODIUM 1 TABLET: 50; 8.6 TABLET ORAL at 08:39

## 2023-04-19 RX ADMIN — LEVOTHYROXINE SODIUM 25 MCG: 25 TABLET ORAL at 05:11

## 2023-04-19 RX ADMIN — PRAVASTATIN SODIUM 20 MG: 20 TABLET ORAL at 17:25

## 2023-04-19 RX ADMIN — METOPROLOL TARTRATE 25 MG: 25 TABLET, FILM COATED ORAL at 21:21

## 2023-04-19 RX ADMIN — Medication 1000 UNITS: at 08:39

## 2023-04-19 RX ADMIN — APIXABAN 2.5 MG: 2.5 TABLET, FILM COATED ORAL at 08:39

## 2023-04-19 RX ADMIN — APIXABAN 2.5 MG: 2.5 TABLET, FILM COATED ORAL at 17:24

## 2023-04-19 RX ADMIN — SULFAMETHOXAZOLE AND TRIMETHOPRIM 1 TABLET: 400; 80 TABLET ORAL at 21:21

## 2023-04-19 RX ADMIN — METFORMIN HYDROCHLORIDE 500 MG: 500 TABLET ORAL at 17:24

## 2023-04-19 RX ADMIN — LISINOPRIL 5 MG: 5 TABLET ORAL at 08:39

## 2023-04-19 RX ADMIN — INSULIN LISPRO 1 UNITS: 100 INJECTION, SOLUTION INTRAVENOUS; SUBCUTANEOUS at 08:42

## 2023-04-19 NOTE — PLAN OF CARE
Problem: PAIN - ADULT  Goal: Verbalizes/displays adequate comfort level or baseline comfort level  Description: Interventions:  - Encourage patient to monitor pain and request assistance  - Assess pain using appropriate pain scale  - Administer analgesics based on type and severity of pain and evaluate response  - Implement non-pharmacological measures as appropriate and evaluate response  - Consider cultural and social influences on pain and pain management  - Notify physician/advanced practitioner if interventions unsuccessful or patient reports new pain  Outcome: Progressing     Problem: INFECTION - ADULT  Goal: Absence or prevention of progression during hospitalization  Description: INTERVENTIONS:  - Assess and monitor for signs and symptoms of infection  - Monitor lab/diagnostic results  - Monitor all insertion sites, i e  indwelling lines, tubes, and drains  - Monitor endotracheal if appropriate and nasal secretions for changes in amount and color  - [place] appropriate cooling/warming therapies per order  - Administer medications as ordered  - Instruct and encourage patient and family to use good hand hygiene technique  - Identify and instruct in appropriate isolation precautions for identified infection/condition  Outcome: Progressing     Problem: SAFETY ADULT  Goal: Patient will remain free of falls  Description: INTERVENTIONS:  - Educate patient/family on patient safety including physical limitations  - Instruct patient to call for assistance with activity   - Consult OT/PT to assist with strengthening/mobility   - Keep Call bell within reach  - Keep bed low and locked with side rails adjusted as appropriate  - Keep care items and personal belongings within reach  - Initiate and maintain comfort rounds  - Make Fall Risk Sign visible to staff  - Offer Toileting every 2 Hours, in advance of need  - Initiate/Maintain bed/chair alarm  - Apply yellow socks and bracelet for high fall risk patients  - Consider moving patient to room near nurses station  Outcome: Progressing     Problem: Prexisting or High Potential for Compromised Skin Integrity  Goal: Skin integrity is maintained or improved  Description: INTERVENTIONS:  - Identify patients at risk for skin breakdown  - Assess and monitor skin integrity  - Assess and monitor nutrition and hydration status  - Monitor labs   - Assess for incontinence   - Turn and reposition patient  - Assist with mobility/ambulation  - Relieve pressure over bony prominences  - Avoid friction and shearing  - Provide appropriate hygiene as needed including keeping skin clean and dry  - Evaluate need for skin moisturizer/barrier cream  - Collaborate with interdisciplinary team   - Patient/family teaching  - Consider wound care consult   Outcome: Progressing

## 2023-04-19 NOTE — PROGRESS NOTES
[facility] DAILY TREATMENT NOTE    04/19/23 1130   Pain Assessment   Pain Assessment Tool 0-10   Pain Score 5   Restrictions/Precautions   Precautions Bed/chair alarms;Cognitive; Fall Risk;Spinal precautions; Visual deficit; Pain   Speech/Language/Cognition Assessmetn   Treatment Assessment Pt required verbal cueing x2 during bathroom transfer  Pt worked through making a grocery list of preferred food items x10  Immediate recall verbally rehearsed 3 items at a time and recalled after 15 seconds @ bryon and 2:00 @ 2/3 items bryon increased to 3/3 items with min cue     SLP Therapy Minutes   SLP Time In 1130   SLP Time Out 1200   SLP Total Time (minutes) 30   SLP Mode of treatment - Individual (minutes) 30   SLP Mode of treatment - Concurrent (minutes) 0   SLP Mode of treatment - Group (minutes) 0   SLP Mode of treatment - Co-treat (minutes) 0   SLP Mode of Treatment - Total time(minutes) 30 minutes   SLP Cumulative Minutes 180

## 2023-04-19 NOTE — PROGRESS NOTES
"   04/19/23 1001   Pain Assessment   Pain Assessment Tool 0-10   Pain Score 5   Pain Location/Orientation Location: Back   Pain Radiating Towards SI joint   Pain Onset/Description Onset: Ongoing   Patient's Stated Pain Goal No pain   Hospital Pain Intervention(s) Medication (See MAR); Rest   Restrictions/Precautions   Precautions Bed/chair alarms;Pain; Fall Risk;Spinal precautions   Weight Bearing Restrictions No   ROM Restrictions No   Braces or Orthoses LSO   Cognition   Overall Cognitive Status Impaired   Arousal/Participation Alert; Cooperative   Attention Within functional limits   Orientation Level Oriented X4   Memory Decreased long term memory   Following Commands Follows one step commands with increased time or repetition   Subjective   Subjective \"I don't like that brace  It hurts my back  \"   Roll Left and Right   Type of Assistance Needed Supervision;Verbal cues   Physical Assistance Level No physical assistance   Comment Bedrail   Roll Left and Right CARE Score 4   Lying to Sitting on Side of Bed   Type of Assistance Needed Supervision;Verbal cues   Physical Assistance Level No physical assistance   Comment log roll, bed rail   Lying to Sitting on Side of Bed CARE Score 4   Sit to Stand   Type of Assistance Needed Incidental touching;Physical assistance   Physical Assistance Level 26%-50%   Comment min from low bed, CGA from w/c   Sit to Stand CARE Score 3   Bed-Chair Transfer   Type of Assistance Needed Incidental touching   Physical Assistance Level 25% or less   Comment CGA with RW   Chair/Bed-to-Chair Transfer CARE Score 3   Transfer Bed/Chair/Wheelchair   Stand Pivot Contact Guard   Sit to Stand   (Min to CGA)   Stand to Sit Contact Guard   Supine to Sit Supervision   Findings Verbal cues for log roll with supine to sit  Pt slow, but completed with S only  Pt required min A for sit to stand from low bed   Pt also requires cues to complete full turns prior to stand to sit and to reach for armrests of " chair  Car Transfer   Reason if not Attempted Environmental limitations   Car Transfer CARE Score 10   Walk 10 Feet   Type of Assistance Needed Incidental touching   Physical Assistance Level 25% or less   Comment CGA to S with RW   Walk 10 Feet CARE Score 3   Walk 50 Feet with Two Turns   Type of Assistance Needed Incidental touching   Physical Assistance Level 25% or less   Comment CGA to S with RW   Walk 50 Feet with Two Turns CARE Score 3   Walk 150 Feet   Type of Assistance Needed Incidental touching;Verbal cues   Physical Assistance Level 25% or less   Comment CGA to S with RW   Walk 150 Feet CARE Score 3   Walking 10 Feet on Uneven Surfaces   Type of Assistance Needed Incidental touching   Physical Assistance Level 25% or less   Comment CGA with RW   Walking 10 Feet on Uneven Surfaces CARE Score 3   Ambulation   Primary Mode of Locomotion Prior to Admission Walk   Distance Walked (feet) 202 ft   Findings Pt tends to hyperextend L knee during gait to achieve stability  Pt requires cues to increase upright posture  Pt practiced gait 2x, approximately 12', with focus on safety with turns and safe return to chair  Cues needed to complete full turns and to reach for armrests with 2 hands  Does the patient walk? 2  Yes   Curb or Single Stair   Style negotiated Single stair   Type of Assistance Needed Incidental touching   Physical Assistance Level 25% or less   Comment CGA   1 Step (Curb) CARE Score 3   4 Steps   Type of Assistance Needed Incidental touching;Verbal cues   Physical Assistance Level 25% or less   Comment CGA   4 Steps CARE Score 3   12 Steps   Reason if not Attempted Safety concerns   12 Steps CARE Score 88   Stairs   Type Stairs   # of Steps 7   Weight Bearing Precautions WBAT   Assist Devices   (2 rails to descend    2 rails to ascend 4 steps and R rail to ascend 4 steps )   Findings CGA   Picking Up Object   Reason if not Attempted Safety concerns   Picking Up Object CARE Score 88   Therapeutic Interventions   Strengthening seated exercise   Balance gait, transfers, steps   Other gait, transfers, steps   Assessment   Treatment Assessment Pt is motivated to participate  Limited by back pain  Reviewed lumbar precautions  Pt able to recall 1 out of 4  Pt required mod assist to don LSO brace  Reports increased back pain with use of brace  Pt with increased gait distance today  Slow with gait  Pt tends to consistently hyperextend the L knee during gait  Pt requires cues for safe hand placement with transfers, especially with stand to sit, cues for controlling descend with stand to sit, and cues to complete full turns before sitting in chair  The pt will benefit from continued PT to progress gait, transfers, balance, strengthening, steps, knowledge of lumbar precautions, and safety in order to maximize function and decrease risk for falls  Problem List Decreased strength;Decreased endurance; Impaired balance;Decreased mobility;Pain;Orthopedic restrictions   Barriers to Discharge Inaccessible home environment;Decreased caregiver support   PT Barriers   Physical Impairment Decreased strength;Decreased endurance; Impaired balance;Decreased mobility;Pain;Orthopedic restrictions   Functional Limitation Stair negotiation;Standing;Walking;Transfers   Plan   Treatment/Interventions Functional transfer training;LE strengthening/ROM; Elevations; Therapeutic exercise; Endurance training;Patient/family training;Bed mobility;Gait training   Progress Progressing toward goals   PT Therapy Minutes   PT Time In 1001   PT Time Out 1131   PT Total Time (minutes) 90   PT Mode of treatment - Individual (minutes) 60   PT Mode of treatment - Concurrent (minutes) 30   PT Mode of treatment - Group (minutes) 0   PT Mode of treatment - Co-treat (minutes) 0   PT Mode of Treatment - Total time(minutes) 90 minutes   PT Cumulative Minutes 330   Therapy Time missed   Time missed?  No     Seated ex: 1# B LEs  B hip hikes 2 x 15  B hip abduction with red t-band 2 x 15  B hip adduction with therapy ball  B hamstring curls 2 x 15 with red t-band  B LAQs 2 x 15  B APs 2 x 15

## 2023-04-19 NOTE — PROGRESS NOTES
04/19/23 0740   Pain Assessment   Pain Assessment Tool 0-10   Pain Score 5   Pain Location/Orientation Location: Back   Restrictions/Precautions   Precautions Bed/chair alarms;Cognitive; Fall Risk;Pain;Spinal precautions   Braces or Orthoses LSO   Eating   Type of Assistance Needed Independent   Comment positioned upright in bed due to pt request, declined recliner   Eating CARE Score 6   Sit to Lying   Type of Assistance Needed Supervision;Verbal cues   Comment SRs   Sit to Lying CARE Score 4   Lying to Sitting on Side of Bed   Type of Assistance Needed Supervision;Verbal cues   Comment SRs   Lying to Sitting on Side of Bed CARE Score 4   Sit to Stand   Type of Assistance Needed Incidental touching;Verbal cues   Sit to Stand CARE Score 4   Toileting Hygiene   Type of Assistance Needed Incidental touching;Verbal cues   Toileting Hygiene CARE Score 4   Toileting   Able to [address] down yes, up yes  Manage Hygiene Bladder; Bowel   Limitations Noted In Balance;ROM;Safety;LE Strength   Adaptive Equipment Grab Bar   Toilet Transfer   Type of Assistance Needed Incidental touching;Verbal cues; Adaptive equipment   Toilet Transfer CARE Score 4   Toilet Transfer   Surface Assessed Raised Toilet   Transfer Technique Standard   Limitations Noted In Balance;ROM;Safety;LE Strength; Endurance   Adaptive Equipment Walker;Grab Bar   Cognition   Overall Cognitive Status Impaired   Arousal/Participation Alert; Cooperative   Attention Within functional limits   Orientation Level Oriented X4   Memory Decreased long term memory;Decreased recall of recent events;Decreased short term memory;Decreased recall of precautions   Following Commands Follows one step commands with increased time or repetition   Additional Activities   Additional Activities Comments fxnl mobility to and from BR from bed CGA with Brace donned and RW   Activity Tolerance   Activity Tolerance Patient tolerated treatment well   Assessment   Treatment Assessment Pt participated in 21 minutes of concurrent tx addressing similar goals with a pt with simialr deficits  Pt tx addressed safety with all aspects of toileting and bed mobility as noted in respective sections of note  Pt cued for BLTs and was aware of need for donning LSO prior to txf and mobility as well as aware to not bend to retrieve clothes  Plan is to contineu with skilled OT to address establsihed OT goals as per POC  Prognosis Good   Problem List Decreased strength;Decreased range of motion;Decreased endurance; Impaired balance;Decreased mobility; Impaired judgement;Decreased safety awareness;Decreased cognition;Pain;Orthopedic restrictions   Plan   Treatment/Interventions ADL retraining;Functional transfer training;Bed mobility;Gait training; Compensatory technique education;Equipment eval/education;Cognitive reorientation;Patient/family training; Endurance training;OT;Spoke to nursing   Progress Progressing toward goals   OT Therapy Minutes   OT Time In 0740   OT Time Out 0801   OT Total Time (minutes) 21   OT Mode of treatment - Individual (minutes) 0   OT Mode of treatment - Concurrent (minutes) 21   OT Mode of treatment - Group (minutes) 0   OT Mode of treatment - Co-treat (minutes) 0   OT Mode of Treatment - Total time(minutes) 21 minutes   OT Cumulative Minutes 21   Therapy Time missed   Time missed?  No

## 2023-04-19 NOTE — PROGRESS NOTES
[facility] DAILY TREATMENT NOTE    04/19/23 0930   Pain Assessment   Pain Assessment Tool 0-10   Pain Score 4   Pain Location/Orientation Location: Back   Restrictions/Precautions   Precautions Bed/chair alarms;Cognitive; Fall Risk   Comprehension   Comprehension (FIM) 5 - Needs help/cues, repetition only RARELY ( < 10% of the time)   Expression   Expression (FIM) 6 - Expresses complex/abstract but requires:  more time   Social Interaction   Social Interaction (FIM) 6 - Interacts appropriately with others BUT requires extra  time   Problem Solving   Problem solving (FIM) 4 - Solves basic problems 75-89% of time   Memory   Memory (FIM) 4 - Recognizes/recalls/performs 75-89%   Speech/Language/Cognition Assessmetn   Treatment Assessment patient manages her own medications, she utilizes 3 different pill boxes for AM/dinnertime/bedtime   recognized medications @ 90% bryon and able to name medication purpose @ 80% bryon  Verbal sequencing through daily routine with 1 error  Pt keeps mentally active at home     SLP Therapy Minutes   SLP Time In 0930   SLP Time Out 1000   SLP Total Time (minutes) 30   SLP Mode of treatment - Individual (minutes) 30   SLP Mode of treatment - Concurrent (minutes) 0   SLP Mode of treatment - Group (minutes) 0   SLP Mode of treatment - Co-treat (minutes) 0   SLP Mode of Treatment - Total time(minutes) 30 minutes   SLP Cumulative Minutes 150

## 2023-04-19 NOTE — PROGRESS NOTES
04/19/23 1240   Pain Assessment   Pain Assessment Tool 0-10   Pain Score 5   Pain Location/Orientation Orientation: Lower; Location: Back   Restrictions/Precautions   Precautions Bed/chair alarms;Cognitive; Fall Risk;Spinal precautions; Visual deficit; Pain   Braces or Orthoses LSO   Dressing/Undressing Clothing   Able to  Remove UE Orthosis  (LSO)   Findings Pt cued to remove LSO before lying in bed, requiring total assist due to pt positioning in bed   Sit to Lying   Type of Assistance Needed Supervision;Verbal cues   Comment Pt demonstrated poor log roll technique when lying in bed  VC for pt to remove LSO and to perform log roll, pt heightened bed below 45 degrees following spinal precautions   Sit to Lying CARE Score 4   Sit to Stand   Type of Assistance Needed Incidental touching;Verbal cues   Comment Vc for hand placement from sitting surface   Sit to Stand CARE Score 4   Bed-Chair Transfer   Type of Assistance Needed Incidental touching; Adaptive equipment   Comment CGA with RW   Chair/Bed-to-Chair Transfer CARE Score 4   Transfer Bed/Chair/Wheelchair   Positioning Concerns Neglect   Limitations Noted In Balance;Confidence; Endurance;Pain Management;Problem Solving;LE Strength   Adaptive Equipment Roller Walker   Findings Pt requires CGA with RW and VC for hand placement  Pt has decreased confidences when shifting hands to other surfaces after standing  Functional Standing Tolerance   Time 5 minutes   Activity graded resistance closed pins removed from pole anterior to pt with use of table for stabilization  with L hand and used R hand to remove each clip  Pt removed each without any deficits   Comments Pt did not lose balance throughout activity and was able to follow spinal precautions   Exercise Tools   UE Ergometer 4 min f/b with min resistance with BUE   Other Exercise Tool 1 reacher and clip activity    Fxnl reacher user with graded resistance closed pins Pt grabbed clips with use of reacher on L and R  Pt required cueing to stay on task, attempting to open clips with reacher instead of fingers  Other Exercise Tool 2 tubi bar light resistance f/b 30x BUE   Other Exercise Tool 3 1# sanderbox while sitting in all planes  Mod resistance theraband in 4 planes following spinal precautions  Cognition   Overall Cognitive Status Impaired   Arousal/Participation Alert; Cooperative   Attention Within functional limits   Orientation Level Oriented X4   Memory Decreased short term memory   Following Commands Follows one step commands with increased time or repetition   Additional Activities   Additional Activities Comments CGA fxnl rw mobility from pt room to OT room with LSO donned   Activity Tolerance   Activity Tolerance Patient tolerated treatment well   Assessment   Treatment Assessment Pt daughter in law, granddaughter, and spouse present during OT session for observation  Pt participated in session focusing on TE/TA that addressed pt recall for spinal precautions,  increasing pt balance, strength, and endurance to assist pt in fxnl transfers and mobility  Pt had LSO donned throughout session, and was re-educated of her spinal precautions  Pt requires increased cueing during TE to obtain full stretch, pt assisted by therapist in counting each rep  Pt has barriers with spinal precautions, pain, decreased strength, confidence, balance, and endurance; all affecting her ability to perform fxnl txfs  Pt would benefit from the continuation of OT services to make further progress towards established goals as per POC  Prognosis Good   Problem List Decreased strength;Decreased range of motion;Decreased endurance; Impaired balance;Decreased mobility;Orthopedic restrictions;Pain;Decreased cognition;Decreased safety awareness   Plan   Treatment/Interventions Functional transfer training; Therapeutic exercise; Endurance training;Cognitive reorientation;Patient/family training;Equipment eval/education; Compensatory technique education;OT   Progress Progressing toward goals   OT Therapy Minutes   OT Time In 1240   OT Time Out 1420   OT Total Time (minutes) 100   OT Mode of treatment - Individual (minutes) 100   OT Mode of treatment - Concurrent (minutes) 0   OT Mode of treatment - Group (minutes) 0   OT Mode of treatment - Co-treat (minutes) 0   OT Mode of Treatment - Total time(minutes) 100 minutes   OT Cumulative Minutes 121   Therapy Time missed   Time missed?  No

## 2023-04-20 LAB
GLUCOSE SERPL-MCNC: 135 MG/DL (ref 65–140)
GLUCOSE SERPL-MCNC: 136 MG/DL (ref 65–140)
GLUCOSE SERPL-MCNC: 197 MG/DL (ref 65–140)
GLUCOSE SERPL-MCNC: 266 MG/DL (ref 65–140)

## 2023-04-20 RX ADMIN — INSULIN LISPRO 2 UNITS: 100 INJECTION, SOLUTION INTRAVENOUS; SUBCUTANEOUS at 12:11

## 2023-04-20 RX ADMIN — SENNOSIDES AND DOCUSATE SODIUM 1 TABLET: 50; 8.6 TABLET ORAL at 21:01

## 2023-04-20 RX ADMIN — Medication 500 MG: at 08:26

## 2023-04-20 RX ADMIN — Medication 1000 UNITS: at 08:26

## 2023-04-20 RX ADMIN — Medication 3 MG: at 21:01

## 2023-04-20 RX ADMIN — INSULIN LISPRO 1 UNITS: 100 INJECTION, SOLUTION INTRAVENOUS; SUBCUTANEOUS at 17:04

## 2023-04-20 RX ADMIN — GLIPIZIDE 5 MG: 5 TABLET ORAL at 08:26

## 2023-04-20 RX ADMIN — LIDOCAINE 5% 1 PATCH: 700 PATCH TOPICAL at 08:34

## 2023-04-20 RX ADMIN — METOPROLOL TARTRATE 25 MG: 25 TABLET, FILM COATED ORAL at 08:26

## 2023-04-20 RX ADMIN — SENNOSIDES AND DOCUSATE SODIUM 1 TABLET: 50; 8.6 TABLET ORAL at 08:26

## 2023-04-20 RX ADMIN — METFORMIN HYDROCHLORIDE 500 MG: 500 TABLET ORAL at 15:56

## 2023-04-20 RX ADMIN — OXYCODONE HYDROCHLORIDE 5 MG: 5 TABLET ORAL at 08:27

## 2023-04-20 RX ADMIN — FAMOTIDINE 10 MG: 20 TABLET, FILM COATED ORAL at 08:27

## 2023-04-20 RX ADMIN — CALCIUM 1 TABLET: 500 TABLET ORAL at 15:56

## 2023-04-20 RX ADMIN — DOCUSATE SODIUM 100 MG: 100 CAPSULE, LIQUID FILLED ORAL at 21:01

## 2023-04-20 RX ADMIN — PRAVASTATIN SODIUM 20 MG: 20 TABLET ORAL at 15:56

## 2023-04-20 RX ADMIN — SULFAMETHOXAZOLE AND TRIMETHOPRIM 1 TABLET: 400; 80 TABLET ORAL at 21:01

## 2023-04-20 RX ADMIN — LEVOTHYROXINE SODIUM 25 MCG: 25 TABLET ORAL at 05:02

## 2023-04-20 RX ADMIN — APIXABAN 2.5 MG: 2.5 TABLET, FILM COATED ORAL at 17:11

## 2023-04-20 RX ADMIN — DOCUSATE SODIUM 100 MG: 100 CAPSULE, LIQUID FILLED ORAL at 08:26

## 2023-04-20 RX ADMIN — METFORMIN HYDROCHLORIDE 500 MG: 500 TABLET ORAL at 08:26

## 2023-04-20 RX ADMIN — GLIPIZIDE 5 MG: 5 TABLET ORAL at 15:56

## 2023-04-20 RX ADMIN — CYANOCOBALAMIN TAB 500 MCG 1000 MCG: 500 TAB at 08:26

## 2023-04-20 RX ADMIN — CALCIUM 1 TABLET: 500 TABLET ORAL at 08:26

## 2023-04-20 RX ADMIN — APIXABAN 2.5 MG: 2.5 TABLET, FILM COATED ORAL at 08:26

## 2023-04-20 RX ADMIN — LISINOPRIL 5 MG: 5 TABLET ORAL at 08:26

## 2023-04-20 RX ADMIN — SULFAMETHOXAZOLE AND TRIMETHOPRIM 1 TABLET: 400; 80 TABLET ORAL at 08:35

## 2023-04-20 RX ADMIN — METOPROLOL TARTRATE 25 MG: 25 TABLET, FILM COATED ORAL at 21:01

## 2023-04-20 NOTE — NURSING NOTE
Pt does need help lifting legs into bed  Offers no complaints  LSO brace maintained when OOB  Continue same plan of care

## 2023-04-20 NOTE — PROGRESS NOTES
[facility] DAILY TREATMENT NOTE    04/20/23 1110   Pain Assessment   Pain Assessment Tool 0-10   Pain Score 6   Pain Location/Orientation Location: Back   Restrictions/Precautions   Precautions Bed/chair alarms;Cognitive; Fall Risk;Pain;Spinal precautions   Comprehension   Comprehension (FIM) 5 - Needs help/cues, repetition only RARELY ( < 10% of the time)   Expression   Expression (FIM) 6 - Has only MILD difficulty with complex/abstract info   Social Interaction   Social Interaction (FIM) 6 - Interacts appropriately with others BUT requires extra  time   Problem Solving   Problem solving (FIM) 4 - Solves basic problems 75-89% of time   Memory   Memory (FIM) 4 - Recognizes/recalls/performs 75-89%   Speech/Language/Cognition Assessmetn   Treatment Assessment Background distraction during session  Recalled 4 items of breakfast tray  Recalled 2 activities completed in OT earlier this date with min cue  5 step reading comp sequencing @ 6/8 trials bryon  Provided with a bill and able to write a check out with 1 error  Verbal sequencing through familiar recipes @ with min-mod cue 3 picture recall immediate( min cue) , 15 seconds delay independently and 2:00 delay (min cue)     SLP Therapy Minutes   SLP Time In 1110   SLP Time Out 1210   SLP Total Time (minutes) 60   SLP Mode of treatment - Individual (minutes) 60   SLP Mode of treatment - Concurrent (minutes) 0   SLP Mode of treatment - Group (minutes) 0   SLP Mode of treatment - Co-treat (minutes) 0   SLP Mode of Treatment - Total time(minutes) 60 minutes   SLP Cumulative Minutes 240

## 2023-04-20 NOTE — PLAN OF CARE
Problem: PAIN - ADULT  Goal: Verbalizes/displays adequate comfort level or baseline comfort level  Description: Interventions:  - Encourage patient to monitor pain and request assistance  - Assess pain using appropriate pain scale  - Administer analgesics based on type and severity of pain and evaluate response  - Implement non-pharmacological measures as appropriate and evaluate response  - Consider cultural and social influences on pain and pain management  - Notify physician/advanced practitioner if interventions unsuccessful or patient reports new pain  Outcome: Progressing     Problem: INFECTION - ADULT  Goal: Absence or prevention of progression during hospitalization  Description: INTERVENTIONS:  - Assess and monitor for signs and symptoms of infection  - Monitor lab/diagnostic results  - Monitor all insertion sites, i e  indwelling lines, tubes, and drains  - Monitor endotracheal if appropriate and nasal secretions for changes in amount and color  - Ke[place] appropriate cooling/warming therapies per order  - Administer medications as ordered  - Instruct and encourage patient and family to use good hand hygiene technique  - Identify and instruct in appropriate isolation precautions for identified infection/condition  Outcome: Progressing     Problem: Prexisting or High Potential for Compromised Skin Integrity  Goal: Skin integrity is maintained or improved  Description: INTERVENTIONS:  - Identify patients at risk for skin breakdown  - Assess and monitor skin integrity  - Assess and monitor nutrition and hydration status  - Monitor labs   - Assess for incontinence   - Turn and reposition patient  - Assist with mobility/ambulation  - Relieve pressure over bony prominences  - Avoid friction and shearing  - Provide appropriate hygiene as needed including keeping skin clean and dry  - Evaluate need for skin moisturizer/barrier cream  - Collaborate with interdisciplinary team   - Patient/family teaching  - Consider wound care consult   Outcome: Progressing     Problem: Nutrition/Hydration-ADULT  Goal: Nutrient/Hydration intake appropriate for improving, restoring or maintaining nutritional needs  Description: Monitor and assess patient's nutrition/hydration status for malnutrition  Collaborate with interdisciplinary team and initiate plan and interventions as ordered  Monitor patient's weight and dietary intake as ordered or per policy  Utilize nutrition screening tool and intervene as necessary  Determine patient's food preferences and provide high-protein, high-caloric foods as appropriate       INTERVENTIONS:  - Monitor oral intake, urinary output, labs, and treatment plans  - Assess nutrition and hydration status and recommend course of action  - Evaluate amount of meals eaten  - Assist patient with eating if necessary   - Allow adequate time for meals  - Recommend/ encourage appropriate diets, oral nutritional supplements, and vitamin/mineral supplements  - Order, calculate, and assess calorie counts as needed  - Recommend, monitor, and adjust tube feedings and TPN/PPN based on assessed needs  - Assess need for intravenous fluids  - Provide specific nutrition/hydration education as appropriate  - Include patient/family/caregiver in decisions related to nutrition  Outcome: Progressing

## 2023-04-20 NOTE — PROGRESS NOTES
04/20/23 0900   Pain Assessment   Pain Assessment Tool 0-10   Pain Score 8   Pain Location/Orientation Location: Back   Restrictions/Precautions   Precautions Bed/chair alarms; Fall Risk;Cognitive;Pain;Spinal precautions; Visual deficit   Weight Bearing Restrictions No   ROM Restrictions No   Oral Hygiene   Type of Assistance Needed Set-up / clean-up   Oral Hygiene CARE Score 5   Grooming   Able To Initiate Tasks; Acquire Items;Comb/Brush Hair;Brush/Clean Teeth;Wash/Dry Face;Wash/Dry Hands   Limitation Noted In Problem Solving; Safety; Sequencing;Strength   Findings s/u w/c level at the sink   Shower/Bathe Self   Type of Assistance Needed Physical assistance   Physical Assistance Level 25% or less   Shower/Bathe Self CARE Score 3   Bathing   Assessed Bath Style Sponge Bath   Anticipated D/C Bath Style Shower;Sponge Bath   Able to Paresh [place]azar No   Able to [employer] Temperature No   Able to Wash/Rinse/Dry (body part) Left Arm;Right Arm;L Upper Leg;R Upper Leg;Chest;Abdomen;Perineal Area  (min A thoroughness buttocks and pt declines removing socks for bathing feet)   Limitations Noted in Balance; Endurance;Problem Solving;ROM;Safety; Sequencing;Strength;Vision   Positioning Seated;Standing   Adaptive Equipment Longhand Reacher   Tub/Shower Transfer   Reason Not Assessed Sponge Bath   Upper Body Dressing   Type of Assistance Needed Physical assistance   Physical Assistance Level 25% or less   Comment min A to doff and then dev LSO brace; supervision shirt   Upper Body Dressing CARE Score 3   Lower Body Dressing   Type of Assistance Needed Physical assistance   Physical Assistance Level 25% or less   Comment with reacher use   Lower Body Dressing CARE Score 3   Putting On/Taking Off Footwear   Comment in place   Dressing/Undressing Clothing   Remove UB Clothes Pullover Shirt  (LSO)   Don UB Clothes Pullover Shirt  (LSO)   Remove LB Clothes Pants; Undergarment   Don LB Clothes Pants; Undergarment   Limitations Noted In Balance; Endurance;Problem Solving; Safety;Strength;ROM; Sequencing   Adaptive Equipment Reacher   Positioning Supported Sit;Standing   Sit to Stand   Type of Assistance Needed Incidental touching   Sit to Stand CARE Score 4   Bed-Chair Transfer   Type of Assistance Needed Incidental touching   Chair/Bed-to-Chair Transfer CARE Score 4   Toileting Hygiene   Type of Assistance Needed Incidental touching   Toileting Hygiene CARE Score 4   Toileting   Able to [address] down yes, up yes  Manage Hygiene Bladder   Limitations Noted In Balance;Problem Solving;ROM;Safety;LE Strength   Adaptive Equipment Grab Bar   Toilet Transfer   Type of Assistance Needed Incidental touching   Toilet Transfer CARE Score 4   Toilet Transfer   Surface Assessed Raised Toilet   Transfer Technique Standard   Limitations Noted In Balance; Endurance;Problem Solving;ROM;Safety;LE Strength   Adaptive Equipment Grab Bar;Walker   Exercise Tools   Other Exercise Tool 1 card match reaching with BEU while seated to match cards to pockets   Other Exercise Tool 2 yellow flex therabar 2 sets 15 up and down BUE   Coordination   Fine Motor knots out of tubing B ahnds with increased time due to difficulty with tight knots   Cognition   Arousal/Participation Responsive; Alert; Cooperative   Attention Attends with cues to redirect   Orientation Level Oriented X4   Memory Decreased short term memory;Decreased recall of recent events;Decreased recall of precautions   Following Commands Follows one step commands without difficulty   Additional Activities   Additional Activities Other (Comment)   Additional Activities Comments fxl mobility with RW and CGA to and from the BR   Assessment   Treatment Assessment Pt presents in route to the BR with staff, agreeable to OT session including toileting, ADLs, transfers/ mobility and BUE therex   Pt tolerates session wihtout increased pain focusing on doffing and donning LSO brace during UB dressing and use of A/E for LB dressing due to spinal precations  Pt requires assista nd cues due to decerased balance, safety, endurance, strength/ ROM with spinal prec/ LSO and back pain  Pt is progressing towards goals and will benefit from continued skilled OT services to increase independence with daily tasks  Problem List Decreased strength;Decreased range of motion;Decreased endurance; Impaired balance;Decreased safety awareness;Decreased cognition; Impaired vision;Pain;Orthopedic restrictions   Plan   Treatment/Interventions ADL retraining;Functional transfer training; Therapeutic exercise; Endurance training;Patient/family training;Cognitive reorientation;Equipment eval/education; Compensatory technique education   Progress Progressing toward goals   OT Therapy Minutes   OT Time In 0900   OT Time Out 1030   OT Total Time (minutes) 90   OT Mode of treatment - Individual (minutes) 90   Therapy Time missed   Time missed?  No

## 2023-04-20 NOTE — PROGRESS NOTES
04/20/23 1414   Pain Assessment   Pain Assessment Tool 0-10   Pain Score No Pain   Restrictions/Precautions   Precautions Bed/chair alarms;Cognitive; Fall Risk;Pain;Spinal precautions   Weight Bearing Restrictions No   ROM Restrictions No   Exercise Tools   Other Exercise Tool 1 1# wt therex 3 sets 15 BUE including shoulder flexion/ extension, abd/ add and elbow flexion/ extension   Assessment   Treatment Assessment Pt presents supine agreeable to brief session including BUE therex  Pt tolerates session and is making gaisn towards goals  Pt will beenfit from conitnued skilled OT services to increase independence with daily tasks  Problem List Decreased strength;Decreased range of motion;Decreased endurance; Impaired balance;Decreased safety awareness;Decreased cognition   Plan   Treatment/Interventions ADL retraining;Functional transfer training; Therapeutic exercise; Endurance training;Cognitive reorientation;Patient/family training;Equipment eval/education; Compensatory technique education   Progress Progressing toward goals   OT Therapy Minutes   OT Time In 1414   OT Time Out 1440   OT Total Time (minutes) 26   OT Mode of treatment - Individual (minutes) 26   Therapy Time missed   Time missed?  No

## 2023-04-20 NOTE — NUTRITION
"   04/20/23 1035   Biochemical Data,Medical Tests, and Procedures   Biochemical Data/Medical Tests/Procedures Lab values reviewed; Meds reviewed   Labs (Comment) BG remains elevated at times   Meds (Comment) eliquis, Vit D3, Vit B12, pepcid, colace, glipizide, humalog, levothyroxine, zestril, magonate, melatonin, metformin, lopressor, zofran, pravachol   Nutrition-Focused Physical Exam   Nutrition-Focused Physical Exam Findings RN skin assessment reviewed  (Stage 2 coccyx per documentation)   Medical-Related Concerns PMH reviewed   Adequacy of Intake   Nutrition Modality PO   Feeding Route   PO Independent   Current PO Intake   Current Diet Order CCD 2, 2gm Na diet thin liquids   Current Meal Intake %   Estimated calorie intake compared to estimated need Minimum nutrient needs met  PES Statement   Problem Continue previous diagnosis   Recommendations/Interventions   Malnutrition/BMI Present No  (does not meet criteria)   Summary CCD 2, 2gm Na diet thin liquids  No supplements  Meal intakes 100%  4/17 136#, BMI=23 39; weight stable from admission 4/13 136#  BG remains elevated at times  Area present to coccyx  LBM 4/19  She states, \"I'm not as hungry as I used to be but I eat most of it\"  Discussed adding ensure compact once daily, she is agreeable to vanilla flavor  Per notes target discharge 4/27  Interventions/Recommendations Supplement initiate   Intervention Comments add vanilla ensure compact once daily   Education Assessment   Education Patient/caregiver not appropriate for education at this time;Patient declined nutrition education   Patient Nutrition Goals   Goal Glucose WNL; Meet PO needs   Goal Status Not met;Met;Extended   Timeframe to complete goal by next f/u   Nutrition Complexity Risk   Nutrition complexity level Moderate risk   Follow up date 04/27/23       "

## 2023-04-20 NOTE — PROGRESS NOTES
PM&R PROGRESS NOTE:  Calvin Rivero 80 y o  female MRN: [id number]  Unit/Bed#: -01 Encounter: [id number]        Rehabilitation Diagnosis: Impairment of mobility, safety and Activities of Daily Living (ADLs) due to Orthopedic Disorders:  08 9  Other Orthopedic L3 compression fracture    HPI: Calvin Rivero is a 80 y o  female with a PMH significant for T2DM, HTN, Chronic a-fib and CKD who presented to the [email or web address] on 04/08/23 s/p a fall at home  She fell backwards and landed on her neck with questionable head strike  CTH with no acute intracranial abnormality  CT spine cervical with no cervical spine fracture or traumatic malalignment  CT C/A/P with acute mild inferior endplate L3 compression fracture without central canal compromise or posterior element involvement  Incidentally found to have a presumed bronchogenic malignancy at the right hilum and multifocal bladder tumors  A lumbar spine brace was ordered  Found to have encephalopathy  Chest xray without acute findings, she was afebrile with no leukocytosis  UA negative         SUBJECTIVE: Patient seen and evaluated  C/o discomfort to her back while resting in wheelchair however does not wish to move at this time  Placed blanket behind back with mild relief  Patient does understand the importance of the brace and will continue to wear but does feel discomfort from the brace  She otherwise feels like she is doing well with therapy  She offers no further complaints  ASSESSMENT: Stable, progressing      PLAN:    Rehabilitation  • Functional deficits:  Impaired mobility, self care, BLE weakness, pain  • Continue current rehabilitation plan of care to maximize function      • Functional update:   o PT:   Roll L-R: supervision  Lying to sitting on side of bed: supervision   Sit-stand: cg  Bed-chair transfer: min- cg   Ambulation: cg  Stairs: cg  o OT:  Toileting hygiene: cg  Toilet transfer: Cg   o ST:   Comprehension: needs help/cues, repetition only rarely  Expression: mild difficulty with complex/abstract info  Social interaction: appropriate with others but requires extra time  Problem solvin-89%  Memory: 75-89%     • Estimated Discharge: 23 with [facility] PT/OT/ST/N/A    Pain  • Tylenol 650 mg Q6H PRN for mild pain  • Lidocaine patch Q12H Daily     DVT prophylaxis  • Eliquis 2 5 mg BID    Bladder plan  • Continent     Bowel plan  • Continent   • Last BM 23  • Continue colace and Senna BID  • Miralax daily  • Dulcolax rectal suppository daily PRN     Skin care  · Monitor skin daily  · Apply skin nourishing cream  · Reposition Q2H to offload pressure  · Elevate heels off bed   · Hydraguard to b/l heels and lower buttocks BID and PRN  · Stage 2 right upper buttocks: Allevyn life silicone bordered dressing to the sarum-upper buttocks   Check daily, reapply and change Q3days or PRN      UTI (urinary tract infection)  Assessment & Plan  · UA obtained while in acute care  · Culture resulted over the weekend- >100,000 cfu/ml Enterobacter cloacae, <10,000 cfu/ml proteus species   · Continue Bactrim 400-80 mg Q12H [address] x4 days       Lung mass  Assessment & Plan  · Incidentally found on CT imaging : presumed bronchogenic malignancy at the right hilum and multofocal bladder tumors which should also be regarded as malignancy until proven otherwise   · Oncology consulted while in acute care  · Further management to be done as an outpatient per family preference   · Oncology would recommend cystoscopy with Urology to obtain tissue; IR lung biopsy, Outpatient PET CT  · Arrangements to be made via Oncology for a workup in approximately 2 weeks    Malignant neoplasm of overlapping sites of bladder [facility])  Assessment & Plan  · Known history of bladder cancer  · Follows with Dr Ez Heart   · Last testing done by Urology 2021  · Recommend following up as an outpatient with Urology and Oncology    Type 2 diabetes mellitus with stage 3a chronic kidney disease [facility])  Assessment & Plan  Lab Results   Component Value Date    HGBA1C 7 0 (H) 10/10/2022       Recent Labs     04/19/23  1612 04/19/23  2017 04/20/23  0655 04/20/23  1145   POCGLU 113 204* 136 266*       Blood Sugar Average: Last 72 hrs:  · (P) 192 [id number]   · Monitor accu cheks  · Continue SSI  · Continue home Glipizide 5 mg BID and Metformin 500 mg BID today   · Monitor and adjust regimen as needed  · Carb controlled diet     Essential hypertension  Assessment & Plan  · Monitor vitals  · Continue Metoprolol 25 mg Q12H, Lisinopril 5 mg daily    Chronic a-fib (HCC)  Assessment & Plan  · Continue Metoprolol 25 mg Q12H [facility] for rate control  · Continue Eliquis 2 5 mg BID    * Compression fracture of L3 vertebra (HCC)  Assessment & Plan  · S/p fall at home; unclear if head strike   · CTH: No acute intracranial abnormality  · CT C/A/P: acute mild inferior endplate L3 compression fracture without central canal compromise or posterior element involvement  No other acute traumatic injuries seen  · Lumbar spine brace to be worn with ambulation, in upright position ; unclear timeline  Likely 4-6 weeks  · Acute chomprehensive interdisciplinary inpatient rehabilitation to include intensive skilled therapies as outlines with oversight and management by a rehabilitation Physician Assistant overseen by rehabilitation physician as well as inpatient rehabilitation nursing, case management and weekly interdisciplinary team meeting        Appreciate  consultants medical co-management  Labs, medications, and imaging personally reviewed  ROS:  A ten point review of systems was completed and pertinent positives are listed in subjective section  All other systems reviewed were negative  Review of Systems   Constitutional: Negative  HENT: Negative  Respiratory: Negative  Negative for shortness of breath  Cardiovascular: Negative  Negative for chest pain  Gastrointestinal: Negative    Negative for abdominal "pain and constipation  Genitourinary: Negative  Negative for difficulty urinating  Musculoskeletal: Positive for back pain  Neurological: Negative  Psychiatric/Behavioral: Negative  OBJECTIVE:   /58 (BP Location: Right arm)   Pulse 62   Temp 97 9 °F (36 6 °C) (Temporal)   Resp 16   Ht 5' 4\" (1 626 m)   Wt 61 8 kg (136 lb 3 9 oz)   SpO2 100%   BMI 23 39 kg/m²     Physical Exam  Vitals reviewed  Constitutional:       General: She is not in acute distress  Appearance: She is not ill-appearing  HENT:      Head: Normocephalic and atraumatic  Eyes:      Pupils: Pupils are equal, round, and reactive to light  Cardiovascular:      Rate and Rhythm: Normal rate and regular rhythm  Pulses: Normal pulses  Heart sounds: Normal heart sounds  No murmur heard  Pulmonary:      Effort: Pulmonary effort is normal  No respiratory distress  Breath sounds: Normal breath sounds  Abdominal:      General: Bowel sounds are normal  There is no distension  Palpations: Abdomen is soft  Musculoskeletal:      Right lower leg: No edema  Left lower leg: No edema  Skin:     General: Skin is warm and dry  Neurological:      General: No focal deficit present  Mental Status: She is alert and oriented to person, place, and time     Psychiatric:         Mood and Affect: Mood normal          Behavior: Behavior normal           Lab Results   Component Value Date    WBC 7 67 04/14/2023    HGB 14 5 04/14/2023    HCT 45 4 04/14/2023    MCV 99 (H) 04/14/2023     04/14/2023     Lab Results   Component Value Date    SODIUM 139 04/14/2023    K 3 8 04/14/2023     04/14/2023    CO2 28 04/14/2023    BUN 33 (H) 04/14/2023    CREATININE 1 17 04/14/2023    GLUC 91 04/14/2023    CALCIUM 9 3 04/14/2023     Lab Results   Component Value Date    INR 0 99 04/08/2023    INR 1 02 04/18/2022    INR 1 68 (H) 07/13/2021    PROTIME 13 1 04/08/2023    PROTIME 13 3 04/18/2022    PROTIME " 19 7 (H) 07/13/2021           Current Facility-Administered Medications:   •  acetaminophen (TYLENOL) tablet 650 mg, 650 mg, Oral, Q6H PRN, Diana Payne MD, 650 mg at 04/14/23 2100  •  aluminum-magnesium hydroxide-simethicone (MYLANTA) oral suspension 30 mL, 30 mL, Oral, Q4H PRN, Diana Payne MD  •  apixaban [facility] [place]) tablet 2 5 mg, 2 5 mg, Oral, BID, Diana Payne MD, 2 5 mg at 04/20/23 6647  •  bisacodyl (DULCOLAX) rectal suppository 10 mg, 10 mg, Rectal, Daily PRN, Julia Tarango PA-C, 10 mg at 04/17/23 1958  •  calcium carbonate (OYSTER SHELL,OSCAL) 500 mg tablet 1 tablet, 1 tablet, Oral, BID With Meals, Diana Payne MD, 1 tablet at 04/20/23 5214  •  cholecalciferol (VITAMIN D3) tablet 1,000 Units, 1,000 Units, Oral, Daily, Diana Payne MD, 1,000 Units at 04/20/23 1782  •  cyanocobalamin (VITAMIN B-12) tablet 1,000 mcg, 1,000 mcg, Oral, Daily, Diana Payne MD, 1,000 mcg at 04/20/23 6735  •  docusate sodium (COLACE) capsule 100 mg, 100 mg, Oral, BID, Diana Payne MD, 100 mg at 04/20/23 9948  •  famotidine (PEPCID) tablet 10 mg, 10 mg, Oral, Daily, Diana Payne MD, 10 mg at 04/20/23 0827  •  glipiZIDE (GLUCOTROL) tablet 5 mg, 5 mg, Oral, BID AC, Julia Tarango PA-C, 5 mg at 04/20/23 2798  •  insulin lispro (HumaLOG) 100 units/mL subcutaneous injection 1-5 Units, 1-5 Units, Subcutaneous, TID AC, 2 Units at 04/20/23 1211 **AND** Fingerstick Glucose (POCT), , , TID Julia DAAN PA-C  •  levothyroxine tablet 25 mcg, 25 mcg, Oral, Daily, Diana Payne MD, 25 mcg at 04/20/23 0502  •  lidocaine (LIDODERM) 5 % patch 1 patch, 1 patch, Topical, Daily, Diana Payne MD, 1 patch at 04/20/23 1728  •  lisinopril (ZESTRIL) tablet 5 mg, 5 mg, Oral, Daily, Diana Payne MD, 5 mg at 04/20/23 5888  •  magnesium gluconate (MAGONATE) tablet 500 mg, 500 mg, Oral, Daily, Diana Payne MD, 500 mg at 04/20/23 0809  •  melatonin tablet 3 mg, 3 mg, Oral, HS, Diana Payne MD, 3 mg at 04/19/23 2121  •  metFORMIN (GLUCOPHAGE) tablet 500 mg, 500 mg, Oral, BID With Meals, Maria R Cameron PA-C, 500 mg at 04/20/23 4417  •  metoprolol tartrate (LOPRESSOR) tablet 25 mg, 25 mg, Oral, Q12H, Rosey Bowles MD, 25 mg at 04/20/23 7069  •  ondansetron (ZOFRAN-ODT) dispersible tablet 4 mg, 4 mg, Oral, Q6H PRN, Rosey Bowles MD  •  oxyCODONE (ROXICODONE) split tablet 2 5 mg, 2 5 mg, Oral, Q4H PRN, 2 5 mg at 04/16/23 1636 **OR** oxyCODONE (ROXICODONE) IR tablet 5 mg, 5 mg, Oral, Q4H PRN, Albert Pradhan PA-C, 5 mg at 04/20/23 0827  •  polyethylene glycol (MIRALAX) packet 17 g, 17 g, Oral, Daily, Maria R Cameron PA-C, 17 g at 04/18/23 1898  •  pravastatin (PRAVACHOL) tablet 20 mg, 20 mg, Oral, Daily With Dinner, Rosey Bowles MD, 20 mg at 04/19/23 1725  •  senna-docusate sodium (SENOKOT S) 8 6-50 mg per tablet 1 tablet, 1 tablet, Oral, BID, Maria R Cameron PA-C, 1 tablet at 04/20/23 4091  •  sulfamethoxazole-trimethoprim (BACTRIM) 400-80 mg per tablet 1 tablet, 1 tablet, Oral, Q12H [facility], Maria R Cameron PA-C, 1 tablet at 04/20/23 0333    Past Medical History:   Diagnosis Date   • Abnormal gait    • Accidental fall from chair, subsequent encounter    • Atrial fibrillation (HCC)    • Benign essential hypertension    • Bite of animal    • Cataract    • Cellulitis    • Chronic kidney disease    • Chronic kidney disease, stage 3 (HCC)    • Colon cancer ([facility] )    • Colon polyp    • Current tear of lateral cartilage or meniscus of knee    • Diabetes mellitus ([facility] [address] )    • Disease of thyroid gland    • Disorder of magnesium metabolism    • Dvt femoral (deep venous thrombosis)    • Embolism from thrombosis of vein of distal end of lower extremity ([facility] [address] )    • Essential hypertension    • Fall    • Hyperlipidemia    • Hyperlipidemia    • Hypertension    • Hypothyroidism    • Insomnia    • Kidney stone    • Knee joint effusion    • Leukocytosis    • Malaise and fatigue    • MI (myocardial infarction) ([facility] [address] )     Hospitalization    • Orbital hemorrhage    • Other sleep disorders    • PAF (paroxysmal atrial fibrillation) ([facility])    • Presence of vena cava filter    • Pulmonary embolism (HCC)    • Tear film insufficiency    • Type 2 diabetes mellitus ([facility])    • Unspecified osteoarthritis, unspecified site    • Weight decreased        Patient Active Problem List    Diagnosis Date Noted   • UTI (urinary tract infection) 04/17/2023   • Lung mass 04/10/2023   • Encephalopathy 04/10/2023   • Fall at home 04/08/2023   • Compression fracture of L3 vertebra ([facility] ) 04/08/2023   • Stage 3b chronic kidney disease ([place] [address] ) 12/21/2022   • Loss of weight 12/21/2022   • Malignant neoplasm of overlapping sites of bladder ([place] [address] ) 10/10/2022   • Hypocalcemia 06/29/2021   • Other pulmonary embolism without acute cor pulmonale ([place] [address] ) 04/21/2021   • Bladder mass 03/31/2021   • Compression fracture of thoracic spine, non-traumatic, sequela 02/28/2020   • Stage 3a chronic kidney disease ([place] [address] ) 02/28/2020   • Essential hypertension 06/05/2019   • History of pulmonary embolism 06/05/2019   • Osteoarthritis of knee 05/01/2018   • Hypercoagulable state, primary ([place] [address] ) 03/27/2017   • Chronic a-fib ([place] [address] ) 02/20/2016   • Type 2 diabetes mellitus with stage 3a chronic kidney disease ([place] [address] ) 02/20/2016          Francisco Mccormack PA-C    I have spent a total time of 35 minutes on 04/20/23 in caring for this patient including Risks and benefits of tx options, Instructions for management, Impressions, Counseling / Coordination of care, Documenting in the medical record, Reviewing / ordering tests, medicine, procedures  , Obtaining or reviewing history   and Communicating with other healthcare professionals   ** Please Note:  voice to text software may have been used in the creation of this document   Although proof errors in transcription or interpretation are a potential of such software**

## 2023-04-20 NOTE — PROGRESS NOTES
"   04/20/23 1230   Pain Assessment   Pain Assessment Tool 0-10   Pain Score 5   Pain Location/Orientation Location: Back   Restrictions/Precautions   Precautions Bed/chair alarms;Cognitive; Fall Risk;Pain;Spinal precautions   Weight Bearing Restrictions No   ROM Restrictions No   Braces or Orthoses LSO   Cognition   Overall Cognitive Status Impaired   Arousal/Participation Alert; Responsive; Cooperative   Attention Attends with cues to redirect   Orientation Level Oriented X4   Memory Decreased recall of precautions;Decreased recall of recent events;Decreased short term memory;Decreased long term memory   Following Commands Follows one step commands without difficulty   Subjective   Subjective \"I did the steps this morning\"   Roll Left and Right   Comment Pt OOB   Sit to Lying   Comment Pt OOB   Lying to Sitting on Side of Bed   Comment Pt OOB   Sit to Stand   Type of Assistance Needed Incidental touching   Physical Assistance Level 25% or less   Comment CGA RW; VC for sequencing   Sit to Stand CARE Score 3   Bed-Chair Transfer   Type of Assistance Needed Incidental touching   Physical Assistance Level 25% or less   Comment CGA RW; VC for safety with turns toward chair   Chair/Bed-to-Chair Transfer CARE Score 3   Car Transfer   Reason if not Attempted Environmental limitations   Car Transfer CARE Score 10   Walk 10 Feet   Type of Assistance Needed Supervision;Verbal cues   Physical Assistance Level No physical assistance   Comment CGA to Cl S with RW; VC for upright posture and safety   Walk 10 Feet CARE Score 4   Walk 50 Feet with Two Turns   Type of Assistance Needed Incidental touching;Verbal cues   Physical Assistance Level 25% or less   Comment CGA to Cl S with RW; VC for upright posture and safety   Walk 50 Feet with Two Turns CARE Score 3   Walk 150 Feet   Reason if not Attempted Activity not applicable   Walk 515 Feet CARE Score 9   Ambulation   Primary Mode of Locomotion Prior to Admission Walk   Distance " Walked (feet) 136 ft  (136', 119')   Assist Device Roller Walker   Gait Pattern Inconsistant Bibi; Slow Bibi;Decreased foot clearance; Forward Flexion;Narrow ZARA; Improper weight shift   Limitations Noted In Balance;Device Management; Endurance; Heel Strike;Posture; Safety;Speed;Strength   Provided Assistance with: Balance;Direction   Walk Assist Level Contact Guard;Close Supervision   Findings CGA to Cl S with RW; VC for upright posture and safety   Does the patient walk? 2  Yes   Wheel 50 Feet with Two Turns   Reason if not Attempted Activity not applicable   Wheel 50 Feet with Two Turns CARE Score 9   Wheel 150 Feet   Reason if not Attempted Activity not applicable   Wheel 854 Feet CARE Score 9   Wheelchair mobility   Does the patient use a wheelchair? 0   No   Findings N/A   Curb or Single Stair   Style negotiated Single stair   Type of Assistance Needed Incidental touching;Verbal cues   Physical Assistance Level 25% or less   Comment CGA up/down 14 steps with B/L HR; VC for sequencing, especially with UE   1 Step (Curb) CARE Score 3   4 Steps   Type of Assistance Needed Incidental touching;Verbal cues   Physical Assistance Level 25% or less   Comment CGA up/down 14 steps with B/L HR; VC for sequencing, especially with UE   4 Steps CARE Score 3   12 Steps   Type of Assistance Needed Incidental touching;Verbal cues   Physical Assistance Level 25% or less   Comment CGA up/down 14 steps with B/L HR; VC for sequencing, especially with UE   12 Steps CARE Score 3   Stairs   Type Stairs   # of Steps 14   Weight Bearing Precautions WBAT   Findings CGA   Picking Up Object   Type of Assistance Needed Supervision   Physical Assistance Level No physical assistance   Comment Cl S picking up socks from various heights using reacher   Picking Up Object CARE Score 4   Toilet Transfer   Comment Not performed this session   Therapeutic Interventions   Strengthening Seated LE TE   Balance Reaching for socks from varying heights with reacher   Other Gait training, transfer training, stair training   Assessment   Treatment Assessment Pt agreeable to PT this PM, received sitting upright in w/c  Pt continues to be limited by low back pain and R knee pain  She continues to require VC for overall safety with mobility, especially with stand-pivot transfers into chair and sequencing during sit-to-stand  Pt displayed good trunk control when reaching out of ZARA for socks with reacher, with no LOB noted  Will continue with current PT POC to improve deficits and promote return to PLOF  Problem List Decreased strength;Decreased range of motion;Decreased endurance; Impaired balance;Decreased safety awareness;Decreased cognition; Impaired vision;Pain;Orthopedic restrictions   PT Barriers   Physical Impairment Decreased strength;Decreased endurance; Impaired balance;Decreased mobility;Pain;Orthopedic restrictions   Functional Limitation Stair negotiation;Standing;Walking;Transfers   Plan   Treatment/Interventions ADL retraining;Functional transfer training;LE strengthening/ROM; Therapeutic exercise; Endurance training;Cognitive reorientation;Patient/family training;Equipment eval/education; Bed mobility;Gait training   Progress Progressing toward goals   PT Therapy Minutes   PT Time In 1230 Time Out 2 p m  Patient returned to bed, all needs in reach  Alarm in place and activated  Encouraged use of call bell, patient verbalizes understanding

## 2023-04-21 LAB
GLUCOSE SERPL-MCNC: 136 MG/DL (ref 65–140)
GLUCOSE SERPL-MCNC: 155 MG/DL (ref 65–140)
GLUCOSE SERPL-MCNC: 215 MG/DL (ref 65–140)

## 2023-04-21 RX ORDER — POLYETHYLENE GLYCOL 3350 17 G/17G
17 POWDER, FOR SOLUTION ORAL DAILY PRN
Status: DISCONTINUED | OUTPATIENT
Start: 2023-04-21 | End: 2023-04-25

## 2023-04-21 RX ADMIN — Medication 3 MG: at 21:03

## 2023-04-21 RX ADMIN — LEVOTHYROXINE SODIUM 25 MCG: 25 TABLET ORAL at 05:13

## 2023-04-21 RX ADMIN — APIXABAN 2.5 MG: 2.5 TABLET, FILM COATED ORAL at 18:08

## 2023-04-21 RX ADMIN — SENNOSIDES AND DOCUSATE SODIUM 1 TABLET: 50; 8.6 TABLET ORAL at 08:34

## 2023-04-21 RX ADMIN — CALCIUM 1 TABLET: 500 TABLET ORAL at 18:08

## 2023-04-21 RX ADMIN — METOPROLOL TARTRATE 25 MG: 25 TABLET, FILM COATED ORAL at 21:03

## 2023-04-21 RX ADMIN — LIDOCAINE 5% 1 PATCH: 700 PATCH TOPICAL at 08:37

## 2023-04-21 RX ADMIN — GLIPIZIDE 5 MG: 5 TABLET ORAL at 18:08

## 2023-04-21 RX ADMIN — Medication 1000 UNITS: at 08:34

## 2023-04-21 RX ADMIN — PRAVASTATIN SODIUM 20 MG: 20 TABLET ORAL at 18:08

## 2023-04-21 RX ADMIN — CALCIUM 1 TABLET: 500 TABLET ORAL at 08:35

## 2023-04-21 RX ADMIN — DICLOFENAC SODIUM 2 G: 10 GEL TOPICAL at 21:05

## 2023-04-21 RX ADMIN — DICLOFENAC SODIUM 2 G: 10 GEL TOPICAL at 12:27

## 2023-04-21 RX ADMIN — APIXABAN 2.5 MG: 2.5 TABLET, FILM COATED ORAL at 08:34

## 2023-04-21 RX ADMIN — INSULIN LISPRO 1 UNITS: 100 INJECTION, SOLUTION INTRAVENOUS; SUBCUTANEOUS at 12:08

## 2023-04-21 RX ADMIN — INSULIN LISPRO 1 UNITS: 100 INJECTION, SOLUTION INTRAVENOUS; SUBCUTANEOUS at 18:08

## 2023-04-21 RX ADMIN — DOCUSATE SODIUM 100 MG: 100 CAPSULE, LIQUID FILLED ORAL at 08:34

## 2023-04-21 RX ADMIN — GLIPIZIDE 5 MG: 5 TABLET ORAL at 08:34

## 2023-04-21 RX ADMIN — FAMOTIDINE 10 MG: 20 TABLET, FILM COATED ORAL at 08:35

## 2023-04-21 RX ADMIN — METFORMIN HYDROCHLORIDE 500 MG: 500 TABLET ORAL at 08:34

## 2023-04-21 RX ADMIN — SENNOSIDES AND DOCUSATE SODIUM 1 TABLET: 50; 8.6 TABLET ORAL at 18:08

## 2023-04-21 RX ADMIN — SULFAMETHOXAZOLE AND TRIMETHOPRIM 1 TABLET: 400; 80 TABLET ORAL at 21:03

## 2023-04-21 RX ADMIN — METFORMIN HYDROCHLORIDE 500 MG: 500 TABLET ORAL at 18:08

## 2023-04-21 RX ADMIN — METOPROLOL TARTRATE 25 MG: 25 TABLET, FILM COATED ORAL at 08:34

## 2023-04-21 RX ADMIN — OXYCODONE HYDROCHLORIDE 5 MG: 5 TABLET ORAL at 08:56

## 2023-04-21 RX ADMIN — LISINOPRIL 5 MG: 5 TABLET ORAL at 08:34

## 2023-04-21 RX ADMIN — CYANOCOBALAMIN TAB 500 MCG 1000 MCG: 500 TAB at 08:35

## 2023-04-21 RX ADMIN — Medication 500 MG: at 08:34

## 2023-04-21 RX ADMIN — SULFAMETHOXAZOLE AND TRIMETHOPRIM 1 TABLET: 400; 80 TABLET ORAL at 08:37

## 2023-04-21 RX ADMIN — DOCUSATE SODIUM 100 MG: 100 CAPSULE, LIQUID FILLED ORAL at 21:04

## 2023-04-21 NOTE — PROGRESS NOTES
[facility] DAILY TREATMENT NOT    04/21/23 1000   Pain Assessment   Pain Assessment Tool 0-10   Pain Score 8   Pain Location/Orientation Orientation: Right;Location: Hip  (pain 3 (back))   Hospital Pain Intervention(s) Medication (See MAR); Repositioned   Restrictions/Precautions   Precautions Bed/chair alarms;Cognitive; Fall Risk;Pain   Comprehension   Assist Devices Glasses   Comprehension (FIM) 5 - Needs help/cues, repetition only RARELY ( < 10% of the time)   Expression   Expression (FIM) 6 - Expresses complex/abstract but requires:  more time   Social Interaction   Social Interaction (FIM) 6 - Interacts appropriately with others BUT requires extra  time   Problem Solving   Problem solving (FIM) 5 - Solves basic problems 90% of time   Memory   Memory (FIM) 5 - Recalls/performs request 90% of time   Speech/Language/Cognition Assessmetn   Treatment Assessment recalled 4 activities completed in PT earlier this AM  Recalled game show channel and placed on independently  Session completed with background noise  alternate sorting color/shape throughout a 1/2 deck of cards in 10:00 with mod cues  verbal cues with transfer with walker from the bathroom to wheelchair     SLP Therapy Minutes   SLP Time In 1000   SLP Time Out 1100   SLP Total Time (minutes) 60   SLP Mode of treatment - Individual (minutes) 60   SLP Mode of treatment - Concurrent (minutes) 0   SLP Mode of treatment - Group (minutes) 0   SLP Mode of treatment - Co-treat (minutes) 0   SLP Mode of Treatment - Total time(minutes) 60 minutes   SLP Cumulative Minutes 300

## 2023-04-21 NOTE — PROGRESS NOTES
04/21/23 1106   Pain Assessment   Pain Assessment Tool 0-10   Pain Score 7   Pain Location/Orientation Location: Back;Orientation: Lower   Restrictions/Precautions   Precautions Bed/chair alarms;Cognitive; Fall Risk;Pain;Spinal precautions   Weight Bearing Restrictions No   ROM Restrictions   (spinal prec)   Braces or Orthoses LSO   Exercise Tools   Other Exercise Tool 1 gripper with pegs BUE following retrieval of peg from the floor using the reacher in the RUE; increased time to sequence and problem solve through multistep task   Other Exercise Tool 2 Sanderbox 2 sets 15 all directions with 2# wt and BUE   Assessment   Treatment Assessment Pt presents seated in w/c agreeable to OT session inlcuding BUE therex/ theract  Pt tolerates session although does report back pain with movements requiring rest breaks during activity  Pt requries assist and supervision due to decreased balance, safety, endurance, strength/ ROM and congition  Pt is eager for A/E use during task completion  Pt will benefit from continued skilled OT services to increase independence with daily tasks  Problem List Decreased strength;Decreased range of motion;Decreased endurance; Impaired balance;Decreased cognition;Decreased safety awareness;Orthopedic restrictions;Pain   Plan   Treatment/Interventions ADL retraining;Functional transfer training; Therapeutic exercise; Endurance training;Cognitive reorientation;Patient/family training;Equipment eval/education; Compensatory technique education   Progress Progressing toward goals   OT Therapy Minutes   OT Time In 1106   OT Time Out 1200   OT Total Time (minutes) 54   OT Mode of treatment - Individual (minutes) 54   Therapy Time missed   Time missed?  No

## 2023-04-21 NOTE — PROGRESS NOTES
04/21/23 1415   Pain Assessment   Pain Assessment Tool 0-10   Pain Score No Pain   Restrictions/Precautions   Precautions Bed/chair alarms;Cognitive; Fall Risk;Spinal precautions;Pain   Weight Bearing Restrictions No   ROM Restrictions   (spinal prec)   Braces or Orthoses LSO  (when OOB)   Exercise Tools   Other Exercise Tool 1 1# wt therex 2 sets 15 BUE including shoulder flexion/ extension, abd/ add; elbow flexion/ extension, supination/ pronation and wrist flexion/ extension   Other Comments   Assessment Pt participates in 27 minutes concurrent treatment focusing on BUE therex to increase UB strength/ activity tolerance with similar goals as another patient   Assessment   Treatment Assessment Pt presents supine agreeable to brief session including BUE therex  Pt tolerates session and is making gaisn towards goals  Pt will benefit from conitnued skilled OT services to increase independence with daily tasks  Problem List Decreased strength;Decreased range of motion;Decreased endurance; Impaired balance;Decreased cognition;Decreased safety awareness;Pain;Orthopedic restrictions   Plan   Treatment/Interventions ADL retraining;Functional transfer training; Therapeutic exercise; Endurance training;Cognitive reorientation;Patient/family training;Equipment eval/education; Compensatory technique education   Progress Progressing toward goals   OT Therapy Minutes   OT Time In 1415   OT Time Out 1442   OT Total Time (minutes) 27   OT Mode of treatment - Concurrent (minutes) 27   Therapy Time missed   Time missed?  No

## 2023-04-21 NOTE — PLAN OF CARE
Problem: PAIN - ADULT  Goal: Verbalizes/displays adequate comfort level or baseline comfort level  Description: Interventions:  - Encourage patient to monitor pain and request assistance  - Assess pain using appropriate pain scale  - Administer analgesics based on type and severity of pain and evaluate response  - Implement non-pharmacological measures as appropriate and evaluate response  - Consider cultural and social influences on pain and pain management  - Notify physician/advanced practitioner if interventions unsuccessful or patient reports new pain  Outcome: Progressing     Problem: INFECTION - ADULT  Goal: Absence or prevention of progression during hospitalization  Description: INTERVENTIONS:  - Assess and monitor for signs and symptoms of infection  - Monitor lab/diagnostic results  - Monitor all insertion sites, i e  indwelling lines, tubes, and drains  - Monitor endotracheal if appropriate and nasal secretions for changes in amount and color  - [place] appropriate cooling/warming therapies per order  - Administer medications as ordered  - Instruct and encourage patient and family to use good hand hygiene technique  - Identify and instruct in appropriate isolation precautions for identified infection/condition  Outcome: Progressing     Problem: SAFETY ADULT  Goal: Patient will remain free of falls  Description: INTERVENTIONS:  - Educate patient/family on patient safety including physical limitations  - Instruct patient to call for assistance with activity   - Consult OT/PT to assist with strengthening/mobility   - Keep Call bell within reach  - Keep bed low and locked with side rails adjusted as appropriate  - Keep care items and personal belongings within reach  - Initiate and maintain comfort rounds  - Make Fall Risk Sign visible to staff  - Offer Toileting every 2 Hours, in advance of need  - Initiate/Maintain bed/chair alarm  - Apply yellow socks and bracelet for high fall risk patients  - Consider moving patient to room near nurses station  Outcome: Progressing     Problem: Prexisting or High Potential for Compromised Skin Integrity  Goal: Skin integrity is maintained or improved  Description: INTERVENTIONS:  - Identify patients at risk for skin breakdown  - Assess and monitor skin integrity  - Assess and monitor nutrition and hydration status  - Monitor labs   - Assess for incontinence   - Turn and reposition patient  - Assist with mobility/ambulation  - Relieve pressure over bony prominences  - Avoid friction and shearing  - Provide appropriate hygiene as needed including keeping skin clean and dry  - Evaluate need for skin moisturizer/barrier cream  - Collaborate with interdisciplinary team   - Patient/family teaching  - Consider wound care consult   Outcome: Progressing     Problem: Nutrition/Hydration-ADULT  Goal: Nutrient/Hydration intake appropriate for improving, restoring or maintaining nutritional needs  Description: Monitor and assess patient's nutrition/hydration status for malnutrition  Collaborate with interdisciplinary team and initiate plan and interventions as ordered  Monitor patient's weight and dietary intake as ordered or per policy  Utilize nutrition screening tool and intervene as necessary  Determine patient's food preferences and provide high-protein, high-caloric foods as appropriate       INTERVENTIONS:  - Monitor oral intake, urinary output, labs, and treatment plans  - Assess nutrition and hydration status and recommend course of action  - Evaluate amount of meals eaten  - Assist patient with eating if necessary   - Allow adequate time for meals  - Recommend/ encourage appropriate diets, oral nutritional supplements, and vitamin/mineral supplements  - Order, calculate, and assess calorie counts as needed  - Recommend, monitor, and adjust tube feedings and TPN/PPN based on assessed needs  - Assess need for intravenous fluids  - Provide specific nutrition/hydration education as appropriate  - Include patient/family/caregiver in decisions related to nutrition  Outcome: Progressing

## 2023-04-21 NOTE — PROGRESS NOTES
PM&R PROGRESS NOTE:  Calvin Rivero 80 y o  female MRN: [id number]  Unit/Bed#: -01 Encounter: [id number]        Rehabilitation Diagnosis: Impairment of mobility, safety and Activities of Daily Living (ADLs) due to Orthopedic Disorders:  08 9  Other Orthopedic L3 compression fracture    HPI: Calvin Rivero is a 80 y o  female with a PMH significant for T2DM, HTN, Chronic a-fib and CKD who presented to the [address] on 04/08/23 s/p a fall at home  She fell backwards and landed on her neck with questionable head strike  CTH with no acute intracranial abnormality  CT spine cervical with no cervical spine fracture or traumatic malalignment  CT C/A/P with acute mild inferior endplate L3 compression fracture without central canal compromise or posterior element involvement  Incidentally found to have a presumed bronchogenic malignancy at the right hilum and multifocal bladder tumors  A lumbar spine brace was ordered  Found to have encephalopathy  Chest xray without acute findings, she was afebrile with no leukocytosis  UA negative         SUBJECTIVE: Patient seen and evaluated  Resting comfortable in bed at this time but does admit to chronic knee pain  Agreeable to trial Voltaren gel  If this does not help, told patient to inform me  Patient denies back discomfort while in bed but does admit to discomfort after activity and with the brace at times  ASSESSMENT: Stable, progressing      PLAN:    Rehabilitation  • Functional deficits:  Impaired mobility, self care, BLE weakness, pain  • Continue current rehabilitation plan of care to maximize function      • Functional update:   o PT:   Roll L-R: supervision  Lying to sitting on side of bed: supervision   Sit-stand: cg  Bed-chair transfer: min- cg   Ambulation: cg  Stairs: cg  o OT:  Toileting hygiene: cg  Toilet transfer: Cg   o ST:   Comprehension: needs help/cues, repetition only rarely  Expression: mild difficulty with complex/abstract info  Social interaction: appropriate with others but requires extra time  Problem solvin-89%  Memory: 75-89%     • Estimated Discharge: 23 with [facility] PT/OT/ST/N/A    Pain  • Tylenol 650 mg Q6H PRN for mild pain  • Lidocaine patch Q12H Daily     DVT prophylaxis  • Eliquis 2 5 mg BID    Bladder plan  • Continent    Bowel plan  • Continent  • Last BM   • Continue colace and Senna BID  • Miralax daily PRN  • Dulcolax rectal suppository daily PRN     Skin care  · Monitor skin daily  · Apply skin nourishing cream  · Reposition Q2H to offload pressure  · Elevate heels off bed   · Hydraguard to b/l heels and lower buttocks BID and PRN  · Stage 2 right upper buttocks: Allevyn life silicone bordered dressing to the sarum-upper buttocks   Check daily, reapply and change Q3days or PRN      UTI (urinary tract infection)  Assessment & Plan  · UA obtained while in acute care  · Culture resulted over the weekend- >100,000 cfu/ml Enterobacter cloacae, <10,000 cfu/ml proteus species   · Continue Bactrim 400-80 mg Q12H [address] x3 days       Lung mass  Assessment & Plan  · Incidentally found on CT imaging : presumed bronchogenic malignancy at the right hilum and multofocal bladder tumors which should also be regarded as malignancy until proven otherwise   · Oncology consulted while in acute care  · Further management to be done as an outpatient per family preference   · Oncology would recommend cystoscopy with Urology to obtain tissue; IR lung biopsy, Outpatient PET CT  · Arrangements to be made via Oncology for a workup in approximately 2 weeks    Malignant neoplasm of overlapping sites of bladder [facility])  Assessment & Plan  · Known history of bladder cancer  · Follows with Dr Danelle Reese   · Last testing done by Urology 2021  · Recommend following up as an outpatient with Urology and Oncology    Type 2 diabetes mellitus with stage 3a chronic kidney disease [facility])  Assessment & Plan  Lab Results   Component Value Date    HGBA1C 7 0 (H) 10/10/2022       Recent Labs     04/20/23  1145 04/20/23  1618 04/20/23 2031 04/21/23  0732   POCGLU 266* 197* 135 136       Blood Sugar Average: Last 72 hrs:  · (P) 989 [id number]   · Monitor accu cheks  · Continue SSI  · Continue home Glipizide 5 mg BID and Metformin 500 mg BID   · Monitor and adjust regimen as needed  · Carb controlled diet     Essential hypertension  Assessment & Plan  · Monitor vitals  · Continue Metoprolol 25 mg Q12H, Lisinopril 5 mg daily    Chronic a-fib (HCC)  Assessment & Plan  · Continue Metoprolol 25 mg Q12H [address] for rate control  · Continue Eliquis 2 5 mg BID    * Compression fracture of L3 vertebra (HCC)  Assessment & Plan  · S/p fall at home; unclear if head strike   · CTH: No acute intracranial abnormality  · CT C/A/P: acute mild inferior endplate L3 compression fracture without central canal compromise or posterior element involvement  No other acute traumatic injuries seen  · Lumbar spine brace to be worn with ambulation, in upright position ; unclear timeline  Likely 4-6 weeks  · Acute chomprehensive interdisciplinary inpatient rehabilitation to include intensive skilled therapies as outlines with oversight and management by a rehabilitation Physician Assistant overseen by rehabilitation physician as well as inpatient rehabilitation nursing, case management and weekly interdisciplinary team meeting        Appreciate  consultants medical co-management  Labs, medications, and imaging personally reviewed  ROS:  A ten point review of systems was completed and pertinent positives are listed in subjective section  All other systems reviewed were negative  Review of Systems   Constitutional: Negative  HENT: Negative  Respiratory: Negative  Negative for shortness of breath  Cardiovascular: Negative  Negative for chest pain  Gastrointestinal: Negative  Negative for abdominal pain and constipation  Genitourinary: Negative    Negative for difficulty "urinating  Musculoskeletal: Positive for arthralgias  Negative for back pain  Chronic knee pain   Neurological: Negative  Psychiatric/Behavioral: Negative  OBJECTIVE:   /53 (BP Location: Right leg)   Pulse 63   Temp 98 2 °F (36 8 °C) (Temporal)   Resp 16   Ht 5' 4\" (1 626 m)   Wt 61 8 kg (136 lb 3 9 oz)   SpO2 97%   BMI 23 39 kg/m²     Physical Exam  Vitals and nursing note reviewed  Constitutional:       General: She is not in acute distress  Appearance: She is not ill-appearing  HENT:      Head: Normocephalic and atraumatic  Eyes:      Pupils: Pupils are equal, round, and reactive to light  Cardiovascular:      Rate and Rhythm: Normal rate and regular rhythm  Pulses: Normal pulses  Heart sounds: Normal heart sounds  No murmur heard  Pulmonary:      Effort: Pulmonary effort is normal  No respiratory distress  Breath sounds: Normal breath sounds  Abdominal:      General: Bowel sounds are normal  There is no distension  Palpations: Abdomen is soft  Musculoskeletal:      Right lower leg: No edema  Left lower leg: No edema  Skin:     General: Skin is warm and dry  Neurological:      General: No focal deficit present  Mental Status: She is alert and oriented to person, place, and time     Psychiatric:         Mood and Affect: Mood normal          Behavior: Behavior normal           Lab Results   Component Value Date    WBC 7 67 04/14/2023    HGB 14 5 04/14/2023    HCT 45 4 04/14/2023    MCV 99 (H) 04/14/2023     04/14/2023     Lab Results   Component Value Date    SODIUM 139 04/14/2023    K 3 8 04/14/2023     04/14/2023    CO2 28 04/14/2023    BUN 33 (H) 04/14/2023    CREATININE 1 17 04/14/2023    GLUC 91 04/14/2023    CALCIUM 9 3 04/14/2023     Lab Results   Component Value Date    INR 0 99 04/08/2023    INR 1 02 04/18/2022    INR 1 68 (H) 07/13/2021    PROTIME 13 1 04/08/2023    PROTIME 13 3 04/18/2022    PROTIME 19 7 (H) " 07/13/2021           Current Facility-Administered Medications:   •  acetaminophen (TYLENOL) tablet 650 mg, 650 mg, Oral, Q6H PRN, Marcin Hutton MD, 650 mg at 04/14/23 2100  •  aluminum-magnesium hydroxide-simethicone (MYLANTA) oral suspension 30 mL, 30 mL, Oral, Q4H PRN, Marcin Hutton MD  •  apixaban Myrl Rend) tablet 2 5 mg, 2 5 mg, Oral, BID, Marcin Hutton MD, 2 5 mg at 04/21/23 9086  •  bisacodyl (DULCOLAX) rectal suppository 10 mg, 10 mg, Rectal, Daily PRN, Jake Baca PA-C, 10 mg at 04/17/23 1958  •  calcium carbonate (OYSTER SHELL,OSCAL) 500 mg tablet 1 tablet, 1 tablet, Oral, BID With Meals, Marcin Hutton MD, 1 tablet at 04/21/23 4359  •  cholecalciferol (VITAMIN D3) tablet 1,000 Units, 1,000 Units, Oral, Daily, Marcin Hutton MD, 1,000 Units at 04/21/23 0482  •  cyanocobalamin (VITAMIN B-12) tablet 1,000 mcg, 1,000 mcg, Oral, Daily, Marcin Hutton MD, 1,000 mcg at 04/21/23 5687  •  Diclofenac Sodium (VOLTAREN) 1 % topical gel 2 g, 2 g, Topical, 4x Daily, Jake Baca PA-C  •  docusate sodium (COLACE) capsule 100 mg, 100 mg, Oral, BID, Marcin Hutton MD, 100 mg at 04/21/23 0379  •  famotidine (PEPCID) tablet 10 mg, 10 mg, Oral, Daily, Marcin Hutton MD, 10 mg at 04/21/23 6232  •  glipiZIDE (GLUCOTROL) tablet 5 mg, 5 mg, Oral, BID AC, Jake Baca PA-C, 5 mg at 04/21/23 0073  •  insulin lispro (HumaLOG) 100 units/mL subcutaneous injection 1-5 Units, 1-5 Units, Subcutaneous, TID AC, 1 Units at 04/20/23 1704 **AND** Fingerstick Glucose (POCT), , , TID AC, Jake Baca PA-C  •  levothyroxine tablet 25 mcg, 25 mcg, Oral, Daily, Marcin Hutton MD, 25 mcg at 04/21/23 0428  •  lidocaine (LIDODERM) 5 % patch 1 patch, 1 patch, Topical, Daily, Marcin Hutton MD, 1 patch at 04/21/23 6125  •  lisinopril (ZESTRIL) tablet 5 mg, 5 mg, Oral, Daily, Marcin Hutton MD, 5 mg at 04/21/23 6168  •  magnesium gluconate (MAGONATE) tablet 500 mg, 500 mg, Oral, Daily, Marcin Hutton MD, 500 mg at 04/21/23 3282  • melatonin tablet 3 mg, 3 mg, Oral, HS, Natalia Ledesma MD, 3 mg at 04/20/23 2101  •  metFORMIN (GLUCOPHAGE) tablet 500 mg, 500 mg, Oral, BID With Meals, NYDIA Felix-C, 500 mg at 04/21/23 9988  •  metoprolol tartrate (LOPRESSOR) tablet 25 mg, 25 mg, Oral, Q12H, Natalia Ledesma MD, 25 mg at 04/21/23 9522  •  ondansetron (ZOFRAN-ODT) dispersible tablet 4 mg, 4 mg, Oral, Q6H PRN, Natalia Ledesma MD  •  oxyCODONE (ROXICODONE) split tablet 2 5 mg, 2 5 mg, Oral, Q4H PRN, 2 5 mg at 04/16/23 1636 **OR** oxyCODONE (ROXICODONE) IR tablet 5 mg, 5 mg, Oral, Q4H PRN, NYDIA Santana-C, 5 mg at 04/21/23 2823  •  polyethylene glycol (MIRALAX) packet 17 g, 17 g, Oral, Daily PRN, Alona Perez PA-C  •  pravastatin (PRAVACHOL) tablet 20 mg, 20 mg, Oral, Daily With Dinner, Natalia Ledesma MD, 20 mg at 04/20/23 1553  •  senna-docusate sodium (SENOKOT S) 8 6-50 mg per tablet 1 tablet, 1 tablet, Oral, BID, Alona Perez PA-C, 1 tablet at 04/21/23 7915  •  sulfamethoxazole-trimethoprim (BACTRIM) 400-80 mg per tablet 1 tablet, 1 tablet, Oral, Q12H [address], Leocadia Ana PA-C, 1 tablet at 04/21/23 7659    Past Medical History:   Diagnosis Date   • Abnormal gait    • Accidental fall from chair, subsequent encounter    • Atrial fibrillation (HCC)    • Benign essential hypertension    • Bite of animal    • Cataract    • Cellulitis    • Chronic kidney disease    • Chronic kidney disease, stage 3 (HCC)    • Colon cancer (HCC)    • Colon polyp    • Current tear of lateral cartilage or meniscus of knee    • Diabetes mellitus ([facility] [address] )    • Disease of thyroid gland    • Disorder of magnesium metabolism    • Dvt femoral (deep venous thrombosis)    • Embolism from thrombosis of vein of distal end of lower extremity ([address] )    • Essential hypertension    • Fall    • Hyperlipidemia    • Hyperlipidemia    • Hypertension    • Hypothyroidism    • Insomnia    • Kidney stone    • Knee joint effusion    • Leukocytosis    • Malaise and fatigue    • MI (myocardial infarction) [facility])     Hospitalization    • Orbital hemorrhage    • Other sleep disorders    • PAF (paroxysmal atrial fibrillation) (HCC)    • Presence of vena cava filter    • Pulmonary embolism (HCC)    • Tear film insufficiency    • Type 2 diabetes mellitus (HCC)    • Unspecified osteoarthritis, unspecified site    • Weight decreased        Patient Active Problem List    Diagnosis Date Noted   • UTI (urinary tract infection) 04/17/2023   • Lung mass 04/10/2023   • Encephalopathy 04/10/2023   • Fall at home 04/08/2023   • Compression fracture of L3 vertebra ([facility] [address] ) 04/08/2023   • Stage 3b chronic kidney disease ([facility] ) 12/21/2022   • Loss of weight 12/21/2022   • Malignant neoplasm of overlapping sites of bladder ([facility] ) 10/10/2022   • Hypocalcemia 06/29/2021   • Other pulmonary embolism without acute cor pulmonale ([facility] ) 04/21/2021   • Bladder mass 03/31/2021   • Compression fracture of thoracic spine, non-traumatic, sequela 02/28/2020   • Stage 3a chronic kidney disease ([facility] ) 02/28/2020   • Essential hypertension 06/05/2019   • History of pulmonary embolism 06/05/2019   • Osteoarthritis of knee 05/01/2018   • Hypercoagulable state, primary ([facility] ) 03/27/2017   • Chronic a-fib ([facility] ) 02/20/2016   • Type 2 diabetes mellitus with stage 3a chronic kidney disease ([place] [id number] ) 02/20/2016          Shani Haines PA-C    I have spent a total time of 35 minutes on 04/21/23 in caring for this patient including Instructions for management, Patient and family education, Counseling / Coordination of care, Documenting in the medical record, Reviewing / ordering tests, medicine, procedures  , Obtaining or reviewing history   and Communicating with other healthcare professionals   ** Please Note:  voice to text software may have been used in the creation of this document   Although proof errors in transcription or interpretation are a potential of such software**

## 2023-04-21 NOTE — PROGRESS NOTES
04/21/23 0858   Pain Assessment   Pain Assessment Tool 0-10   Pain Score 6   Pain Location/Orientation Orientation: Lower; Location: Back   Restrictions/Precautions   Precautions Bed/chair alarms; Fall Risk;Pain;Spinal precautions   ROM Restrictions   (spinal precautions)   Braces or Orthoses LSO   Cognition   Overall Cognitive Status Impaired   Arousal/Participation Alert; Responsive; Cooperative   Attention Attends with cues to redirect   Orientation Level Oriented X4   Memory Decreased short term memory;Decreased recall of recent events;Decreased recall of precautions   Following Commands Follows one step commands without difficulty   Sit to Lying   Type of Assistance Needed Supervision;Verbal cues   Sit to Lying CARE Score 4   Lying to Sitting on Side of Bed   Type of Assistance Needed Supervision;Verbal cues   Lying to Sitting on Side of Bed CARE Score 4   Sit to Stand   Type of Assistance Needed Incidental touching   Comment cg with RW and cues   Sit to Stand CARE Score 4   Bed-Chair Transfer   Type of Assistance Needed Incidental touching   Comment cg with RW and cues   Chair/Bed-to-Chair Transfer CARE Score 4   Walk 10 Feet   Type of Assistance Needed Incidental touching;Verbal cues   Comment cg level and unlevel surfaces   Walk 10 Feet CARE Score 4   Walk 50 Feet with Two Turns   Type of Assistance Needed Incidental touching;Verbal cues   Comment cg level and unlevel surfaces   Walk 50 Feet with Two Turns CARE Score 4   Walking 10 Feet on Uneven Surfaces   Type of Assistance Needed Incidental touching;Verbal cues   Comment cg level and unlevel surfaces   Walking 10 Feet on Uneven Surfaces CARE Score 4   Ambulation   Primary Mode of Locomotion Prior to Admission Walk   Distance Walked (feet) 137 ft  (137' 129')   Assist Device Roller Walker   Gait Pattern Antalgic; Inconsistant Bibi; Slow Bibi;Decreased foot clearance; Forward Flexion;Narrow ZARA; Decreased L stance   Limitations Noted In Balance; Endurance; Heel Strike;Posture; Safety;Speed;Strength   Provided Assistance with: Balance   Walk Assist Level Contact Guard   Findings cg level and unlevel surfaces   Does the patient walk? 2  Yes   Curb or Single Stair   Style negotiated Single stair   Type of Assistance Needed Incidental touching;Verbal cues   Comment cg with verbal cues   1 Step (Curb) CARE Score 4   4 Steps   Type of Assistance Needed Incidental touching;Verbal cues   Comment cg with verbal cues   4 Steps CARE Score 4   Stairs   Type Stairs   # of Steps 7   Weight Bearing Precautions WBAT   Assist Devices Bilateral Rail   Findings cg with cues for sequence   Therapeutic Interventions   Strengthening seated ther ex   Balance gait and transfer training   Other stair training   Assessment   Treatment Assessment Patient agreeable to therapy session  Pain in low back 61/0  Cues needed for general safety  Completed ther ex for general LE strengthening; gait and transfer training focusing on sequence and technique for improved balance and safety with functional mobility using walker  Able to negotiate up and down steps with bilateral HRs and cues for sequence  Patient appropriate for concurrent therapy to increase motivation and encouragement among pts with similar deficits while completing therapy session  PT Barriers   Physical Impairment Decreased strength;Decreased range of motion;Decreased endurance; Impaired balance;Decreased mobility; Decreased cognition; Impaired judgement;Decreased safety awareness;Orthopedic restrictions;Pain   Functional Limitation Walking;Transfers;Standing;Stair negotiation   Plan   Treatment/Interventions Functional transfer training;LE strengthening/ROM; Elevations; Therapeutic exercise; Bed mobility;Gait training   Progress Progressing toward goals   PT Therapy Minutes   PT Time In 0858   PT Time Out 1000   PT Total Time (minutes) 62   PT Mode of treatment - Individual (minutes) 0   PT Mode of treatment - Concurrent (minutes) 62   PT Mode of treatment - Group (minutes) 0   PT Mode of treatment - Co-treat (minutes) 0   PT Mode of Treatment - Total time(minutes) 62 minutes   PT Cumulative Minutes 482   Therapy Time missed   Time missed?  No

## 2023-04-22 LAB
GLUCOSE SERPL-MCNC: 110 MG/DL (ref 65–140)
GLUCOSE SERPL-MCNC: 231 MG/DL (ref 65–140)
GLUCOSE SERPL-MCNC: 67 MG/DL (ref 65–140)

## 2023-04-22 RX ADMIN — Medication 1000 UNITS: at 09:43

## 2023-04-22 RX ADMIN — SENNOSIDES AND DOCUSATE SODIUM 1 TABLET: 50; 8.6 TABLET ORAL at 17:18

## 2023-04-22 RX ADMIN — FAMOTIDINE 10 MG: 20 TABLET, FILM COATED ORAL at 09:42

## 2023-04-22 RX ADMIN — DICLOFENAC SODIUM 2 G: 10 GEL TOPICAL at 21:02

## 2023-04-22 RX ADMIN — METFORMIN HYDROCHLORIDE 500 MG: 500 TABLET ORAL at 17:18

## 2023-04-22 RX ADMIN — DICLOFENAC SODIUM 2 G: 10 GEL TOPICAL at 12:20

## 2023-04-22 RX ADMIN — Medication 3 MG: at 21:02

## 2023-04-22 RX ADMIN — DICLOFENAC SODIUM 2 G: 10 GEL TOPICAL at 09:43

## 2023-04-22 RX ADMIN — DOCUSATE SODIUM 100 MG: 100 CAPSULE, LIQUID FILLED ORAL at 09:43

## 2023-04-22 RX ADMIN — APIXABAN 2.5 MG: 2.5 TABLET, FILM COATED ORAL at 17:18

## 2023-04-22 RX ADMIN — GLIPIZIDE 5 MG: 5 TABLET ORAL at 08:10

## 2023-04-22 RX ADMIN — INSULIN LISPRO 2 UNITS: 100 INJECTION, SOLUTION INTRAVENOUS; SUBCUTANEOUS at 12:20

## 2023-04-22 RX ADMIN — GLIPIZIDE 5 MG: 5 TABLET ORAL at 17:19

## 2023-04-22 RX ADMIN — CYANOCOBALAMIN TAB 500 MCG 1000 MCG: 500 TAB at 09:43

## 2023-04-22 RX ADMIN — APIXABAN 2.5 MG: 2.5 TABLET, FILM COATED ORAL at 09:42

## 2023-04-22 RX ADMIN — Medication 500 MG: at 09:43

## 2023-04-22 RX ADMIN — DICLOFENAC SODIUM 2 G: 10 GEL TOPICAL at 17:19

## 2023-04-22 RX ADMIN — CALCIUM 1 TABLET: 500 TABLET ORAL at 09:43

## 2023-04-22 RX ADMIN — CALCIUM 1 TABLET: 500 TABLET ORAL at 17:19

## 2023-04-22 RX ADMIN — DOCUSATE SODIUM 100 MG: 100 CAPSULE, LIQUID FILLED ORAL at 20:36

## 2023-04-22 RX ADMIN — LEVOTHYROXINE SODIUM 25 MCG: 25 TABLET ORAL at 05:09

## 2023-04-22 RX ADMIN — METOPROLOL TARTRATE 25 MG: 25 TABLET, FILM COATED ORAL at 20:36

## 2023-04-22 RX ADMIN — SENNOSIDES AND DOCUSATE SODIUM 1 TABLET: 50; 8.6 TABLET ORAL at 09:43

## 2023-04-22 RX ADMIN — SULFAMETHOXAZOLE AND TRIMETHOPRIM 1 TABLET: 400; 80 TABLET ORAL at 20:36

## 2023-04-22 RX ADMIN — METFORMIN HYDROCHLORIDE 500 MG: 500 TABLET ORAL at 09:42

## 2023-04-22 RX ADMIN — SULFAMETHOXAZOLE AND TRIMETHOPRIM 1 TABLET: 400; 80 TABLET ORAL at 09:45

## 2023-04-22 RX ADMIN — METOPROLOL TARTRATE 25 MG: 25 TABLET, FILM COATED ORAL at 09:43

## 2023-04-22 RX ADMIN — PRAVASTATIN SODIUM 20 MG: 20 TABLET ORAL at 17:18

## 2023-04-22 RX ADMIN — LISINOPRIL 5 MG: 5 TABLET ORAL at 09:43

## 2023-04-22 RX ADMIN — LIDOCAINE 5% 1 PATCH: 700 PATCH TOPICAL at 09:43

## 2023-04-22 NOTE — PROGRESS NOTES
"   04/22/23 0830   Pain Assessment   Pain Assessment Tool 0-10   Pain Score 6   Pain Location/Orientation Orientation: Lower; Location: Back   Restrictions/Precautions   Precautions Bed/chair alarms;Cognitive; Fall Risk;Spinal precautions;Pain   Weight Bearing Restrictions No   ROM Restrictions   (spinal precautions)   Braces or Orthoses LSO   Cognition   Overall Cognitive Status Impaired   Arousal/Participation Alert; Responsive; Cooperative   Attention Attends with cues to redirect   Orientation Level Oriented X4   Memory Decreased short term memory;Decreased recall of recent events;Decreased recall of precautions   Following Commands Follows one step commands without difficulty   Subjective   Subjective \"I have to use the BR   Lying to Sitting on Side of Bed   Type of Assistance Needed Supervision;Verbal cues   Physical Assistance Level No physical assistance   Lying to Sitting on Side of Bed CARE Score 4   Sit to Stand   Type of Assistance Needed Incidental touching   Physical Assistance Level 25% or less   Comment CGA /c VCs for proper hand placement   Sit to Stand CARE Score 3   Bed-Chair Transfer   Type of Assistance Needed Incidental touching   Physical Assistance Level 25% or less   Comment CGA /c VCs for proper hand placement   Chair/Bed-to-Chair Transfer CARE Score 3   Transfer Bed/Chair/Wheelchair   Limitations Noted In Balance; Coordination; Endurance;Pain Management;UE Strength;LE Strength   Adaptive Equipment Roller Walker   Sit to [employer]   Stand to [facility]   Supine to Sit Supervision   Findings VCs for log roll while completing supine>sit  VCs for proper technique and hand placement /c txfers     Car Transfer   Reason if not Attempted Environmental limitations   Car Transfer CARE Score 10   Walk 10 Feet   Type of Assistance Needed Incidental touching;Verbal cues   Physical Assistance Level 25% or less   Comment VCs for posture and staying inside ZARA   Walk 10 Feet CARE Score 3   Walk " Follow up patient note  Interventional spine and sports physiatry, Physical medicine rehabilitation      Chief complaint:   Chief Complaint   Patient presents with   • Follow-Up     Back pain          HISTORY    Please see new patient note by Dr Mojica,  for more details.     HPI  Patient identification: José Luis Blackburn ,  1974,   With Diagnoses of Chronic left-sided low back pain with left-sided sciatica, Impaired mobility and ADLs, Lumbar disc herniation, and Lumbar radiculopathy were pertinent to this visit.     Procedures:  2020 left Lumbar Transforaminal Epidural Steroid  at the L5-S1 and S1 levels 100% improvement in radicular component and 50% improvement in back pain    Patient continues to get improvement from the epidural and her home exercise program.  She is tolerating her home exercise program well and does daily stretches and exercises.  He is also walking.  Her pain is typically now mild 1-3/10 in intensity sometimes radiating down the left leg with associated numbness but this is intermittent and rare.           ROS Red Flags :   Fever, Chills, Sweats: Denies  Involuntary Weight Loss: Denies  Bowel/Bladder Incontinence: Denies  Saddle Anesthesia: Denies        PMHx:   Past Medical History:   Diagnosis Date   • Allergy    • Anemia     with pregnancy   • Anxiety    • Arthritis    • Depression    • Headache(784.0)    • kid ston    • pac 2009    and PVCs   • Sciatica    • Seizure (HCC)     Petit mal until puberty   • Urinary tract infection, site not specified        PSHx:   Past Surgical History:   Procedure Laterality Date   • LUMBAR TRANSFORAMINAL EPIDURAL STEROID INJECTION Left 2020    Procedure: INJECTION, STEROID, SPINE, LUMBAR, EPIDURAL, TRANSFORAMINAL APPROACH;  Surgeon: Bro Mojica M.D.;  [facility];  Service: Pain Management   • LITHOTRIPSY         Family history   Family History   Problem Relation Age of Onset   • Arthritis  "Mother         Rheumatoid arthritis   • Diabetes Father    • Hypertension Father    • Hyperlipidemia Father    • Hypertension Brother    • Hypertension Maternal Grandmother    • Cancer Maternal Grandfather         mesothelioma from asbestos   • Hypertension Paternal Grandmother    • Cancer Paternal Grandfather         bone   • Hypertension Paternal Grandfather          Medications:   Outpatient Medications Marked as Taking for the 1/26/21 encounter (Office Visit) with Bro Mojica M.D.   Medication Sig Dispense Refill   • cyclobenzaprine (FLEXERIL) 10 mg Tab Take 1 Tab by mouth 3 times a day as needed. 90 Tab 3   • progesterone (PROMETRIUM) 200 MG capsule TAKE 1 CAPSULE BY MOUTH ONCE DAILY FOR 12 DAYS EVERY 3 4 MONTHS     • acetaminophen (TYLENOL) 500 MG Tab Take 500-1,000 mg by mouth every 6 hours as needed.     • LORAZEPAM PO Take  by mouth.     • ibuprofen (MOTRIN) 600 MG Tab Take 600 mg by mouth every 6 hours as needed.          Current Outpatient Medications on File Prior to Visit   Medication Sig Dispense Refill   • progesterone (PROMETRIUM) 200 MG capsule TAKE 1 CAPSULE BY MOUTH ONCE DAILY FOR 12 DAYS EVERY 3 4 MONTHS     • acetaminophen (TYLENOL) 500 MG Tab Take 500-1,000 mg by mouth every 6 hours as needed.     • LORAZEPAM PO Take  by mouth.     • ibuprofen (MOTRIN) 600 MG Tab Take 600 mg by mouth every 6 hours as needed.       No current facility-administered medications on file prior to visit.          Allergies:   Allergies   Allergen Reactions   • Contrast Media With Iodine [Iodine]      Pt reports becoming SOB with contrast media with iodine in the past   • Gabapentin      Cognitive side effects    • Other Drug      \"some epilepsy medications, unsure of name\"       Social Hx:   Social History     Socioeconomic History   • Marital status:      Spouse name: Not on file   • Number of children: Not on file   • Years of education: Not on file   • Highest education level: Not on file   " 48 Feet with Two Turns   Type of Assistance Needed Incidental touching;Verbal cues   Physical Assistance Level 25% or less   Comment VCs for posture and staying inside ZARA   Walk 50 Feet with Two Turns CARE Score 3   Walk 150 Feet   Type of Assistance Needed Incidental touching;Verbal cues   Physical Assistance Level 25% or less   Comment VCs for posture and staying inside ZARA   Walk 150 Feet CARE Score 3   Walking 10 Feet on Uneven Surfaces   Type of Assistance Needed Incidental touching;Verbal cues   Physical Assistance Level 25% or less   Comment VCs for posture and staying inside ZARA   Walking 10 Feet on Uneven Surfaces CARE Score 3   Ambulation   Primary Mode of Locomotion Prior to Admission Walk   Distance Walked (feet) 130 ft   Assist Device Roller Walker   Gait Pattern Antalgic; Inconsistant Bibi; Slow Bibi;Decreased foot clearance;R foot drag;L foot drag;Narrow ZARA;Step to;Decreased R stance; Improper weight shift; Forward Flexion   Limitations Noted In Balance; Endurance; Heel Strike;Posture; Safety; Sequencing;Speed;Strength   Provided Assistance with: Balance;Direction   Walk Assist Level Contact Guard   Findings level and unlevel surfaces   Does the patient walk? 2  Yes   Wheel 50 Feet with Two Turns   Reason if not Attempted Activity not applicable   Wheel 50 Feet with Two Turns CARE Score 9   Wheel 150 Feet   Reason if not Attempted Activity not applicable   Wheel 039 Feet CARE Score 9   Wheelchair mobility   Does the patient use a wheelchair? 0   No   Findings n/a   Curb or Single Stair   Style negotiated Single stair   Type of Assistance Needed Incidental touching   Physical Assistance Level 25% or less   Comment CGA /c VCs for proper technique   1 Step (Curb) CARE Score 3   4 Steps   Type of Assistance Needed Incidental touching   Physical Assistance Level 25% or less   Comment CGA /c VCs   4 Steps CARE Score 3   Stairs   Type Stairs   # of Steps 7   Weight Bearing Precautions WBAT   Assist Devices "  Occupational History   • Not on file   Social Needs   • Financial resource strain: Not on file   • Food insecurity     Worry: Not on file     Inability: Not on file   • Transportation needs     Medical: Not on file     Non-medical: Not on file   Tobacco Use   • Smoking status: Former Smoker     Years: 15.00     Types: Cigarettes   • Smokeless tobacco: Never Used   • Tobacco comment: Socially twice a month now   Substance and Sexual Activity   • Alcohol use: No   • Drug use: No   • Sexual activity: Yes     Partners: Male     Birth control/protection: Rhythm   Lifestyle   • Physical activity     Days per week: Not on file     Minutes per session: Not on file   • Stress: Not on file   Relationships   • Social connections     Talks on phone: Not on file     Gets together: Not on file     Attends Jain service: Not on file     Active member of club or organization: Not on file     Attends meetings of clubs or organizations: Not on file     Relationship status: Not on file   • Intimate partner violence     Fear of current or ex partner: Not on file     Emotionally abused: Not on file     Physically abused: Not on file     Forced sexual activity: Not on file   Other Topics Concern   •  Service No   • Blood Transfusions No   • Caffeine Concern No   • Occupational Exposure No   • Hobby Hazards No   • Sleep Concern Yes   • Stress Concern No   • Weight Concern Yes   • Special Diet No   • Back Care Yes   • Exercise No   • Bike Helmet No   • Seat Belt Yes   • Self-Exams Yes   Social History Narrative   • Not on file         EXAMINATION     Physical Exam:   Vitals: /76 (BP Location: Left arm, Patient Position: Sitting, BP Cuff Size: Adult)   Pulse (!) 102   Temp 36.2 °C (97.1 °F) (Temporal)   Ht 1.753 m (5' 9\")   Wt (!) 135 kg (297 lb 6.4 oz)   SpO2 96%     Constitutional:   Body Habitus: Body mass index is 43.92 kg/m².  Cooperation: Fully cooperates with exam  Appearance: Well-groomed no " Bilateral Rail   Toilet Transfer   Type of Assistance Needed Incidental touching   Physical Assistance Level 25% or less   Comment CGA for txfers  Required assistance for pants up   Toilet Transfer CARE Score 3   Toilet Transfer   Surface Assessed Bedside Commode   Limitations Noted In Balance; Endurance;Problem Solving; Safety; Sequencing;UE Strength;LE Strength   Adaptive Equipment Grab Bar   Positioning Concerns Grab Bars; Safety   Therapeutic Interventions   Strengthening seated BLE ther  ex /c green TB 2x10 each vikas/toe raises, marches, LAQs, hip aBd/aDd   Balance txfers, ambualtion   Other stairs   Assessment   Treatment Assessment Pt  Received supine in bed and agreeable to PT session  Donned LSO brace  Pt  completed supine>sit /c supervision requiring increased time to complete /c HOB flat and no use of bed rails  Pt  Requested to use BR before starting PT session  Pt  Able to ambulate 10' to BR using RW /c CGA and completed toilet transfer /c CGA using grab bars, required assistance for pants up  Pt  ambulated 130' and 150' using RW /c CGA requiring constant VC/tactile cues for posture and staying inside ZARA  Pt  ambulates /c forward flexed posture, decrease step length/height, decreased heel strike, and slow guevara  Stair training completed for 7 steps using BHRs /c CGA  Transfer training completed focusing on sequence and technique for improved safety and balance /c functional mobility using RW  She completed seated BLE ther  ex at outlined in flow sheet for general LE strengthening requiring tactile cues to maintain proper hip, knee, ankle alignment  Pt  required VCs t/o session for general safety awareness  Pt  Would continue to benefit from skilled PT services to maximize independence /c MRADLs  Problem List Decreased strength;Decreased range of motion;Decreased endurance; Impaired balance;Decreased cognition;Decreased safety awareness;Pain;Orthopedic restrictions;Decreased mobility   Barriers to Discharge Inaccessible home environment   PT Barriers   Physical Impairment Decreased strength;Decreased range of motion;Decreased endurance; Impaired balance;Decreased mobility; Decreased cognition;Decreased safety awareness;Orthopedic restrictions;Pain   Functional Limitation Walking;Transfers;Standing;Stair negotiation   Plan   Treatment/Interventions Functional transfer training;LE strengthening/ROM; Elevations; Therapeutic exercise; Endurance training;Patient/family training;Equipment eval/education; Bed mobility;Gait training; Compensatory technique education   Progress Progressing toward goals   PT Therapy Minutes   PT Time In 0830   PT Time Out 0930   PT Total Time (minutes) 60   PT Mode of treatment - Individual (minutes) 60   PT Mode of treatment - Concurrent (minutes) 0   PT Mode of treatment - Group (minutes) 0   PT Mode of treatment - Co-treat (minutes) 0   PT Mode of Treatment - Total time(minutes) 60 minutes   PT Cumulative Minutes 542   Therapy Time missed   Time missed?  No disheveled    Respiratory-  breathing comfortable on room air, no audible wheezing  Cardiovascular- capillary refills less than 2 seconds. No lower extremity edema is noted.   Psychiatric- alert and oriented ×3. Normal affect.    MSK and Neuro: -  There are no signs of infection around the injection sites.   full  active range of motion with flexion, lateral flexion, and rotation bilaterally.   There is full  active range of motion with lumbar extension.      Palpation:   No tenderness to palpation in midline at T1-T12 levels. No tenderness to palpation in the left and right of the midline T1-L5, NEGATIVE for tenderness to palpation to the para-midline region in the lower lumbar levels.  palpation over SI joint: negative bilaterally    palpation in hip or over the gluteus medius tendon insertion: negative bilaterally      Lumbar spine Special tests  Neuro tension  Straight leg test negative bilaterally    Slump test negative bilaterally      Key points for the international standards for neurological classification of spinal cord injury (ISNCSCI) to light touch.     Dermatome R L                                      L2 2 2   L3 2 2   L4 2 2   L5 2 2   S1 2 2   S2 2 2         Motor Exam Lower Extremities    ? Myotome R L   Hip flexion L2 5 5   Knee extension L3 5 5   Ankle dorsiflexion L4 5 5   Toe extension L5 5 5   Ankle plantarflexion S1 5 5             MEDICAL DECISION MAKING    DATA    Labs:   No results found for: SODIUM, POTASSIUM, CHLORIDE, CO2, GLUCOSE, BUN, CREATININE, BUNCREATRAT, GLOMRATE     No results found for: PROTHROMBTM, INR     No results found for: WBC, RBC, HEMOGLOBIN, HEMATOCRIT, MCV, MCH, MCHC, MPV, NEUTSPOLYS, LYMPHOCYTES, MONOCYTES, EOSINOPHILS, BASOPHILS, HYPOCHROMIA, ANISOCYTOSIS     No results found for: HBA1C       Imaging:   I personally reviewed following images    I reviewed the fluoroscopic images from 11/16/2020 showing successful left L5-S1 and S1 transforaminal epidural steroid  injection.  I reviewed these with the patient on 2020.    MRI lumbar spine dated 10/16/2020  At L5-S1 there is a large left paracentral disc herniation with impingement on the descending left S1 nerve root.  At L4-5 there is a small disc protrusion with mild left neuroforaminal stenosis.    I reviewed the following radiology reports               Results for orders placed in visit on 10/16/20   MR-LUMBAR SPINE-W/O                                                                                                                                                                                                      Results for orders placed during the hospital encounter of 20   DX-LUMBAR SPINE-2 OR 3 VIEWS    Impression No significant spondylosis. No acute fracture or listhesis.                                           DIAGNOSIS   Visit Diagnoses     ICD-10-CM   1. Chronic left-sided low back pain with left-sided sciatica  M54.42    G89.29   2. Impaired mobility and ADLs  Z74.09    Z78.9   3. Lumbar disc herniation  M51.26   4. Lumbar radiculopathy  M54.16         ASSESSMENT and PLAN:     José Luis Marinelli Bere  1974 female      José Luis Marinelli was seen today for follow-up.    Diagnoses and all orders for this visit:    Chronic left-sided low back pain with left-sided sciatica  -     cyclobenzaprine (FLEXERIL) 10 mg Tab; Take 1 Tab by mouth 3 times a day as needed.    Impaired mobility and ADLs  -     cyclobenzaprine (FLEXERIL) 10 mg Tab; Take 1 Tab by mouth 3 times a day as needed.    Lumbar disc herniation  -     cyclobenzaprine (FLEXERIL) 10 mg Tab; Take 1 Tab by mouth 3 times a day as needed.    Lumbar radiculopathy  -     cyclobenzaprine (FLEXERIL) 10 mg Tab; Take 1 Tab by mouth 3 times a day as needed.         We discussed updates to the patient's home exercise program.  She is tolerating her home exercise program and has no difficulty with ADLs.  She does get intermittent symptoms which are improved  with Flexeril.  She is using this as needed and has no significant side effects from the medication.      Follow up: 1 year as needed    Thank you for allowing me to participate in the care of this patient. If you have any questions please not hesitate to contact me.             Please note that this dictation was created using voice recognition software. I have made every reasonable attempt to correct obvious errors but there may be errors of grammar and content that I may have overlooked prior to finalization of this note.      Bro Mojica MD  Interventional Spine and Sports Physiatry  Physical Medicine and Rehabilitation  [facility]

## 2023-04-22 NOTE — NURSING NOTE
04/22/23 Patient compliant with wearing LSO when OOB to bathroom  Accucheck 67 before dinner  Given ice cream and ate 100% of meal  No complaints of hypoglycemia  Rings bell appropriately  Assist of 1 with RW and supervision to bathroom  Will continue to monitor and follow plan of care   Marshall García

## 2023-04-22 NOTE — PROGRESS NOTES
04/22/23 0932   Pain Assessment   Pain Assessment Tool 0-10   Pain Score 6   Pain Location/Orientation Orientation: Lower; Location: Back   Restrictions/Precautions   Precautions Bed/chair alarms;Cognitive; Fall Risk;Pain;Spinal precautions   Weight Bearing Restrictions No   ROM Restrictions   (spinal prec)   Braces or Orthoses LSO   Sit to Lying   Type of Assistance Needed Supervision;Verbal cues   Sit to Lying CARE Score 4   Sit to Stand   Type of Assistance Needed Supervision; Incidental touching   Sit to Stand CARE Score 4   Bed-Chair Transfer   Type of Assistance Needed Supervision; Incidental touching   Chair/Bed-to-Chair Transfer CARE Score 4   Exercise Tools   Other Exercise Tool 1 gripper with pegs B hands following retrieval of pegs from the floor using viola reacher in the RUE   Other Exercise Tool 2 Card match reaching with BEU to match card with pocket while seated   Other Comments   Assessment Pt participates in 45 minutes concurrent treatment focusing on BUE therex to increase UB strength/ activity toleracne with similar goals as another patient   Assessment   Treatment Assessment Pt presents walking to OT room with PTA and agreeable to OT session including BUE therex/ tehract  Pt tolerates session with LSO in place  Barriers include decreased balance, safety, endurance, strength/ ROM with spinal prec and LSO  Pt will benefit from continued skilled OT servcies to increase independence with daily tasks  Problem List Decreased strength;Decreased range of motion;Decreased endurance; Impaired balance;Decreased cognition;Decreased safety awareness;Orthopedic restrictions;Pain   Plan   Treatment/Interventions ADL retraining;Functional transfer training; Therapeutic exercise; Endurance training;Cognitive reorientation;Patient/family training;Equipment eval/education; Compensatory technique education   Progress Progressing toward goals   OT Therapy Minutes   OT Time In 0932   OT Time Out 1030   OT Total Time (minutes) 58   OT Mode of treatment - Individual (minutes) 13   OT Mode of treatment - Concurrent (minutes) 45   Therapy Time missed   Time missed?  No

## 2023-04-23 LAB
GLUCOSE SERPL-MCNC: 153 MG/DL (ref 65–140)
GLUCOSE SERPL-MCNC: 233 MG/DL (ref 65–140)
GLUCOSE SERPL-MCNC: 92 MG/DL (ref 65–140)

## 2023-04-23 RX ADMIN — CYANOCOBALAMIN TAB 500 MCG 1000 MCG: 500 TAB at 08:46

## 2023-04-23 RX ADMIN — METFORMIN HYDROCHLORIDE 500 MG: 500 TABLET ORAL at 08:47

## 2023-04-23 RX ADMIN — Medication 3 MG: at 21:10

## 2023-04-23 RX ADMIN — SENNOSIDES AND DOCUSATE SODIUM 1 TABLET: 50; 8.6 TABLET ORAL at 17:00

## 2023-04-23 RX ADMIN — METOPROLOL TARTRATE 25 MG: 25 TABLET, FILM COATED ORAL at 21:09

## 2023-04-23 RX ADMIN — CALCIUM 1 TABLET: 500 TABLET ORAL at 08:47

## 2023-04-23 RX ADMIN — DOCUSATE SODIUM 100 MG: 100 CAPSULE, LIQUID FILLED ORAL at 21:09

## 2023-04-23 RX ADMIN — SENNOSIDES AND DOCUSATE SODIUM 1 TABLET: 50; 8.6 TABLET ORAL at 08:46

## 2023-04-23 RX ADMIN — INSULIN LISPRO 1 UNITS: 100 INJECTION, SOLUTION INTRAVENOUS; SUBCUTANEOUS at 12:30

## 2023-04-23 RX ADMIN — LIDOCAINE 5% 1 PATCH: 700 PATCH TOPICAL at 08:47

## 2023-04-23 RX ADMIN — DOCUSATE SODIUM 100 MG: 100 CAPSULE, LIQUID FILLED ORAL at 08:46

## 2023-04-23 RX ADMIN — APIXABAN 2.5 MG: 2.5 TABLET, FILM COATED ORAL at 17:00

## 2023-04-23 RX ADMIN — Medication 1000 UNITS: at 08:47

## 2023-04-23 RX ADMIN — LISINOPRIL 5 MG: 5 TABLET ORAL at 08:46

## 2023-04-23 RX ADMIN — METOPROLOL TARTRATE 25 MG: 25 TABLET, FILM COATED ORAL at 08:47

## 2023-04-23 RX ADMIN — FAMOTIDINE 10 MG: 20 TABLET, FILM COATED ORAL at 08:46

## 2023-04-23 RX ADMIN — GLIPIZIDE 5 MG: 5 TABLET ORAL at 08:47

## 2023-04-23 RX ADMIN — APIXABAN 2.5 MG: 2.5 TABLET, FILM COATED ORAL at 08:46

## 2023-04-23 RX ADMIN — SULFAMETHOXAZOLE AND TRIMETHOPRIM 1 TABLET: 400; 80 TABLET ORAL at 09:10

## 2023-04-23 RX ADMIN — CALCIUM 1 TABLET: 500 TABLET ORAL at 16:58

## 2023-04-23 RX ADMIN — LEVOTHYROXINE SODIUM 25 MCG: 25 TABLET ORAL at 05:54

## 2023-04-23 RX ADMIN — INSULIN LISPRO 2 UNITS: 100 INJECTION, SOLUTION INTRAVENOUS; SUBCUTANEOUS at 16:57

## 2023-04-23 RX ADMIN — DICLOFENAC SODIUM 2 G: 10 GEL TOPICAL at 08:47

## 2023-04-23 RX ADMIN — Medication 500 MG: at 08:46

## 2023-04-23 RX ADMIN — SULFAMETHOXAZOLE AND TRIMETHOPRIM 1 TABLET: 400; 80 TABLET ORAL at 21:09

## 2023-04-23 RX ADMIN — METFORMIN HYDROCHLORIDE 500 MG: 500 TABLET ORAL at 16:58

## 2023-04-23 RX ADMIN — GLIPIZIDE 5 MG: 5 TABLET ORAL at 16:58

## 2023-04-23 RX ADMIN — PRAVASTATIN SODIUM 20 MG: 20 TABLET ORAL at 16:58

## 2023-04-23 NOTE — PROGRESS NOTES
04/23/23 1035   Pain Assessment   Pain Assessment Tool 0-10   Pain Score 5   Pain Location/Orientation Orientation: Lower; Location: Back;Orientation: Right;Location: Hip   Restrictions/Precautions   Precautions Bed/chair alarms; Fall Risk;Pain;Spinal precautions;Cognitive   Weight Bearing Restrictions No   ROM Restrictions   (spinal prec)   Braces or Orthoses LSO   Upper Body Dressing   Type of Assistance Needed Physical assistance   Physical Assistance Level 25% or less   Comment min A LSO seated EOB   Upper Body Dressing CARE Score 3   Putting On/Taking Off Footwear   Type of Assistance Needed Supervision   Putting On/Taking Off Footwear CARE Score 4   Lying to Sitting on Side of Bed   Type of Assistance Needed Supervision   Lying to Sitting on Side of Bed CARE Score 4   Sit to Stand   Type of Assistance Needed Supervision   Sit to Stand CARE Score 4   Bed-Chair Transfer   Type of Assistance Needed Supervision; Incidental touching   Chair/Bed-to-Chair Transfer CARE Score 4   Toileting Hygiene   Type of Assistance Needed Incidental touching   Toileting Hygiene CARE Score 4   Toileting   Able to Pull Clothing down yes, up yes  Manage Hygiene Bladder   Limitations Noted In Balance;Problem Solving;ROM;Safety;LE Strength  (spinal prec with LSO)   Adaptive Equipment Grab Bar   Toilet Transfer   Type of Assistance Needed Incidental touching;Supervision   Toilet Transfer CARE Score 4   Toilet Transfer   Surface Assessed Raised Toilet   Transfer Technique Stand Pivot   Limitations Noted In Balance; Endurance;Problem Solving;ROM;Safety;LE Strength  (spinal prec with LSO)   Adaptive Equipment Grab Bar   Exercise Tools   Other Exercise Tool 1 Sanderbox 2 sets 15 all directions with BUE and 2# wt   Other Exercise Tool 2 1# wt therex 2 sets 15 BUE including shoulder chest rpess, abd/ add; elbow flexion/ extension, supination/ pronation and wrist flexion/ extension   Coordination   Fine Motor small object retrieval from red sofia REDDING hands   Other Comments   Assessment Pt participates in 75 minutes concurrent treatment focusing on BUE therex and theract to increase UB strength and activity tolerance with similar goals as another patient   Assessment   Treatment Assessment Pt presents supine agreeable to OT session including BUE therex and theract  Pt tolerates session without complaints and is making gain towards goals  Pt requires assist and supervision due to decreased balance, safety, endurance, cogniton and strength/ ROM with spinal prec and LSO  Pt will benefit from continued skilled OT services to increase independence with daily tasks  Problem List Decreased strength;Decreased range of motion;Decreased endurance; Impaired balance;Decreased cognition;Decreased safety awareness;Orthopedic restrictions;Pain  (spinal prec with LSO)   Plan   Treatment/Interventions ADL retraining;Functional transfer training; Therapeutic exercise; Endurance training;Cognitive reorientation;Patient/family training;Equipment eval/education; Compensatory technique education   Progress Progressing toward goals   OT Therapy Minutes   OT Time In 1035   OT Time Out 1200   OT Total Time (minutes) 85   OT Mode of treatment - Individual (minutes) 10   OT Mode of treatment - Concurrent (minutes) 75   Therapy Time missed   Time missed?  No

## 2023-04-23 NOTE — NURSING NOTE
Patient awake and alert  OOB with assist of 1 with RW  Continent of bladder this shift  Voiding qS  Assisted with ADLS  Compliant with use of LSO brace  Offers complaints of mild pain in lower back 2/10 but declined offerings for pain medications  Skin warm and dry   Respirations easy on room air  Able to verbalize needs effectively  Call bell in reach at bedside

## 2023-04-24 LAB
GLUCOSE SERPL-MCNC: 124 MG/DL (ref 65–140)
GLUCOSE SERPL-MCNC: 180 MG/DL (ref 65–140)
GLUCOSE SERPL-MCNC: 216 MG/DL (ref 65–140)

## 2023-04-24 RX ORDER — BISACODYL 10 MG
10 SUPPOSITORY, RECTAL RECTAL DAILY PRN
Status: DISCONTINUED | OUTPATIENT
Start: 2023-04-24 | End: 2023-04-27 | Stop reason: HOSPADM

## 2023-04-24 RX ADMIN — APIXABAN 2.5 MG: 2.5 TABLET, FILM COATED ORAL at 17:27

## 2023-04-24 RX ADMIN — DOCUSATE SODIUM 100 MG: 100 CAPSULE, LIQUID FILLED ORAL at 08:55

## 2023-04-24 RX ADMIN — PRAVASTATIN SODIUM 20 MG: 20 TABLET ORAL at 17:27

## 2023-04-24 RX ADMIN — ACETAMINOPHEN 650 MG: 325 TABLET ORAL at 21:01

## 2023-04-24 RX ADMIN — SENNOSIDES AND DOCUSATE SODIUM 1 TABLET: 50; 8.6 TABLET ORAL at 08:54

## 2023-04-24 RX ADMIN — DOCUSATE SODIUM 100 MG: 100 CAPSULE, LIQUID FILLED ORAL at 21:01

## 2023-04-24 RX ADMIN — LIDOCAINE 5% 1 PATCH: 700 PATCH TOPICAL at 09:02

## 2023-04-24 RX ADMIN — LEVOTHYROXINE SODIUM 25 MCG: 25 TABLET ORAL at 05:30

## 2023-04-24 RX ADMIN — METOPROLOL TARTRATE 25 MG: 25 TABLET, FILM COATED ORAL at 08:54

## 2023-04-24 RX ADMIN — LISINOPRIL 5 MG: 5 TABLET ORAL at 08:55

## 2023-04-24 RX ADMIN — INSULIN LISPRO 1 UNITS: 100 INJECTION, SOLUTION INTRAVENOUS; SUBCUTANEOUS at 12:15

## 2023-04-24 RX ADMIN — POLYETHYLENE GLYCOL 3350 17 G: 17 POWDER, FOR SOLUTION ORAL at 08:53

## 2023-04-24 RX ADMIN — FAMOTIDINE 10 MG: 20 TABLET, FILM COATED ORAL at 08:54

## 2023-04-24 RX ADMIN — Medication 1000 UNITS: at 08:55

## 2023-04-24 RX ADMIN — GLIPIZIDE 5 MG: 5 TABLET ORAL at 09:00

## 2023-04-24 RX ADMIN — CALCIUM 1 TABLET: 500 TABLET ORAL at 17:27

## 2023-04-24 RX ADMIN — Medication 500 MG: at 08:55

## 2023-04-24 RX ADMIN — APIXABAN 2.5 MG: 2.5 TABLET, FILM COATED ORAL at 08:55

## 2023-04-24 RX ADMIN — METFORMIN HYDROCHLORIDE 500 MG: 500 TABLET ORAL at 09:00

## 2023-04-24 RX ADMIN — CALCIUM 1 TABLET: 500 TABLET ORAL at 08:55

## 2023-04-24 RX ADMIN — GLIPIZIDE 5 MG: 5 TABLET ORAL at 17:27

## 2023-04-24 RX ADMIN — Medication 3 MG: at 21:01

## 2023-04-24 RX ADMIN — CYANOCOBALAMIN TAB 500 MCG 1000 MCG: 500 TAB at 08:55

## 2023-04-24 RX ADMIN — METFORMIN HYDROCHLORIDE 500 MG: 500 TABLET ORAL at 17:26

## 2023-04-24 RX ADMIN — SENNOSIDES AND DOCUSATE SODIUM 1 TABLET: 50; 8.6 TABLET ORAL at 17:27

## 2023-04-24 RX ADMIN — INSULIN LISPRO 1 UNITS: 100 INJECTION, SOLUTION INTRAVENOUS; SUBCUTANEOUS at 17:26

## 2023-04-24 NOTE — PROGRESS NOTES
"PM&R PROGRESS NOTE:  Lambert Moreno 80 y o  female MRN: [id number]  Unit/Bed#: -01 Encounter: [id number]        Rehabilitation Diagnosis: Impairment of mobility, safety and Activities of Daily Living (ADLs) due to Orthopedic Disorders:  08 9  Other Orthopedic L3 compression fracture    HPI: Lambert Moreno is a 80 y o  female with a PMH significant for T2DM, HTN, Chronic a-fib and CKD who presented to the [address] on 04/08/23 s/p a fall at home  She fell backwards and landed on her neck with questionable head strike  CTH with no acute intracranial abnormality  CT spine cervical with no cervical spine fracture or traumatic malalignment  CT C/A/P with acute mild inferior endplate L3 compression fracture without central canal compromise or posterior element involvement  Incidentally found to have a presumed bronchogenic malignancy at the right hilum and multifocal bladder tumors  A lumbar spine brace was ordered  Found to have encephalopathy  Chest xray without acute findings, she was afebrile with no leukocytosis  UA negative         SUBJECTIVE: Patient seen and evaluated  C/o back discomfort  Notes some difficulty falling asleep due to discomfort from the back  She offered no further complaints just stated, \"I want to go home  \"    ASSESSMENT: Stable, progressing      PLAN:    Rehabilitation  • Functional deficits:  Impaired mobility, self care, BLE weakness, back pain, LSO   • Continue current rehabilitation plan of care to maximize function      • Functional update:   o PT:   Lying to sitting on side of bed: supervision  Sit-stand: cg   Bed-chair transfer: cg  Ambulation: cg  Stairs: cg  o OT:  Oral Hygiene: mao cu   Grooming: mao   Showering/bathing: supervision   UB dressing: cg  LB dressing: supervision   Putting on/taking off footwear: supervision   Picking up object: supervision  Toileting hygiene: supervision  Toilet transfer: supervision  o ST:   Comprehension: mild difficulty with " complex/abstract info  Expression: expresses complex/abstract but requires more time  Social interaction: appropriate but requires extra time  Problem solving: solves basic problems 90% of time  Memory: recalls/performs requests 90% of time       • Estimated Discharge: 4/27/23 with Carlton Nava PT/OT/ST/N/A    Pain  • Tylenol 650 mg Q6H PRN for mild pain  • Lidocaine patch Q12H Daily     DVT prophylaxis  • Eliquis 2 5 mg BID    Bladder plan  • Continent     Bowel plan  • Constipated   • Last BM 4/21/23  • Continue colace and Senna BID  • Miralax daily PRN  • Dulcolax rectal suppository daily PRN     Skin care  · Monitor skin daily  · Apply skin nourishing cream  · Reposition Q2H to offload pressure  · Elevate heels off bed   · Hydraguard to b/l heels and lower buttocks BID and PRN  · Stage 2 right upper buttocks: Allevyn life silicone bordered dressing to the sacrum-upper buttocks   Check daily, reapply and change Q3days or PRN      UTI (urinary tract infection)  Assessment & Plan  · UA obtained while in acute care  · Culture resulted over the weekend- >100,000 cfu/ml Enterobacter cloacae, <10,000 cfu/ml proteus species   · Bactrim completed       Lung mass  Assessment & Plan  · Incidentally found on CT imaging : presumed bronchogenic malignancy at the right hilum and multofocal bladder tumors which should also be regarded as malignancy until proven otherwise   · Oncology consulted while in acute care  · Further management to be done as an outpatient per family preference   · Oncology would recommend cystoscopy with Urology to obtain tissue; IR lung biopsy, Outpatient PET CT  · Arrangements to be made via Oncology for a workup in approximately 2 weeks    Malignant neoplasm of overlapping sites of bladder [facility])  Assessment & Plan  · Known history of bladder cancer  · Follows with Dr Susanne Chanel   · Last testing done by Urology 5/2021  · Recommend following up as an outpatient with Urology and Oncology    Type 2 diabetes mellitus with stage 3a chronic kidney disease [facility])  Assessment & Plan  Lab Results   Component Value Date    HGBA1C 7 0 (H) 10/10/2022       Recent Labs     04/23/23  1148 04/23/23  1652 04/24/23  0743 04/24/23  1117   POCGLU 153* 233* 124 216*       Blood Sugar Average: Last 72 hrs:  · (P) 597 [id number]   · Monitor accu cheks  · Continue SSI  · Continue home Glipizide 5 mg BID and Metformin 500 mg BID   · Monitor and adjust regimen as needed  · Carb controlled diet     Essential hypertension  Assessment & Plan  · Monitor vitals  · Continue Metoprolol 25 mg Q12H, Lisinopril 5 mg daily    Chronic a-fib (HCC)  Assessment & Plan  · Continue Metoprolol 25 mg Q12H [address] for rate control  · Continue Eliquis 2 5 mg BID    * Compression fracture of L3 vertebra (HCC)  Assessment & Plan  · S/p fall at home; unclear if head strike   · CTH: No acute intracranial abnormality  · CT C/A/P: acute mild inferior endplate L3 compression fracture without central canal compromise or posterior element involvement  No other acute traumatic injuries seen  · Lumbar spine brace to be worn with ambulation, in upright position ; unclear timeline  Likely 4-6 weeks  · Acute chomprehensive interdisciplinary inpatient rehabilitation to include intensive skilled therapies as outlines with oversight and management by a rehabilitation Physician Assistant overseen by rehabilitation physician as well as inpatient rehabilitation nursing, case management and weekly interdisciplinary team meeting        Appreciate IM consultants medical co-management  Labs, medications, and imaging personally reviewed  ROS:  A ten point review of systems was completed and pertinent positives are listed in subjective section  All other systems reviewed were negative  Review of Systems   Constitutional: Negative  HENT: Negative  Respiratory: Negative  Negative for shortness of breath  Cardiovascular: Negative  Negative for chest pain     Gastrointestinal: "Negative  Negative for abdominal pain and constipation  Genitourinary: Negative  Negative for difficulty urinating  Musculoskeletal: Positive for back pain  Neurological: Negative  Psychiatric/Behavioral: Negative  OBJECTIVE:   /65 (BP Location: Left arm)   Pulse 70   Temp 98 °F (36 7 °C) (Temporal)   Resp 16   Ht 5' 4\" (1 626 m)   Wt 60 5 kg (133 lb 6 1 oz)   SpO2 96%   BMI 22 89 kg/m²     Physical Exam  Vitals reviewed  Constitutional:       General: She is not in acute distress  Appearance: She is not ill-appearing  HENT:      Head: Normocephalic and atraumatic  Eyes:      Pupils: Pupils are equal, round, and reactive to light  Cardiovascular:      Rate and Rhythm: Normal rate and regular rhythm  Pulses: Normal pulses  Heart sounds: Normal heart sounds  No murmur heard  Pulmonary:      Effort: Pulmonary effort is normal  No respiratory distress  Breath sounds: Normal breath sounds  Abdominal:      General: Bowel sounds are normal  There is no distension  Palpations: Abdomen is soft  Musculoskeletal:      Right lower leg: No edema  Left lower leg: No edema  Skin:     General: Skin is warm and dry  Neurological:      General: No focal deficit present  Mental Status: She is alert and oriented to person, place, and time     Psychiatric:         Mood and Affect: Mood normal          Behavior: Behavior normal           Lab Results   Component Value Date    WBC 7 67 04/14/2023    HGB 14 5 04/14/2023    HCT 45 4 04/14/2023    MCV 99 (H) 04/14/2023     04/14/2023     Lab Results   Component Value Date    SODIUM 139 04/14/2023    K 3 8 04/14/2023     04/14/2023    CO2 28 04/14/2023    BUN 33 (H) 04/14/2023    CREATININE 1 17 04/14/2023    GLUC 91 04/14/2023    CALCIUM 9 3 04/14/2023     Lab Results   Component Value Date    INR 0 99 04/08/2023    INR 1 02 04/18/2022    INR 1 68 (H) 07/13/2021    PROTIME 13 1 04/08/2023    PROTIME " 13 3 04/18/2022    PROTIME 19 7 (H) 07/13/2021           Current Facility-Administered Medications:   •  acetaminophen (TYLENOL) tablet 650 mg, 650 mg, Oral, Q6H PRN, Corinne Ishihara, MD, 650 mg at 04/14/23 2100  •  aluminum-magnesium hydroxide-simethicone (MYLANTA) oral suspension 30 mL, 30 mL, Oral, Q4H PRN, Corinne Ishihara, MD  •  apixaban Craige Pian) tablet 2 5 mg, 2 5 mg, Oral, BID, Corinne Ishihara, MD, 2 5 mg at 04/24/23 1773  •  bisacodyl (DULCOLAX) rectal suppository 10 mg, 10 mg, Rectal, Daily PRN, Corinne Ishihara, MD  •  calcium carbonate (OYSTER SHELL,OSCAL) 500 mg tablet 1 tablet, 1 tablet, Oral, BID With Meals, Corinne Ishihara, MD, 1 tablet at 04/24/23 4945  •  cholecalciferol (VITAMIN D3) tablet 1,000 Units, 1,000 Units, Oral, Daily, Corinne Ishihara, MD, 1,000 Units at 04/24/23 2680  •  cyanocobalamin (VITAMIN B-12) tablet 1,000 mcg, 1,000 mcg, Oral, Daily, Corinne Ishihara, MD, 1,000 mcg at 04/24/23 3685  •  Diclofenac Sodium (VOLTAREN) 1 % topical gel 2 g, 2 g, Topical, 4x Daily, Earma Imus, PA-C, 2 g at 04/23/23 2980  •  docusate sodium (COLACE) capsule 100 mg, 100 mg, Oral, BID, Corinne Ishihara, MD, 100 mg at 04/24/23 7031  •  famotidine (PEPCID) tablet 10 mg, 10 mg, Oral, Daily, Corinne Ishihara, MD, 10 mg at 04/24/23 0854  •  glipiZIDE (GLUCOTROL) tablet 5 mg, 5 mg, Oral, BID AC, Earma Imus, PA-C, 5 mg at 04/24/23 0900  •  insulin lispro (HumaLOG) 100 units/mL subcutaneous injection 1-5 Units, 1-5 Units, Subcutaneous, TID AC, 1 Units at 04/24/23 1215 **AND** Fingerstick Glucose (POCT), , , TID AC, Hallie Núñez PA-C  •  levothyroxine tablet 25 mcg, 25 mcg, Oral, Daily, Corinne Ishihara, MD, 25 mcg at 04/24/23 0530  •  lidocaine (LIDODERM) 5 % patch 1 patch, 1 patch, Topical, Daily, Corinne Ishihara, MD, 1 patch at 04/24/23 0902  •  lisinopril (ZESTRIL) tablet 5 mg, 5 mg, Oral, Daily, Corinne Ishihara, MD, 5 mg at 04/24/23 9639  •  magnesium gluconate (MAGONATE) tablet 500 mg, 500 mg, Oral, Daily, Corinne Ishihara, MD, 500 mg at 04/24/23 0855  •  melatonin tablet 3 mg, 3 mg, Oral, HS, Delfino Glass MD, 3 mg at 04/23/23 2110  •  metFORMIN (GLUCOPHAGE) tablet 500 mg, 500 mg, Oral, BID With Meals, Elias Fernando PA-C, 500 mg at 04/24/23 0900  •  metoprolol tartrate (LOPRESSOR) tablet 25 mg, 25 mg, Oral, Q12H, Delfino Glass MD, 25 mg at 04/24/23 0854  •  ondansetron (ZOFRAN-ODT) dispersible tablet 4 mg, 4 mg, Oral, Q6H PRN, Delfino Glass MD  •  oxyCODONE (ROXICODONE) split tablet 2 5 mg, 2 5 mg, Oral, Q4H PRN, 2 5 mg at 04/16/23 1636 **OR** oxyCODONE (ROXICODONE) IR tablet 5 mg, 5 mg, Oral, Q4H PRN, Driss Guerrero PA-C, 5 mg at 04/21/23 0559  •  polyethylene glycol (MIRALAX) packet 17 g, 17 g, Oral, Daily PRN, Elias Fernando PA-C, 17 g at 04/24/23 8934  •  pravastatin (PRAVACHOL) tablet 20 mg, 20 mg, Oral, Daily With Chilango Tran MD, 20 mg at 04/23/23 1658  •  senna-docusate sodium (SENOKOT S) 8 6-50 mg per tablet 1 tablet, 1 tablet, Oral, BID, Elias Fernando PA-C, 1 tablet at 04/24/23 8642    Past Medical History:   Diagnosis Date   • Abnormal gait    • Accidental fall from chair, subsequent encounter    • Atrial fibrillation (HCC)    • Benign essential hypertension    • Bite of animal    • Cataract    • Cellulitis    • Chronic kidney disease    • Chronic kidney disease, stage 3 (HCC)    • Colon cancer (HCC)    • Colon polyp    • Current tear of lateral cartilage or meniscus of knee    • Diabetes mellitus ([facility] )    • Disease of thyroid gland    • Disorder of magnesium metabolism    • Dvt femoral (deep venous thrombosis)    • Embolism from thrombosis of vein of distal end of lower extremity ([facility] )    • Essential hypertension    • Fall    • Hyperlipidemia    • Hyperlipidemia    • Hypertension    • Hypothyroidism    • Insomnia    • Kidney stone    • Knee joint effusion    • Leukocytosis    • Malaise and fatigue    • MI (myocardial infarction) ([facility] )     Hospitalization    • Orbital hemorrhage    • Other sleep disorders    • PAF (paroxysmal atrial fibrillation) (HCC)    • Presence of vena cava filter    • Pulmonary embolism (HCC)    • Tear film insufficiency    • Type 2 diabetes mellitus (HCC)    • Unspecified osteoarthritis, unspecified site    • Weight decreased        Patient Active Problem List    Diagnosis Date Noted   • UTI (urinary tract infection) 04/17/2023   • Lung mass 04/10/2023   • Encephalopathy 04/10/2023   • Fall at home 04/08/2023   • Compression fracture of L3 vertebra ([place] [id number] ) 04/08/2023   • Stage 3b chronic kidney disease ([place] [id number] ) 12/21/2022   • Loss of weight 12/21/2022   • Malignant neoplasm of overlapping sites of bladder ([place] [id number] ) 10/10/2022   • Hypocalcemia 06/29/2021   • Other pulmonary embolism without acute cor pulmonale ([place] [id number] ) 04/21/2021   • Bladder mass 03/31/2021   • Compression fracture of thoracic spine, non-traumatic, sequela 02/28/2020   • Stage 3a chronic kidney disease ([place] [id number] ) 02/28/2020   • Essential hypertension 06/05/2019   • History of pulmonary embolism 06/05/2019   • Osteoarthritis of knee 05/01/2018   • Hypercoagulable state, primary ([place] [id number] ) 03/27/2017   • Chronic a-fib ([place] [id number] ) 02/20/2016   • Type 2 diabetes mellitus with stage 3a chronic kidney disease ([place] [id number] ) 02/20/2016          Wellington Wagner PA-C    I have spent a total time of 35 minutes on 04/24/23 in caring for this patient including Instructions for management, Patient and family education, Impressions, Counseling / Coordination of care, Documenting in the medical record, Reviewing / ordering tests, medicine, procedures  , Obtaining or reviewing history   and Communicating with other healthcare professionals   ** Please Note:  voice to text software may have been used in the creation of this document   Although proof errors in transcription or interpretation are a potential of such software**

## 2023-04-24 NOTE — PLAN OF CARE
Problem: PAIN - ADULT  Goal: Verbalizes/displays adequate comfort level or baseline comfort level  Description: Interventions:  - Encourage patient to monitor pain and request assistance  - Assess pain using appropriate pain scale  - Administer analgesics based on type and severity of pain and evaluate response  - Implement non-pharmacological measures as appropriate and evaluate response  - Consider cultural and social influences on pain and pain management  - Notify physician/advanced practitioner if interventions unsuccessful or patient reports new pain  Outcome: Progressing     Problem: INFECTION - ADULT  Goal: Absence or prevention of progression during hospitalization  Description: INTERVENTIONS:  - Assess and monitor for signs and symptoms of infection  - Monitor lab/diagnostic results  - Monitor all insertion sites, i e  indwelling lines, tubes, and drains  - Monitor endotracheal if appropriate and nasal secretions for changes in amount and color  - [place] appropriate cooling/warming therapies per order  - Administer medications as ordered  - Instruct and encourage patient and family to use good hand hygiene technique  - Identify and instruct in appropriate isolation precautions for identified infection/condition  Outcome: Progressing     Problem: SAFETY ADULT  Goal: Patient will remain free of falls  Description: INTERVENTIONS:  - Educate patient/family on patient safety including physical limitations  - Instruct patient to call for assistance with activity   - Consult OT/PT to assist with strengthening/mobility   - Keep Call bell within reach  - Keep bed low and locked with side rails adjusted as appropriate  - Keep care items and personal belongings within reach  - Initiate and maintain comfort rounds  - Make Fall Risk Sign visible to staff  - Offer Toileting every 2 Hours, in advance of need  - Initiate/Maintain bed/chair alarm  - Apply yellow socks and bracelet for high fall risk patients  - Consider moving patient to room near nurses station  Outcome: Progressing     Problem: Knowledge Deficit  Goal: Patient/family/caregiver demonstrates understanding of disease process, treatment plan, medications, and discharge instructions  Description: Complete learning assessment and assess knowledge base  Interventions:  - Provide teaching at level of understanding  - Provide teaching via preferred learning methods  Outcome: Progressing     Problem: Prexisting or High Potential for Compromised Skin Integrity  Goal: Skin integrity is maintained or improved  Description: INTERVENTIONS:  - Identify patients at risk for skin breakdown  - Assess and monitor skin integrity  - Assess and monitor nutrition and hydration status  - Monitor labs   - Assess for incontinence   - Turn and reposition patient  - Assist with mobility/ambulation  - Relieve pressure over bony prominences  - Avoid friction and shearing  - Provide appropriate hygiene as needed including keeping skin clean and dry  - Evaluate need for skin moisturizer/barrier cream  - Collaborate with interdisciplinary team   - Patient/family teaching  - Consider wound care consult   Outcome: Progressing     Problem: Nutrition/Hydration-ADULT  Goal: Nutrient/Hydration intake appropriate for improving, restoring or maintaining nutritional needs  Description: Monitor and assess patient's nutrition/hydration status for malnutrition  Collaborate with interdisciplinary team and initiate plan and interventions as ordered  Monitor patient's weight and dietary intake as ordered or per policy  Utilize nutrition screening tool and intervene as necessary  Determine patient's food preferences and provide high-protein, high-caloric foods as appropriate       INTERVENTIONS:  - Monitor oral intake, urinary output, labs, and treatment plans  - Assess nutrition and hydration status and recommend course of action  - Evaluate amount of meals eaten  - Assist patient with eating if necessary   - Allow adequate time for meals  - Recommend/ encourage appropriate diets, oral nutritional supplements, and vitamin/mineral supplements  - Order, calculate, and assess calorie counts as needed  - Recommend, monitor, and adjust tube feedings and TPN/PPN based on assessed needs  - Assess need for intravenous fluids  - Provide specific nutrition/hydration education as appropriate  - Include patient/family/caregiver in decisions related to nutrition  Outcome: Progressing

## 2023-04-24 NOTE — PROGRESS NOTES
"   04/24/23 1230   Pain Assessment   Pain Assessment Tool 0-10   Pain Score 3   Pain Location/Orientation Location: Back   Restrictions/Precautions   Precautions Bed/chair alarms;Cognitive; Fall Risk;Pain   Weight Bearing Restrictions No   Braces or Orthoses LSO   Cognition   Overall Cognitive Status Impaired   Arousal/Participation Alert; Responsive; Cooperative   Attention Attends with cues to redirect   Orientation Level Oriented X4   Memory Decreased recall of precautions;Decreased recall of recent events;Decreased short term memory   Following Commands Follows one step commands without difficulty   Subjective   Subjective \"These pierogies were too greasy\"   Roll Left and Right   Type of Assistance Needed Independent   Physical Assistance Level No physical assistance   Roll Left and Right CARE Score 6   Sit to Lying   Type of Assistance Needed Supervision   Physical Assistance Level No physical assistance   Sit to Lying CARE Score 4   Lying to Sitting on Side of Bed   Comment Pt OOB   Sit to Stand   Type of Assistance Needed Incidental touching;Verbal cues   Physical Assistance Level 25% or less   Comment Cl S / CGA RW; increased time needed; VC for hand placement   Sit to Stand CARE Score 3   Bed-Chair Transfer   Type of Assistance Needed Incidental touching;Verbal cues   Physical Assistance Level 25% or less   Comment Cl S / CGA RW; increased time needed; VC for hand placement   Chair/Bed-to-Chair Transfer CARE Score 3   Car Transfer   Reason if not Attempted Environmental limitations   Car Transfer CARE Score 10   Walk 10 Feet   Type of Assistance Needed Supervision;Verbal cues   Physical Assistance Level No physical assistance   Comment VC for upright posture and staying within frame of RW   Walk 10 Feet CARE Score 4   Walk 50 Feet with Two Turns   Type of Assistance Needed Supervision;Verbal cues   Physical Assistance Level No physical assistance   Comment VC for upright posture and staying within frame of RW " Walk 50 Feet with Two Turns CARE Score 4   Walk 150 Feet   Type of Assistance Needed Incidental touching   Physical Assistance Level 25% or less   Comment CGA RW 2/2 fatigue   Walk 150 Feet CARE Score 3   Walking 10 Feet on Uneven Surfaces   Type of Assistance Needed Incidental touching   Physical Assistance Level 25% or less   Comment VC for RW use over green mat   Walking 10 Feet on Uneven Surfaces CARE Score 3   Ambulation   Primary Mode of Locomotion Prior to Admission Walk   Distance Walked (feet) 236 ft   Assist Device Roller Walker   Gait Pattern Inconsistant Bibi; Slow Bibi;Decreased foot clearance; Forward Flexion;L knee hyperextension;Narrow ZARA; Decreased R stance; Improper weight shift   Limitations Noted In Balance;Device Management; Endurance; Heel Strike;Posture; Safety;Speed;Strength   Provided Assistance with: Balance;Direction   Walk Assist Level Close Supervision   Findings Cl S RW <150'; Increased R knee pain and L knee hyperextension >150' 2/2 fatigue   Does the patient walk? 2  Yes   Wheelchair mobility   Does the patient use a wheelchair? 0   No   Findings N/A   Curb or Single Stair   Style negotiated Single stair   Type of Assistance Needed Incidental touching   Physical Assistance Level 25% or less   Comment CGA up/down 14 steps with B/L HR   1 Step (Curb) CARE Score 3   4 Steps   Type of Assistance Needed Incidental touching   Physical Assistance Level 25% or less   Comment CGA up/down 14 steps with B/L HR   4 Steps CARE Score 3   12 Steps   Type of Assistance Needed Incidental touching   Physical Assistance Level 25% or less   Comment CGA up/down 14 steps with B/L HR   12 Steps CARE Score 3   Stairs   Type Stairs   # of Steps 14   Assist Devices Bilateral Rail   Picking Up Object   Comment Not performed this session   Toilet Transfer   Comment Not performed this session   Therapeutic Interventions   Strengthening Seated LE TE   Other Gait training, transfer training, stair training, ramp negotiation   Assessment   Treatment Assessment Pt agreeable to PT this PM, received sitting upright in w/c  Pt amb 236' at Cl S level, demonstrating L knee hyperextension towards the end 2/2 fatigue  Pt able to negotiate up/down ramp at Cl S with RW, requiring VC for sequencing  Pt continuing to require VC for safety with transfers, such as hand placement and anterior weight shift when performing sit to stand  Will continue with current PT POC to improve deficits and promote return to PLOF  Problem List Decreased strength;Decreased range of motion;Decreased endurance; Impaired balance;Decreased safety awareness;Pain;Orthopedic restrictions   PT Barriers   Physical Impairment Decreased strength;Decreased range of motion;Decreased endurance; Impaired balance;Decreased mobility; Decreased cognition;Decreased safety awareness;Orthopedic restrictions;Pain   Functional Limitation Walking;Transfers;Standing;Stair negotiation   Plan   Treatment/Interventions Functional transfer training;LE strengthening/ROM; Therapeutic exercise; Endurance training;Cognitive reorientation;Patient/family training;Equipment eval/education; Bed mobility;Gait training   Progress Progressing toward goals   PT Therapy Minutes   PT Time In 1230   PT Time Out 1400   PT Total Time (minutes) 90   PT Mode of treatment - Individual (minutes) 65   PT Mode of treatment - Concurrent (minutes) 25   PT Mode of treatment - Group (minutes) 0   PT Mode of treatment - Co-treat (minutes) 0   PT Mode of Treatment - Total time(minutes) 90 minutes   PT Cumulative Minutes 632     Patient remains OOB in chair, all needs in reach  Alarm in place and activated  Encouraged use of call bell, patient verbalizes understanding

## 2023-04-24 NOTE — PROGRESS NOTES
[facility] DAILY TREATMENT NOTE    04/24/23 1100   Pain Assessment   Pain Assessment Tool 0-10   Pain Score 4   Pain Location/Orientation Location: Back   Restrictions/Precautions   Precautions Bed/chair alarms;Cognitive; Fall Risk;Pain   Comprehension   Assist Devices Glasses   Comprehension (FIM) 6 - Has only MILD difficulty with complex/abstract info   Expression   Expression (FIM) 6 - Expresses complex/abstract but requires:  more time   Social Interaction   Social Interaction (FIM) 6 - Interacts appropriately with others BUT requires extra  time   Problem Solving   Problem solving (FIM) 5 - Solves basic problems 90% of time   Memory   Memory (FIM) 5 - Recalls/performs request 90% of time   Speech/Swallow Mechanism Exam   Vocal Quality Overall improved vocal quality  Onset of hoarse vocal quality during prolonged conversation  Pt no longer fading into a wisper during conversation, able to sustain adequate volume in conversation   Speech/Language/Cognition Assessmetn   Treatment Assessment repeated activity from friday  alternate sorting color/shape 1/2 deck of cards max cue initially decreased to min-mod cue by end of activity completed in 5:00  reading comprehension pertaining to unfamiliar medication label completed @6/7 questions answered correctly     Eating   Type of Assistance Needed Independent   Physical Assistance Level No physical assistance   Eating CARE Score 6   SLP Therapy Minutes   SLP Time In 1100   SLP Time Out 1130   SLP Total Time (minutes) 30   SLP Mode of treatment - Individual (minutes) 30   SLP Mode of treatment - Concurrent (minutes) 0   SLP Mode of treatment - Group (minutes) 0   SLP Mode of treatment - Co-treat (minutes) 0   SLP Mode of Treatment - Total time(minutes) 30 minutes   SLP Cumulative Minutes 330

## 2023-04-25 LAB
ANION GAP SERPL CALCULATED.3IONS-SCNC: 6 MMOL/L (ref 4–13)
BASOPHILS # BLD AUTO: 0.05 THOUSANDS/ÂΜL (ref 0–0.1)
BASOPHILS NFR BLD AUTO: 1 % (ref 0–1)
BUN SERPL-MCNC: 49 MG/DL (ref 5–25)
CALCIUM SERPL-MCNC: 10.2 MG/DL (ref 8.4–10.2)
CHLORIDE SERPL-SCNC: 99 MMOL/L (ref 96–108)
CO2 SERPL-SCNC: 30 MMOL/L (ref 21–32)
CREAT SERPL-MCNC: 1.83 MG/DL (ref 0.6–1.3)
EOSINOPHIL # BLD AUTO: 0.11 THOUSAND/ÂΜL (ref 0–0.61)
EOSINOPHIL NFR BLD AUTO: 2 % (ref 0–6)
ERYTHROCYTE [DISTWIDTH] IN BLOOD BY AUTOMATED COUNT: 14.1 % (ref 11.6–15.1)
GFR SERPL CREATININE-BSD FRML MDRD: 24 ML/MIN/1.73SQ M
GLUCOSE P FAST SERPL-MCNC: 97 MG/DL (ref 65–99)
GLUCOSE SERPL-MCNC: 130 MG/DL (ref 65–140)
GLUCOSE SERPL-MCNC: 137 MG/DL (ref 65–140)
GLUCOSE SERPL-MCNC: 91 MG/DL (ref 65–140)
GLUCOSE SERPL-MCNC: 97 MG/DL (ref 65–140)
HCT VFR BLD AUTO: 45.1 % (ref 34.8–46.1)
HGB BLD-MCNC: 14.2 G/DL (ref 11.5–15.4)
IMM GRANULOCYTES # BLD AUTO: 0.02 THOUSAND/UL (ref 0–0.2)
IMM GRANULOCYTES NFR BLD AUTO: 0 % (ref 0–2)
LYMPHOCYTES # BLD AUTO: 1.7 THOUSANDS/ÂΜL (ref 0.6–4.47)
LYMPHOCYTES NFR BLD AUTO: 23 % (ref 14–44)
MCH RBC QN AUTO: 31.6 PG (ref 26.8–34.3)
MCHC RBC AUTO-ENTMCNC: 31.5 G/DL (ref 31.4–37.4)
MCV RBC AUTO: 100 FL (ref 82–98)
MONOCYTES # BLD AUTO: 0.62 THOUSAND/ÂΜL (ref 0.17–1.22)
MONOCYTES NFR BLD AUTO: 9 % (ref 4–12)
NEUTROPHILS # BLD AUTO: 4.79 THOUSANDS/ÂΜL (ref 1.85–7.62)
NEUTS SEG NFR BLD AUTO: 65 % (ref 43–75)
NRBC BLD AUTO-RTO: 0 /100 WBCS
PLATELET # BLD AUTO: 211 THOUSANDS/UL (ref 149–390)
PMV BLD AUTO: 12.5 FL (ref 8.9–12.7)
POTASSIUM SERPL-SCNC: 4.7 MMOL/L (ref 3.5–5.3)
RBC # BLD AUTO: 4.49 MILLION/UL (ref 3.81–5.12)
SODIUM SERPL-SCNC: 135 MMOL/L (ref 135–147)
WBC # BLD AUTO: 7.29 THOUSAND/UL (ref 4.31–10.16)

## 2023-04-25 RX ORDER — POLYETHYLENE GLYCOL 3350 17 G/17G
17 POWDER, FOR SOLUTION ORAL DAILY
Status: DISCONTINUED | OUTPATIENT
Start: 2023-04-26 | End: 2023-04-27 | Stop reason: HOSPADM

## 2023-04-25 RX ADMIN — GLIPIZIDE 5 MG: 5 TABLET ORAL at 17:11

## 2023-04-25 RX ADMIN — APIXABAN 2.5 MG: 2.5 TABLET, FILM COATED ORAL at 17:10

## 2023-04-25 RX ADMIN — DOCUSATE SODIUM 100 MG: 100 CAPSULE, LIQUID FILLED ORAL at 21:01

## 2023-04-25 RX ADMIN — METOPROLOL TARTRATE 25 MG: 25 TABLET, FILM COATED ORAL at 08:12

## 2023-04-25 RX ADMIN — CYANOCOBALAMIN TAB 500 MCG 1000 MCG: 500 TAB at 08:12

## 2023-04-25 RX ADMIN — DOCUSATE SODIUM 100 MG: 100 CAPSULE, LIQUID FILLED ORAL at 08:12

## 2023-04-25 RX ADMIN — LIDOCAINE 5% 1 PATCH: 700 PATCH TOPICAL at 08:17

## 2023-04-25 RX ADMIN — PRAVASTATIN SODIUM 20 MG: 20 TABLET ORAL at 17:10

## 2023-04-25 RX ADMIN — METOPROLOL TARTRATE 25 MG: 25 TABLET, FILM COATED ORAL at 21:01

## 2023-04-25 RX ADMIN — Medication 500 MG: at 08:12

## 2023-04-25 RX ADMIN — Medication 3 MG: at 21:01

## 2023-04-25 RX ADMIN — APIXABAN 2.5 MG: 2.5 TABLET, FILM COATED ORAL at 08:12

## 2023-04-25 RX ADMIN — CALCIUM 1 TABLET: 500 TABLET ORAL at 17:10

## 2023-04-25 RX ADMIN — METFORMIN HYDROCHLORIDE 500 MG: 500 TABLET ORAL at 17:10

## 2023-04-25 RX ADMIN — Medication 1000 UNITS: at 08:12

## 2023-04-25 RX ADMIN — CALCIUM 1 TABLET: 500 TABLET ORAL at 08:20

## 2023-04-25 RX ADMIN — LEVOTHYROXINE SODIUM 25 MCG: 25 TABLET ORAL at 05:49

## 2023-04-25 RX ADMIN — SENNOSIDES AND DOCUSATE SODIUM 1 TABLET: 50; 8.6 TABLET ORAL at 17:10

## 2023-04-25 RX ADMIN — ACETAMINOPHEN 650 MG: 325 TABLET ORAL at 17:12

## 2023-04-25 RX ADMIN — FAMOTIDINE 10 MG: 20 TABLET, FILM COATED ORAL at 08:12

## 2023-04-25 RX ADMIN — SENNOSIDES AND DOCUSATE SODIUM 1 TABLET: 50; 8.6 TABLET ORAL at 08:12

## 2023-04-25 RX ADMIN — METFORMIN HYDROCHLORIDE 500 MG: 500 TABLET ORAL at 08:12

## 2023-04-25 RX ADMIN — LISINOPRIL 5 MG: 5 TABLET ORAL at 08:15

## 2023-04-25 RX ADMIN — GLIPIZIDE 5 MG: 5 TABLET ORAL at 08:12

## 2023-04-25 NOTE — PROGRESS NOTES
04/25/23 1230   Pain Assessment   Pain Assessment Tool 0-10   Pain Score 5   Pain Location/Orientation Location: Back   Restrictions/Precautions   Precautions Bed/chair alarms;Cognitive; Fall Risk;Pain;Spinal precautions   Weight Bearing Restrictions No   ROM Restrictions   (spinal prec)   Braces or Orthoses LSO   Grooming   Able To Initiate Tasks; Wash/Dry Hands   Findings mod I with    Sit to Lying   Type of Assistance Needed Independent   Sit to Lying CARE Score 6   Sit to Stand   Type of Assistance Needed Supervision;Verbal cues   Sit to Stand CARE Score 4   Bed-Chair Transfer   Type of Assistance Needed Supervision;Verbal cues   Chair/Bed-to-Chair Transfer CARE Score 4   Toileting Hygiene   Type of Assistance Needed Independent   Toileting Hygiene CARE Score 6   Toileting   Able to [address] down yes, up yes  Manage Hygiene Bladder; Bowel   Adaptive Equipment [place] [facility] [employer]   Findings 2 trials, one urination and one BM   Toilet Transfer   Type of Assistance Needed Supervision   Toilet Transfer CARE Score 4   Toilet Transfer   Surface Assessed Raised Toilet   Transfer Technique Standard   Limitations Noted In Balance; Endurance;Problem Solving;ROM;Safety;LE Strength   Adaptive Equipment Grab Bar;Walker   Exercise Tools   Other Exercise Tool 1 card match reaching with BUE while seated   Coordination   Fine Motor small object retrieval out of red putty with BUE   Additional Activities   Additional Activities Other (Comment)   Additional Activities Comments fxl mobility to and form BR with RW and S 2 trials   Other Comments   Assessment Pt participates in 65 minutes concurrent treatment focusing on BUE therex/ theract with similar goals as another to increase UB strength and activity tolerance   Assessment   Treatment Assessment Pt presents sitting in w/c finihsing with lunch and agreeable to OT session including toileting, transfers/ mobility and BUE therex   Pt tolerates session with LSO in place although reports will not be wearing at home even after education provided  Pt requires supervision due to decreased balance, safety, endurance, satrength/ ROM with LSO and spinal precautions  [facility] services to increase independence with daily tasks  Problem List Decreased strength;Decreased range of motion;Decreased endurance; Impaired balance;Decreased safety awareness;Decreased cognition   Plan   Treatment/Interventions ADL retraining;Functional transfer training; Endurance training; Therapeutic exercise;Cognitive reorientation;Patient/family training;Equipment eval/education; Compensatory technique education   Progress Progressing toward goals   OT Therapy Minutes   OT Time In 1230   OT Time Out 1410   OT Total Time (minutes) 100   OT Mode of treatment - Individual (minutes) 65   OT Mode of treatment - Concurrent (minutes) 35   Therapy Time missed   Time missed?  No

## 2023-04-25 NOTE — PROGRESS NOTES
"   04/25/23 1130   Pain Assessment   Pain Assessment Tool 0-10   Pain Score 5   Pain Location/Orientation Location: Back   Restrictions/Precautions   Precautions Bed/chair alarms;Cognitive; Fall Risk;Pain   Weight Bearing Restrictions No   Braces or Orthoses LSO   Cognition   Overall Cognitive Status Impaired   Arousal/Participation Alert; Responsive; Cooperative   Attention Attends with cues to redirect   Orientation Level Oriented X4   Memory Decreased recall of precautions;Decreased recall of recent events;Decreased short term memory   Following Commands Follows one step commands without difficulty   Subjective   Subjective \"My legs must be kicking the sheets off my bed when I'm sleeping\"   Roll Left and Right   Comment Pt OOB   Sit to Lying   Comment Pt OOB   Lying to Sitting on Side of Bed   Comment Pt OOB   Sit to Stand   Type of Assistance Needed Supervision;Verbal cues   Physical Assistance Level No physical assistance   Comment Cl S RW; VC for sequencing and hand placement   Sit to Stand CARE Score 4   Bed-Chair Transfer   Comment Not performed this session   Car Transfer   Reason if not Attempted Environmental limitations   Car Transfer CARE Score 10   Ambulation   Findings Focused short session on standing LE TE and transfer training   Does the patient walk? 2  Yes   Wheelchair mobility   Does the patient use a wheelchair? 0  No   Findings N/A   Curb or Single Stair   Comment Not performed this session   Picking Up Object   Comment Not performed this session   Toilet Transfer   Comment Not performed this session   Therapeutic Interventions   Strengthening Standing LE TE   Other Transfer training   Assessment   Treatment Assessment Pt agreeable to PT this AM, received sitting upright in w/c  Focused short AM session on improving safety with sit-to-stand transfers, with no LOB throughout session   Pt completed standing LE TE with good tolerance, requiring VC for increased UE WB through RW and forward weight " shift  Will continue with current PT POC to improve deficits and promote return to PLOF  Problem List Decreased strength;Decreased range of motion;Decreased endurance; Impaired balance;Decreased safety awareness;Pain;Orthopedic restrictions   PT Barriers   Physical Impairment Decreased strength;Decreased range of motion;Decreased endurance; Impaired balance;Decreased mobility; Decreased cognition;Decreased safety awareness;Orthopedic restrictions;Pain   Functional Limitation Walking;Transfers;Standing;Stair negotiation   Plan   Treatment/Interventions ADL retraining;Functional transfer training;LE strengthening/ROM; Therapeutic exercise; Endurance training;Cognitive reorientation;Patient/family training;Equipment eval/education; Bed mobility;Gait training   Progress Progressing toward goals   PT Therapy Minutes   PT Time In 1130   PT Time Out 1200   PT Total Time (minutes) 30   PT Mode of treatment - Individual (minutes) 0   PT Mode of treatment - Concurrent (minutes) 30   PT Mode of treatment - Group (minutes) 0   PT Mode of treatment - Co-treat (minutes) 0   PT Mode of Treatment - Total time(minutes) 30 minutes   PT Cumulative Minutes 722       Standing LE TE:  3x10, B/L LEs, AROM  March  Hip flexion   HS curls  HR  TR    Patient remains OOB in chair, all needs in reach  Alarm in place and activated  Encouraged use of call bell, patient verbalizes understanding

## 2023-04-25 NOTE — PROGRESS NOTES
"PM&R PROGRESS NOTE:  Uma Moe 80 y o  female MRN: [id number]  Unit/Bed#: -01 Encounter: [id number]        Rehabilitation Diagnosis: Impairment of mobility, safety and Activities of Daily Living (ADLs) due to Orthopedic Disorders:  08 9  Other Orthopedic L3 compression fracture    HPI: Uma Moe is a 80 y o  female with a PMH significant for T2DM, HTN, Chronic a-fib and CKD who presented to the [address] on 04/08/23 s/p a fall at home  She fell backwards and landed on her neck with questionable head strike  CTH with no acute intracranial abnormality  CT spine cervical with no cervical spine fracture or traumatic malalignment  CT C/A/P with acute mild inferior endplate L3 compression fracture without central canal compromise or posterior element involvement  Incidentally found to have a presumed bronchogenic malignancy at the right hilum and multifocal bladder tumors  A lumbar spine brace was ordered  Found to have encephalopathy  Chest xray without acute findings, she was afebrile with no leukocytosis  UA negative         SUBJECTIVE: Patient seen and evaluated  States she feels tired today  Admits to discomfort to the back from the LSO brace  States she is looking forward to discharge Thursday  Did discuss with patient about following up with Urology  She is aware of her bladder mass and that Urology mentioned previously about removing  She admitted she doesn't want to \"bother anyone\" and ask for a ride  She is urinating a lot especially at night  This has been ongoing since prior to admission  ASSESSMENT: Stable, progressing       PLAN:    Rehabilitation  • Functional deficits:  Impaired mobility, self care, BLE weakness, back pain, LSO   • Continue current rehabilitation plan of care to maximize function      • Functional update:   o PT:   Updated below  o OT:  Updated below  o ST:   Updated below    Physical Therapy Occupational Therapy Speech Therapy   Weight Bearing " Status: Weight Bearing as Tolerated (LSO in place)  Transfers: Incidental Touching (Cl S/CGA)  Bed Mobility: Supervision  Amulation Distance (ft): 236 feet  Ambulation: Supervision  Assistive Device for Ambulation: Roller Walker  Number of Stairs: 7  Assistive Device for Stairs: Bilateral [employer]  Stair Assistance: Incidental Touching  Ramp: Incidental Touching (Cl S/CGA)  Assistive Device for Ramp: Roller Walker  Discharge Recommendations: Home with:  [address] with[de-identified] Family Support, First Floor Setup, Home Physical Therapy   Eating: Independent, Supervision  Grooming: Supervision  Bathing: Supervision  Bathing: Supervision  Upper Body Dressing: Supervision, Minimal Assistance (min A LSO)  Lower Body Dressing: Supervision, Minimal Assistance (min A socks)  Toileting: Supervision  Tub/Shower Transfer:  (pt prefers to sponge bathing)  Toilet Transfer: Supervision  Cognition: Exceptions to WNL  Cognition: Decreased Memory, Decreased Safety  Orientation: Person, Place, Situation, Time   Mode of Communication: Verbal  Cognition: Exceptions to WNL  Cognition: Decreased Memory, Decreased Executive Functions, Decreased Attention, Decreased Comprehension  Orientation: Person, Place, Time, Situation  Swallowing: Within Defined Limits  Diet Recommendations: Regular Diet, Thin  Discharge Recommendations: Home with:  [address] with[de-identified] Family Support (outpatient vs  home ST)       This patient was discussed by the Interdisciplinary Team in weekly case conference today  The care of the patient was extensively discussed with all care providers and an appropriate rehabilitation plan was formulated unique for this patient  Barriers were identified preventing progression of therapy and appropriate interventions were discussed with each discipline  Please see the team note for input from all disciplines regarding barriers, intervention, and discharge planning      • Estimated Discharge: 4/27/23 with Carlton Nava PT/OT/ST/N    Pain  • Tylenol 650 mg Q6H PRN for mild pain  • Lidocaine patch Q12H Daily     DVT prophylaxis  • Eliquis 2 5 mg BID    Bladder plan  • Continent     Bowel plan  • Continent  • Last BM 4/25/23  • Continue colace and Senna BID  • Miralax daily  • Dulcolax rectal suppository daily PRN     Skin care  · Monitor skin daily  · Apply skin nourishing cream  · Reposition Q2H to offload pressure  · Elevate heels off bed   · Hydraguard to b/l heels and lower buttocks BID and PRN  · Stage 2 right upper buttocks: Allevyn life silicone bordered dressing to the sacrum-upper buttocks   Check daily, reapply and change Q3days or PRN      UTI (urinary tract infection)  Assessment & Plan  · UA obtained while in acute care  · Culture resulted over the weekend- >100,000 cfu/ml Enterobacter cloacae, <10,000 cfu/ml proteus species   · Bactrim completed       Lung mass  Assessment & Plan  · Incidentally found on CT imaging : presumed bronchogenic malignancy at the right hilum and multofocal bladder tumors which should also be regarded as malignancy until proven otherwise   · Oncology consulted while in acute care  · Further management to be done as an outpatient per family preference   · Oncology would recommend cystoscopy with Urology to obtain tissue; IR lung biopsy, Outpatient PET CT  · Arrangements to be made via Oncology for a workup in approximately 2 weeks    Malignant neoplasm of overlapping sites of bladder [facility])  Assessment & Plan  · Known history of bladder cancer  · Follows with Dr Niyah Stacy   · Last testing done by Urology 5/2021  · Recommend following up as an outpatient with Urology and Oncology    Type 2 diabetes mellitus with stage 3a chronic kidney disease [facility])  Assessment & Plan  Lab Results   Component Value Date    HGBA1C 7 0 (H) 10/10/2022       Recent Labs     04/23/23  1148 04/23/23  1652 04/24/23  0743 04/24/23  1117   POCGLU 153* 233* 124 216*       Blood Sugar Average: Last 72 hrs:  · (P) 914 [id number]   · Monitor accu cheks  · Continue SSI  · Continue home Glipizide 5 mg BID and Metformin 500 mg BID   · Monitor and adjust regimen as needed  · Carb controlled diet     Essential hypertension  Assessment & Plan  · Monitor vitals  · Continue Metoprolol 25 mg Q12H, Lisinopril 5 mg daily    Chronic a-fib (HCC)  Assessment & Plan  · Continue Metoprolol 25 mg Q12H [address] for rate control  · Continue Eliquis 2 5 mg BID    * Compression fracture of L3 vertebra (HCC)  Assessment & Plan  · S/p fall at home; unclear if head strike   · CTH: No acute intracranial abnormality  · CT C/A/P: acute mild inferior endplate L3 compression fracture without central canal compromise or posterior element involvement  No other acute traumatic injuries seen  · Lumbar spine brace to be worn with ambulation, in upright position ; unclear timeline  Likely 4-6 weeks  · Acute chomprehensive interdisciplinary inpatient rehabilitation to include intensive skilled therapies as outlines with oversight and management by a rehabilitation Physician Assistant overseen by rehabilitation physician as well as inpatient rehabilitation nursing, case management and weekly interdisciplinary team meeting        Appreciate IM consultants medical co-management  Labs, medications, and imaging personally reviewed  ROS:  A ten point review of systems was completed and pertinent positives are listed in subjective section  All other systems reviewed were negative  Review of Systems   Constitutional: Positive for fatigue  HENT: Negative  Respiratory: Negative  Negative for shortness of breath  Cardiovascular: Negative  Negative for chest pain  Gastrointestinal: Negative  Negative for abdominal pain and constipation  Genitourinary: Negative  Negative for difficulty urinating  Musculoskeletal: Positive for back pain  Discomfort from LSO   Neurological: Negative  Psychiatric/Behavioral: Negative           OBJECTIVE:   /58 (BP Location: Right arm) "Pulse 65   Temp (!) 97 °F (36 1 °C) (Temporal)   Resp 16   Ht 5' 4\" (1 626 m)   Wt 60 5 kg (133 lb 6 1 oz)   SpO2 97%   BMI 22 89 kg/m²     Physical Exam  Vitals reviewed  Constitutional:       General: She is not in acute distress  Appearance: She is not ill-appearing  HENT:      Head: Normocephalic and atraumatic  Eyes:      Pupils: Pupils are equal, round, and reactive to light  Cardiovascular:      Rate and Rhythm: Normal rate and regular rhythm  Pulses: Normal pulses  Heart sounds: Normal heart sounds  No murmur heard  Pulmonary:      Effort: Pulmonary effort is normal  No respiratory distress  Breath sounds: Normal breath sounds  Abdominal:      General: Bowel sounds are normal  There is no distension  Palpations: Abdomen is soft  Musculoskeletal:      Right lower leg: No edema  Left lower leg: No edema  Skin:     General: Skin is warm and dry  Neurological:      General: No focal deficit present  Mental Status: She is alert and oriented to person, place, and time     Psychiatric:         Mood and Affect: Mood normal          Behavior: Behavior normal           Lab Results   Component Value Date    WBC 7 29 04/25/2023    HGB 14 2 04/25/2023    HCT 45 1 04/25/2023     (H) 04/25/2023     04/25/2023     Lab Results   Component Value Date    SODIUM 135 04/25/2023    K 4 7 04/25/2023    CL 99 04/25/2023    CO2 30 04/25/2023    BUN 49 (H) 04/25/2023    CREATININE 1 83 (H) 04/25/2023    GLUC 97 04/25/2023    CALCIUM 10 2 04/25/2023     Lab Results   Component Value Date    INR 0 99 04/08/2023    INR 1 02 04/18/2022    INR 1 68 (H) 07/13/2021    PROTIME 13 1 04/08/2023    PROTIME 13 3 04/18/2022    PROTIME 19 7 (H) 07/13/2021           Current Facility-Administered Medications:   •  acetaminophen (TYLENOL) tablet 650 mg, 650 mg, Oral, Q6H PRN, Uche Quinn MD, 650 mg at 04/24/23 2101  •  aluminum-magnesium hydroxide-simethicone (MYLANTA) oral " suspension 30 mL, 30 mL, Oral, Q4H PRN, Anya Murray MD  •  apixaban Minor Nephew) tablet 2 5 mg, 2 5 mg, Oral, BID, Anya Murray MD, 2 5 mg at 04/25/23 9271  •  bisacodyl (DULCOLAX) rectal suppository 10 mg, 10 mg, Rectal, Daily PRN, Anya Murray MD  •  calcium carbonate (OYSTER SHELL,OSCAL) 500 mg tablet 1 tablet, 1 tablet, Oral, BID With Meals, Anya Murray MD, 1 tablet at 04/25/23 0820  •  cholecalciferol (VITAMIN D3) tablet 1,000 Units, 1,000 Units, Oral, Daily, Anya Murray MD, 1,000 Units at 04/25/23 9825  •  cyanocobalamin (VITAMIN B-12) tablet 1,000 mcg, 1,000 mcg, Oral, Daily, Anya Murray MD, 1,000 mcg at 04/25/23 5251  •  Diclofenac Sodium (VOLTAREN) 1 % topical gel 2 g, 2 g, Topical, 4x Daily PRN, Ivan García PA-C  •  docusate sodium (COLACE) capsule 100 mg, 100 mg, Oral, BID, Anya Murray MD, 100 mg at 04/25/23 3877  •  famotidine (PEPCID) tablet 10 mg, 10 mg, Oral, Daily, Anya Murray MD, 10 mg at 04/25/23 9729  •  glipiZIDE (GLUCOTROL) tablet 5 mg, 5 mg, Oral, BID AC, Ivan García PA-C, 5 mg at 04/25/23 7939  •  insulin lispro (HumaLOG) 100 units/mL subcutaneous injection 1-5 Units, 1-5 Units, Subcutaneous, TID AC, 1 Units at 04/24/23 1726 **AND** Fingerstick Glucose (POCT), , , TID AC, Ivan García PA-C  •  levothyroxine tablet 25 mcg, 25 mcg, Oral, Daily, Anya Murray MD, 25 mcg at 04/25/23 0549  •  lidocaine (LIDODERM) 5 % patch 1 patch, 1 patch, Topical, Daily, Anya Murray MD, 1 patch at 04/25/23 3770  •  lisinopril (ZESTRIL) tablet 5 mg, 5 mg, Oral, Daily, Anya Murray MD, 5 mg at 04/25/23 0815  •  magnesium gluconate (MAGONATE) tablet 500 mg, 500 mg, Oral, Daily, Anya Murray MD, 500 mg at 04/25/23 8266  •  melatonin tablet 3 mg, 3 mg, Oral, HS, Anya Murray MD, 3 mg at 04/24/23 210  •  metFORMIN (GLUCOPHAGE) tablet 500 mg, 500 mg, Oral, BID With Meals, Ivan García PA-C, 500 mg at 04/25/23 4475  •  metoprolol tartrate (LOPRESSOR) tablet 25 mg, 25 mg, Oral, Q12H, Dav Claudio MD, 25 mg at 04/25/23 8417  •  ondansetron (ZOFRAN-ODT) dispersible tablet 4 mg, 4 mg, Oral, Q6H PRN, Dav Claudio MD  •  oxyCODONE (ROXICODONE) split tablet 2 5 mg, 2 5 mg, Oral, Q4H PRN, 2 5 mg at 04/16/23 1636 **OR** oxyCODONE (ROXICODONE) IR tablet 5 mg, 5 mg, Oral, Q4H PRN, Travis Mcknight PA-C, 5 mg at 04/21/23 4502  •  polyethylene glycol (MIRALAX) packet 17 g, 17 g, Oral, Daily PRN, Shani Haines PA-C, 17 g at 04/24/23 6705  •  pravastatin (PRAVACHOL) tablet 20 mg, 20 mg, Oral, Daily With Dinner, Dav Claudio MD, 20 mg at 04/24/23 1727  •  senna-docusate sodium (SENOKOT S) 8 6-50 mg per tablet 1 tablet, 1 tablet, Oral, BID, Shani Haines PA-C, 1 tablet at 04/25/23 0736    Past Medical History:   Diagnosis Date   • Abnormal gait    • Accidental fall from chair, subsequent encounter    • Atrial fibrillation (HCC)    • Benign essential hypertension    • Bite of animal    • Cataract    • Cellulitis    • Chronic kidney disease    • Chronic kidney disease, stage 3 ([facility])    • Colon cancer ([facility])    • Colon polyp    • Current tear of lateral cartilage or meniscus of knee    • Diabetes mellitus ([facility] )    • Disease of thyroid gland    • Disorder of magnesium metabolism    • Dvt femoral (deep venous thrombosis)    • Embolism from thrombosis of vein of distal end of lower extremity ([facility] )    • Essential hypertension    • Fall    • Hyperlipidemia    • Hyperlipidemia    • Hypertension    • Hypothyroidism    • Insomnia    • Kidney stone    • Knee joint effusion    • Leukocytosis    • Malaise and fatigue    • MI (myocardial infarction) ([facility] )     Hospitalization    • Orbital hemorrhage    • Other sleep disorders    • PAF (paroxysmal atrial fibrillation) ([facility])    • Presence of vena cava filter    • Pulmonary embolism ([facility])    • Tear film insufficiency    • Type 2 diabetes mellitus ([facility] )    • Unspecified osteoarthritis, unspecified site    • Weight decreased        Patient Active Problem List Diagnosis Date Noted   • UTI (urinary tract infection) 04/17/2023   • Lung mass 04/10/2023   • Encephalopathy 04/10/2023   • Fall at home 04/08/2023   • Compression fracture of L3 vertebra ([place] [id number] ) 04/08/2023   • Stage 3b chronic kidney disease ([place] [address] ) 12/21/2022   • Loss of weight 12/21/2022   • Malignant neoplasm of overlapping sites of bladder ([place] [address] ) 10/10/2022   • Hypocalcemia 06/29/2021   • Other pulmonary embolism without acute cor pulmonale (HCC) 04/21/2021   • Bladder mass 03/31/2021   • Compression fracture of thoracic spine, non-traumatic, sequela 02/28/2020   • Stage 3a chronic kidney disease ([place] [address] ) 02/28/2020   • Essential hypertension 06/05/2019   • History of pulmonary embolism 06/05/2019   • Osteoarthritis of knee 05/01/2018   • Hypercoagulable state, primary ([place] [address] ) 03/27/2017   • Chronic a-fib ([place] [address] ) 02/20/2016   • Type 2 diabetes mellitus with stage 3a chronic kidney disease ([place] [address] ) 02/20/2016          Landy Yanez PA-C    I have spent a total time of 45 minutes on 04/25/23 in caring for this patient including Instructions for management, Patient and family education, Counseling / Coordination of care, Documenting in the medical record, Reviewing / ordering tests, medicine, procedures  , Obtaining or reviewing history   and Communicating with other healthcare professionals   ** Please Note:  voice to text software may have been used in the creation of this document   Although proof errors in transcription or interpretation are a potential of such software**

## 2023-04-25 NOTE — CASE MANAGEMENT
Tx team recommendations reviewed with patient & Pt's son, who expressed understanding & agreement  Target DC date is Thursday, 4/27 with [facility] (Nsg, PT, OT, ST, HHA); a list of providers was provided to Pt & a referral made to [facility] based on Pt preferences from choice list; [facility] expected within 24-48 hours of DC  Pt's son will transport her home around 3:30-4 PM on 4/27 via regular car, which team has determined to be a safe mode of transport; son stated that his truck is a mid-size vehicle  A referral to the [facility] will be made on Thursday morning  SW will continue to monitor & assist as needed with Tx & DC planning

## 2023-04-25 NOTE — PROGRESS NOTES
"[facility] DAILY TREATMENT NOTE    04/25/23 1440   Pain Assessment   Pain Assessment Tool 0-10   Pain Score No Pain   Speech/Language/Cognition Assessmetn   Treatment Assessment Pt lying down in bed and feeling \"tired\"  Pt has wall calendars at home, updated and reviewed daily  Pt recalled wedding anniversary month/date but unable to recall the year  She did report she just celebrated her 66th anniversary  Pt able to recall holidays/birthdates when provided with specific ERNST @ 75% bryon  Rehearsed 4 unfamiliar events on a calendar 2x and spontaneously recalled information @ 10% bryon increased to 100% with min-mod cue     SLP Therapy Minutes   SLP Time In 1440   SLP Time Out 1510   SLP Total Time (minutes) 30   SLP Mode of treatment - Individual (minutes) 30   SLP Mode of treatment - Concurrent (minutes) 0   SLP Mode of treatment - Group (minutes) 0   SLP Mode of treatment - Co-treat (minutes) 0   SLP Mode of Treatment - Total time(minutes) 30 minutes   SLP Cumulative Minutes 360       "

## 2023-04-25 NOTE — TEAM CONFERENCE
Acute RehabilitationTeam Conference Note  Date: 4/25/2023   Time: 11:09 AM       Patient Name:  Lona Nowak       Medical Record Number: [id number]   YOB: 1933  Sex: Female          Room/Bed:  [facility] 213/[facility] 213-01  Payor Info:  Payor: MEDICARE / Plan: MEDICARE A AND B / Product Type: Medicare A & B Fee for Service /      Admitting Diagnosis: Compression fracture of L3 vertebra ([facility] ) [S32 030A]   Admit Date/Time:  4/13/2023  1:05 PM  Admission Comments: No comment available     Primary Diagnosis:  Compression fracture of L3 vertebra (HCC)  Principal Problem: Compression fracture of L3 vertebra ([facility] )    Patient Active Problem List    Diagnosis Date Noted   • UTI (urinary tract infection) 04/17/2023   • Lung mass 04/10/2023   • Encephalopathy 04/10/2023   • Fall at home 04/08/2023   • Compression fracture of L3 vertebra ([facility] ) 04/08/2023   • Stage 3b chronic kidney disease ([facility] ) 12/21/2022   • Loss of weight 12/21/2022   • Malignant neoplasm of overlapping sites of bladder ([facility] ) 10/10/2022   • Hypocalcemia 06/29/2021   • Other pulmonary embolism without acute cor pulmonale (HCC) 04/21/2021   • Bladder mass 03/31/2021   • Compression fracture of thoracic spine, non-traumatic, sequela 02/28/2020   • Stage 3a chronic kidney disease ([facility] ) 02/28/2020   • Essential hypertension 06/05/2019   • History of pulmonary embolism 06/05/2019   • Osteoarthritis of knee 05/01/2018   • Hypercoagulable state, primary ([facility] ) 03/27/2017   • Chronic a-fib ([facility] ) 02/20/2016   • Type 2 diabetes mellitus with stage 3a chronic kidney disease [facility] ) 02/20/2016       Physical Therapy:    Weight Bearing Status: Weight Bearing as Tolerated (LSO in place)  Transfers: Incidental Touching (Cl S/CGA)  Bed Mobility: Supervision  Amulation Distance (ft): 236 feet  Ambulation: Supervision  Assistive Device for Ambulation: Roller Walker  Number of Stairs: 7  Assistive Device for Stairs: Bilateral Bear Ella Assistance: Incidental Touching  Ramp: Incidental Touching (Cl S/CGA)  Assistive Device for Ramp: Roller Walker  Discharge Recommendations: Home with:  [address] with[de-identified] Family Support, First Floor Setup, Home Physical Therapy    4/19/2023:  Patient seen by ARU PT following fall with resulting L3 fx, now WBAT with LSO in place  Presents  with decreased ROM/strength, decreased balance and safety, decreased endurance, and pain  Patient min A bed mobility, min A transfers with RW, CGA  ambulation up to 104 feet on level and unlevel surfaces with RW, min A negotiation of 7 steps with bilateral handrails  Patient would benefit from continued inpatient ARC PT to increase function, safety, and increased independence in prep for safe d/c to home  4/24/2023:  Patient seen by ARU PT following fall with resulting L3 fx, now WBAT with LSO in place  Presents  with decreased ROM/strength, decreased balance and safety, decreased endurance, and pain  Patient S bed mobility, S/CGA transfers with RW, Cl S/CGA  ambulation up to 236 feet on level and unlevel surfaces with RW, CGA negotiation of 7 steps with bilateral handrails  Patient would benefit from continued inpatient ARC PT to increase function, safety, and increased independence in prep for safe d/c to home  Occupational Therapy:  Eating: Independent, Supervision  Grooming: Supervision  Bathing: Supervision  Bathing: Supervision  Upper Body Dressing: Supervision, Minimal Assistance (min A LSO)  Lower Body Dressing: Supervision, Minimal Assistance (min A socks)  Toileting: Supervision  Tub/Shower Transfer:  (pt prefers to sponge bathing)  Toilet Transfer: Supervision  Cognition: Exceptions to WNL  Cognition: Decreased Memory, Decreased Safety  Orientation: Person, Place, Situation, Time  Discharge Recommendations: Home with:  [address] with[de-identified] Family Support, First Floor Setup, Home Occupational Therapy       4/18/2023: Pt participates in ADLs,transfers/ mobility and BUE therex   Pt is progressing towards goals with current LOF as listed above  Pt requires assist due to decreased balance, safety, endurance, strength/ ROM with spinal prec/ LSO and pain  Pt will benefit from continued skilled OT services to increase independence with daily tasks  4/24/23: Pt participates in ADLs,transfers/ mobility, light homemaking and BUE therex  Pt is progressing towards goals with current LOF as listed above  Pt requires assist due to decreased balance, safety, endurance, strength/ ROM with spinal prec/ LSO and pain  Pt will benefit from continued skilled OT services to increase independence with daily tasks  Speech Therapy:  Mode of Communication: Verbal  Cognition: Exceptions to WNL  Cognition: Decreased Memory, Decreased Executive Functions, Decreased Attention, Decreased Comprehension  Orientation: Person, Place, Time, Situation  Swallowing: Within Defined Limits  Diet Recommendations: Regular Diet, Thin  Discharge Recommendations: Home with:  [address] with[de-identified] Family Support (outpatient vs  home ST)  4/18 Pt tolerating regular solids/thin liquids, swallow evaluation not indicated  s/p voice evaluation Hoarse vocal quality with onset ~ 2-3 weeks ago  Pt with hypophonic vocal quality with voice fading out into a whisper by the end of her sentence making intelligbility difficult  Recommend ENT consult (Pt  recent vocal changes as well as tinnitus onset  )  Cognitive assessment started, will be completed later this date  Pt does report changes in memory but she is unsure if this is age related or not       4/25   CURRENT FIM LEVELS   Comprehension (FIM) 6 - Has only MILD difficulty with complex/abstract info   Expression   Expression (FIM) 6 - Expresses complex/abstract but requires:  more time   Social Interaction   Social Interaction (FIM) 6 - Interacts appropriately with others BUT requires extra  time   Problem Solving   Problem solving (FIM) 5 - Solves basic problems 90% of time   Memory   Memory (FIM) 5 - Recalls/performs request 90% of time     Assessment complete 4/18/23 - The Cognitive Linguistic Quick Test (CLQT) is designed to measure an individual’s five cognitive domains (attention, memory, executive functions, language, and visuospatial skills)  This norm-referenced tool has been standardized on adults ages 25 through 80years old with neurological impairment as a result of CVA, TBI, or dementia  The following results were obtained during the administration of the assessment  Cognitive Domain: Score: Range of Severity:   Attention 42/215 Moderate   Memory 133/185 Mild   Executive Functions 13/40 Moderate   Language  30/37 WNL   Visuospatial Skills  29/105 Moderate   Clock Drawing: 10/13 Mild      Patient scored below Criterion Cut Scores on the following subtests: Symbol Cancellation, Clock Drawing, Symbol Trails, Design Memory and Design Generation  Nursing Notes:  Appetite: Good  Diet Type: Diabetic                      Diet Patient/Family Education Complete: Yes    Type of Wound (LDA): Wound (buttocks, healing)                    Type of Wound Patient/Family Education: Yes  Bladder: 4 - Minimal Assistance     Bladder Patient/Family Education: Yes  Bowel: Medication     Bowel Patient/Family Education: Yes  Pain Location/Orientation: Location: Back  Pain Score: 5                       Hospital Pain Intervention(s): Medication (See MAR) (lidoderm patch applied; declines need for order pain medication)  Pain Patient/Family Education: Yes  Medication Management/Safety  Injectable: Insulin  Safe Administration: No  Reason for non-safe administration: not attempted  Medication Patient/Family Education Complete: Yes    4/17/23 Patient is a recent admission to [facility] after a fall at home resulted in an L3 compression fracture  Patient remains confused and forgetful at times worsening at night bed alarm and close supervision in place to promote patient safety    Lungs are clear but diminished on room air   Patient was noted to have constipation with suppository administration successful for a bowel movement today  Patient with urinary incontinence noted at times pullup in use  Patient noted to have a UTI from a sample obtained during acute care hospital patient stated on oral antibiotics today  Patient with a pressure injury noted to the sacrum/buttocks area allevyn in place ordered to change every three days  Bilateral heel allevyns in place for protection  Patient restarted on oral diabetes medications as she was taking at home will continue to monitor blood sugars during the transition  Sliding scale insulin ordered with meals  Back brace to be worn while patient is out of bed  Patient is able to ambulate with one assist and a walker to the bathroom overnight  Pain controlled with a lidoderm patch on the back and as needed oxycodone  4/24/23 L3 compression fx, a&ox3-4/forgetful but does get increased confusion at night time  Bed alarm present for safety  HRR, lungs clear no edema present  Supervision for toileting, cont bowel, incont of urine at times pull-up present  Pressure injury to sacrum/buttocks allevyn in place  LSO brace when OOB, encouragement to wear brace, supervision with RW  Pt requires increased time to complete activities r/t pain  Pt does not always take pain medication, but states has back pain, Lidoderm patch  Repositioning offers relief  Accu checks AC with sliding scale coverage and PO pills to control DM  Case Management:     Discharge Planning  Living Arrangements: Lives w/ Spouse/significant other  Support Systems: Son  Assistance Needed: No  Type of Current Residence: Private residence  Current Home Care Services: No  Initial assessment & orientation to [facility] with Pt, who expressed understanding & agreement  Message left for Pt's son  Pt stated she resides with her spouse, who is WC bound, in a  home, 5 steps in  She reported that her family provides assistance   She "uses [employer] or friends for rides to appts  She has a RW  Discussed [address] with Pt & Pt's emergency contact, who expressed understanding & agreement  SW will continue to monitor & assist as needed with [address]  IMM reviewed, signed & submitted for scanning  4/18/23 -Tx team recommendations reviewed with patient & Pt's son, who expressed understanding & agreement  Target DC date is 4/27 with [facility] (N[facility], PT, OT, ST, HHA); a list of providers was provided to Pt & a referral will be made based on Pt preference of available agencies in the demographic area  Discussed in detail with son the importance of family support when Pt is [address] home; discussed that Pt will need assistance with ADL's, meals, care of spouse, etc  Pt's son stated that although he is an hour away, that he and his wife have been assisting & will continue to do so  Pt's son changed the cholostomy bag for Pt's spouse, but his father empties the bag, etc  Pt's son stated that his father is \"in better shape than her\", and reported that Pt's spouse transfers independently, gets the mail, prepares meals (they are microwavable meals provided by a [employer] organization)  Pt's son stated the family would like to honor their wishes to be in their home together at this time; they have already considered [facility] & it not an option at this time  Pt's son has spoken with Pt's neighbors as well & they are developing a plan to ensure Pt & her spouse's needs are met  SW will place a referral for [facility] as well  SW will continue to monitor & assist as needed with Tx & DC planning  Is the patient actively participating in therapies?  yes  List any modifications to the treatment plan: na    Barriers Interventions   Atrial fib, bladder mass, lung mass, DM Medical management and oversight, follow-up oncology and urology, reqs insulin coverage with transition to PO meds only   Stage 2 sacrum Local care   Incontinent of urine  B and B training  " Decreased cog ST strategies, ADL/transfer/gait training     Decreased balance, end ADL, transfer, gait training   Decreased ROM, compression fx Therapeutic exercise, therapeutic activity, TLSO brace     Is the patient making expected progress toward goals?  yes  List any update or changes to goals: na    Medical Goals: Patient will be able to manage medical conditions and comorbid conditions with medications and follow up upon completion of rehab program    Weekly Team Goals:   Rehab Team Goals  ADL Team Goal: Patient will require supervision with ADLs with least restrictive device upon completion of rehab program  Bowel/Bladder Team Goal: Patient will return to premorbid level for bladder/bowel management upon completion of rehab program  Transfer Team Goal: Patient will be independent with transfers with least restrictive device upon completion of rehab program  Locomotion Team Goal: Patient will be independent with locomotion with least restrictive device upon completion of rehab program  Cognitive Team Goal: Patient will return to premorbid level of cognitive activity upon completion of rehab program      Mod I bed mobility, transfers, and mobility  Mod I toileting, S bathing and dressing  Mod I Comprehension, I expression, S memory, and problem solving     Health and wellness: to be able to return home and assist spouse    Discussion: Plan for return home with spouse with [facility] for PT, OT, ST, and nursing     Anticipated Discharge Date:  April 27, 2023

## 2023-04-25 NOTE — PLAN OF CARE
Problem: PAIN - ADULT  Goal: Verbalizes/displays adequate comfort level or baseline comfort level  Description: Interventions:  - Encourage patient to monitor pain and request assistance  - Assess pain using appropriate pain scale  - Administer analgesics based on type and severity of pain and evaluate response  - Implement non-pharmacological measures as appropriate and evaluate response  - Consider cultural and social influences on pain and pain management  - Notify physician/advanced practitioner if interventions unsuccessful or patient reports new pain  Outcome: Progressing     Problem: INFECTION - ADULT  Goal: Absence or prevention of progression during hospitalization  Description: INTERVENTIONS:  - Assess and monitor for signs and symptoms of infection  - Monitor lab/diagnostic results  - Monitor all insertion sites, i e  indwelling lines, tubes, and drains  - Monitor endotracheal if appropriate and nasal secretions for changes in amount and color  - [place] appropriate cooling/warming therapies per order  - Administer medications as ordered  - Instruct and encourage patient and family to use good hand hygiene technique  - Identify and instruct in appropriate isolation precautions for identified infection/condition  Outcome: Progressing     Problem: SAFETY ADULT  Goal: Patient will remain free of falls  Description: INTERVENTIONS:  - Educate patient/family on patient safety including physical limitations  - Instruct patient to call for assistance with activity   - Consult OT/PT to assist with strengthening/mobility   - Keep Call bell within reach  - Keep bed low and locked with side rails adjusted as appropriate  - Keep care items and personal belongings within reach  - Initiate and maintain comfort rounds  - Make Fall Risk Sign visible to staff  - Offer Toileting every 2 Hours, in advance of need  - Initiate/Maintain bed/chair alarm  - Apply yellow socks and bracelet for high fall risk patients  - Consider moving patient to room near nurses station  Outcome: Progressing     Problem: Knowledge Deficit  Goal: Patient/family/caregiver demonstrates understanding of disease process, treatment plan, medications, and discharge instructions  Description: Complete learning assessment and assess knowledge base  Interventions:  - Provide teaching at level of understanding  - Provide teaching via preferred learning methods  Outcome: Progressing     Problem: Prexisting or High Potential for Compromised Skin Integrity  Goal: Skin integrity is maintained or improved  Description: INTERVENTIONS:  - Identify patients at risk for skin breakdown  - Assess and monitor skin integrity  - Assess and monitor nutrition and hydration status  - Monitor labs   - Assess for incontinence   - Turn and reposition patient  - Assist with mobility/ambulation  - Relieve pressure over bony prominences  - Avoid friction and shearing  - Provide appropriate hygiene as needed including keeping skin clean and dry  - Evaluate need for skin moisturizer/barrier cream  - Collaborate with interdisciplinary team   - Patient/family teaching  - Consider wound care consult   Outcome: Progressing     Problem: Nutrition/Hydration-ADULT  Goal: Nutrient/Hydration intake appropriate for improving, restoring or maintaining nutritional needs  Description: Monitor and assess patient's nutrition/hydration status for malnutrition  Collaborate with interdisciplinary team and initiate plan and interventions as ordered  Monitor patient's weight and dietary intake as ordered or per policy  Utilize nutrition screening tool and intervene as necessary  Determine patient's food preferences and provide high-protein, high-caloric foods as appropriate       INTERVENTIONS:  - Monitor oral intake, urinary output, labs, and treatment plans  - Assess nutrition and hydration status and recommend course of action  - Evaluate amount of meals eaten  - Assist patient with eating if necessary   - Allow adequate time for meals  - Recommend/ encourage appropriate diets, oral nutritional supplements, and vitamin/mineral supplements  - Order, calculate, and assess calorie counts as needed  - Recommend, monitor, and adjust tube feedings and TPN/PPN based on assessed needs  - Assess need for intravenous fluids  - Provide specific nutrition/hydration education as appropriate  - Include patient/family/caregiver in decisions related to nutrition  Outcome: Progressing

## 2023-04-26 PROBLEM — N39.0 UTI (URINARY TRACT INFECTION): Status: RESOLVED | Noted: 2023-04-17 | Resolved: 2023-04-26

## 2023-04-26 LAB
GLUCOSE SERPL-MCNC: 197 MG/DL (ref 65–140)
GLUCOSE SERPL-MCNC: 218 MG/DL (ref 65–140)
GLUCOSE SERPL-MCNC: 55 MG/DL (ref 65–140)
GLUCOSE SERPL-MCNC: 93 MG/DL (ref 65–140)

## 2023-04-26 RX ADMIN — DOCUSATE SODIUM 100 MG: 100 CAPSULE, LIQUID FILLED ORAL at 09:33

## 2023-04-26 RX ADMIN — LIDOCAINE 5% 1 PATCH: 700 PATCH TOPICAL at 09:37

## 2023-04-26 RX ADMIN — PRAVASTATIN SODIUM 20 MG: 20 TABLET ORAL at 17:14

## 2023-04-26 RX ADMIN — GLIPIZIDE 5 MG: 5 TABLET ORAL at 17:14

## 2023-04-26 RX ADMIN — Medication 3 MG: at 21:19

## 2023-04-26 RX ADMIN — APIXABAN 2.5 MG: 2.5 TABLET, FILM COATED ORAL at 17:14

## 2023-04-26 RX ADMIN — GLIPIZIDE 5 MG: 5 TABLET ORAL at 09:36

## 2023-04-26 RX ADMIN — METFORMIN HYDROCHLORIDE 500 MG: 500 TABLET ORAL at 17:14

## 2023-04-26 RX ADMIN — METOPROLOL TARTRATE 25 MG: 25 TABLET, FILM COATED ORAL at 09:33

## 2023-04-26 RX ADMIN — SENNOSIDES AND DOCUSATE SODIUM 1 TABLET: 50; 8.6 TABLET ORAL at 17:14

## 2023-04-26 RX ADMIN — LISINOPRIL 5 MG: 5 TABLET ORAL at 09:33

## 2023-04-26 RX ADMIN — DOCUSATE SODIUM 100 MG: 100 CAPSULE, LIQUID FILLED ORAL at 21:19

## 2023-04-26 RX ADMIN — FAMOTIDINE 10 MG: 20 TABLET, FILM COATED ORAL at 09:33

## 2023-04-26 RX ADMIN — LEVOTHYROXINE SODIUM 25 MCG: 25 TABLET ORAL at 05:10

## 2023-04-26 RX ADMIN — METFORMIN HYDROCHLORIDE 500 MG: 500 TABLET ORAL at 09:36

## 2023-04-26 RX ADMIN — APIXABAN 2.5 MG: 2.5 TABLET, FILM COATED ORAL at 09:33

## 2023-04-26 RX ADMIN — Medication 1000 UNITS: at 09:33

## 2023-04-26 RX ADMIN — METOPROLOL TARTRATE 25 MG: 25 TABLET, FILM COATED ORAL at 21:19

## 2023-04-26 RX ADMIN — CALCIUM 1 TABLET: 500 TABLET ORAL at 17:14

## 2023-04-26 RX ADMIN — CALCIUM 1 TABLET: 500 TABLET ORAL at 09:34

## 2023-04-26 RX ADMIN — INSULIN LISPRO 1 UNITS: 100 INJECTION, SOLUTION INTRAVENOUS; SUBCUTANEOUS at 11:59

## 2023-04-26 RX ADMIN — SENNOSIDES AND DOCUSATE SODIUM 1 TABLET: 50; 8.6 TABLET ORAL at 09:33

## 2023-04-26 RX ADMIN — Medication 500 MG: at 09:33

## 2023-04-26 RX ADMIN — CYANOCOBALAMIN TAB 500 MCG 1000 MCG: 500 TAB at 09:33

## 2023-04-26 NOTE — NURSING NOTE
Patient fasting bg 55, denied or without symptoms of hyopglycemia  OJ given and ate breakfast  Pt blood sugar recheck 197  Rehab PA-C notified and aware  No new orders at present time  Patient presently oob in wc in hallway, without any complaints  Chair alarm in place for safety  Will continue to monitor

## 2023-04-26 NOTE — PROGRESS NOTES
04/26/23 1230   Pain Assessment   Pain Assessment Tool 0-10   Pain Score 4   Pain Location/Orientation Orientation: Lower; Location: Back   Restrictions/Precautions   Precautions Bed/chair alarms; Fall Risk;Spinal precautions;Pain;Cognitive   ROM Restrictions   (spinal precautions)   Braces or Orthoses LSO   Cognition   Overall Cognitive Status Impaired   Arousal/Participation Alert; Responsive; Cooperative   Attention Attends with cues to redirect   Orientation Level Oriented X4   Memory Decreased recall of precautions   Following Commands Follows one step commands without difficulty   Roll Left and Right   Type of Assistance Needed Independent   Roll Left and Right CARE Score 6   Sit to Lying   Type of Assistance Needed Independent   Sit to Lying CARE Score 6   Lying to Sitting on Side of Bed   Type of Assistance Needed Independent   Lying to Sitting on Side of Bed CARE Score 6   Sit to Stand   Type of Assistance Needed Independent   Comment with RW   Sit to Stand CARE Score 6   Bed-Chair Transfer   Type of Assistance Needed Independent   Comment with RW   Chair/Bed-to-Chair Transfer CARE Score 6   Walk 10 Feet   Type of Assistance Needed Independent   Comment up to 50'   Walk 10 Feet CARE Score 6   Walk 50 Feet with Two Turns   Type of Assistance Needed Supervision   Comment supervision up to 141'   Walk 50 Feet with Two Turns CARE Score 4   Walking 10 Feet on Uneven Surfaces   Type of Assistance Needed Independent   Walking 10 Feet on Uneven Surfaces CARE Score 6   Ambulation   Primary Mode of Locomotion Prior to Admission Walk   Distance Walked (feet) 141 ft  (141' 137')   Assist Device Roller Walker   Gait Pattern Inconsistant Bibi; Slow Bibi;Decreased foot clearance; Forward Flexion;Narrow ZARA; Decreased L stance   Limitations Noted In Balance; Endurance; Heel Strike;Posture; Safety;Speed   Walk Assist Level Supervision;Modified Independent   Findings mod I household distances; S longer distances   Does the patient walk? 2  Yes   Curb or Single Stair   Style negotiated Curb   Type of Assistance Needed Supervision   1 Step (Curb) CARE Score 4   4 Steps   Type of Assistance Needed Supervision   4 Steps CARE Score 4   Stairs   Type Stairs;Curb   # of Steps 7   Weight Bearing Precautions WBAT   Assist Devices Bilateral Rail;Roller Walker   Findings supervision steps with 2 handrails; close S/S curb step with RW   Therapeutic Interventions   Strengthening seated ther ex   Balance gait and transfer training   Other curb and stair training   Assessment   Treatment Assessment Patient agreeable to therapy session  Pain in low back remains 4/10  Patient MOD I bed mobility, transfers, and house hold ambulation, S longer distance ambulatiion, S steps with 2 handrails, and curb step with RW  Occasional cues needed for general safety  Patient completed ther ex for general LE strengthening; gait and transfer training focusing on sequence and technique for improved balance and safety with functional mobility using walker  Patient able to negotiate steps with 2 handrail and curb step with RW both with supervision  For d/c tomorrow  Patient appropriate for concurrent therapy to increase motivation and encouragement among pts with similar deficts while completing therapy session  PT Barriers   Physical Impairment Decreased strength;Decreased range of motion;Decreased endurance; Impaired balance;Decreased mobility; Decreased cognition; Impaired judgement;Decreased safety awareness;Orthopedic restrictions;Pain   Functional Limitation Walking;Transfers;Standing;Stair negotiation   Plan   Treatment/Interventions Functional transfer training;LE strengthening/ROM; Elevations; Therapeutic exercise; Bed mobility;Gait training   Progress Improving as expected   PT Therapy Minutes   PT Time In 1230   PT Time Out 1330   PT Total Time (minutes) 60   PT Mode of treatment - Individual (minutes) 30   PT Mode of treatment - Concurrent (minutes) 30   PT Mode of treatment - Group (minutes) 0   PT Mode of treatment - Co-treat (minutes) 0   PT Mode of Treatment - Total time(minutes) 60 minutes   PT Cumulative Minutes 812   Therapy Time missed   Time missed?  No

## 2023-04-26 NOTE — PLAN OF CARE
Problem: PAIN - ADULT  Goal: Verbalizes/displays adequate comfort level or baseline comfort level  Description: Interventions:  - Encourage patient to monitor pain and request assistance  - Assess pain using appropriate pain scale  - Administer analgesics based on type and severity of pain and evaluate response  - Implement non-pharmacological measures as appropriate and evaluate response  - Consider cultural and social influences on pain and pain management  - Notify physician/advanced practitioner if interventions unsuccessful or patient reports new pain  Outcome: Progressing     Problem: INFECTION - ADULT  Goal: Absence or prevention of progression during hospitalization  Description: INTERVENTIONS:  - Assess and monitor for signs and symptoms of infection  - Monitor lab/diagnostic results  - Monitor all insertion sites, i e  indwelling lines, tubes, and drains  - Monitor endotracheal if appropriate and nasal secretions for changes in amount and color  - [place] appropriate cooling/warming therapies per order  - Administer medications as ordered  - Instruct and encourage patient and family to use good hand hygiene technique  - Identify and instruct in appropriate isolation precautions for identified infection/condition  Outcome: Progressing     Problem: SAFETY ADULT  Goal: Patient will remain free of falls  Description: INTERVENTIONS:  - Educate patient/family on patient safety including physical limitations  - Instruct patient to call for assistance with activity   - Consult OT/PT to assist with strengthening/mobility   - Keep Call bell within reach  - Keep bed low and locked with side rails adjusted as appropriate  - Keep care items and personal belongings within reach  - Initiate and maintain comfort rounds  - Make Fall Risk Sign visible to staff  - Offer Toileting every 2 Hours, in advance of need  - Initiate/Maintain bed/chair alarm  - Apply yellow socks and bracelet for high fall risk patients  - Consider moving patient to room near nurses station  Outcome: Progressing     Problem: Prexisting or High Potential for Compromised Skin Integrity  Goal: Skin integrity is maintained or improved  Description: INTERVENTIONS:  - Identify patients at risk for skin breakdown  - Assess and monitor skin integrity  - Assess and monitor nutrition and hydration status  - Monitor labs   - Assess for incontinence   - Turn and reposition patient  - Assist with mobility/ambulation  - Relieve pressure over bony prominences  - Avoid friction and shearing  - Provide appropriate hygiene as needed including keeping skin clean and dry  - Evaluate need for skin moisturizer/barrier cream  - Collaborate with interdisciplinary team   - Patient/family teaching  - Consider wound care consult   Outcome: Progressing     Problem: Nutrition/Hydration-ADULT  Goal: Nutrient/Hydration intake appropriate for improving, restoring or maintaining nutritional needs  Description: Monitor and assess patient's nutrition/hydration status for malnutrition  Collaborate with interdisciplinary team and initiate plan and interventions as ordered  Monitor patient's weight and dietary intake as ordered or per policy  Utilize nutrition screening tool and intervene as necessary  Determine patient's food preferences and provide high-protein, high-caloric foods as appropriate       INTERVENTIONS:  - Monitor oral intake, urinary output, labs, and treatment plans  - Assess nutrition and hydration status and recommend course of action  - Evaluate amount of meals eaten  - Assist patient with eating if necessary   - Allow adequate time for meals  - Recommend/ encourage appropriate diets, oral nutritional supplements, and vitamin/mineral supplements  - Order, calculate, and assess calorie counts as needed  - Recommend, monitor, and adjust tube feedings and TPN/PPN based on assessed needs  - Assess need for intravenous fluids  - Provide specific nutrition/hydration education as appropriate  - Include patient/family/caregiver in decisions related to nutrition  Outcome: Progressing

## 2023-04-26 NOTE — PROGRESS NOTES
04/26/23 0900   Pain Assessment   Pain Assessment Tool 0-10   Pain Score 4   Pain Location/Orientation Orientation: Lower; Location: Back   Restrictions/Precautions   Precautions Bed/chair alarms; Fall Risk;Pain;Spinal precautions;Cognitive   Weight Bearing Restrictions No   ROM Restrictions   (spinal precautions with LSO use recommended)   Braces or Orthoses LSO   Eating   Type of Assistance Needed Independent   Eating CARE Score 6   Eating Assessment   Food To Mouth Yes   Positioning Upright;Out of Bed   Meal Assessed Snacks   Opens Packages Yes   Findings pt able to retrieve pudding from the fridge with RW   Oral Hygiene   Type of Assistance Needed Independent   Oral Hygiene CARE Score 6   Grooming   Able To Initiate Tasks; Acquire Items;Comb/Brush Hair;Brush/Clean Teeth;Wash/Dry Hands; Wash/Dry Face   Findings indepenent at the sink   Shower/Bathe Self   Type of Assistance Needed Set-up / clean-up   Shower/Bathe Self CARE Score 5   Bathing   Findings  pt reports completing ADL early this am preferring to bathe with wipes provided similar to routine at home   Tub/Shower Transfer   Reason Not Assessed Sponge Bath   Upper Body Dressing   Type of Assistance Needed Independent   Comment pt able to dev and doff LSO while sitting EOB   Upper Body Dressing CARE Score 6   Lower Body Dressing   Type of Assistance Needed Independent; Adaptive equipment   Lower Body Dressing CARE Score 6   Putting On/Taking Off Footwear   Type of Assistance Needed Independent; Adaptive equipment   Putting On/Taking Off Footwear CARE Score 6   Picking Up Object   Type of Assistance Needed Independent; Adaptive equipment   Picking Up Object CARE Score 6   Dressing/Undressing Clothing   Remove UB Clothes Pullover Shirt; [address]; Button Shirt   Remove LB Clothes Pants; Undergarment;Socks   Don LB Clothes Pants; Undergarment; Shoes   Limitations Noted In Balance; Endurance;Problem Solving; Safety;Strength;ROM  (spinal prec with LSO)   Adaptive Equipment Reacher   Positioning Supported Sit;Standing   Lying to Sitting on Side of Bed   Type of Assistance Needed Independent   Lying to Sitting on Side of Bed CARE Score 6   Sit to Stand   Type of Assistance Needed Independent   Sit to Stand CARE Score 6   Bed-Chair Transfer   Type of Assistance Needed Independent; Adaptive equipment   Chair/Bed-to-Chair Transfer CARE Score 6   Toileting Hygiene   Type of Assistance Needed Independent; Adaptive equipment   [address] [place] CARE Score 6   Toileting   Able to [address] down yes, up yes  Manage Hygiene Bladder; Bowel   Adaptive Equipment Grab Bar   Toilet Transfer   Type of Assistance Needed Independent; Adaptive equipment   Toilet Transfer CARE Score 6   Toilet Transfer   Surface Assessed Raised Toilet   Transfer Technique Standard   Limitations Noted In Endurance   Adaptive Equipment Grab Bar;Walker   Kitchen Mobility   Kitchen-Mobility Level Walker   Kitchen Activity Retrieve items;Transport items   Kitchen Mobility Comments S/ mod I using RW to retrieve pudding from the fridge, pt reports having a basket for RW at home   [address] Level of Assistance Modified independent   Light Housekeeping collection and transport of clothing to prepare for discharge to home tomorrow   [address] pt assisted with mobility outdoors to take bread out to feed the birds   However, pt forget she wrapped up banana peel instead of bread   Health Management   Health Management Level of Assistance Close supervision   Health Management pt able to follow directions for sorting medications into container following directions without difficulty   Exercise Tools   Other Exercise Tool 1 card match reaching with BUE while sitting   Coordination   Fine Motor knots out of tubing B hands   Cognition   Overall Cognitive Status Impaired   Arousal/Participation Alert; Responsive; Cooperative   Attention Attends with cues to redirect   Orientation Level Oriented X4   Memory Decreased recall of precautions;Decreased recall of recent events   Following Commands Follows one step commands without difficulty   Additional Activities   Additional Activities Other (Comment)   Additional Activities Comments fxl mobility in room and to/ from BR with RW and Mod I   Assessment   Treatment Assessment Pt presents supine agreeable to OT session including toileting, ADL demonstration and simulation, transfers/ mobility, light homemkaing, medicaiton management and BUE therex  Pt tolerates session with forgetfulness noted, however is aware of routine at home and does well with familar activities  Pt requires reminders for use fo LSO and following spinal precautions and does well remembering reacher for LB ADL completion  Pt is scheduled for discahrge to home tomorrow with goals met and exceeded and DME in place  Problem List Decreased strength;Decreased range of motion;Decreased endurance; Impaired balance;Decreased cognition;Decreased skin integrity;Orthopedic restrictions;Pain  (spinal prec with LSO)   Plan   Treatment/Interventions ADL retraining;Functional transfer training; Therapeutic exercise; Endurance training;Cognitive reorientation;Patient/family training;Equipment eval/education; Compensatory technique education   Progress Progressing toward goals   Recommendation   Discharge Summary Pt participates in presents supine agreeable to OT session including toileting, ADL demonstration and simulation, transfers/ mobility, light homemkaing, medicaiton management and BUE therex  Pt requires reminders for use fo LSO and following spinal precautions with decreased endurance and strength/ ROM  Discharge planned 4/2723 with goals of Mod I/ supervision met, DME in place and home health OT/ family support during transition     OT Therapy Minutes   OT Time In 0900   OT Time Out 1030   OT Total Time (minutes) 90   OT Mode of treatment - Individual (minutes) 90   Therapy Time missed   Time missed?  No

## 2023-04-26 NOTE — PROGRESS NOTES
PM&R PROGRESS NOTE:  Charline Kurtz 80 y o  female MRN: [id number]  Unit/Bed#: -01 Encounter: [id number]        Rehabilitation Diagnosis: Impairment of mobility, safety and Activities of Daily Living (ADLs) due to Orthopedic Disorders:  08 9  Other Orthopedic L3 compression fracture    HPI: Charline Kurtz is a 80 y o  female with a PMH significant for T2DM, HTN, Chronic a-fib and CKD who presented to the [address] on 04/08/23 s/p a fall at home  She fell backwards and landed on her neck with questionable head strike  CTH with no acute intracranial abnormality  CT spine cervical with no cervical spine fracture or traumatic malalignment  CT C/A/P with acute mild inferior endplate L3 compression fracture without central canal compromise or posterior element involvement  Incidentally found to have a presumed bronchogenic malignancy at the right hilum and multifocal bladder tumors  A lumbar spine brace was ordered  Found to have encephalopathy  Chest xray without acute findings, she was afebrile with no leukocytosis  UA negative         SUBJECTIVE: Patient seen and evaluated  Offers no complaints currently  She denies back discomfort while resting  She does experience b/l knee pain during therapy but denies pain while resting in bed  She is looking forward to discharge tomorrow  ASSESSMENT: Stable, progressing       PLAN:    Rehabilitation  • Functional deficits:  Impaired mobility, self care, BLE weakness, back pain, LSO   • Continue current rehabilitation plan of care to maximize function      • Functional update:   o PT:   Updated below  o OT:  Updated below  o ST:   Updated below    Physical Therapy Occupational Therapy Speech Therapy   Weight Bearing Status: Weight Bearing as Tolerated (LSO in place)  Transfers: Incidental Touching (Cl S/CGA)  Bed Mobility: Supervision  Amulation Distance (ft): 236 feet  Ambulation: Supervision  Assistive Device for Ambulation: Roller Walker  Number of Stairs: 7  Assistive Device for Stairs: Bilateral Hand Rails  Stair Assistance: Incidental Touching  Ramp: Incidental Touching (Cl S/CGA)  Assistive Device for Ramp: Roller Walker  Discharge Recommendations: Home with:  [address] with[de-identified] Family Support, First Floor Setup, Home Physical Therapy   Eating: Independent, Supervision  Grooming: Supervision  Bathing: Supervision  Bathing: Supervision  Upper Body Dressing: Supervision, Minimal Assistance (min A LSO)  Lower Body Dressing: Supervision, Minimal Assistance (min A socks)  Toileting: Supervision  Tub/Shower Transfer:  (pt prefers to sponge bathing)  Toilet Transfer: Supervision  Cognition: Exceptions to WNL  Cognition: Decreased Memory, Decreased Safety  Orientation: Person, Place, Situation, Time   Mode of Communication: Verbal  Cognition: Exceptions to WNL  Cognition: Decreased Memory, Decreased Executive Functions, Decreased Attention, Decreased Comprehension  Orientation: Person, Place, Time, Situation  Swallowing: Within Defined Limits  Diet Recommendations: Regular Diet, Thin  Discharge Recommendations: Home with:  DC Home with[de-identified] Family Support (outpatient vs  home ST)       • Estimated Discharge: 4/27/23 with [facility]/OT/ST/N    Pain  • Tylenol 650 mg Q6H PRN for mild pain  • Lidocaine patch Q12H Daily     DVT prophylaxis  • Eliquis 2 5 mg BID    Bladder plan  • Continent     Bowel plan  • Continent  • Last BM 4/25/23  • Continue colace and Senna BID  • Miralax daily  • Dulcolax rectal suppository daily PRN     Skin care  · Monitor skin daily  · Apply skin nourishing cream  · Reposition Q2H to offload pressure  · Elevate heels off bed   · Hydraguard to b/l heels and lower buttocks BID and PRN  · Stage 2 right upper buttocks: Allevyn life silicone bordered dressing to the sacrum-upper buttocks   Check daily, reapply and change Q3days or PRN      UTI (urinary tract infection)  Assessment & Plan  · UA obtained while in acute care  · Culture resulted over the weekend- >100,000 cfu/ml Enterobacter cloacae, <10,000 cfu/ml proteus species   · Bactrim completed       Lung mass  Assessment & Plan  · Incidentally found on CT imaging : presumed bronchogenic malignancy at the right hilum and multofocal bladder tumors which should also be regarded as malignancy until proven otherwise   · Oncology consulted while in acute care  · Further management to be done as an outpatient per family preference   · Oncology would recommend cystoscopy with Urology to obtain tissue; IR lung biopsy, Outpatient PET CT  · Arrangements to be made via Oncology for a workup in approximately 2 weeks    Malignant neoplasm of overlapping sites of bladder [facility])  Assessment & Plan  · Known history of bladder cancer  · Follows with Dr Danelle Reese   · Last testing done by Urology 5/2021  · Recommend following up as an outpatient with Urology and Oncology    Type 2 diabetes mellitus with stage 3a chronic kidney disease [facility])  Assessment & Plan  Lab Results   Component Value Date    HGBA1C 7 0 (H) 10/10/2022       Recent Labs     04/25/23  1610 04/26/23  0730 04/26/23  0917 04/26/23  1105   POCGLU 137 55* 197* 218*       Blood Sugar Average: Last 72 hrs:  · (P) 175 [id number]   · Monitor accu cheks  · Continue SSI  · Continue home Glipizide 5 mg BID and Metformin 500 mg BID   · Monitor and adjust regimen as needed  · Carb controlled diet     Essential hypertension  Assessment & Plan  · Monitor vitals  · Continue Metoprolol 25 mg Q12H, Lisinopril 5 mg daily    Chronic a-fib (HCC)  Assessment & Plan  · Continue Metoprolol 25 mg Q12H [address] for rate control  · Continue Eliquis 2 5 mg BID    * Compression fracture of L3 vertebra (HCC)  Assessment & Plan  · S/p fall at home; unclear if head strike   · CTH: No acute intracranial abnormality  · CT C/A/P: acute mild inferior endplate L3 compression fracture without central canal compromise or posterior element involvement   No other acute traumatic injuries seen  · Lumbar "spine brace to be worn with ambulation, in upright position ; unclear timeline  Likely 4-6 weeks  · Acute chomprehensive interdisciplinary inpatient rehabilitation to include intensive skilled therapies as outlines with oversight and management by a rehabilitation Physician Assistant overseen by rehabilitation physician as well as inpatient rehabilitation nursing, case management and weekly interdisciplinary team meeting        Appreciate IM consultants medical co-management  Labs, medications, and imaging personally reviewed  ROS:  A ten point review of systems was completed and pertinent positives are listed in subjective section  All other systems reviewed were negative  Review of Systems   Constitutional: Negative  HENT: Negative  Respiratory: Negative  Negative for shortness of breath  Cardiovascular: Negative  Negative for chest pain  Gastrointestinal: Negative  Negative for abdominal pain and constipation  Genitourinary: Negative  Negative for difficulty urinating  Musculoskeletal: Negative  Negative for arthralgias and back pain  Does experience back discomfort from brace   B/l knee pain while working with therapy  No pain while resting in bed currently  Neurological: Negative  Psychiatric/Behavioral: Negative  OBJECTIVE:   /58 (BP Location: Left arm)   Pulse 59   Temp 97 8 °F (36 6 °C) (Temporal)   Resp 18   Ht 5' 4\" (1 626 m)   Wt 60 5 kg (133 lb 6 1 oz)   SpO2 97%   BMI 22 89 kg/m²     Physical Exam  Vitals reviewed  Constitutional:       General: She is not in acute distress  Appearance: She is not ill-appearing  HENT:      Head: Normocephalic and atraumatic  Eyes:      Pupils: Pupils are equal, round, and reactive to light  Cardiovascular:      Rate and Rhythm: Normal rate and regular rhythm  Pulses: Normal pulses  Heart sounds: Normal heart sounds  No murmur heard    Pulmonary:      Effort: Pulmonary effort is normal  No " respiratory distress  Breath sounds: Normal breath sounds  Abdominal:      General: Bowel sounds are normal  There is no distension  Palpations: Abdomen is soft  Musculoskeletal:      Right lower leg: No edema  Left lower leg: No edema  Skin:     General: Skin is warm and dry  Neurological:      General: No focal deficit present  Mental Status: She is alert and oriented to person, place, and time     Psychiatric:         Mood and Affect: Mood normal          Behavior: Behavior normal           Lab Results   Component Value Date    WBC 7 29 04/25/2023    HGB 14 2 04/25/2023    HCT 45 1 04/25/2023     (H) 04/25/2023     04/25/2023     Lab Results   Component Value Date    SODIUM 135 04/25/2023    K 4 7 04/25/2023    CL 99 04/25/2023    CO2 30 04/25/2023    BUN 49 (H) 04/25/2023    CREATININE 1 83 (H) 04/25/2023    GLUC 97 04/25/2023    CALCIUM 10 2 04/25/2023     Lab Results   Component Value Date    INR 0 99 04/08/2023    INR 1 02 04/18/2022    INR 1 68 (H) 07/13/2021    PROTIME 13 1 04/08/2023    PROTIME 13 3 04/18/2022    PROTIME 19 7 (H) 07/13/2021           Current Facility-Administered Medications:   •  acetaminophen (TYLENOL) tablet 650 mg, 650 mg, Oral, Q6H PRN, Uche Quinn MD, 650 mg at 04/25/23 1712  •  aluminum-magnesium hydroxide-simethicone (MYLANTA) oral suspension 30 mL, 30 mL, Oral, Q4H PRN, Uche Quinn MD  •  apixaban [place] Hash) tablet 2 5 mg, 2 5 mg, Oral, BID, Uche Quinn MD, 2 5 mg at 04/26/23 0964  •  bisacodyl (DULCOLAX) rectal suppository 10 mg, 10 mg, Rectal, Daily PRN, Uche Quinn MD  •  calcium carbonate (OYSTER SHELL,OSCAL) 500 mg tablet 1 tablet, 1 tablet, Oral, BID With Meals, Uche Quinn MD, 1 tablet at 04/26/23 3233  •  cholecalciferol (VITAMIN D3) tablet 1,000 Units, 1,000 Units, Oral, Daily, Uche Quinn MD, 1,000 Units at 04/26/23 5608  •  cyanocobalamin (VITAMIN B-12) tablet 1,000 mcg, 1,000 mcg, Oral, Daily, Uche Quinn MD, 1,000 mcg at 04/26/23 1978  •  Diclofenac Sodium (VOLTAREN) 1 % topical gel 2 g, 2 g, Topical, 4x Daily PRN, Alona Perez PA-C  •  docusate sodium (COLACE) capsule 100 mg, 100 mg, Oral, BID, Natalia Ledesma MD, 100 mg at 04/26/23 4383  •  famotidine (PEPCID) tablet 10 mg, 10 mg, Oral, Daily, Natalia Ledesma MD, 10 mg at 04/26/23 0933  •  glipiZIDE (GLUCOTROL) tablet 5 mg, 5 mg, Oral, BID AC, Alona Perez PA-C, 5 mg at 04/26/23 3492  •  insulin lispro (HumaLOG) 100 units/mL subcutaneous injection 1-5 Units, 1-5 Units, Subcutaneous, TID AC, 1 Units at 04/26/23 1159 **AND** Fingerstick Glucose (POCT), , , TID AC, Alona Perez PA-C  •  levothyroxine tablet 25 mcg, 25 mcg, Oral, Daily, Natalia Ledesma MD, 25 mcg at 04/26/23 0510  •  lidocaine (LIDODERM) 5 % patch 1 patch, 1 patch, Topical, Daily, Natalia Ledesma MD, 1 patch at 04/26/23 9886  •  lisinopril (ZESTRIL) tablet 5 mg, 5 mg, Oral, Daily, Natalia Ledesma MD, 5 mg at 04/26/23 7003  •  magnesium gluconate (MAGONATE) tablet 500 mg, 500 mg, Oral, Daily, Natalia Ledesma MD, 500 mg at 04/26/23 3370  •  melatonin tablet 3 mg, 3 mg, Oral, HS, Natalia Ledesma MD, 3 mg at 04/25/23 2101  •  metFORMIN (GLUCOPHAGE) tablet 500 mg, 500 mg, Oral, BID With Meals, Alona Perez PA-C, 500 mg at 04/26/23 7654  •  metoprolol tartrate (LOPRESSOR) tablet 25 mg, 25 mg, Oral, Q12H, Natalia Ledesma MD, 25 mg at 04/26/23 4530  •  ondansetron (ZOFRAN-ODT) dispersible tablet 4 mg, 4 mg, Oral, Q6H PRN, Natalia Ledesma MD  •  oxyCODONE (ROXICODONE) split tablet 2 5 mg, 2 5 mg, Oral, Q4H PRN, 2 5 mg at 04/16/23 1636 **OR** oxyCODONE (ROXICODONE) IR tablet 5 mg, 5 mg, Oral, Q4H PRN, Chico Rodriguez PA-C, 5 mg at 04/21/23 7025  •  polyethylene glycol (MIRALAX) packet 17 g, 17 g, Oral, Daily, Alona Perez PA-C  •  pravastatin (PRAVACHOL) tablet 20 mg, 20 mg, Oral, Daily With Trevor Perales MD, 20 mg at 04/25/23 1710  •  senna-docusate sodium (SENOKOT S) 8 6-50 mg per tablet 1 tablet, 1 tablet, Oral, BID, Yadira Fuller, PA-C, 1 tablet at 04/26/23 4723    Past Medical History:   Diagnosis Date   • Abnormal gait    • Accidental fall from chair, subsequent encounter    • Atrial fibrillation ([facility])    • Benign essential hypertension    • Bite of animal    • Cataract    • Cellulitis    • Chronic kidney disease    • Chronic kidney disease, stage 3 (HCC)    • Colon cancer ([facility])    • Colon polyp    • Current tear of lateral cartilage or meniscus of knee    • Diabetes mellitus ([email or web address] )    • Disease of thyroid gland    • Disorder of magnesium metabolism    • Dvt femoral (deep venous thrombosis)    • Embolism from thrombosis of vein of distal end of lower extremity ([facility] )    • Essential hypertension    • Fall    • Hyperlipidemia    • Hyperlipidemia    • Hypertension    • Hypothyroidism    • Insomnia    • Kidney stone    • Knee joint effusion    • Leukocytosis    • Malaise and fatigue    • MI (myocardial infarction) ([facility])     Hospitalization    • Orbital hemorrhage    • Other sleep disorders    • PAF (paroxysmal atrial fibrillation) ([facility])    • Presence of vena cava filter    • Pulmonary embolism ([facility])    • Tear film insufficiency    • Type 2 diabetes mellitus ([facility])    • Unspecified osteoarthritis, unspecified site    • Weight decreased        Patient Active Problem List    Diagnosis Date Noted   • UTI (urinary tract infection) 04/17/2023   • Lung mass 04/10/2023   • Encephalopathy 04/10/2023   • Fall at home 04/08/2023   • Compression fracture of L3 vertebra ([facility] ) 04/08/2023   • Stage 3b chronic kidney disease ([facility] ) 12/21/2022   • Loss of weight 12/21/2022   • Malignant neoplasm of overlapping sites of bladder ([facility]  ) 10/10/2022   • Hypocalcemia 06/29/2021   • Other pulmonary embolism without acute cor pulmonale ([facility]) 04/21/2021   • Bladder mass 03/31/2021   • Compression fracture of thoracic spine, non-traumatic, sequela 02/28/2020   • Stage 3a chronic kidney disease ([facility] ) 02/28/2020   • Essential hypertension 06/05/2019   • History of pulmonary embolism 06/05/2019   • Osteoarthritis of knee 05/01/2018   • Hypercoagulable state, primary ([place] [address] ) 03/27/2017   • Chronic a-fib ([place] [address] ) 02/20/2016   • Type 2 diabetes mellitus with stage 3a chronic kidney disease ([place] [address] ) 02/20/2016          FAY Danielson have spent a total time of 35 minutes on 04/26/23 in caring for this patient including Instructions for management, Patient and family education, Impressions, Counseling / Coordination of care, Documenting in the medical record, Reviewing / ordering tests, medicine, procedures  , Obtaining or reviewing history   and Communicating with other healthcare professionals   ** Please Note:  voice to text software may have been used in the creation of this document   Although proof errors in transcription or interpretation are a potential of such software**

## 2023-04-26 NOTE — DISCHARGE INSTRUCTIONS
Thoracolumbar Fracture   WHAT YOU NEED TO KNOW:   A thoracolumbar fracture is a break in a thoracic or lumbar vertebrae  The thoracic vertebrae are the 12 bones between your neck and lower back  They are connected to your ribs and help the ribs move when you breathe  The lumbar vertebrae are the 5 bones between your chest and hips  When a vertebra is damaged, the spinal cord may also be damaged  DISCHARGE INSTRUCTIONS:   Call your local emergency number (911 in the [address] [facility]) if:   You feel lightheaded, short of breath, or have chest pain  You cough up blood  You have trouble moving your legs  Your legs feel numb or you cannot move them  Seek care immediately if:   Your arm or leg feels warm, tender, and painful  It may look swollen and red  Call your doctor or orthopedist if:   You have pain or swelling on your back that is worse or does not go away  You have questions or concerns about your condition or care  Medicines: You may need any of the following:  NSAIDs , such as ibuprofen, help decrease swelling, pain, and fever  This medicine is available with or without a doctor's order  NSAIDs can cause stomach bleeding or kidney problems in certain people  If you take blood thinner medicine, always ask if NSAIDs are safe for you  Always read the medicine label and follow directions  Do not give these medicines to children younger than 6 months without direction from a healthcare provider  Acetaminophen  decreases pain and fever  It is available without a doctor's order  Ask how much to take and how often to take it  Follow directions  Read the labels of all other medicines you are using to see if they also contain acetaminophen, or ask your doctor or pharmacist  Acetaminophen can cause liver damage if not taken correctly  Take your medicine as directed  Contact your healthcare provider if you think your medicine is not helping or if you have side effects   Tell your provider if you are allergic to any medicine  Keep a list of the medicines, vitamins, and herbs you take  Include the amounts, and when and why you take them  Bring the list or the pill bottles to follow-up visits  Carry your medicine list with you in case of an emergency  Activity:   Avoid activities that may make the pain worse, such as picking up heavy objects  When your pain decreases, begin normal, slow movements as directed by your healthcare provider  Ask your healthcare provider when you can begin to exercise  Together you can plan a safe exercise program that can help strengthen your back  You may sleep better by doing the following:    Sleep on a firm mattress  You may also put a ½ to 1-inch piece of plywood between the mattress and box springs  Do not use a waterbed, because it will not support your back correctly  Sleep on your back with a pillow under your knees to decrease tension on your back  You may also sleep on your side with one or both of your knees bent  Prevent more injury to your back:   Lift objects correctly, and use good posture to decrease your risk of injuring your back again  When you pick objects up, bend at the hips and knees  Never bend from the waist only  While you lift the object, keep it close to your chest  Try not to twist or lift anything above your waist     Maintain a healthy weight  Being overweight puts more stress on your back  Wear low-heeled shoes  Physical and occupational therapy:  Your healthcare provider may recommend you start or continue one or both types of therapy  A physical therapist teaches you exercises to help improve movement and strength, and to decrease pain  An occupational therapist teaches you new ways to do daily activities after an injury  Follow up with your doctor or orthopedist as directed:  Write down your questions so you remember to ask them during your visits    © Copyright [employer] 2022 Information is for End User's use only and may not be sold, redistributed or otherwise used for commercial purposes  The above information is an  only  It is not intended as medical advice for individual conditions or treatments  Talk to your doctor, nurse or pharmacist before following any medical regimen to see if it is safe and effective for you

## 2023-04-26 NOTE — PROGRESS NOTES
04/26/23 1130   Pain Assessment   Pain Assessment Tool 0-10   Pain Score 4   Pain Location/Orientation Orientation: Lower; Location: Back   Restrictions/Precautions   Precautions Bed/chair alarms; Fall Risk;Pain;Spinal precautions   ROM Restrictions   (spinal precautions)   Braces or Orthoses LSO   Therapeutic Interventions   Strengthening supine ther ex   Assessment   Treatment Assessment Patient agreeable to therapy session  Pain in low back 4/10  Completed ther ex for general LE strengthening  Will see patient in PM for further strengthening and functional mobility training  PT Barriers   Physical Impairment Decreased strength;Decreased range of motion;Decreased endurance; Impaired balance;Decreased mobility; Decreased cognition; Impaired judgement;Decreased safety awareness;Orthopedic restrictions;Pain   Functional Limitation Walking;Transfers;Standing;Stair negotiation   Plan   Treatment/Interventions LE strengthening/ROM; Therapeutic exercise   Progress Progressing toward goals   PT Therapy Minutes   PT Time In 1130   PT Time Out 1200   PT Total Time (minutes) 30   PT Mode of treatment - Individual (minutes) 30   PT Mode of treatment - Concurrent (minutes) 0   PT Mode of treatment - Group (minutes) 0   PT Mode of treatment - Co-treat (minutes) 0   PT Mode of Treatment - Total time(minutes) 30 minutes   PT Cumulative Minutes 752   Therapy Time missed   Time missed?  No

## 2023-04-26 NOTE — NURSING NOTE
Patient resting comfortably in wheelchair at this time  No signs of distress noted  Patient remains forgetful at times bed alarm in place to promote patient safety  Patient was able to ambulate with one assist and a walker to the bathroom overnight  LSO back brace when out of bed  Patient encouraged to reposition frequently to promote skin integrity  Call bell within reach  Will continue to monitor patient and follow plan of care

## 2023-04-27 ENCOUNTER — PATIENT OUTREACH (OUTPATIENT)
Dept: CASE MANAGEMENT | Facility: OTHER | Age: 88
End: 2023-04-27

## 2023-04-27 VITALS
HEART RATE: 65 BPM | BODY MASS INDEX: 22.77 KG/M2 | DIASTOLIC BLOOD PRESSURE: 67 MMHG | TEMPERATURE: 97.7 F | HEIGHT: 64 IN | RESPIRATION RATE: 18 BRPM | OXYGEN SATURATION: 97 % | WEIGHT: 133.38 LBS | SYSTOLIC BLOOD PRESSURE: 118 MMHG

## 2023-04-27 DIAGNOSIS — N18.31 STAGE 3A CHRONIC KIDNEY DISEASE (HCC): Primary | ICD-10-CM

## 2023-04-27 LAB
GLUCOSE SERPL-MCNC: 135 MG/DL (ref 65–140)
GLUCOSE SERPL-MCNC: 97 MG/DL (ref 65–140)

## 2023-04-27 RX ADMIN — SENNOSIDES AND DOCUSATE SODIUM 1 TABLET: 50; 8.6 TABLET ORAL at 09:11

## 2023-04-27 RX ADMIN — CALCIUM 1 TABLET: 500 TABLET ORAL at 15:46

## 2023-04-27 RX ADMIN — METFORMIN HYDROCHLORIDE 500 MG: 500 TABLET ORAL at 09:11

## 2023-04-27 RX ADMIN — LIDOCAINE 5% 1 PATCH: 700 PATCH TOPICAL at 09:11

## 2023-04-27 RX ADMIN — METOPROLOL TARTRATE 25 MG: 25 TABLET, FILM COATED ORAL at 09:11

## 2023-04-27 RX ADMIN — POLYETHYLENE GLYCOL 3350 17 G: 17 POWDER, FOR SOLUTION ORAL at 09:11

## 2023-04-27 RX ADMIN — Medication 1000 UNITS: at 09:11

## 2023-04-27 RX ADMIN — Medication 500 MG: at 09:11

## 2023-04-27 RX ADMIN — METFORMIN HYDROCHLORIDE 500 MG: 500 TABLET ORAL at 15:46

## 2023-04-27 RX ADMIN — GLIPIZIDE 5 MG: 5 TABLET ORAL at 09:11

## 2023-04-27 RX ADMIN — DOCUSATE SODIUM 100 MG: 100 CAPSULE, LIQUID FILLED ORAL at 09:11

## 2023-04-27 RX ADMIN — CYANOCOBALAMIN TAB 500 MCG 1000 MCG: 500 TAB at 09:11

## 2023-04-27 RX ADMIN — GLIPIZIDE 5 MG: 5 TABLET ORAL at 15:46

## 2023-04-27 RX ADMIN — LISINOPRIL 5 MG: 5 TABLET ORAL at 09:11

## 2023-04-27 RX ADMIN — PRAVASTATIN SODIUM 20 MG: 20 TABLET ORAL at 15:46

## 2023-04-27 RX ADMIN — APIXABAN 2.5 MG: 2.5 TABLET, FILM COATED ORAL at 09:11

## 2023-04-27 RX ADMIN — CALCIUM 1 TABLET: 500 TABLET ORAL at 09:11

## 2023-04-27 RX ADMIN — FAMOTIDINE 10 MG: 20 TABLET, FILM COATED ORAL at 09:11

## 2023-04-27 RX ADMIN — LEVOTHYROXINE SODIUM 25 MCG: 25 TABLET ORAL at 05:48

## 2023-04-27 NOTE — DISCHARGE SUMMARY
Discharge Summary - PMR   Calvin Rivero 80 y o  female MRN: [id number]  Unit/Bed#: [facility] 213-01 Encounter: [id number]    Admission Date: 4/13/2023     Discharge Date:  4/27/23    Etiologic/Rehabilitation Diagnosis: Impairment of mobility, safety and Activities of Daily Living (ADLs) due to Orthopedic Disorders:  08 9  Other Orthopedic L3 compression fracture    HPI: Calvin Rivero is a 80 y o  female with a PMH significant for T2DM, HTN, Chronic a-fib and CKD who presented to the [facility] on 04/08/23 s/p a fall at home  She fell backwards and landed on her neck with questionable head strike  CTH with no acute intracranial abnormality  CT spine cervical with no cervical spine fracture or traumatic malalignment  CT C/A/P with acute mild inferior endplate L3 compression fracture without central canal compromise or posterior element involvement  Incidentally found to have a presumed bronchogenic malignancy at the right hilum and multifocal bladder tumors  A lumbar spine brace was ordered  Found to have encephalopathy  Chest xray without acute findings, she was afebrile with no leukocytosis  UA negative  Procedures Performed During ARC Admission: none    Acute Rehabilitation Center Course: Patient participated in a comprehensive interdisciplinary inpatient rehabilitation program which included involvment of MD, therapies (PT, OT, and/or SLP), RN, CM, SW, dietary, and psychology services  She was able to be advanced to a modified independent level of assist and considered safe for discharge home  Please see below for patient's day to day management of medical needs        Lung mass  Assessment & Plan  · Incidentally found on CT imaging : presumed bronchogenic malignancy at the right hilum and multofocal bladder tumors which should also be regarded as malignancy until proven otherwise   · Oncology consulted while in acute care  · Further management to be done as an outpatient per family preference · Oncology would recommend cystoscopy with Urology to obtain tissue; IR lung biopsy, Outpatient PET CT  · Arrangements to be made via Oncology for a workup in approximately 2 weeks  · Ambulatory referral made at discharge     Malignant neoplasm of overlapping sites of bladder [facility])  Assessment & Plan  · Known history of bladder cancer  · Follows with Dr Beth Felipe   · Last testing done by Urology 5/2021  · Recommend following up as an outpatient with Urology and Oncology  · Put in discharge to follow up with Dr Beth Felipe within a month    Type 2 diabetes mellitus with stage 3a chronic kidney disease [facility])  Assessment & Plan  Lab Results   Component Value Date    HGBA1C 7 0 (H) 10/10/2022       Recent Labs     04/26/23  0917 04/26/23  1105 04/26/23  1608 04/27/23  0728   POCGLU 197* 218* 93 97       Blood Sugar Average: Last 72 hrs:  · (P) 139 [id number]   · Continue home Glipizide 5 mg BID and Metformin 500 mg BID   · Carb controlled diet   · Follow up as outpatient with PCP    Essential hypertension  Assessment & Plan  · Monitor vitals  · Continue Metoprolol 25 mg Q12H, Lisinopril 5 mg daily    Chronic a-fib (HCC)  Assessment & Plan  · Continue Metoprolol 25 mg Q12H [address] for rate control  · Continue Eliquis 2 5 mg BID  · Patient states she has both these medications at home  Informed patient to call us or PCP if she needs any refills    * Compression fracture of L3 vertebra (HCC)  Assessment & Plan  · S/p fall at home; unclear if head strike   · CTH: No acute intracranial abnormality  · CT C/A/P: acute mild inferior endplate L3 compression fracture without central canal compromise or posterior element involvement   No other acute traumatic injuries seen  · Lumbar spine brace- follow up with PCP for further management   · Likely to remain in brace 4-6 weeks    UTI (urinary tract infection)-resolved as of 4/26/2023  Assessment & Plan  · UA obtained while in acute care  · Culture resulted over the weekend- >100,000 cfu/ml Enterobacter cloacae, <10,000 cfu/ml proteus species   · Bactrim completed         Discharge Physical Examination:  Vitals reviewed  /67, pulse 65, afebrile  Respiration: 18  Constitutional:  Not in acute distress, not ill-appearing  CV:   Normal rate, regular rhythm  Normal pulses  Pulmonary:   Pulmonary effort is normal  Not in respiratory distress  Normal breath sounds  Abdominal:   Bowel sounds are normal  Non-distended, soft  MSK:  No edema to BLE   ROM WNL  Ambulating appropriately with RW   Skin:  Warm/dry  Areas of ecchymosis to forearms  Neuro:  No focal deficit present  Oriented x4    Significant Findings, Care, Treatment and Services Provided: No significant findings while in [facility]  Incidentally found on CT imaging: presumed bronchogenic malignancy at the right hilum and multifocal bladder tumors which should also be regarded as malignancy until proven otherwise  This was found while in Acute care  Patient seen by Oncology and recommended for Outpatient follow up  An ambulatory referral has been made at time of discharge  Patient follows with Dr Halley Lutz as an outpatient for Urology  Encouraged patient to schedule an appointment and placed contact information on discharge summary  She participated with PT/OT/ST x3hr/day, 5-7x/week  She was followed by [facility] and IM providers  She will continue therapy with home health care       Complications: none     Functional Status Upon Admission to [facility]  Physical Therapy: transfers mod-max, ambulation mod   Occupational Therapy: grooming, UB bathing s/mao, LB bathing mod, UB dressing min   Speech Therapy: n/a    Functional Status Upon Discharge from [facility]   PT:   Roll L-R: independent   Sit-lying: independent   Lying to sitting on side of bed: independent   Sit-stand: independent   Bed-chair transfer: independent   Ambulation: independent up to 50', supervision >50 ft  Stairs: supervision  OT:  Oral Hygiene: independent   Eating: independent  Grooming: independent   Showering/bathing: mao cu   UB dressing: independent   LB dressing: independent with adaptive equipment   Putting on/taking off footwear: independent with adaptive equipment   Picking up object: independent with adaptive equipment   Toileting hygiene: independent; adaptive equipment   Toilet transfer: independent; adaptive equipment   ST:  Comprehension: mild difficulty with complex/abstract   Expression: expresses complex/absract; requires more time  Social interaction: appropriate with extra time  Problem solving: basic problems 90% of time  Memory: recalls/performs requests 90% of time    Discharge Diagnosis: Impairment of mobility, safety and Activities of Daily Living (ADLs) due to Orthopedic Disorders:  08 9  Other Orthopedic L3 compression fracture    Discharge Medications:   See after visit summary for reconciled discharge medications provided to patient and family  Condition at Discharge: good     Discharge instructions/Information to patient and family:   See after visit summary for information provided to patient and family  Provisions for Follow-Up Care:  See after visit summary for information related to follow-up care and any pertinent home health orders  Future Appointments   Date Time Provider Kalia Bhagat   5/2/2023 11:20 AM Wilmar Ryder MD Hem Onc [facility]   6/28/2023  1:00 PM MAYE Kilgore Neph CC Med Spc       Disposition: See After Visit Summary for discharge disposition information  Planned Readmission: No    Discharge Statement   I spent more than 30 minutes discharging the patient  This time was spent on the day of discharge  I had direct contact with the patient on the day of discharge  Greater than 50% of the total time was spent examining patient, answering all patient questions, arranging and discussing plan of care with patient as well as directly providing post-discharge instructions   Additional time then spent on discharge activities  Discharge Medications:  See after visit summary for reconciled discharge medications provided to patient and family

## 2023-04-27 NOTE — PROGRESS NOTES
ADT Alert received indicating the patient discharged 4/27/23 from [facility] to Home with [facility]  I have removed myself off of the care team, added the CM to the care team who will follow the patient through the episode, sent the care manager an inbasket notifying them of the HELIO/SNF Pathway  Ambulatory referral placed for complex care management

## 2023-04-27 NOTE — WOUND OSTOMY CARE
Progress Note - Wound   Donavan Neil 80 y o  female MRN: [id number]  Unit/Bed#: -01 Encounter: [id number]        Assessment:   Patient seen for wound care follow-up  She is able to turn in bed independently  Continent of bowel and bladder  Patient being discharged today  Bilateral heels intact without redness  1   Buttocks/sacrum--RESOLVED  Pink, intact, entirely blanchable scar tissue to coccyx area and right medial buttock  Remainder of buttocks with blanchable erythema  Skin Care Plan:   1  Offloading (tan waffle) cushion to chair when OOB  2  Elevate heels off the bed with pillows  3  Turn and reposition every two hours  Shift your weight often when sitting in the chair  4  Hydraguard to bilateral heels and bilateral buttocks twice daily for skin protection/hydration  5  Apply lotion to your body daily  Wound care team will sign-off at this time  Patient being discharged today, AVS updated with skin care recommendations  Provided patient with offloading air cushion        Kyle ARMIJO, RN, Stacy Barber

## 2023-04-27 NOTE — PROGRESS NOTES
[facility]    04/26/23 1400   Pain Assessment   Pain Assessment Tool 0-10   Pain Score 4   Pain Location/Orientation Location: Back   Restrictions/Precautions   Precautions Bed/chair alarms;Cognitive; Fall Risk;Pain;Spinal precautions   Cognitive Linguisitic Assessments   Cognitive Linquistic Quick Test (CLQT) assessment  completed on 4/18/23    Cognitive Linguistic Quick Test (CLQT) is designed to measure an individual’s five cognitive domains (attention, memory, executive functions, language, and visuospatial skills)  This norm-referenced tool has been standardized on adults ages 25 through 80years old with neurological impairment as a result of CVA, TBI, or dementia  The following results were obtained during the administration of the assessment  Cognitive Domain: Score: Range of Severity:   Attention 42/215 Moderate   Memory 133/185 Mild   Executive Functions 13/40 Moderate   Language  30/37 WNL   Visuospatial Skills  29/105 Moderate   Clock Drawing: 10/13 Mild      Patient scored below Criterion Cut Scores on the following subtests: Symbol Cancellation, Clock Drawing, Symbol Trails, Design Memory and Design Generation  Comprehension   Assist Devices Glasses  (tinnitus continues without change ( patient reports onset \"a few months ago\")  Recommend follow up with ENT if this continues )   Comprehension (FIM) 6 - Has only MILD difficulty with complex/abstract info   Expression   Expression (FIM) 7 - Expresses complex/abstract ideas in a reasonable time w/o devices or helper  Social Interaction   Social Interaction (FIM) 6 - Interacts appropriately with others BUT requires extra  time   Problem Solving   Problem solving (FIM) 5 - Solves basic problems 90% of time   Memory   Memory (FIM) 5 - Recalls/performs request 90% of time   Memory Skills   Orientation Level Oriented X4   Speech/Swallow Mechanism Exam   Vocal Quality Overall improved vocal quality   Onset of hoarse vocal quality " during prolonged conversation  Pt no longer fading into a wisper during conversation, able to sustain adequate volume in conversation   Speech/Language/Cognition Assessmetn   Treatment Assessment Pt pending discharge home with her  tomorrow with recommended homecare ST services to increase independence and safety  Swallow Information   Current Diet Regular; Thin liquid   Baseline Diet Regular; Thin liquids   Eating   Type of Assistance Needed Independent   Physical Assistance Level No physical assistance   Eating CARE Score 6     SLP Therapy Minutes   SLP Time In 1400   SLP Time Out 2935   SLP Total Time (minutes) 15   SLP Mode of treatment - Individual (minutes) 15   SLP Mode of treatment - Concurrent (minutes) 0   SLP Mode of treatment - Group (minutes) 0   SLP Mode of treatment - Co-treat (minutes) 0   SLP Mode of Treatment - Total time(minutes) 15 minutes   SLP Cumulative Minutes 375

## 2023-04-27 NOTE — PROGRESS NOTES
04/27/23 0900   Pain Assessment   Pain Assessment Tool 0-10   Pain Score No Pain   Restrictions/Precautions   Precautions Bed/chair alarms; Fall Risk;Pain;Spinal precautions;Cognitive   Weight Bearing Restrictions No   Braces or Orthoses LSO   Oral Hygiene   Type of Assistance Needed Independent   Physical Assistance Level No physical assistance   Comment Pt  required set up only   Oral Hygiene CARE Score 6   Lying to Sitting on Side of Bed   Type of Assistance Needed Independent   Physical Assistance Level No physical assistance   Comment Pt  supine in bed   Lying to Sitting on Side of Bed CARE Score 6   Sit to Stand   Type of Assistance Needed Independent   Physical Assistance Level No physical assistance   Comment with RW   Sit to Stand CARE Score 6   Bed Mobility   Supine to Sit 7  Independent   Additional items Increased time required   Sit to Supine 7  Independent   Additional items Increased time required   Bed-Chair Transfer   Type of Assistance Needed Independent   Physical Assistance Level No physical assistance   Comment with RW   Chair/Bed-to-Chair Transfer CARE Score 6   Functional Standing Tolerance   Time 5 mins  Activity functional mobility in hallway with use of RW approx  50 ft  X 2   Comments No LOB noted   Transfers   Sit to Stand 7  Independent   Additional items Armrests; Increased time required   Stand to Sit 7  Independent   Additional items Armrests; Increased time required   Stand pivot 7  Independent   Additional items Increased time required   Exercise Tools   Exercise Tools Yes   UE Ergometer 5 mins  forward and 5 mins  backwards   Other Exercise Tool 1 Pt   utilizing reacher to retrieve clothpins from floor while sitting and placing on to clothpin tree  No problems  noted  Coordination   Fine Motor clothpins on and off of clothpin tree   Cognition   Overall Cognitive Status Impaired   Arousal/Participation Alert; Responsive; Cooperative   Attention Attends with cues to redirect Orientation Level Oriented X4   Memory Decreased recall of precautions   Following Commands Follows one step commands without difficulty   Comments Pt  agreeable to OT treatment   Additional Activities   Additional Activities Comments functional mobility in hallway to and from therapy room   Activity Tolerance   Activity Tolerance Patient tolerated treatment well   Assessment   Treatment Assessment Patient participated in Skilled OT session this date with interventions consisting of ADL re training with the use of correct body mechnaics, safety awareness and fall prevention techniques,  long handle equipment, maintaining spinal precautions, therapeutic exercise to: increase functional use of BUEs, increase BUE muscle strength ,  therapeutic activities to: increase activity tolerance, increase standing tolerance time with unilateral UE support to complete sink level ADLs, and increase dynamic sit/ stand balance during functional activity    Patient agreeable to OT treatment session, upon arrival patient was found supine in bed  In comparison to previous session, patient with improvements in functional transfers/mobility  Patient requiring Patient continues to be functioning below baseline level, occupational performance remains limited secondary to factors listed above and increased risk for falls and injury  From OT standpoint, recommendation at time of d/c would be Home OT  Patient to benefit from continued Occupational Therapy treatment while in the hospital to address deficits as defined above and maximize level of functional independence with ADLs and functional mobility  Prognosis Good   Problem List Decreased strength;Decreased range of motion;Decreased endurance; Impaired balance;Decreased cognition;Decreased skin integrity;Orthopedic restrictions;Pain   Plan   Treatment/Interventions ADL retraining;Functional transfer training; Therapeutic exercise; Endurance training;Equipment eval/education; Bed mobility;OT   Progress Improving as expected   Recommendation   OT Discharge Recommendation Home with home health rehabilitation   OT Therapy Minutes   OT Time In 0900   OT Time Out 1000   OT Total Time (minutes) 60   OT Mode of treatment - Individual (minutes) 60     NATHAN Cordova/CLYDE

## 2023-04-27 NOTE — NURSING NOTE
Patient discharged to home with home health referral for PT, OT and Nursing  Discharge instructions reviewed with patient and her son, both whom verbalized understanding of same  Patient belongings inventoried and sent with patient at time of discharge  Patient was transported home via personal vehicle of son which IDT deemed the safest mode of transportation  Patient and son verbalized appreciation of cares and services received while on ARU

## 2023-04-27 NOTE — NURSING NOTE
Patient resting comfortably in wheelchair at this time  No signs of distress noted  Patient remains forgetful at times bed alarm in place to promote patient safety  Patient was able to ambulate with a walker to the bathroom overnight  LSO back brace when out of bed  Patient encouraged to reposition frequently to promote skin integrity  Call bell within reach  Will continue to monitor patient and follow plan of care

## 2023-04-27 NOTE — CASE MANAGEMENT
DCI reviewed with patient & Pt's son, who expressed understanding & agreement  Pt being [address] home today between 3:30-4 PM, Thursday, 4/27 with C (Nsg, PT, OT, ST, HHA); a list of providers was provided to Pt & a referral made to [facility] based on Pt preferences from choice list; [facility] expected within 24-48 hours of DC  Pt's son will transport her home via regular vehicle, which team has determined to be a safe mode of transport; son stated that his truck is a mid-size vehicle  A referral to the Option program was made  FU providers & appts indicated on DCI, see AVS  SW will continue to monitor & assist as needed with Tx & DC planning  FU IMM reviewed, signed & submitted for scanning  Addendum: Spoke with Montserrat at [facility] [employer], who is also making a notation to assess Pt as well as her spouse for aides in the home  Meals are set to resume as well

## 2023-04-28 ENCOUNTER — PATIENT OUTREACH (OUTPATIENT)
Dept: HEMATOLOGY ONCOLOGY | Facility: CLINIC | Age: 88
End: 2023-04-28

## 2023-04-28 ENCOUNTER — PATIENT OUTREACH (OUTPATIENT)
Dept: CASE MANAGEMENT | Facility: OTHER | Age: 88
End: 2023-04-28

## 2023-04-28 ENCOUNTER — TRANSITIONAL CARE MANAGEMENT (OUTPATIENT)
Dept: FAMILY MEDICINE CLINIC | Facility: CLINIC | Age: 88
End: 2023-04-28

## 2023-04-28 NOTE — PROGRESS NOTES
I sent a tiger text to the Hem/Onc SW with my concerns  She responded and when she is at her desk will review patient's chart

## 2023-04-28 NOTE — PROGRESS NOTES
I called [facility] and they confirmed they got the referral They will start care on 5/2  I asked if it was possible to start earlier and was advised only if they got a cancellation

## 2023-04-28 NOTE — PROGRESS NOTES
"Outpatient Care Manager Note: Patient identified for SNF/HELIO Pathway  BMP order placed and PCP notified  Chart review completed  Patient discharged from  [address] [place] [address] on 4/27  HELIO pathway interventions reviewed  including:  • PCP visit within 1 week  • Avoiding NSAIDs  • Adequate nutrition and hydration  • Keeping BP >130 if normal BP not lower  • Checking temperature  • Assessing for weight gain or loss and edema changes since home  • Medication Review  • BMP   I spoke with Guera Green today who reports she is laying down to relieve her back pain  She had a fracture of L3 after a fall at home  on 4/8  She has not taken any Tylenol today but stated she will if she has pain  Patient reports someone called when I asked if [facility] had contacted her  Patient is at home with spouse and family members and \"good neighbors\" are checking on her  She has meals supplied but does not know through which company  I asked if I could call her son but she prefers not since he is at work  She nor her  drive and I asked if family would be taking her to her appointments  She stated she had no appointments and I advised her she is scheduled with Hem/Onc on 5/2  Patient stated she was not aware  I advised her I would call the office and ask if they can arrange transportation  I will also all [facility] and ask when they can start care  Patient states she manages her own medication and uses a daily sorter  She did take my contact information and repeated it to me    "

## 2023-04-28 NOTE — PHYSICAL THERAPY NOTE
PT DISCHARGE SUMMARY:      Patient has met max benefit for inpatient ARC PT  Patient MOD I bed mobility, MOD I all transfers with walker and increased time, MOD I household ambulation with walker and S for longer distance ambulation with walker; close S negotiation of curb step with walker and regular steps with 2 handrails  Patient remains limited by decreased ROM/strength, decreased balance and safety, decreased endurance, spinal precautions and pain  Patient for d/c to home with family and continue PT services 04/27/2023  Per nursing patient transferred into son's large  truck with A x 2

## 2023-04-28 NOTE — PROGRESS NOTES
Call to pt and introduced myself as Nurse Navigator and explained my role in his care with coordinating appts, being a point of contact, providing support and connecting him with available resources as needed  General assessment completed and evaluated for any barriers to care  Referral to Oncology Social Work placed  Answered questions to pt's satisfaction  Reviewed upcoming appts  STAR forms completed but myself and the pt expressed concerns over safety and ability to safely board  Email sent to [facility] with forms and concerns that due to safety pt may not be able to utilize this service  She is feeling very overwhelmed with catching up with everything s/p hospitalization  Provided support and encouraged to call with any questions or concerns  Future Appointments   Date Time Provider Kalia Bhagat   5/2/2023 11:20 AM Wagner Alcazar MD Hem Onc [facility] Practice-Onc   6/28/2023  1:00 PM MAYE Martinez Neph CC Med Spc   Outreach to pt's son to inquire about assistance with getting pt to appt on 5/2/23  Advised I could get her there by [facility] but pt expressed safety concerns with fear of falling and he informed me he tried to cancel the 5/2/23 appt but was unable because he does not feel she is ready with just getting out of rehab  Advised that I would reocommend a family member for the visit also to discuss plan of care options  The plan is for pt's DIL to take her to the appt but she is unable to do 5/2/23  Offered to reschedule and if the appt does not suit we can look for something else but availability is limited  He requested I send his wife an email with all the information  Email sent with appt rescheduled to 5/10/23 at 11:20am   Provided phone number for Dr Jesusita Caraballo office, pt's urologist, requesting they schedule with him asap to address bladder masses  All of my contact info is included and requested call with any questions or assistance      Follow-up call to pt and informed her of change to appts and that email was sent to Karley with all of the information

## 2023-04-28 NOTE — PROGRESS NOTES
I spoke with Sj Diamond at [facility] [employer]  She reports the referral was started and when assigned they will reach out to patient's son Isadora Sheehan to schedule an assessment

## 2023-05-01 ENCOUNTER — PATIENT OUTREACH (OUTPATIENT)
Dept: HEMATOLOGY ONCOLOGY | Facility: CLINIC | Age: 88
End: 2023-05-01

## 2023-05-01 ENCOUNTER — DOCUMENTATION (OUTPATIENT)
Dept: HEMATOLOGY ONCOLOGY | Facility: CLINIC | Age: 88
End: 2023-05-01

## 2023-05-01 ENCOUNTER — TELEPHONE (OUTPATIENT)
Dept: HEMATOLOGY ONCOLOGY | Facility: CLINIC | Age: 88
End: 2023-05-01

## 2023-05-01 DIAGNOSIS — R91.8 LUNG MASS: Primary | ICD-10-CM

## 2023-05-01 NOTE — PROGRESS NOTES
Received voicemail from pt's daughter in law:    Sonal Forward, this is Tomas Stacks Long calling regarding my mother-in-law, Tristian Jha  I did receive your e-mail  I also just made an appointment with Doctor Sadie Ponce for May 17th  But I do have a question about Doctor Román Artis appointment  I feel like that she's already had a consultation  Is there any way that we can actually just make the appointment for her biopsy? 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

## 2023-05-01 NOTE — PROGRESS NOTES
05/01/23 1202   Hello, [Guardians Name / Monta Curling, this is [Caller Edy Ranks from [facility], and our clinical care team wanted to check on you / your child after your recent visit to the hospital  It will only take 3-5 minutes  Is this a good time? Discharge Call Type/ Specific Diagnosis: General Call   General Discharge Phone Call   Were your/your child's discharge instructions clear and understandable? Please tell me in your own words how to care for yourself/your child now that you're home Yes;Patient understood instructions   Have you filled your/your child's new prescriptions yet? Yes   What questions do you have about those medications? No questions   Are you/your child having any unusual symptoms or problems? (Specific to problem- i e , dressing, pain, bruising or swelling, procedure, etc ) No reported symptoms/problems   Is there anything preventing you from keeping that appointment? No;Patient able to keep appointment   Are there any physicians, nurses, or hospital staff you would like us to recognize for doing a very good job? Global Team Acknowledgment   This call resulted in: No interventions needed   Call Complete   Attempted Number of Calls 1   Discharge phone call complete?  Complete

## 2023-05-01 NOTE — TELEPHONE ENCOUNTER
Phoned pt's dtr in law Mallika Reyes at [phone] and left voice message regarding scheduling a Biopsy of right hilar mass and put in comments for IR to phone dtr in law Mariana to schedule this appt  I left my Teams number in case Mariana needed to ask questions

## 2023-05-01 NOTE — PROGRESS NOTES
"Pt's daughter-in-law is inquiring about the possibility of completing a biopsy prior to seeing Dr Claudio  since she was seen by Jame Jamison, Valeri Dubois [place]  Per Lynne's note, \"Would also recommend IR lung biopsy to new right lung mass  \"  According to note, work-up deferred until completion of STR per family request   Pt's transportation options all live an hour away and work full-time  Pt is s/p fall and not a great candidate for STAR at this point  We have her set up to see Urology on 5/17/23 with Dr Santiago College  Please advise    Thank you  "

## 2023-05-02 ENCOUNTER — PATIENT OUTREACH (OUTPATIENT)
Dept: CASE MANAGEMENT | Facility: OTHER | Age: 88
End: 2023-05-02

## 2023-05-02 DIAGNOSIS — I48.20 CHRONIC A-FIB (HCC): ICD-10-CM

## 2023-05-02 DIAGNOSIS — E03.9 ACQUIRED HYPOTHYROIDISM: ICD-10-CM

## 2023-05-02 NOTE — PROGRESS NOTES
I received a reply from Oncology SW that patient's daughter in law is providing transportation to her appointments

## 2023-05-02 NOTE — PROGRESS NOTES
I spoke with daughter in law who is making patient's follow up appointments she reports  Patient is in need of a biopsy of a hilar mass discovered during recent hospitalization  Hem/Onc office has reached out and asked IR to call daughter in law with appointment time and details  Daughter in law is aware  I advised her I had spoken with [employer] [facility] [address] and they will schedule an assessment for waiver services  She feels both patient and spouse are doing well at home  She took my contact information and I advised her to call me if I can be of assistance

## 2023-05-02 NOTE — TELEPHONE ENCOUNTER
Patient requesting refill(s) of: Eliquis     Last filled: 4/6/22  Last appt: 10/10/22  Next appt: none   Pharmacy: [facility]       Patient requesting refill(s) of: levothyroxine     Last filled: 10/11/22

## 2023-05-02 NOTE — PROGRESS NOTES
"I spoke with patient today who reports she has not slept well and is \"tired\"  She reports no pain at this time  Patient had start of care with [facility] and does not want to continue  She stated she told the nurse she had enough physical therapy in rehab  She reports she showered herself today and made breakfast I encouraged her to continue with physical therapy and explained the benefits  Assessment completed today  I gave [facility] my contact information and she repeated it to me  She did consent to another call    "

## 2023-05-03 RX ORDER — LEVOTHYROXINE SODIUM 0.03 MG/1
25 TABLET ORAL DAILY
Qty: 90 TABLET | Refills: 3 | Status: SHIPPED | OUTPATIENT
Start: 2023-05-03

## 2023-05-03 NOTE — TELEPHONE ENCOUNTER
Spoke to patient  Unable to come in for an appt  Is willing to do a telephone call visit, needs a TCM  Spoke to provider and is ok with a 20 minute TCM call  Tried to call patient back to schedule 2x and phone was busy

## 2023-05-03 NOTE — TELEPHONE ENCOUNTER
Please advise pt that I'm worried about use of blood thinners with her recent fall  We should really discuss risks/benefits  Does she have an upcoming visit with me? I know she was discharged from rehab recently

## 2023-05-04 ENCOUNTER — OFFICE VISIT (OUTPATIENT)
Dept: FAMILY MEDICINE CLINIC | Facility: CLINIC | Age: 88
End: 2023-05-04

## 2023-05-04 ENCOUNTER — PATIENT OUTREACH (OUTPATIENT)
Dept: HEMATOLOGY ONCOLOGY | Facility: CLINIC | Age: 88
End: 2023-05-04

## 2023-05-04 ENCOUNTER — TELEPHONE (OUTPATIENT)
Dept: FAMILY MEDICINE CLINIC | Facility: CLINIC | Age: 88
End: 2023-05-04

## 2023-05-04 ENCOUNTER — TELEPHONE (OUTPATIENT)
Dept: LAB | Facility: HOSPITAL | Age: 88
End: 2023-05-04

## 2023-05-04 ENCOUNTER — TRANSITIONAL CARE MANAGEMENT (OUTPATIENT)
Dept: FAMILY MEDICINE CLINIC | Facility: CLINIC | Age: 88
End: 2023-05-04

## 2023-05-04 DIAGNOSIS — R91.8 LUNG MASS: ICD-10-CM

## 2023-05-04 DIAGNOSIS — I48.20 CHRONIC A-FIB (HCC): ICD-10-CM
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
## 2023-05-04 DIAGNOSIS — I27.82 OTHER CHRONIC PULMONARY EMBOLISM WITHOUT ACUTE COR PULMONALE (HCC): ICD-10-CM

## 2023-05-04 DIAGNOSIS — N18.4 CHRONIC RENAL DISEASE, STAGE IV (HCC): ICD-10-CM

## 2023-05-04 DIAGNOSIS — S32.030D COMPRESSION FRACTURE OF L3 VERTEBRA WITH ROUTINE HEALING, SUBSEQUENT ENCOUNTER: Primary | ICD-10-CM

## 2023-05-04 DIAGNOSIS — N18.31 TYPE 2 DIABETES MELLITUS WITH STAGE 3A CHRONIC KIDNEY DISEASE, WITHOUT LONG-TERM CURRENT USE OF INSULIN (HCC): ICD-10-CM

## 2023-05-04 DIAGNOSIS — I10 ESSENTIAL HYPERTENSION: ICD-10-CM

## 2023-05-04 DIAGNOSIS — E03.9 ACQUIRED HYPOTHYROIDISM: ICD-10-CM

## 2023-05-04 DIAGNOSIS — C67.8 MALIGNANT NEOPLASM OF OVERLAPPING SITES OF BLADDER (HCC): ICD-10-CM

## 2023-05-04 DIAGNOSIS — D69.6 THROMBOCYTOPENIA (HCC): ICD-10-CM

## 2023-05-04 DIAGNOSIS — E11.22 TYPE 2 DIABETES MELLITUS WITH STAGE 3A CHRONIC KIDNEY DISEASE, WITHOUT LONG-TERM CURRENT USE OF INSULIN (HCC): ICD-10-CM
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
## 2023-05-04 PROBLEM — R63.4 LOSS OF WEIGHT: Status: RESOLVED | Noted: 2022-12-21 | Resolved: 2023-05-04

## 2023-05-04 PROBLEM — D68.59 HYPERCOAGULABLE STATE, PRIMARY (HCC): Status: RESOLVED | Noted: 2017-03-27 | Resolved: 2023-05-04

## 2023-05-04 PROBLEM — N32.89 BLADDER MASS: Status: RESOLVED | Noted: 2021-03-31 | Resolved: 2023-05-04

## 2023-05-04 PROBLEM — E83.51 HYPOCALCEMIA: Status: RESOLVED | Noted: 2021-06-29 | Resolved: 2023-05-04

## 2023-05-04 PROBLEM — G93.40 ENCEPHALOPATHY: Status: RESOLVED | Noted: 2023-04-10 | Resolved: 2023-05-04

## 2023-05-04 RX ORDER — LIDOCAINE 50 MG/G
2 PATCH TOPICAL DAILY
Qty: 30 PATCH | Refills: 0 | Status: SHIPPED | OUTPATIENT
Start: 2023-05-04

## 2023-05-04 NOTE — TELEPHONE ENCOUNTER
Called and spoke with patient, apt made for 10:20 Am today with provider for a telephone [facility] ([phone])

## 2023-05-04 NOTE — PROGRESS NOTES
Virtual TCM Visit:    Verification of patient location:    Patient is located at Home in the following state in which I hold an active license [place]    Assessment/Plan:        Problem List Items Addressed This Visit        Endocrine    Type 2 diabetes mellitus with stage 3a chronic kidney disease ([facility] )    - will stop metformin due to declining renal function, arrange for mobile lab for next week  Last A1c 7 0, at goal for age  Relevant Orders    Hemoglobin A1C    Comprehensive metabolic panel       Cardiovascular and Mediastinum    Chronic a-fib ([facility])    - discussed risks of frequent falls and AC, including life-threatening intra-cranial bleeding  Advised to stop Eliquis once she finishes current supply  -Relevant Orders    Comprehensive metabolic panel    CBC and differential    Essential hypertension  - hospital BP appears low for her age, reports feeling lightheaded and week, will stop lisinopril at this time      Other pulmonary embolism without acute cor pulmonale ([facility])  - remote hx of unprovoked DVT, has IVC filter  Discussed risks of AC given frequent falls       Musculoskeletal and Integument    Compression fracture of L3 vertebra ([facility]) - Primary    - continues to have pain, now essentially home bound as well, can increase tylenol to 1,000mg BID, add lidocaine patches  Relevant Orders    Comprehensive metabolic panel    CBC and differential       Genitourinary    Malignant neoplasm of overlapping sites of bladder ([facility] )    - known hx of bladder cancer, now with recurrence, has follow up with Dr Enedina Jain scheduled   Relevant Orders    Comprehensive metabolic panel    CBC and differential    Chronic renal disease, stage IV ([facility] )  - labs prior to DC show declining renal function/HELIO with Cr 1 83  will arrange for mobile lab to repeat, advised to stop metformin and lisinopril as noted above        Other    Fall at home    Lung mass    - suspicious mass noted on CT, concerning for bronchogenic carcinoma   Pt is former smoker, quit 20 years ago, about 5-10 pack year history   -Relevant Orders    Comprehensive metabolic panel    CBC and differential   Other Visit Diagnoses     Acquired hypothyroidism        Relevant Orders    TSH, 3rd generation with Free T4 reflex    Thrombocytopenia ([facility] )               Reason for visit is TCM    Encounter provider Song Moya MD     Provider located at [address] [place] [id number] [phone]    Recent Visits  Date Type Provider Dept   05/04/23 Telephone [employer] [facility]   05/04/23 Office Visit Song Moya MD [facility]   Showing recent visits within past 7 days and meeting all other requirements  Future Appointments  No visits were found meeting these conditions  Showing future appointments within next 150 days and meeting all other requirements       After connecting through [employer], the patient was identified by name and date of birth  Delfin Friday was informed that this is a telemedicine visit and that the visit is being conducted through Telephone  My office door was closed  No one else was in the room  She acknowledged consent and understanding of privacy and security of the video platform  The patient has agreed to participate and understands they can discontinue the visit at any time  Patient is aware this is a billable service  Transitional Care Management Review:  Delfin Friday is a 80 y o  female here for TCM follow up       During the TCM phone call patient stated:    TCM Call     Date and time call was made  5/4/2023 10:40 AM    Hospital care reviewed  Records reviewed    Patient was hospitialized at  [address]    Date of Admission  04/13/23    Date of discharge  04/27/23    Diagnosis  spinal fracture    Disposition  Home    Were the patients medications reviewed and updated  Yes    Current Symptoms  None      TCM Call "Post hospital issues  Poor ADL (Activities of Daily Living) performance    Should patient be enrolled in anticoag monitoring? No    Scheduled for follow up? Yes    Did you obtain your prescribed medications  Yes    Do you need help managing your prescriptions or medications  No    Is transportation to your appointment needed  Yes    Specify why  home bound    I have advised the patient to call PCP with any new or worsening symptoms  Andreea Espinosa MA    Living Arrangements  Alone    Support System  Family        Subjective:     Patient ID: Mirian Arellano is a 80 y o  female  81yo female with history of diabetes, CKD, hypothyroidism, HTN, DVT presents for virtual TCM  Pt discharged from rehab last week, 04/27  Still having a lot of back pain, not wearing brace regularly because it is irritating her skin, reports a \"lump\" on her spine  Taking tylenol 650mg without much relief  Feels weak, tired, not sleeping well  Appetite is poor  No longer driving, doesn't feel safe doing so  Family lives about 50 miles away  Gets med[facility] delivered [facility] since being home but landed onto her bed  Feels weak and unsteady when walking, using walker  Does not want PT or home health  Review of Systems   Constitutional: Positive for activity change, appetite change and fatigue  Negative for chills and fever  Respiratory: Negative for chest tightness and shortness of breath  Cardiovascular: Negative for chest pain  Musculoskeletal: Positive for back pain and gait problem  Neurological: Positive for weakness  Negative for dizziness, syncope and light-headedness  Psychiatric/Behavioral: Positive for sleep disturbance  Objective: There were no vitals filed for this visit  Physical Exam  Vitals reviewed  Constitutional:       General: She is not in acute distress  Pulmonary:      Effort: Pulmonary effort is normal  No respiratory distress  Neurological:      Mental Status: She is alert   " Psychiatric:         Mood and Affect: Mood normal          Behavior: Behavior normal          Thought Content: Thought content normal          Judgment: Judgment normal           Medications have been reviewed by provider in current encounter    I spent 20 minutes with the patient during this visit  David Miranda MD      VIRTUAL VISIT DISCLAIMER    Jackson Sanderson verbally agrees to participate in [facility]  Pt is aware that [facility] could be limited without vital signs or the ability to perform a full hands-on physical Kareem Downer understands she or the provider may request at any time to terminate the video visit and request the patient to seek care or treatment in person

## 2023-05-04 NOTE — TELEPHONE ENCOUNTER
[employer]  Scripts for lisinopril and metformin are cancelled  Made sure there will be no more fills on Eliquis as well per PCP  [facility] cannot deliver controlled substances  Please advise

## 2023-05-04 NOTE — PROGRESS NOTES
"Noted in referral to IR for lung biopsy  \"Tamam,     This mass is hilar and better biopsied with EBUS  Please consult pulmonology  Thanks     Cesar\"    Pt scheduled for 5/10/23 with Dr Denis Vera  Would you like to discsus this at appointment or refer to pulmonology now? Thank you!     "

## 2023-05-04 NOTE — PROGRESS NOTES
Outreach to pt to review her desires for next steps  Explained that we are currently working on getting an appt to get a biopsy of the lung mass seen on the CT scan while she was hospitalized and reviewed that when she spoke to [facility] in the hospital she wanted to pursue a work-up for diagnosis  Stated I want to confirm that this is still her desire because I know that she has bee through a lot lately and there are a lot of appts that will be scheduled  Inquired about what needs to be done for the lung and again reviewed that there is a mass in her lung that is concerning for cancer  She sounded surprised by this news  She did not provide a direct answer and then stated she needed to go because she was waiting on a call back from another provider to discuss her back pain

## 2023-05-05 DIAGNOSIS — R91.8 LUNG MASS: Primary | ICD-10-CM

## 2023-05-05 NOTE — TELEPHONE ENCOUNTER
Ok, please advise pt that I've sent Lidocaine patches instead and she can increase to extra-strength tylenol 1,000mg every 8hrs as needed for pain

## 2023-05-08 ENCOUNTER — DOCUMENTATION (OUTPATIENT)
Dept: HEMATOLOGY ONCOLOGY | Facility: CLINIC | Age: 88
End: 2023-05-08

## 2023-05-08 NOTE — PROGRESS NOTES
Pt was initially referred to IR for biopsy of the lung mass seen on the CT scan while she was hospitalized  Per Dr Merissa Carmen after review, pts mass is hilar and better biopsied with EBUS  He forward pt to a have a pulmonology consult  A pulmonary referral was placed  I did reach out to pulmonary and was able to get her in on 5/15 with Dr Narciso Magaña at the [place] office at [address] [place] [address], [address] [place] [address], , will call and notified patient  As for her med/onc consult initially scheduled on 5/10 with Dr Norman Bumpers, was cancelled  I did reschedule her med/onc HFU to 6/13  She should be biopsied and resulted by then

## 2023-05-09 ENCOUNTER — PATIENT OUTREACH (OUTPATIENT)
Dept: CASE MANAGEMENT | Facility: OTHER | Age: 88
End: 2023-05-09

## 2023-05-09 ENCOUNTER — PATIENT OUTREACH (OUTPATIENT)
Dept: HEMATOLOGY ONCOLOGY | Facility: CLINIC | Age: 88
End: 2023-05-09

## 2023-05-09 NOTE — PROGRESS NOTES
Daughter in law called and stated they were waiting to hear from the pulmonary office  Hem/Onc referred patient to pulmonary for a follow up of the lung mass before they see her  She feels patient would benefit from physical therapy but patient refused their services  I advised daughter in law that I encouraged patient to have physical therapy at home  She reports patient has no needs at this time

## 2023-05-09 NOTE — PROGRESS NOTES
Outreach to Farhan Coffey pt's ARMAAN  Provided appt information for 5/15/23 for EBUS consult  She inquired about doing consult on day of procedure, later in the day or video visit  Transportation is challenging and looking for any options that will make it any easier  Provided office number of pulmonology and encouraged to call to inquire about those options  Email sent with appt location and address  Encouraged to call with any questions/concerns

## 2023-05-15 ENCOUNTER — CONSULT (OUTPATIENT)
Dept: PULMONOLOGY | Facility: CLINIC | Age: 88
End: 2023-05-15

## 2023-05-15 ENCOUNTER — APPOINTMENT (OUTPATIENT)
Dept: LAB | Facility: HOSPITAL | Age: 88
End: 2023-05-15
Attending: INTERNAL MEDICINE

## 2023-05-15 VITALS
OXYGEN SATURATION: 98 % | HEART RATE: 64 BPM | HEIGHT: 64 IN | BODY MASS INDEX: 23.76 KG/M2 | DIASTOLIC BLOOD PRESSURE: 84 MMHG | RESPIRATION RATE: 18 BRPM | WEIGHT: 139.2 LBS | TEMPERATURE: 97.3 F | SYSTOLIC BLOOD PRESSURE: 164 MMHG
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
## 2023-05-15 DIAGNOSIS — C67.8 MALIGNANT NEOPLASM OF OVERLAPPING SITES OF BLADDER (HCC): ICD-10-CM

## 2023-05-15 DIAGNOSIS — N18.31 TYPE 2 DIABETES MELLITUS WITH STAGE 3A CHRONIC KIDNEY DISEASE, WITHOUT LONG-TERM CURRENT USE OF INSULIN (HCC): ICD-10-CM

## 2023-05-15 DIAGNOSIS — R91.8 LUNG MASS: Primary | ICD-10-CM

## 2023-05-15 DIAGNOSIS — E03.9 ACQUIRED HYPOTHYROIDISM: ICD-10-CM

## 2023-05-15 DIAGNOSIS — I10 ESSENTIAL HYPERTENSION: ICD-10-CM

## 2023-05-15 DIAGNOSIS — I48.20 CHRONIC A-FIB (HCC): ICD-10-CM

## 2023-05-15 DIAGNOSIS — E11.22 TYPE 2 DIABETES MELLITUS WITH STAGE 3A CHRONIC KIDNEY DISEASE, WITHOUT LONG-TERM CURRENT USE OF INSULIN (HCC): ICD-10-CM
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
## 2023-05-15 DIAGNOSIS — N18.31 STAGE 3A CHRONIC KIDNEY DISEASE (HCC): ICD-10-CM

## 2023-05-15 DIAGNOSIS — Z86.711 HISTORY OF PULMONARY EMBOLISM: ICD-10-CM

## 2023-05-15 DIAGNOSIS — R91.8 LUNG MASS: ICD-10-CM

## 2023-05-15 DIAGNOSIS — S32.030D COMPRESSION FRACTURE OF L3 VERTEBRA WITH ROUTINE HEALING, SUBSEQUENT ENCOUNTER: ICD-10-CM

## 2023-05-15 LAB
ALBUMIN SERPL BCP-MCNC: 3.5 G/DL (ref 3.5–5)
ALP SERPL-CCNC: 79 U/L (ref 46–116)
ALT SERPL W P-5'-P-CCNC: 32 U/L (ref 12–78)
ANION GAP SERPL CALCULATED.3IONS-SCNC: 1 MMOL/L (ref 4–13)
AST SERPL W P-5'-P-CCNC: 24 U/L (ref 5–45)
BASOPHILS # BLD AUTO: 0.05 THOUSANDS/ÂΜL (ref 0–0.1)
BASOPHILS NFR BLD AUTO: 1 % (ref 0–1)
BILIRUB SERPL-MCNC: 0.39 MG/DL (ref 0.2–1)
BUN SERPL-MCNC: 29 MG/DL (ref 5–25)
CALCIUM SERPL-MCNC: 9.3 MG/DL (ref 8.3–10.1)
CHLORIDE SERPL-SCNC: 106 MMOL/L (ref 96–108)
CO2 SERPL-SCNC: 28 MMOL/L (ref 21–32)
CREAT SERPL-MCNC: 1.36 MG/DL (ref 0.6–1.3)
EOSINOPHIL # BLD AUTO: 0.05 THOUSAND/ÂΜL (ref 0–0.61)
EOSINOPHIL NFR BLD AUTO: 1 % (ref 0–6)
ERYTHROCYTE [DISTWIDTH] IN BLOOD BY AUTOMATED COUNT: 15.7 % (ref 11.6–15.1)
EST. AVERAGE GLUCOSE BLD GHB EST-MCNC: 177 MG/DL
GFR SERPL CREATININE-BSD FRML MDRD: 34 ML/MIN/1.73SQ M
GLUCOSE P FAST SERPL-MCNC: 179 MG/DL (ref 65–99)
HBA1C MFR BLD: 7.8 %
HCT VFR BLD AUTO: 47 % (ref 34.8–46.1)
HGB BLD-MCNC: 14.6 G/DL (ref 11.5–15.4)
IMM GRANULOCYTES # BLD AUTO: 0.02 THOUSAND/UL (ref 0–0.2)
IMM GRANULOCYTES NFR BLD AUTO: 0 % (ref 0–2)
LYMPHOCYTES # BLD AUTO: 1.35 THOUSANDS/ÂΜL (ref 0.6–4.47)
LYMPHOCYTES NFR BLD AUTO: 18 % (ref 14–44)
MCH RBC QN AUTO: 32 PG (ref 26.8–34.3)
MCHC RBC AUTO-ENTMCNC: 31.1 G/DL (ref 31.4–37.4)
MCV RBC AUTO: 103 FL (ref 82–98)
MONOCYTES # BLD AUTO: 0.49 THOUSAND/ÂΜL (ref 0.17–1.22)
MONOCYTES NFR BLD AUTO: 7 % (ref 4–12)
NEUTROPHILS # BLD AUTO: 5.48 THOUSANDS/ÂΜL (ref 1.85–7.62)
NEUTS SEG NFR BLD AUTO: 73 % (ref 43–75)
NRBC BLD AUTO-RTO: 0 /100 WBCS
PLATELET # BLD AUTO: 165 THOUSANDS/UL (ref 149–390)
PMV BLD AUTO: 11.8 FL (ref 8.9–12.7)
POTASSIUM SERPL-SCNC: 4.6 MMOL/L (ref 3.5–5.3)
PROT SERPL-MCNC: 7.6 G/DL (ref 6.4–8.4)
RBC # BLD AUTO: 4.56 MILLION/UL (ref 3.81–5.12)
SODIUM SERPL-SCNC: 135 MMOL/L (ref 135–147)
TSH SERPL DL<=0.05 MIU/L-ACNC: 2.27 UIU/ML (ref 0.45–4.5)
WBC # BLD AUTO: 7.44 THOUSAND/UL (ref 4.31–10.16)

## 2023-05-15 NOTE — ASSESSMENT & PLAN NOTE
Occured in ~2002 at the time of her colon cancer surgery  She is on eliquis  She has an IVC filter from 2002

## 2023-05-15 NOTE — PROGRESS NOTES
Pulmonary Outpatient Note   Jonathan Randall 80 y o  female MRN: [id number]  5/15/2023      Referring Physician: Rhea Florentino MD    Reason for Consultation:    Chief Complaint   Patient presents with   • Lung Cancer         Assessment/Plan:    1  Lung mass  Assessment & Plan:  She has a 2 2 x 3 7 cm R hilar mass in lung suspicious for new primary lung cancer  She has history of urothelial carcinoma with a surgery in 2021 that I think is unrelated although the more recent CT has bladder tumors as well  She has a remote history of colon cancer s/p surgery in 2002 at [facility]   Has never been on chemotherapy or had radiation  She is a former smoker- quit 28 years ago  Smoked <0 5 PPD x 20 years  She is interested in a diagnosis  She has a decent functional status although she does not drive and is reliant on her daughter-in-law for rides to most appts and she lives an hour away in [facility]  She has had a couple of falls in the last two months  For now  Order PET/CT  Bronchoscopy will follow but will schedule after PET/CT to determine if EBUS is required  Discussed plan and details of procedure with the patient and her daughter-in-law  She has a urology appt to discuss her bladder tumors/cancer in the near future  Orders:  -     Ambulatory Referral to Pulmonology  -     NM PET CT skull base to mid thigh; Future; Expected date: 05/15/2023    2  Essential hypertension  Assessment & Plan: Bp elevated  Meds reviewed  Advised PCP follow-up      3  Fall in home, subsequent encounter  Assessment & Plan:  Multiple recent falls  4  Chronic a-fib ([address] )  Assessment & Plan: On eliquis      5  History of pulmonary embolism  Assessment & Plan:  Occured in ~2002 at the time of her colon cancer surgery  She is on eliquis  She has an IVC filter from 2002            Health Maintenance  Immunization History   Administered Date(s) Administered   • COVID-19 MODERNA VACC 0 5 ML IM 04/01/2021   • INFLUENZA 10/26/2015, 09/01/2016, 08/17/2017, 09/02/2018   • Influenza, high dose seasonal 0 7 mL 10/10/2022   • Pneumococcal Conjugate 13-Valent 12/31/2018   • Pneumococcal Polysaccharide PPV23 10/23/2017        Return will be getting PET and likely biospy  Need for pulmonary outpatient visit follow-up TBD  Kassandra Rocha History of Present Illness   HPI:  Jennifer Long is a 80 y o  female who presents for a lung mass found on CT in April 2023  She has a history of -  noninvasive low-grade papillary urothelial carcinoma  last addressed in 2021  Also with a history of colon tumor in 2002 s/p surgery had a colostomy that ws reversed  PE at that time IVF filter placed and has been on Eliquis  Also with Type 2 diabetes, atrial fibrillation, CKD, hypertension, prior PE, on A/C  She has had an IVC filter since 2002  Hospitalized 4/8-4/13 at [facility] after a mechanical fall  Found to have L3 compression fracture  Also found to have a R hilar mass on CT chest   She has known bladder tumors  In 2021 had surgery with bladder biopsy- noninvasive low-grade papillary urothelial carcinoma  Has not had any specific treatment since then  Has some back pain since fall in April  Mer Brooke yesterday- she slipped down one step in her garage and fell on her rear end  No shortness of breath  She has been coughing for a couple of weeks  She attributes to allergies  No hemoptysis  Cough is dry  She reports she has not been losing weight  Former smoker- quit at age 64  Started smoking 40- 20 years of less than 0 5 PPD  No radon exposures that she knows of  She has a history of two sons with lung cancer- both smokers  One has passed away  The other has metastatic cancer  She had colon cancer in 2002- surgery, no other treatment  She worked as a radiation technician- many years ago  Fluoroscopy and radiation exposure  She is fatigued  Frequently gets up to urine at night  She lives with her       She performs activities of daily living  She no longer drives  She has food delivered  Family helps with cleaning the house (they live in [facility])  Help from neighbors  [facility] [phone] cell phone- daughter-in-law      Review of Systems   Constitutional: Negative for chills and fever  HENT: Negative for ear pain and sore throat  Eyes: Negative for pain and visual disturbance  Respiratory: Positive for cough  Negative for shortness of breath  Cardiovascular: Negative for chest pain and palpitations  Gastrointestinal: Negative for abdominal pain and vomiting  Genitourinary: Negative for dysuria and hematuria  Musculoskeletal: Positive for back pain  Negative for arthralgias  Skin: Negative for color change and rash  Neurological: Negative for seizures and syncope  All other systems reviewed and are negative            Historical Information   Past Medical History:   Diagnosis Date   • Abnormal gait    • Accidental fall from chair, subsequent encounter    • Atrial fibrillation (HCC)    • Benign essential hypertension    • Bite of animal    • Cataract    • Cellulitis    • Chronic kidney disease    • Chronic kidney disease, stage 3 ([facility])    • Colon cancer ([facility])    • Colon polyp    • Current tear of lateral cartilage or meniscus of knee    • Diabetes mellitus ([address] )    • Disease of thyroid gland    • Disorder of magnesium metabolism    • Dvt femoral (deep venous thrombosis)    • Embolism from thrombosis of vein of distal end of lower extremity ([address] )    • Essential hypertension    • Fall    • Hyperlipidemia    • Hyperlipidemia    • Hypertension    • Hypothyroidism    • Insomnia    • Kidney stone    • Knee joint effusion    • Leukocytosis    • Malaise and fatigue    • MI (myocardial infarction) ([facility])     Hospitalization    • Orbital hemorrhage    • Other sleep disorders    • PAF (paroxysmal atrial fibrillation) ([facility])    • Presence of vena cava filter    • Pulmonary embolism ([facility])    • Tear film insufficiency    • Type 2 diabetes mellitus ([facility] )    • Unspecified osteoarthritis, unspecified site    • Weight decreased      Past Surgical History:   Procedure Laterality Date   • ABDOMINAL ADHESION SURGERY  2015    Had wound vac   • ABDOMINAL SURGERY     • APPENDECTOMY     • CATARACT EXTRACTION     • CHOLECYSTECTOMY     • COLON SURGERY     • COLOSTOMY  2011    Due to Ileus, then reanastomosis in 2002   • COLOSTOMY TAKEDOWN/REVERSE KUMAR     • EXCISION BLADDER MESH TRANSVESICLEPORT W/ LASER     • HERNIA REPAIR     • IVC FILTER INSERTION     • KNEE SURGERY  2014    repair    • MT CYSTOURETHROSCOPY W/DEST &/RMVL TUMOR LARGE N/A 4/30/2021    Procedure: CYSTOSCOPY, TRANSURETHRAL RESECTION OF BLADDER TUMOR (TURBT);   Surgeon: Desirae Jack MD;  Location: [address] [place], [place] [address] MAIN OR;  Service: Urology     Family History   Problem Relation Age of Onset   • Deep vein thrombosis Mother        Meds/Allergies     Current Outpatient Medications:   •  acetaminophen (TYLENOL) 325 mg tablet, Take 2 tablets (650 mg total) by mouth every 6 (six) hours as needed for mild pain or fever, Disp: , Rfl: 0  •  apixaban (Eliquis) 2 5 mg, Take 1 tablet (2 5 mg total) by mouth 2 (two) times a day, Disp: 180 tablet, Rfl: 3  •  calcium carbonate (OS-CARLTON) 600 MG tablet, Take 600 mg by mouth 2 (two) times a day with meals, Disp: , Rfl:   •  cholecalciferol (VITAMIN D3) 1,000 units tablet, Take 1,000 Units by mouth daily, Disp: , Rfl:   •  cyanocobalamin (VITAMIN B-12) 100 mcg tablet, Take 1,000 mcg by mouth daily, Disp: , Rfl:   •  glipiZIDE (GLUCOTROL) 5 mg tablet, Take 1 tablet (5 mg total) by mouth 2 (two) times a day before meals, Disp: 180 tablet, Rfl: 3  •  levothyroxine 25 mcg tablet, Take 1 tablet (25 mcg total) by mouth daily, Disp: 90 tablet, Rfl: 3  •  lovastatin (MEVACOR) 20 mg tablet, Take 1 tablet (20 mg total) by mouth daily at bedtime, Disp: 90 tablet, Rfl: 3  •  metoprolol tartrate (LOPRESSOR) 25 mg tablet, Take 1 tablet (25 mg total) by mouth "every 12 (twelve) hours, Disp: 180 tablet, Rfl: 3  •  Microlet Lancets MISC, Testing twice daily, Disp: 100 each, Rfl: 5  •  lidocaine (Lidoderm) 5 %, Apply 2 patches topically over 12 hours daily Remove & Discard patch within 12 hours or as directed by MD (Patient not taking: Reported on 5/15/2023), Disp: 30 patch, Rfl: 0  Not on File    Vitals: Blood pressure 164/84, pulse 64, temperature (!) 97 3 °F (36 3 °C), temperature source Temporal, resp  rate 18, height 5' 4\" (1 626 m), weight 63 1 kg (139 lb 3 2 oz), SpO2 98 %  Body mass index is 23 89 kg/m²  Oxygen Therapy  SpO2: 98 %  Oxygen Therapy: None (Room air)      Physical Exam  Physical Exam  Vitals and nursing note reviewed  Constitutional:       General: She is not in acute distress  Appearance: She is well-developed  HENT:      Head: Normocephalic and atraumatic  Eyes:      Conjunctiva/sclera: Conjunctivae normal    Cardiovascular:      Rate and Rhythm: Normal rate and regular rhythm  Heart sounds: No murmur heard  Pulmonary:      Effort: Pulmonary effort is normal  No respiratory distress  Breath sounds: Normal breath sounds  Abdominal:      Palpations: Abdomen is soft  Tenderness: There is no abdominal tenderness  Musculoskeletal:         General: No swelling  Cervical back: Neck supple  Right lower leg: No edema  Left lower leg: No edema  Skin:     General: Skin is warm and dry  Capillary Refill: Capillary refill takes less than 2 seconds  Neurological:      Mental Status: She is alert  Psychiatric:         Mood and Affect: Mood normal          Labs: I have personally reviewed pertinent lab results      ABG: No results found for: PHART, FVL2LSP, PO2ART, JBS3XAZ, K0DJZURA, BEART, SOURCE,   BNP: No results found for: BNP,   CBC:  Lab Results   Component Value Date    WBC 7 29 04/25/2023    HGB 14 2 04/25/2023    HCT 45 1 04/25/2023     (H) 04/25/2023     04/25/2023    EOSPCT 2 04/25/2023 " EOSABS 0 11 04/25/2023    NEUTOPHILPCT 65 04/25/2023    LYMPHOPCT 23 04/25/2023   ,   CMP:   Lab Results   Component Value Date    SODIUM 135 04/25/2023    K 4 7 04/25/2023    CL 99 04/25/2023    CO2 30 04/25/2023    ANIONGAP 11 2 04/18/2018    BUN 49 (H) 04/25/2023    CREATININE 1 83 (H) 04/25/2023    CALCIUM 10 2 04/25/2023    AST 14 04/08/2023    ALT 13 04/08/2023    ALKPHOS 75 04/08/2023    PROT 6 7 04/18/2018    BILITOT 0 4 04/18/2018    EGFR 24 04/25/2023   ,   PT/INR:   Lab Results   Component Value Date    INR 0 99 04/08/2023   ,   Troponin: No results found for: TROPONINI      Imaging and other studies: I have personally reviewed pertinent reports  and I have personally reviewed pertinent films in PACS    CT Chest A/P 4/8/23  LUNGS:  There is a suspicious lung mass identified at the right hilum which should be regarded as bronchogenic malignancy until proven otherwise  It has partially lobulated and partially spiculated borders and is estimated to be 2 2 x 3 7 cm  There are no pathologically enlarged mediastinal lymph nodes identified  Acute mild inferior endplate L3 compression fracture without central canal compromise or posterior element involvement  No other acute traumatic injuries are seen  Incidental but very significant additional findings of presumed bronchogenic malignancy at the right hilum and multifocal bladder tumors which should also be regarded as malignancy until proven otherwise  Soft tissue density in the superior aspect of the gluteal cleft which extends to the margin of the coccyx  This may be phlegmonous tissue if the patient has a pressure sore  Correlate with physical exam findings  No soft tissue gas or evidence of   osteomyelitis based on CT  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

## 2023-05-15 NOTE — ASSESSMENT & PLAN NOTE
She has a 2 2 x 3 7 cm R hilar mass in lung suspicious for new primary lung cancer  She has history of urothelial carcinoma with a surgery in 2021 that I think is unrelated although the more recent CT has bladder tumors as well  She has a remote history of colon cancer s/p surgery in 2002 at [facility]   Has never been on chemotherapy or had radiation  She is a former smoker- quit 28 years ago  Smoked <0 5 PPD x 20 years  She is interested in a diagnosis  She has a decent functional status although she does not drive and is reliant on her daughter-in-law for rides to most appts and she lives an hour away in [facility]  She has had a couple of falls in the last two months  For now  Order PET/CT  Bronchoscopy will follow but will schedule after PET/CT to determine if EBUS is required  Discussed plan and details of procedure with the patient and her daughter-in-law  She has a urology appt to discuss her bladder tumors/cancer in the near future

## 2023-05-16 ENCOUNTER — PATIENT OUTREACH (OUTPATIENT)
Dept: CASE MANAGEMENT | Facility: OTHER | Age: 88
End: 2023-05-16

## 2023-05-16 NOTE — PROGRESS NOTES
I spoke with patient today who reports she saw a physician yesterday who ordered further tests  She stated her daughter in law was with her and has all the information  She was seen by [facility]  She has a lung mass and the provider ordered a PET/CT  Patient did have her BMP drawn yesterday  Both BUN and creatinine are trending down  She reports she slipped off one step yesterday and struck her back  She denies injury and did not seek treatment  She is able to do her ADL's with no assistance  I will continue to follow  She states she has no needs at present  I advised her to call me if I can assist her

## 2023-05-17 ENCOUNTER — TELEPHONE (OUTPATIENT)
Dept: HEMATOLOGY ONCOLOGY | Facility: CLINIC | Age: 88
End: 2023-05-17

## 2023-05-17 DIAGNOSIS — I10 ESSENTIAL HYPERTENSION: Primary | ICD-10-CM

## 2023-05-17 RX ORDER — AMLODIPINE BESYLATE 5 MG/1
5 TABLET ORAL
Qty: 90 TABLET | Refills: 0 | Status: SHIPPED | OUTPATIENT
Start: 2023-05-17

## 2023-05-19 ENCOUNTER — DOCUMENTATION (OUTPATIENT)
Dept: HEMATOLOGY ONCOLOGY | Facility: CLINIC | Age: 88
End: 2023-05-19

## 2023-05-19 NOTE — PROGRESS NOTES
Call to Dr Anat Mishra office and left message requesting office note from 5/17/23 to be faxed to right fax at [phone]  Provided my TEAMS number for call back  Received fax with office note from appt with Dr Chanel Henderson on 5/17/23    Scanned in under media

## 2023-05-23 ENCOUNTER — PATIENT OUTREACH (OUTPATIENT)
Dept: CASE MANAGEMENT | Facility: OTHER | Age: 88
End: 2023-05-23

## 2023-05-25 ENCOUNTER — PATIENT OUTREACH (OUTPATIENT)
Dept: CASE MANAGEMENT | Facility: HOSPITAL | Age: 88
End: 2023-05-25

## 2023-05-25 NOTE — PROGRESS NOTES
Reassigning pt's case to JILLIAN Correa  Pt will have her hospital follow up appt with Dr Shabnam Rhodes in June, outreach to assess for needs will be attempted after a tx plan is in place

## 2023-05-30 ENCOUNTER — PATIENT OUTREACH (OUTPATIENT)
Dept: CASE MANAGEMENT | Facility: OTHER | Age: 88
End: 2023-05-30

## 2023-05-30 NOTE — PROGRESS NOTES
I spoke with patient who reports she has chronic back pain which makes it difficult to ambulate  It is affecting her sleep  I strongly encouraged her to call her PCP but she declined  She does have a IR lung biopsy scheduled on 6/5 and reports her daughter in law will drive her  She has an appointment with Dr Manoj Balderas 6/13  I reviewed both with the patient  She reports she has no needs at this time  Her BUN and creatinine done on 5/15 are trending down

## 2023-06-05 ENCOUNTER — HOSPITAL ENCOUNTER (OUTPATIENT)
Dept: RADIOLOGY | Age: 88
Discharge: HOME | End: 2023-06-05
Payer: MEDICARE

## 2023-06-05 DIAGNOSIS — R91.8 LUNG MASS: ICD-10-CM

## 2023-06-05 DIAGNOSIS — D41.4 NEOPLASM OF UNCERTAIN BEHAVIOR OF BLADDER: ICD-10-CM

## 2023-06-05 DIAGNOSIS — D38.1 NEOPLASM OF UNCERTAIN BEHAVIOR OF TRACHEA, BRONCHUS, AND LUNG: ICD-10-CM

## 2023-06-05 LAB — GLUCOSE SERPL-MCNC: 171 MG/DL (ref 65–140)

## 2023-06-05 PROCEDURE — 82948 REAGENT STRIP/BLOOD GLUCOSE: CPT

## 2023-06-05 PROCEDURE — 78815 PET IMAGE W/CT SKULL-THIGH: CPT

## 2023-06-05 PROCEDURE — A9552 F18 FDG: HCPCS

## 2023-06-05 PROCEDURE — G1004 CDSM NDSC: HCPCS

## 2023-06-05 NOTE — LETTER
[address] [place] [address]  [address] [place] [address]      June 8, 2023    MRN: [id number]     Phone: [phone]     Dear Ms Sanderson,    You recently had a(n) Nuclear Medicine performed on 6/5/2023 at  [address] [place] [address] that was requested by Brandon Mccullough MD  The study was reviewed by a radiologist, which is a physician who specializes in medical imaging  The radiologist issued a report describing his or her findings  In that report there was a finding that the radiologist felt warranted further discussion with your health care provider and that discussion would be beneficial to you  The results were sent to Brandon Mccullough MD on 06/05/2023  2:31 PM  We recommend that you contact Brandon Mccullough MD at [phone] or set up an appointment to discuss the results of the imaging test  If you have already heard from Brandon Mccullough MD regarding the results of your study, you can disregard this letter  This letter is not meant to alarm you, but intended to encourage you to follow-up on your results with the provider that sent you for the imaging study  In addition, we have enclosed answers to frequently asked questions by other patients who have also received a letter to review results with their health care provider (see page two)  Thank you for choosing [address] [place] [address] for your medical imaging needs  FREQUENTLY ASKED QUESTIONS    Why am I receiving this letter? [address] [place] [address] requires us to notify patients who have findings on imaging exams that may require more testing or follow-up with a health professional within the next 3 months          How serious is the finding on the imaging test?  This letter is sent to all patients who may need follow-up or more testing within the next 3 months  Receiving this letter does not necessarily mean you have a life-threatening imaging finding or disease  Recommendations in the radiologist’s imaging report are general in nature and it is up to your healthcare provider to say whether those recommendations make sense for your situation  You are strongly encouraged to talk to your health care provider about the results and ask whether additional steps need to be taken  Where can I get a copy of the final report for my recent radiology exam?  To get a full copy of the report you can access your records online at [email or web address] or please contact [facility] Medical Records Department at [phone] Monday through Friday between 8 am and 6 pm          What do I need to do now? Please contact your health care provider who requested the imaging study to discuss what further actions (if any) are needed  You may have already heard from (your ordering provider) in regard to this test in which case you can disregard this letter  NOTICE IN ACCORDANCE WITH THE [place] STATE “PATIENT TEST RESULT INFORMATION ACT OF 2018”    You are receiving this notice as a result of a determination by your diagnostic imaging service that further discussions of your test results are warranted and would be beneficial to you  The complete results of your test or tests have been or will be sent to the health care practitioner that ordered the test or tests  It is recommended that you contact your health care practitioner to discuss your results as soon as possible

## 2023-06-06 ENCOUNTER — TELEPHONE (OUTPATIENT)
Dept: PULMONOLOGY | Facility: CLINIC | Age: 88
End: 2023-06-06

## 2023-06-06 DIAGNOSIS — E11.65 TYPE 2 DIABETES MELLITUS WITH HYPERGLYCEMIA, WITHOUT LONG-TERM CURRENT USE OF INSULIN (HCC): ICD-10-CM

## 2023-06-06 RX ORDER — GLIPIZIDE 5 MG/1
5 TABLET ORAL
Qty: 180 TABLET | Refills: 3 | Status: SHIPPED | OUTPATIENT
Start: 2023-06-06

## 2023-06-06 NOTE — TELEPHONE ENCOUNTER
Patient requesting refill(s) of:  Glucotrol     Last filled: 7/18/2022  Last appt: 5/4/2023  Next appt:  Pharmacy:    [facility]

## 2023-06-06 NOTE — TELEPHONE ENCOUNTER
I just called the patient's daughter-in-law Vamshi Posada at [phone]  The patient deferred all discussions to her    I explained/release the results of the PET/CT  This could be obtained via tissue sampling from bronchoscopy/BAL does not appear to require an EBUS  I also discussed the details of the bronchoscopy procedure  This [facility] be done at [facility]   For now, and Vamshi Posada would like to have more time to discuss with Maye Sutherland  She still has not decided about pursuing biopsies or not  Since she may not accept treatment for malignancy if this were to be a cancer  For now, patient and family will discuss also they would like to discuss further with Dr Karen Clarke oncology at next visit  She will call our office after that

## 2023-06-06 NOTE — TELEPHONE ENCOUNTER
Does she want it sent to [facility] or [facility] [employer]?

## 2023-06-08 ENCOUNTER — DOCUMENTATION (OUTPATIENT)
Dept: HEMATOLOGY ONCOLOGY | Facility: CLINIC | Age: 88
End: 2023-06-08

## 2023-06-08 NOTE — PROGRESS NOTES
Chart Review: Pt last saw Pulmonary on 5/15 with plans for a PET/ CT  Bronchoscopy to follow but after PET/CT to determine if EBUS is required  PET/ CT was on 6/5 with significant findings  Per pulmonary notes, pt has not decided about pursuing biopsies or not  Since she may not accept treatment for malignancy if this were to be a cancer  For now, patient and family will discuss  Also, they would like to discuss further with Dr Steven Guadarrama oncology at next visit scheduled on 6/13  Will continue to keep patient on my radar leading up to med/onc consult

## 2023-06-13 ENCOUNTER — OFFICE VISIT (OUTPATIENT)
Dept: HEMATOLOGY ONCOLOGY | Facility: CLINIC | Age: 88
End: 2023-06-13
Payer: MEDICARE

## 2023-06-13 ENCOUNTER — DOCUMENTATION (OUTPATIENT)
Dept: HEMATOLOGY ONCOLOGY | Facility: CLINIC | Age: 88
End: 2023-06-13

## 2023-06-13 VITALS
OXYGEN SATURATION: 95 % | DIASTOLIC BLOOD PRESSURE: 80 MMHG | BODY MASS INDEX: 23.73 KG/M2 | HEIGHT: 64 IN | TEMPERATURE: 97.6 F | WEIGHT: 139 LBS | RESPIRATION RATE: 16 BRPM | SYSTOLIC BLOOD PRESSURE: 140 MMHG | HEART RATE: 63 BPM

## 2023-06-13 DIAGNOSIS — R91.8 LUNG MASS: Primary | ICD-10-CM

## 2023-06-13 DIAGNOSIS — C67.8 MALIGNANT NEOPLASM OF OVERLAPPING SITES OF BLADDER (HCC): ICD-10-CM

## 2023-06-13 PROCEDURE — 99215 OFFICE O/P EST HI 40 MIN: CPT | Performed by: INTERNAL MEDICINE

## 2023-06-13 NOTE — PROGRESS NOTES
In-basket message received from Dr Louisa Boland to add patient to [facility] 6/26/23  Chart reviewed and prep completed

## 2023-06-13 NOTE — PROGRESS NOTES
Hematology/Oncology Outpatient Follow-up  Corin Stager 80 y o  female 7/25/1933 [id number]    Date:  6/13/2023        Assessment and Plan:  1  Lung mass  I did review the PET scan imaging with the patient and her daughter-in-law which showed hypermetabolic right hilar mass compatible with malignancy with an SUV of 7 6 measuring about 4 cm in size  The patient was told that this is most likely a malignant process until proven otherwise  The likelihood that this is a lung primary is very high  The patient was educated about the usual staging steps we usually recommend to get a PET logical diagnosis  The patient was educated about that the need for bronchoscopy to be able to biopsy of the right hilar mass  Subsequently, according to the pathology we will recommend treatment  She was told that she is not a candidate for systemic chemotherapy or surgical approach  We did discuss localized radiation like SBRT after confirming the diagnosis  We also discussed briefly the usual indication for palliative targeted therapy after molecular evaluation in case of targetable mutation  The patient stated that she is very concerned about bronchoscopy and the potential complication after such a procedure  I did offer her to think about our recommendation and get in touch with us or with the pulmonary team after she makes her final decision  If the patient decides against any active procedure, close monitoring with imaging should be discussed with the patient  2  Malignant neoplasm of overlapping sites of bladder [facility])  She seems to have a superficial bladder cancer which was resected through TURBT in April 2021  Recent CT scan of the abdomen pelvis on 4/8/2023 showed an enhanced mass protruding into the lumen of the bladder with slightly irregular margin measuring about 1 7 cm there is a second enhanced polyp  Appearing mass toward the base of the bladder measuring about 1 1 cm    Cystoscopy would be appropriate to evaluate the significance of the recent findings  She seems to be under the surveillance of the urology team         HPI:  Patient is an 22-year-old female with past medical history of A-fib on Eliquis 2 5 mg, chronic kidney disease, diabetes, hypertension, hyperlipidemia, hypothyroidism, she has history of PE/DVT and had IVC filter placed 2002  Former smoker quit 25 years ago  She has a remote history of colon cancer diagnosed about 25 years ago late 80s status post hemicolectomy/colostomy with colostomy reversal   She also has a history of bladder cancer and had TURBT 4/2021 with her urologist Dr Davis Collar  Was lost to urology follow-up due to social/transportation issues  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  One of her sons passed away from lung cancer  She has another son who had 3 different types of primary malignancies but she is not sure what kind      She apparently had a fall and was brought into the hospital via EMS/8/23 post fall, found to have compression fracture of the L3 vertebrae  Interval history:  The patient came in for a follow-up visit after her recent hospitalization accompanied by her daughter-in-law  She apparently was found to have right hilar mass on the CT scan from 4/8/2023  She did then have a PET CT scan on 6/5/2023 which showed:  IMPRESSION:     1  Radiotracer uptake related to the right hilar lesion/lesions most compatible with malignancy  2  No findings for hypermetabolic metastasis  She denied breathing problems or coughing  She denied any significant constitutional symptoms besides some moderate fatigue which most likely related to her age  Blood work on 5/15/2023 showed hemoglobin of 14 6 with normal white cells and platelets  Creatinine is 1 3 with normal calcium and liver enzymes  ROS: Review of Systems   Constitutional: Positive for fatigue  Negative for chills and fever  HENT: Positive for hearing loss  Negative for ear pain and sore throat  Eyes: Negative for pain and visual disturbance  Respiratory: Negative for cough and shortness of breath  Cardiovascular: Negative for chest pain and palpitations  Gastrointestinal: Positive for constipation  Negative for abdominal pain and vomiting  Genitourinary: Negative for dysuria and hematuria  Musculoskeletal: Negative for arthralgias and back pain  Skin: Negative for color change and rash  Neurological: Negative for seizures and syncope  Psychiatric/Behavioral: Positive for sleep disturbance  All other systems reviewed and are negative        Past Medical History:   Diagnosis Date   • Abnormal gait    • Accidental fall from chair, subsequent encounter    • Atrial fibrillation (HCC)    • Benign essential hypertension    • Bite of animal    • Cataract    • Cellulitis    • Chronic kidney disease    • Chronic kidney disease, stage 3 (HCC)    • Colon cancer (HCC)    • Colon polyp    • Current tear of lateral cartilage or meniscus of knee    • Diabetes mellitus ([facility] [address] )    • Disease of thyroid gland    • Disorder of magnesium metabolism    • Dvt femoral (deep venous thrombosis)    • Embolism from thrombosis of vein of distal end of lower extremity ([facility] [address] )    • Essential hypertension    • Fall    • Hyperlipidemia    • Hyperlipidemia    • Hypertension    • Hypothyroidism    • Insomnia    • Kidney stone    • Knee joint effusion    • Leukocytosis    • Malaise and fatigue    • MI (myocardial infarction) (HCC)     Hospitalization    • Orbital hemorrhage    • Other sleep disorders    • PAF (paroxysmal atrial fibrillation) ([facility])    • Presence of vena cava filter    • Pulmonary embolism (HCC)    • Tear film insufficiency    • Type 2 diabetes mellitus (HCC)    • Unspecified osteoarthritis, unspecified site    • Weight decreased        Past Surgical History:   Procedure Laterality Date   • ABDOMINAL ADHESION SURGERY  2015    Had wound vac   • ABDOMINAL SURGERY     • APPENDECTOMY     • CATARACT EXTRACTION     • CHOLECYSTECTOMY     • COLON SURGERY     • COLOSTOMY      Due to Ileus, then reanastomosis in    • COLOSTOMY TAKEDOWN/REVERSE KUMAR     • EXCISION BLADDER MESH TRANSVESICLEPORT W/ LASER     • HERNIA REPAIR     • IVC FILTER INSERTION     • KNEE SURGERY  2014    repair    • FL CYSTOURETHROSCOPY W/DEST &/RMVL TUMOR LARGE N/A 2021    Procedure: CYSTOSCOPY, TRANSURETHRAL RESECTION OF BLADDER TUMOR (TURBT); Surgeon: Mikala Holm MD;  Location: [facility] MAIN OR;  Service: Urology       Social History     Socioeconomic History   • Marital status: /Civil Union     Spouse name: None   • Number of children: None   • Years of education: None   • Highest education level: None   Occupational History   • Occupation: Retired    Tobacco Use   • Smoking status: Former     Packs/day: 0 50     Years: 10 00     Total pack years: 5 00     Types: Cigarettes     Quit date: 3/19/2001     Years since quittin 2   • Smokeless tobacco: Never   Vaping Use   • Vaping Use: Never used   Substance and Sexual Activity   • Alcohol use: Never   • Drug use: Never   • Sexual activity: Not Currently   Other Topics Concern   • None   Social History Narrative    Rarely consumes alcohol - As per Austen Sleek    Consumes on average 2 cups of regular coffee per day    Consumes on average 12 oz of soda per day     Social Determinants of Health     Financial Resource Strain: Not on file   Food Insecurity: No Food Insecurity (4/10/2023)    Hunger Vital Sign    • Worried About Running Out of Food in the Last Year: Never true    • Ran Out of Food in the Last Year: Never true   Transportation Needs: No Transportation Needs (4/10/2023)    PRAPARE - Transportation    • Lack of Transportation (Medical): No    • Lack of Transportation (Non-Medical):  No   Physical Activity: Not on file   Stress: Not on file   Social Connections: Not on file   Intimate Partner Violence: Not on file   Housing Stability: Low Risk (4/10/2023)    Housing Stability Vital Sign    • Unable to Pay for Housing in the Last Year: No    • Number of Places Lived in the Last Year: 1    • Unstable Housing in the Last Year: No       Family History   Problem Relation Age of Onset   • Deep vein thrombosis Mother        No Known Allergies      Current Outpatient Medications:   •  acetaminophen (TYLENOL) 325 mg tablet, Take 2 tablets (650 mg total) by mouth every 6 (six) hours as needed for mild pain or fever, Disp: , Rfl: 0  •  amLODIPine (NORVASC) 5 mg tablet, Take 1 tablet (5 mg total) by mouth daily at bedtime, Disp: 90 tablet, Rfl: 0  •  apixaban (Eliquis) 2 5 mg, Take 1 tablet (2 5 mg total) by mouth 2 (two) times a day, Disp: 180 tablet, Rfl: 3  •  calcium carbonate (OS-CARLTON) 600 MG tablet, Take 600 mg by mouth 2 (two) times a day with meals, Disp: , Rfl:   •  cholecalciferol (VITAMIN D3) 1,000 units tablet, Take 1,000 Units by mouth daily, Disp: , Rfl:   •  cyanocobalamin (VITAMIN B-12) 100 mcg tablet, Take 1,000 mcg by mouth daily, Disp: , Rfl:   •  glipiZIDE (GLUCOTROL) 5 mg tablet, Take 1 tablet (5 mg total) by mouth 2 (two) times a day before meals, Disp: 180 tablet, Rfl: 3  •  levothyroxine 25 mcg tablet, Take 1 tablet (25 mcg total) by mouth daily, Disp: 90 tablet, Rfl: 3  •  lovastatin (MEVACOR) 20 mg tablet, Take 1 tablet (20 mg total) by mouth daily at bedtime, Disp: 90 tablet, Rfl: 3  •  metoprolol tartrate (LOPRESSOR) 25 mg tablet, Take 1 tablet (25 mg total) by mouth every 12 (twelve) hours, Disp: 180 tablet, Rfl: 3  •  Microlet Lancets MISC, Testing twice daily, Disp: 100 each, Rfl: 5  •  lidocaine (Lidoderm) 5 %, Apply 2 patches topically over 12 hours daily Remove & Discard patch within 12 hours or as directed by MD (Patient not taking: Reported on 5/15/2023), Disp: 30 patch, Rfl: 0      Physical Exam:  /80 (BP Location: Left arm, Patient Position: Sitting, Cuff Size: Adult)   Pulse 63   Temp 97 6 °F (36 4 °C)   Resp 16   Ht 5' "4\" (1 626 m)   Wt 63 kg (139 lb)   SpO2 95%   BMI 23 86 kg/m²     Physical Exam  Constitutional:       General: She is not in acute distress  Appearance: She is well-developed  She is not diaphoretic  HENT:      Head: Normocephalic and atraumatic  Nose: Nose normal    Eyes:      General: No scleral icterus  Right eye: No discharge  Left eye: No discharge  Conjunctiva/sclera: Conjunctivae normal       Pupils: Pupils are equal, round, and reactive to light  Neck:      Thyroid: No thyromegaly  Vascular: No JVD  Trachea: No tracheal deviation  Cardiovascular:      Rate and Rhythm: Normal rate and regular rhythm  Heart sounds: Normal heart sounds  No murmur heard  No friction rub  Pulmonary:      Effort: Pulmonary effort is normal  No respiratory distress  Breath sounds: Normal breath sounds  No stridor  No wheezing or rales  Chest:      Chest wall: No tenderness  Abdominal:      General: There is no distension  Palpations: Abdomen is soft  There is no hepatomegaly or splenomegaly  Tenderness: There is no abdominal tenderness  There is no guarding or rebound  Musculoskeletal:         General: No tenderness or deformity  Normal range of motion  Cervical back: Normal range of motion and neck supple  Lymphadenopathy:      Cervical: No cervical adenopathy  Skin:     General: Skin is warm and dry  Coloration: Skin is not pale  Findings: No erythema or rash  Neurological:      Mental Status: She is alert and oriented to person, place, and time  Cranial Nerves: No cranial nerve deficit  Coordination: Coordination normal       Deep Tendon Reflexes: Reflexes are normal and symmetric  Psychiatric:         Behavior: Behavior normal          Thought Content:  Thought content normal          Judgment: Judgment normal            Labs:  Lab Results   Component Value Date    HCT 47 0 (H) 05/15/2023    HGB 14 6 05/15/2023    " " (H) 05/15/2023     05/15/2023    WBC 7 44 05/15/2023     Lab Results   Component Value Date    ALKPHOS 79 05/15/2023    ALT 32 05/15/2023    ANIONGAP 11 2 04/18/2018    AST 24 05/15/2023    BILITOT 0 4 04/18/2018    BUN 29 (H) 05/15/2023    CALCIUM 9 3 05/15/2023     05/15/2023    CO2 28 05/15/2023    CORRECTEDCA 9 3 09/29/2021    CREATININE 1 36 (H) 05/15/2023    EGFR 34 05/15/2023    GLUF 179 (H) 05/15/2023    K 4 6 05/15/2023     04/18/2018    PROT 6 7 04/18/2018     No results found for: \"TSH\"    Patient voiced understanding and agreement in the above discussion  Aware to contact our office with questions/symptoms in the interim     "

## 2023-06-14 ENCOUNTER — DOCUMENTATION (OUTPATIENT)
Dept: HEMATOLOGY ONCOLOGY | Facility: CLINIC | Age: 88
End: 2023-06-14

## 2023-06-14 NOTE — PROGRESS NOTES
Chart Review: Pt recent f/u with Med/onc Dr Miguelangel Singh  Per notes, PET/ CT imaging were reviewed with the patient and her daughter-in-law which showed hypermetabolic right hilar mass compatible with malignancy with an SUV of 7 6 measuring about 4 cm in size  The patient was told that this is most likely a malignant process until proven otherwise  The likelihood that this is a lung primary is very high  It was recommended to get a PET logical diagnosis, the need for bronchoscopy to be able to biopsy of the right hilar mass  Subsequently, according to the pathology we will recommend treatment  She was told that she is not a candidate for systemic chemotherapy or surgical approach  They discuss localized radiation like SBRT after confirming the diagnosis  Pt is very concerned about bronchoscopy and the potential complication after such a procedure  She was offered to think about recommendations and get in touch with Care Team or with the pulmonary team after she makes her final decision  If the patient decides against any active procedure, close monitoring with imaging should be discussed with the patient  She is also on Thoracic tumor board on 6/26 per notes  To add, as of right now she does not have a med/ onc f/u nor a pulmonary f/u since she established on 5/15  Will continue to keep patient on my radar leading up to TB

## 2023-06-19 ENCOUNTER — PATIENT OUTREACH (OUTPATIENT)
Dept: CASE MANAGEMENT | Facility: HOSPITAL | Age: 88
End: 2023-06-19

## 2023-06-19 NOTE — PROGRESS NOTES
Biopsychosocial and Barriers Assessment    Cancer Diagnosis: lung  Home/Cell Phone: [phone]  Emergency Agnieszka Villalobos (Son) [phone]   Marital Status: , Martha Sadie  Interpretation concerns, speaks another language, preferred language: english  Cultural concerns: no  Ability to read or write: yes    Caregiver/Support: son Humaira Jeffrey and daughter-in-law Sg Pickering: ryan Jeffrey  Child/Elder care: no, offered by Aging, declines    Housing: no  Home Setup: ranch, one floor  Lives With:   Daily Living Activities: independent  Durable Medical Equipment: walker  Ambulation: uses walker for mobility    Preferred Pharmacy: [facility]  High co-pays with insurance:   High co-pays with medication coverage:  No medication coverage: [address] and [employer]    Primary Care Provider: Corazon Zheng  Hx of [address]: no  Hx of Short term rehab: no  Mental Health Hx: no  Substance Abuse Hx: no  Employment: retired  [place] Status/Location: no  Ability to pay bills: yes  POA/LW/AD: no  Transportation Plan/Concerns: daughter-in-law Margaret Bradley      What do you know about your Cancer Diagnosis    What has your doctor told you about your cancer diagnosis: lung    What has your doctor told you about your cancer treatment: Patient does not want any treatment    What specific concerns do you have about your diagnosis and treatment: concerns due to bladder and often urination    Have you been made aware of any hair loss associated with treatment:    Additional Comments:    LSW spoke with patient this morning  Patient and her  are private people  Her  just turned 80 and she will be 89 next month  They live together in their house  One level  Pt's  is Wheelchair bound  Patient uses a walker  Pt's son and dtr-in-law offer help and transportation  Patient declines [facility]  Patient reports receiving home delivered meals trough the [employer]   Patient was offered caregivers but declines  Patient does not wish to have further treatment, she is also considering canceling any further appointments  She reports she is tired of worrying about appointments, she has lived a good life and wants to let life happen the way its going to  Patient stress level is medium  She worries about her bladder and having to get up many times during the night to urinate and does not sleep well  She also has pain in her back an legs  She tells SW she still is able to ambulate with er walker and denies needing help from Aging for caregivers  Patient denies the need for any resources that [facility] could offer  Patient was appreciative of the call and someone who cares  She would like a follow up call in a while to chat again  LSW provided emotional support and will follow up with patient

## 2023-06-22 ENCOUNTER — DOCUMENTATION (OUTPATIENT)
Dept: HEMATOLOGY ONCOLOGY | Facility: CLINIC | Age: 88
End: 2023-06-22

## 2023-06-22 NOTE — PROGRESS NOTES
In-basket message received from Dr Temitope Scott to move patient's case presentation to [facility] scheduled on 7/3/2023  Chart reviewed and prep completed

## 2023-06-23 ENCOUNTER — TELEPHONE (OUTPATIENT)
Dept: PULMONOLOGY | Facility: CLINIC | Age: 88
End: 2023-06-23

## 2023-06-23 NOTE — TELEPHONE ENCOUNTER
I spoke with both patient and her daughter-in-law  She is not interested in biopsy  She is also not interested in invasive therapy or therapy with significant side effects  I stated I would present at multidisciplinary tumor board to see if radiation therapy without a tissue diagnosis would be offered  If so- I will place referral to rad onc  If not- I will have her follow-up with her primary care doctor from here on out with the understanding that if this is lung cancer it will progress and eventually she would need hospice care  She is due to be presented to the tumor board on Monday 7/3

## 2023-06-26 ENCOUNTER — PATIENT OUTREACH (OUTPATIENT)
Dept: CASE MANAGEMENT | Facility: OTHER | Age: 88
End: 2023-06-26

## 2023-06-26 NOTE — PROGRESS NOTES
BUN and creatinine reviewed at Hem/Onc appointment  Patient refusing any invasive testing or therapy for malignancy  I resolved the episode and removed myself from the care team   Patient is being followed by oncology nurse navigator and

## 2023-06-29 ENCOUNTER — DOCUMENTATION (OUTPATIENT)
Dept: HEMATOLOGY ONCOLOGY | Facility: CLINIC | Age: 88
End: 2023-06-29

## 2023-06-29 NOTE — PROGRESS NOTES
Chart reviewed in preparation of Thoracic Tumor Conference presentation by Dr Debbie Hayden  She has a history of colon cancer status post surgery in 2002  She also has a history of  urothelial carcinoma status post TURBT in 2021  Recent CT scan shows 2 2 x 3 7 cm right hilar mass along with multiple bladder tumors  She has never been on chemotherapy or had radiation

## 2023-07-03 ENCOUNTER — PATIENT OUTREACH (OUTPATIENT)
Dept: CASE MANAGEMENT | Facility: HOSPITAL | Age: 88
End: 2023-07-03

## 2023-07-03 ENCOUNTER — DOCUMENTATION (OUTPATIENT)
Dept: HEMATOLOGY ONCOLOGY | Facility: CLINIC | Age: 88
End: 2023-07-03

## 2023-07-03 DIAGNOSIS — E11.22 TYPE 2 DIABETES MELLITUS WITH STAGE 3A CHRONIC KIDNEY DISEASE, WITHOUT LONG-TERM CURRENT USE OF INSULIN (HCC): Primary | ICD-10-CM

## 2023-07-03 DIAGNOSIS — N18.31 TYPE 2 DIABETES MELLITUS WITH STAGE 3A CHRONIC KIDNEY DISEASE, WITHOUT LONG-TERM CURRENT USE OF INSULIN (HCC): Primary | ICD-10-CM

## 2023-07-03 NOTE — TELEPHONE ENCOUNTER
Called and spoke with patient. Virtual appointment scheduled for Thursday 7/6/23. Patient can only telephone call. While on phone with patient she reports that she needs a refill of her Contour Next test strips. Reports order was cx'd but she needs refill. She tests once daily. Pharmacy [facility].       Contour next test strips test daily  [facility]

## 2023-07-03 NOTE — PROGRESS NOTES
LSW spoke with patient via phone this afternoon. Follow up call from assessment on 6/19. Patient explains to LSW that she made the decision not to have further tests or treatment. She does not want a bronchoscopy that was discussed with her and her daughter-in-law during the conversation with pulmonary. Patient tells me she will be [age] this month and is tired of all the appointments and such. Patient denies the need for any services from [facility]W at this time. Patient's case will be closed unless there is a need or [facility]. LSW provided emotional support.

## 2023-07-03 NOTE — TELEPHONE ENCOUNTER
Thanks for the update. Please offer pt virtual follow up with me this week to discuss her options, like getting home hospice involved for symptom management if things progress.

## 2023-07-03 NOTE — PROGRESS NOTES
THORACIC ONCOLOGY MULTIDISCIPLINARY CASE REVIEW    DATE:  7/3/2023    PRESENTING DOCTOR:  Dr. Ryan Sheldon     DIAGNOSIS:  Lung Mass  STAGING: N/A    Berry Enamorado is a 80 y.o. female who was presented at the Thoracic Oncology Multidisciplinary Tumor Conference today. She has a history of colon cancer status post surgery in 2002. She also has a history of urothelial carcinoma status post TURBT in 2021. Recent CT scan shows 2.2 x 3.7 cm right hilar mass along with multiple bladder tumors. She has never been on chemotherapy or had radiation. PHYSICIAN RECOMMENDED PLAN: Observation versus bronchoscopy with EBUS for tissue sampling. Not a candidate for SBRT. Imaging reviewed:   6/5/23 PET CT -Bilobed focus versus two adjacent foci of uptake in the right hilar region posteriorly. More lateral focus demonstrates SUV max of 7.6. The more medial focus demonstrates SUV max of 7.7. Overall this measures up to 3.9 cm in size. Otherwise no suspicious foci of FDG uptake in the mediastinal or left hilar region. 4/8/23 CT chest abdomen pelvis w contrast     Pathology reviewed: N/A    PFT's reviewed: N/A    Future imaging: No    Referrals: No      Team agreed to plan. NCCN guidelines were readily available for review at this discussion    The final treatment plan will be left to the discretion of the patient and the treating physician. DISCLAIMERS:  TO THE TREATING PHYSICIAN:  This conference is a meeting of clinicians from various specialty areas who evaluate and discuss patients for whom a multidisciplinary treatment approach is being considered. Please note that the above opinion was a consensus of the conference attendees and is intended only to assist in quality care of the discussed patient. The responsibility for follow up on the input given during the conference, along with any final decisions regarding plan of care, is that of the patient and the patient's provider.  Accordingly, appointments have only been recommended based on this information and have NOT been scheduled unless otherwise noted. TO THE PATIENT:  This summary is a brief record of major aspects of your cancer treatment. You may choose to share a copy with any of your doctors or nurses. However, this is not a detailed or comprehensive record of your care.

## 2023-07-03 NOTE — TELEPHONE ENCOUNTER
I spoke with both the patient and her Daughter-in-law Yoly Cruz on the phone. The tumor board meeting today stated that no treatment could be offered without a tissue diagnosis. The patient adamantly declines getting procedure such as bronchoscopy with EBUS which I think would be the appropriate step for diagnosis. She also is not interested in invasive procedures such as biopsy by IR. I explained that without a diagnosis and potential treatment this tumor will almost certainly continue to grow- eventually spreading to other areas of the body, leading to symptoms that are bothersome, and may lead to her death. The patient understands. The daughter-in-law understands and respects her mother-in-law's decision not to pursue treatment/diagnosis. I offered a CT scan in 2-3 months to monitor the tumor. The patient declines this at this time. I stated to both of them to call me if they change their mind at all. Otherwise I advised them to follow-up with their primary care doctor.

## 2023-07-05 ENCOUNTER — TELEPHONE (OUTPATIENT)
Dept: NEPHROLOGY | Facility: CLINIC | Age: 88
End: 2023-07-05

## 2023-07-05 DIAGNOSIS — N18.32 STAGE 3B CHRONIC KIDNEY DISEASE (HCC): Primary | ICD-10-CM

## 2023-07-05 RX ORDER — PERPHENAZINE 16 MG/1
TABLET, FILM COATED ORAL
Qty: 100 EACH | Refills: 5 | Status: SHIPPED | OUTPATIENT
Start: 2023-07-05

## 2023-07-06 ENCOUNTER — TELEMEDICINE (OUTPATIENT)
Dept: FAMILY MEDICINE CLINIC | Facility: CLINIC | Age: 88
End: 2023-07-06
Payer: MEDICARE

## 2023-07-06 DIAGNOSIS — Z71.89 GOALS OF CARE, COUNSELING/DISCUSSION: ICD-10-CM

## 2023-07-06 DIAGNOSIS — M48.54XS COMPRESSION FRACTURE OF THORACIC SPINE, NON-TRAUMATIC, SEQUELA: ICD-10-CM

## 2023-07-06 DIAGNOSIS — S32.030D COMPRESSION FRACTURE OF L3 VERTEBRA WITH ROUTINE HEALING, SUBSEQUENT ENCOUNTER: ICD-10-CM

## 2023-07-06 DIAGNOSIS — C67.8 MALIGNANT NEOPLASM OF OVERLAPPING SITES OF BLADDER (HCC): ICD-10-CM

## 2023-07-06 DIAGNOSIS — N18.4 CHRONIC RENAL DISEASE, STAGE IV (HCC): ICD-10-CM

## 2023-07-06 DIAGNOSIS — R91.8 LUNG MASS: Primary | ICD-10-CM

## 2023-07-06 PROBLEM — N18.32 STAGE 3B CHRONIC KIDNEY DISEASE (HCC): Status: RESOLVED | Noted: 2022-12-21 | Resolved: 2023-07-06

## 2023-07-06 PROCEDURE — 99442 PR PHYS/QHP TELEPHONE EVALUATION 11-20 MIN: CPT | Performed by: INTERNAL MEDICINE

## 2023-07-06 NOTE — PROGRESS NOTES
Virtual Regular Visit    Verification of patient location:    Patient is located at Home in the following state in which I hold an active license [place]      Assessment/Plan:    Problem List Items Addressed This Visit        Musculoskeletal and Integument    Compression fracture of thoracic spine, non-traumatic, sequela    Compression fracture of L3 vertebra (HCC)       Genitourinary    Malignant neoplasm of overlapping sites of bladder ([address])    Chronic renal disease, stage IV (HCC)       Other    Lung mass - Primary   Other Visit Diagnoses     Goals of care, counseling/discussion      - discussed home hospice and focus on comfort, analgesia and improve QOL for whatever time remains, discussed that she likely has underlying lung malignancy, which will progress if not treated. She will discuss with her  and daughter-in-law and let me know once she makes her decision. However, she still wishes to pursue treatment for bladder tumors. Reason for visit is   Chief Complaint   Patient presents with   • Follow-up   • Virtual Regular Visit        Encounter provider James Cannon MD    Provider located at [address] [place] [address] [phone]      Recent Visits  No visits were found meeting these conditions. Showing recent visits within past 7 days and meeting all other requirements  Today's Visits  Date Type Provider Dept   07/06/23 Telemedicine James Cannon MD [facility]   Showing today's visits and meeting all other requirements  Future Appointments  No visits were found meeting these conditions. Showing future appointments within next 150 days and meeting all other requirements       The patient was identified by name and date of birth. Ana Maria Meléndez was informed that this is a telemedicine visit and that the visit is being conducted through Telephone. My office door was closed.  No one else was in the room. She acknowledged consent and understanding of privacy and security of the video platform. The patient has agreed to participate and understands they can discontinue the visit at any time. Patient is aware this is a billable service. Jaylan Marti is a 80 y.o. female with history of CKD stage 3b/4, Afib, bladder tumor, PE, type 2 diabetes and recently diagnosed right hilar mass . Pt's case presented at tumor board this week and told they cannot offer RT without tissue diagnosis. She is adamant that she does not want bronchoscopy and biopsy due to her age, lack of transportation. Reports some NEAL, but denies cough or hemoptysis. Main concerns include urinary frequency, arthralgias, trouble sleeping. She does plan to follow up with Dr. Bubba Montoya regarding recently discovered bladder tumors on CT back in April. She is essentially home-bound, and relies on family for transportation. Medications and food delivered. She does not feel safe driving.  has debilitating arthritis and essentially wheel-chair bound.         Past Medical History:   Diagnosis Date   • Abnormal gait    • Accidental fall from chair, subsequent encounter    • Atrial fibrillation (HCC)    • Benign essential hypertension    • Bite of animal    • Cataract    • Cellulitis    • Chronic kidney disease    • Chronic kidney disease, stage 3 (HCC)    • Colon cancer (HCC)    • Colon polyp    • Current tear of lateral cartilage or meniscus of knee    • Diabetes mellitus ([address])    • Disease of thyroid gland    • Disorder of magnesium metabolism    • Dvt femoral (deep venous thrombosis)    • Embolism from thrombosis of vein of distal end of lower extremity ([address])    • Essential hypertension    • Fall    • Hyperlipidemia    • Hyperlipidemia    • Hypertension    • Hypothyroidism    • Insomnia    • Kidney stone    • Knee joint effusion    • Leukocytosis    • Malaise and fatigue    • MI (myocardial infarction) ([address]) Hospitalization    • Orbital hemorrhage    • Other sleep disorders    • PAF (paroxysmal atrial fibrillation) (HCC)    • Presence of vena cava filter    • Pulmonary embolism (HCC)    • Tear film insufficiency    • Type 2 diabetes mellitus ([address])    • Unspecified osteoarthritis, unspecified site    • Weight decreased        Past Surgical History:   Procedure Laterality Date   • ABDOMINAL ADHESION SURGERY  2015    Had wound vac   • ABDOMINAL SURGERY     • APPENDECTOMY     • CATARACT EXTRACTION     • CHOLECYSTECTOMY     • COLON SURGERY     • COLOSTOMY  2011    Due to Ileus, then reanastomosis in 2002   • COLOSTOMY TAKEDOWN/REVERSE KUMAR     • EXCISION BLADDER [place] W/ LASER     • HERNIA REPAIR     • IVC FILTER INSERTION     • KNEE SURGERY  2014    repair    • CT CYSTOURETHROSCOPY W/DEST &/RMVL TUMOR LARGE N/A 4/30/2021    Procedure: CYSTOSCOPY, TRANSURETHRAL RESECTION OF BLADDER TUMOR (TURBT);   Surgeon: Tricia Arango MD;  Location: [address] [place] [address];  Service: Urology       Current Outpatient Medications   Medication Sig Dispense Refill   • acetaminophen (TYLENOL) 325 mg tablet Take 2 tablets (650 mg total) by mouth every 6 (six) hours as needed for mild pain or fever  0   • amLODIPine (NORVASC) 5 mg tablet Take 1 tablet (5 mg total) by mouth daily at bedtime 90 tablet 0   • apixaban (Eliquis) 2.5 mg Take 1 tablet (2.5 mg total) by mouth 2 (two) times a day 180 tablet 3   • calcium carbonate (OS-CARLTON) 600 MG tablet Take 600 mg by mouth 2 (two) times a day with meals     • cholecalciferol (VITAMIN D3) 1,000 units tablet Take 1,000 Units by mouth daily     • cyanocobalamin (VITAMIN B-12) 100 mcg tablet Take 1,000 mcg by mouth daily     • glipiZIDE (GLUCOTROL) 5 mg tablet Take 1 tablet (5 mg total) by mouth 2 (two) times a day before meals 180 tablet 3   • glucose blood (Contour Next Test) test strip Test  each 5   • levothyroxine 25 mcg tablet Take 1 tablet (25 mcg total) by mouth daily 90 tablet 3   • lovastatin (MEVACOR) 20 mg tablet Take 1 tablet (20 mg total) by mouth daily at bedtime 90 tablet 3   • metoprolol tartrate (LOPRESSOR) 25 mg tablet Take 1 tablet (25 mg total) by mouth every 12 (twelve) hours 180 tablet 3   • Microlet Lancets MISC Testing twice daily 100 each 5   • lidocaine (Lidoderm) 5 % Apply 2 patches topically over 12 hours daily Remove & Discard patch within 12 hours or as directed by MD (Patient not taking: Reported on 5/15/2023) 30 patch 0     No current facility-administered medications for this visit. No Known Allergies    Review of Systems   Constitutional: Positive for activity change and fatigue. Negative for chills and fever. Respiratory: Positive for shortness of breath. Genitourinary: Positive for frequency and urgency. Negative for difficulty urinating and hematuria. Musculoskeletal: Positive for arthralgias, back pain and gait problem. Neurological: Negative for dizziness and light-headedness. Psychiatric/Behavioral: Positive for dysphoric mood and sleep disturbance. Video Exam    There were no vitals filed for this visit. Physical Exam  Vitals reviewed. Constitutional:       General: She is not in acute distress. Pulmonary:      Effort: Pulmonary effort is normal. No respiratory distress. Neurological:      Mental Status: She is alert. Psychiatric:         Mood and Affect: Mood normal.         Behavior: Behavior normal.         Thought Content: Thought content normal.         Judgment: Judgment normal.          Visit Time  Total Visit Duration: 20 minutes. Prognosis, Risks and benefits of tx options, Patient and family education, Counseling / Coordination of care and Reviewing / ordering tests, medicine, procedures  .

## 2023-07-11 ENCOUNTER — DOCUMENTATION (OUTPATIENT)
Dept: HEMATOLOGY ONCOLOGY | Facility: CLINIC | Age: 88
End: 2023-07-11

## 2023-07-11 NOTE — PROGRESS NOTES
Chart Review: Patient recently presented at thoracic TB on 7/3. Recommendations were observation versus bronchoscopy with EBUS for tissue sampling. She is not a candidate for SBRT. Per Dr Silke Yip note on 7/3, patient adamantly declines getting procedure such as bronchoscopy with EBUS. She also is not interested in invasive procedures such as biopsy by IR. Possibly considering hospice. Pt was offered a CT scan in 2-3 months to monitor the tumor. The patient declined. Due to this Oncology Care Coordination Lung team no longer needed. Care team updated.

## 2023-07-13 ENCOUNTER — OFFICE VISIT (OUTPATIENT)
Dept: NEPHROLOGY | Facility: CLINIC | Age: 88
End: 2023-07-13
Payer: MEDICARE

## 2023-07-13 VITALS
HEART RATE: 57 BPM | DIASTOLIC BLOOD PRESSURE: 70 MMHG | OXYGEN SATURATION: 97 % | HEIGHT: 64 IN | WEIGHT: 139 LBS | BODY MASS INDEX: 23.73 KG/M2 | SYSTOLIC BLOOD PRESSURE: 142 MMHG

## 2023-07-13 DIAGNOSIS — R91.8 LUNG MASS: ICD-10-CM

## 2023-07-13 DIAGNOSIS — E55.9 VITAMIN D DEFICIENCY: ICD-10-CM

## 2023-07-13 DIAGNOSIS — N25.81 SECONDARY HYPERPARATHYROIDISM (HCC): ICD-10-CM

## 2023-07-13 DIAGNOSIS — C67.8 MALIGNANT NEOPLASM OF OVERLAPPING SITES OF BLADDER (HCC): ICD-10-CM

## 2023-07-13 DIAGNOSIS — N18.32 STAGE 3B CHRONIC KIDNEY DISEASE (HCC): Primary | ICD-10-CM

## 2023-07-13 DIAGNOSIS — E11.22 TYPE 2 DIABETES MELLITUS WITH STAGE 3B CHRONIC KIDNEY DISEASE, WITHOUT LONG-TERM CURRENT USE OF INSULIN (HCC): ICD-10-CM

## 2023-07-13 DIAGNOSIS — N18.32 TYPE 2 DIABETES MELLITUS WITH STAGE 3B CHRONIC KIDNEY DISEASE, WITHOUT LONG-TERM CURRENT USE OF INSULIN (HCC): ICD-10-CM

## 2023-07-13 DIAGNOSIS — I10 ESSENTIAL HYPERTENSION: ICD-10-CM

## 2023-07-13 PROCEDURE — 99213 OFFICE O/P EST LOW 20 MIN: CPT | Performed by: NURSE PRACTITIONER

## 2023-07-13 NOTE — PROGRESS NOTES
Nephrology   Office Follow-Up  Braden Thayer 80 y.o. female MRN: [id number]    Encounter: [id number]        [address] [place] [address] Street was seen in the [address] [place] [address] office today. All diagnoses and orders for visit:     1. Stage 3b chronic kidney disease ([facility])  · Record review indicates baseline creatinine around 1.2-1.3 mg/dL-. Did have acute kidney injury in April now resolved. Etiology likely hypertensive nephrosclerosis, diabetic kidney disease, age-related nephron loss. Not a candidate for ACE/ARB/SGLT2 inhibitor or finerenone. -     Comprehensive metabolic panel; Future; Expected date: 01/08/2024   -     CBC and differential; Future; Expected date: 01/08/2024   -     Albumin / creatinine urine ratio; Future; Expected date: 01/08/2024   -     Urinalysis with microscopic; Future; Expected date: 01/08/2024   -     Phosphorus; Future; Expected date: 01/08/2024   -     Magnesium; Future; Expected date: 01/08/2024  2. Vitamin D deficiency   -     Vitamin D 25 hydroxy; Future; Expected date: 01/08/2024  3. Secondary hyperparathyroidism (HCC)   -     PTH, intact; Future; Expected date: 01/08/2024  4. Essential hypertension  · Blood pressure is appropriate in office. Avoid overcorrection due to increased risk for falls. Not a candidate for ACE or ARB  5. Type 2 diabetes mellitus with stage 3b chronic kidney disease, without long-term current use of insulin ([facility])  · As mentioned previously, not a candidate for SGLT2 inhibitor, ACE/ARB. Diabetic management per PCP. 6. Lung mass  · Patient does not want further evaluation per her report today  7. Malignant neoplasm of overlapping sites of bladder [facility])  · Follows with Dr. Jerome Pierson        HPI: Braden Thayer is a 80 y.o. female who is here for scheduled follow-up regarding stage IIIb chronic kidney disease.   Other pertinent problems include type 2 diabetes mellitus, history of PE and A-fib on Eliquis, hypothyroidism, hypertension, lung mass, bladder neoplasm, osteoarthritis    Kidney function is stable and within typical baseline. She is not a candidate for ACE/ARB or SGLT2 better. She will continue to follow with other specialists as planned. She will continue avoid potential nephrotoxins. Repeat lab work in 6 months and then return office with primary nephrologist          ROS:   Review of Systems   Constitutional: Positive for activity change. Negative for chills and fever. HENT: Negative for ear pain and sore throat. Eyes: Negative for pain and visual disturbance. Respiratory: Negative for cough and shortness of breath. Cardiovascular: Negative for chest pain and palpitations. Gastrointestinal: Negative for abdominal pain and vomiting. Genitourinary: Positive for frequency and urgency. Negative for dysuria and hematuria. Musculoskeletal: Positive for arthralgias and gait problem. Negative for back pain. Skin: Negative for color change and rash. Neurological: Negative for seizures and syncope. All other systems reviewed and are negative. Allergies: Patient has no known allergies.     Medications:   Current Outpatient Medications:   •  acetaminophen (TYLENOL) 325 mg tablet, Take 2 tablets (650 mg total) by mouth every 6 (six) hours as needed for mild pain or fever, Disp: , Rfl: 0  •  amLODIPine (NORVASC) 5 mg tablet, Take 1 tablet (5 mg total) by mouth daily at bedtime, Disp: 90 tablet, Rfl: 0  •  apixaban (Eliquis) 2.5 mg, Take 1 tablet (2.5 mg total) by mouth 2 (two) times a day, Disp: 180 tablet, Rfl: 3  •  calcium carbonate (OS-CARLTON) 600 MG tablet, Take 600 mg by mouth 2 (two) times a day with meals, Disp: , Rfl:   •  cholecalciferol (VITAMIN D3) 1,000 units tablet, Take 1,000 Units by mouth daily, Disp: , Rfl:   •  cyanocobalamin (VITAMIN B-12) 100 mcg tablet, Take 1,000 mcg by mouth daily, Disp: , Rfl:   •  glipiZIDE (GLUCOTROL) 5 mg tablet, Take 1 tablet (5 mg total) by mouth 2 (two) times a day before meals, Disp: 180 tablet, Rfl: 3  •  glucose blood (Contour Next Test) test strip, Test BID, Disp: 100 each, Rfl: 5  •  levothyroxine 25 mcg tablet, Take 1 tablet (25 mcg total) by mouth daily, Disp: 90 tablet, Rfl: 3  •  lovastatin (MEVACOR) 20 mg tablet, Take 1 tablet (20 mg total) by mouth daily at bedtime, Disp: 90 tablet, Rfl: 3  •  metoprolol tartrate (LOPRESSOR) 25 mg tablet, Take 1 tablet (25 mg total) by mouth every 12 (twelve) hours, Disp: 180 tablet, Rfl: 3  •  Microlet Lancets MISC, Testing twice daily, Disp: 100 each, Rfl: 5  •  lidocaine (Lidoderm) 5 %, Apply 2 patches topically over 12 hours daily Remove & Discard patch within 12 hours or as directed by MD (Patient not taking: Reported on 5/15/2023), Disp: 30 patch, Rfl: 0    Past Medical History:   Diagnosis Date   • Abnormal gait    • Accidental fall from chair, subsequent encounter    • Atrial fibrillation ([facility])    • Benign essential hypertension    • Bite of animal    • Cataract    • Cellulitis    • Chronic kidney disease    • Chronic kidney disease, stage 3 ([facility])    • Colon cancer ([facility])    • Colon polyp    • Current tear of lateral cartilage or meniscus of knee    • Diabetes mellitus ([facility])    • Disease of thyroid gland    • Disorder of magnesium metabolism    • Dvt femoral (deep venous thrombosis)    • Embolism from thrombosis of vein of distal end of lower extremity ([facility])    • Essential hypertension    • Fall    • Hyperlipidemia    • Hyperlipidemia    • Hypertension    • Hypothyroidism    • Insomnia    • Kidney stone    • Knee joint effusion    • Leukocytosis    • Malaise and fatigue    • MI (myocardial infarction) ([facility])     Hospitalization    • Orbital hemorrhage    • Other sleep disorders    • PAF (paroxysmal atrial fibrillation) ([facility])    • Presence of vena cava filter    • Pulmonary embolism ([facility])    • Tear film insufficiency    • Type 2 diabetes mellitus ([address])    • Unspecified osteoarthritis, unspecified site    • Weight decreased      Past Surgical History:   Procedure Laterality Date   • ABDOMINAL ADHESION SURGERY  2015    Had wound vac   • ABDOMINAL SURGERY     • APPENDECTOMY     • CATARACT EXTRACTION     • CHOLECYSTECTOMY     • COLON SURGERY     • COLOSTOMY  2011    Due to Ileus, then reanastomosis in 2002   • COLOSTOMY TAKEDOWN/REVERSE KUMAR     • EXCISION BLADDER [place] W/ LASER     • HERNIA REPAIR     • IVC FILTER INSERTION     • KNEE SURGERY  2014    repair    • [place] CYSTOURETHROSCOPY W/DEST &/RMVL TUMOR LARGE N/A 4/30/2021    Procedure: CYSTOSCOPY, TRANSURETHRAL RESECTION OF BLADDER TUMOR (TURBT); Surgeon: Luciana Cockayne, MD;  Location: [facility] MAIN OR;  Service: Urology     Family History   Problem Relation Age of Onset   • Deep vein thrombosis Mother       reports that she quit smoking about 22 years ago. Her smoking use included cigarettes. She has a 5.00 pack-year smoking history. She has never used smokeless tobacco. She reports that she does not drink alcohol and does not use drugs. Physical Exam:   Vitals:    07/13/23 1227   BP: 142/70   BP Location: Left arm   Patient Position: Sitting   Cuff Size: Standard   Pulse: 57   SpO2: 97%   Weight: 63 kg (139 lb)   Height: 5' 4" (1.626 m)     Body mass index is 23.86 kg/m². General: conscious, cooperative, in no acute distress, appears stated age  Eyes: conjunctivae pale, anicteric sclerae  ENT: lips and mucous membranes moist  Neck: supple, no JVD, no masses  Chest:  essentially clear breath sounds bilaterally, no crackles, ronchus or wheezings  CVS: S1 & S2, normal rate, regular rhythm  Abdomen: soft, non-tender, non-distended, normoactive bowel sounds, rounded  Extremities: no edema of both legs  Skin: no rash   Neuro: awake, alert, oriented       Diagnostic Data:  Lab: I have personally reviewed pertinent lab results. ,   CBC:       CMP: No results found for: "SODIUM", "K", "CL", "CO2", "ANIONGAP", "BUN", "CREATININE", "GLUCOSE", "CALCIUM", "AST", "ALT", "ALKPHOS", "PROT", "BILITOT", "EGFR",   PT/INR: No results found for: "PT", "INR",   Magnesium: No components found for: "MAG",  Phosphorous: No results found for: "PHOS"    Patient Instructions   Repeat lab work in 6 months       Portions of the record may have been created with voice recognition software. Occasional wrong word or "sound a like" substitutions may have occurred due to the inherent limitations of voice recognition software. Read the chart carefully and recognize, using context, where substitutions have occurred. If you have any questions, please contact the dictating provider.

## 2023-08-02 ENCOUNTER — APPOINTMENT (EMERGENCY)
Dept: RADIOLOGY | Facility: HOSPITAL | Age: 88
End: 2023-08-02
Payer: MEDICARE

## 2023-08-02 ENCOUNTER — HOSPITAL ENCOUNTER (EMERGENCY)
Facility: HOSPITAL | Age: 88
Discharge: HOME | End: 2023-08-02
Attending: EMERGENCY MEDICINE
Payer: MEDICARE

## 2023-08-02 VITALS
OXYGEN SATURATION: 95 % | SYSTOLIC BLOOD PRESSURE: 151 MMHG | WEIGHT: 137 LBS | HEART RATE: 78 BPM | BODY MASS INDEX: 23.52 KG/M2 | TEMPERATURE: 97.9 F | RESPIRATION RATE: 16 BRPM | DIASTOLIC BLOOD PRESSURE: 83 MMHG

## 2023-08-02 DIAGNOSIS — S69.90XA WRIST INJURY: Primary | ICD-10-CM
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
## 2023-08-02 PROCEDURE — 99284 EMERGENCY DEPT VISIT MOD MDM: CPT | Performed by: EMERGENCY MEDICINE

## 2023-08-02 PROCEDURE — 73110 X-RAY EXAM OF WRIST: CPT

## 2023-08-02 PROCEDURE — 73564 X-RAY EXAM KNEE 4 OR MORE: CPT

## 2023-08-02 PROCEDURE — 99283 EMERGENCY DEPT VISIT LOW MDM: CPT

## 2023-08-03 NOTE — DISCHARGE INSTRUCTIONS
Use a splint as needed for comfort as long as you have pain  Follow-up with the orthopedist if symptoms persist  Return at anytime for any reason

## 2023-08-03 NOTE — ED PROVIDER NOTES
History  Chief Complaint   Patient presents with   • Fall      Stumbled and fell/landed on carpeting. Pain to left wrist and hand. 8/10. No LOC /did not hit head on Eliquis     24-year-old female with past medical history of A-fib on Eliquis presents after a fall. Patient says she has chronic issues with her right knee and uses a walker and felt her right knee "give out". She landed on outstretched hand. She denies head strike, headache, nausea or vomiting. She recalls all the events. She complains of pain on the lateral surface of the left wrist.          Prior to Admission Medications   Prescriptions Last Dose Informant Patient Reported? Taking?    Microlet Lancets MISC  Self No No   Sig: Testing twice daily   acetaminophen (TYLENOL) 325 mg tablet  Self No No   Sig: Take 2 tablets (650 mg total) by mouth every 6 (six) hours as needed for mild pain or fever   amLODIPine (NORVASC) 5 mg tablet  Self No No   Sig: Take 1 tablet (5 mg total) by mouth daily at bedtime   apixaban (Eliquis) 2.5 mg  Self No No   Sig: Take 1 tablet (2.5 mg total) by mouth 2 (two) times a day   calcium carbonate (OS-CARLTON) 600 MG tablet  Self Yes No   Sig: Take 600 mg by mouth 2 (two) times a day with meals   cholecalciferol (VITAMIN D3) 1,000 units tablet  Self Yes No   Sig: Take 1,000 Units by mouth daily   cyanocobalamin (VITAMIN B-12) 100 mcg tablet  Self Yes No   Sig: Take 1,000 mcg by mouth daily   glipiZIDE (GLUCOTROL) 5 mg tablet  Self No No   Sig: Take 1 tablet (5 mg total) by mouth 2 (two) times a day before meals   glucose blood (Contour Next Test) test strip  Self No No   Sig: Test BID   levothyroxine 25 mcg tablet  Self No No   Sig: Take 1 tablet (25 mcg total) by mouth daily   lidocaine (Lidoderm) 5 %  Self No No   Sig: Apply 2 patches topically over 12 hours daily Remove & Discard patch within 12 hours or as directed by MD   Patient not taking: Reported on 5/15/2023   lovastatin (MEVACOR) 20 mg tablet  Self No No   Sig: Take 1 tablet (20 mg total) by mouth daily at bedtime   metoprolol tartrate (LOPRESSOR) 25 mg tablet  Self No No   Sig: Take 1 tablet (25 mg total) by mouth every 12 (twelve) hours      Facility-Administered Medications: None       Past Medical History:   Diagnosis Date   • Abnormal gait    • Accidental fall from chair, subsequent encounter    • Atrial fibrillation ([address])    • Benign essential hypertension    • Bite of animal    • Cataract    • Cellulitis    • Chronic kidney disease    • Chronic kidney disease, stage 3 (HCC)    • Colon cancer (HCC)    • Colon polyp    • Current tear of lateral cartilage or meniscus of knee    • Diabetes mellitus ([address])    • Disease of thyroid gland    • Disorder of magnesium metabolism    • Dvt femoral (deep venous thrombosis)    • Embolism from thrombosis of vein of distal end of lower extremity ([address])    • Essential hypertension    • Fall    • Hyperlipidemia    • Hyperlipidemia    • Hypertension    • Hypothyroidism    • Insomnia    • Kidney stone    • Knee joint effusion    • Leukocytosis    • Malaise and fatigue    • MI (myocardial infarction) ([address])     Hospitalization    • Orbital hemorrhage    • Other sleep disorders    • PAF (paroxysmal atrial fibrillation) ([facility])    • Presence of vena cava filter    • Pulmonary embolism (HCC)    • Tear film insufficiency    • Type 2 diabetes mellitus ([address])    • Unspecified osteoarthritis, unspecified site    • Weight decreased        Past Surgical History:   Procedure Laterality Date   • ABDOMINAL ADHESION SURGERY  2015    Had wound vac   • ABDOMINAL SURGERY     • APPENDECTOMY     • CATARACT EXTRACTION     • CHOLECYSTECTOMY     • COLON SURGERY     • COLOSTOMY  2011    Due to Ileus, then reanastomosis in 2002   • COLOSTOMY TAKEDOWN/REVERSE KUMAR     • EXCISION BLADDER MESH TRANSVESICLEPORT W/ LASER     • HERNIA REPAIR     • IVC FILTER INSERTION     • KNEE SURGERY  2014    repair    • IN CYSTOURETHROSCOPY W/DEST &/RMVL TUMOR LARGE N/A 4/30/2021 Procedure: CYSTOSCOPY, TRANSURETHRAL RESECTION OF BLADDER TUMOR (TURBT); Surgeon: Judson Yusuf MD;  Location: [address] [place] [address] OR;  Service: Urology       Family History   Problem Relation Age of Onset   • Deep vein thrombosis Mother      I have reviewed and agree with the history as documented. E-Cigarette/Vaping   • E-Cigarette Use Never User      E-Cigarette/Vaping Substances   • Nicotine No    • THC No    • CBD No    • Flavoring No    • Other No    • Unknown No      Social History     Tobacco Use   • Smoking status: Former     Packs/day: 0.50     Years: 10.00     Total pack years: 5.00     Types: Cigarettes     Quit date: 3/19/2001     Years since quittin.3   • Smokeless tobacco: Never   Vaping Use   • Vaping Use: Never used   Substance Use Topics   • Alcohol use: Never   • Drug use: Never       Review of Systems    Physical Exam  Physical Exam  Vitals and nursing note reviewed. Constitutional:       Appearance: Normal appearance. She is well-developed. HENT:      Head: Normocephalic and atraumatic. Eyes:      Conjunctiva/sclera: Conjunctivae normal.      Pupils: Pupils are equal, round, and reactive to light. Neck:      Trachea: No tracheal deviation. Cardiovascular:      Rate and Rhythm: Normal rate and regular rhythm. Heart sounds: Normal heart sounds. No murmur heard. Pulmonary:      Effort: Pulmonary effort is normal. No respiratory distress. Breath sounds: Normal breath sounds. No wheezing or rales. Abdominal:      General: Bowel sounds are normal. There is no distension. Palpations: Abdomen is soft. Tenderness: There is no abdominal tenderness. Musculoskeletal:         General: No deformity. Cervical back: Normal range of motion and neck supple. Comments: Mild swelling and tenderness over the left wrist, near the scaphoid, neurovascularly intact  Mild generalized right knee tenderness, full range of motion, no abrasion   Skin:     General: Skin is warm and dry. Capillary Refill: Capillary refill takes less than 2 seconds. Neurological:      Mental Status: She is alert and oriented to person, place, and time. Sensory: No sensory deficit. Psychiatric:         Judgment: Judgment normal.         Vital Signs  ED Triage Vitals [08/02/23 1932]   Temperature Pulse Respirations Blood Pressure SpO2   97.9 °F (36.6 °C) 77 18 164/72 95 %      Temp Source Heart Rate Source Patient Position - Orthostatic VS BP Location FiO2 (%)   Tympanic Monitor Lying Left arm --      Pain Score       8           Vitals:    08/02/23 1932 08/02/23 2130   BP: 164/72 151/83   Pulse: 77 78   Patient Position - Orthostatic VS: Lying          Visual Acuity  Visual Acuity    Flowsheet Row Most Recent Value   L Pupil Size (mm) 3   R Pupil Size (mm) 3          ED Medications  Medications - No data to display    Diagnostic Studies  Results Reviewed     None                 XR wrist 3+ views LEFT    (Results Pending)   XR knee 4+ vw right injury    (Results Pending)              Procedures  Procedures         ED Course                                             Medical Decision Making  25-year-old female presents after mechanical fall from standing with isolated left wrist injury, mild knee tenderness however suspect the knee is more of a chronic issue. She does not have any evidence of head strike, no headache, nausea vomiting, altered mental status or symptoms to suggest intracranial injury. She has no C-spine tenderness and she has full range of motion in her neck and no neck pain. She meets Nexus criteria. Medically well-appearing with mild swelling over the left distal wrist.  Differential includes fracture, dislocation, contusion. X-ray ordered of the left wrist as well as the right knee. Reviewed and interpreted by me, no acute disease found in either x-ray. Patient was placed in a thumb spica splint, will follow up with orthopedics.   Patient is comfortable with this plan.    Fall, initial encounter: acute illness or injury  Wrist injury: acute illness or injury  Amount and/or Complexity of Data Reviewed  Radiology: ordered and independent interpretation performed. Decision-making details documented in ED Course. Disposition  Final diagnoses:   Wrist injury   Fall, initial encounter     Time reflects when diagnosis was documented in both MDM as applicable and the Disposition within this note     Time User Action Codes Description Comment    8/2/2023  8:37 PM Liberty Corea Add [S69.90XA] Wrist injury     8/2/2023  8:37 PM Liberty Corea Add [E64. [place], initial encounter       ED Disposition     ED Disposition   Discharge    Condition   Stable    Date/Time   Wed Aug 2, 2023  8:37 PM    Comment   Sayra Zavala Kin discharge to home/self care.                Follow-up Information     Follow up With Specialties Details Why Contact Info    Grant Xiong MD Internal Medicine Schedule an appointment as soon as possible for a visit   [address] [place] [address] [facility],  Orthopedic Surgery Schedule an appointment as soon as possible for a visit   [address] [place] [address] [phone]  [address] [place] [address]            Discharge Medication List as of 8/2/2023  8:51 PM      CONTINUE these medications which have NOT CHANGED    Details   acetaminophen (TYLENOL) 325 mg tablet Take 2 tablets (650 mg total) by mouth every 6 (six) hours as needed for mild pain or fever, Starting Thu 4/13/2023, No Print      amLODIPine (NORVASC) 5 mg tablet Take 1 tablet (5 mg total) by mouth daily at bedtime, Starting Wed 5/17/2023, Normal      apixaban (Eliquis) 2.5 mg Take 1 tablet (2.5 mg total) by mouth 2 (two) times a day, Starting Wed 5/3/2023, Normal      calcium carbonate (OS-CARLTON) 600 MG tablet Take 600 mg by mouth 2 (two) times a day with meals, Historical Med      cholecalciferol (VITAMIN D3) 1,000 units tablet Take 1,000 Units by mouth daily, Historical Med      cyanocobalamin (VITAMIN B-12) 100 mcg tablet Take 1,000 mcg by mouth daily, Historical Med      glipiZIDE (GLUCOTROL) 5 mg tablet Take 1 tablet (5 mg total) by mouth 2 (two) times a day before meals, Starting Tue 6/6/2023, Normal      glucose blood (Contour Next Test) test strip Test BID, Normal      levothyroxine 25 mcg tablet Take 1 tablet (25 mcg total) by mouth daily, Starting Wed 5/3/2023, Normal      lidocaine (Lidoderm) 5 % Apply 2 patches topically over 12 hours daily Remove & Discard patch within 12 hours or as directed by MD, Starting Thu 5/4/2023, Normal      lovastatin (MEVACOR) 20 mg tablet Take 1 tablet (20 mg total) by mouth daily at bedtime, Starting Mon 1/30/2023, Normal      metoprolol tartrate (LOPRESSOR) 25 mg tablet Take 1 tablet (25 mg total) by mouth every 12 (twelve) hours, Starting Mon 1/30/2023, Normal      Microlet Lancets MISC Testing twice daily, Normal                 PDMP Review       Value Time User    PDMP Reviewed  Yes 4/26/2023  2:13 PM Cassidy Rodriguez PA-C          ED Provider  Electronically Signed by           Sury Zapien DO  08/02/23 9851

## 2023-08-09 ENCOUNTER — TELEPHONE (OUTPATIENT)
Age: 88
End: 2023-08-09

## 2023-08-09 NOTE — TELEPHONE ENCOUNTER
Patient is being referred to a orthopedics. Please schedule accordingly.     [facility], [place]   [phone]

## 2023-09-08 ENCOUNTER — PATIENT OUTREACH (OUTPATIENT)
Dept: HEMATOLOGY ONCOLOGY | Facility: CLINIC | Age: 88
End: 2023-09-08

## 2023-09-08 NOTE — PROGRESS NOTES
Per social work note,"Patient explains to [place] that she made the decision not to have further tests or treatment.  She does not want a bronchoscopy that was discussed with her and her daughter-in-law during the conversation with pulmonary"

## 2023-10-06 ENCOUNTER — TELEPHONE (OUTPATIENT)
Dept: FAMILY MEDICINE CLINIC | Facility: CLINIC | Age: 88
End: 2023-10-06

## 2023-10-06 NOTE — TELEPHONE ENCOUNTER
Received Medicare Part B Detailed written Order, Scanned into Chart and put in Pcp's Folder up front

## 2023-10-09 DIAGNOSIS — E11.69 TYPE 2 DIABETES MELLITUS WITH OTHER SPECIFIED COMPLICATION, WITHOUT LONG-TERM CURRENT USE OF INSULIN (HCC): ICD-10-CM

## 2023-10-19 ENCOUNTER — APPOINTMENT (EMERGENCY)
Dept: CT IMAGING | Facility: HOSPITAL | Age: 88
End: 2023-10-19
Payer: MEDICARE

## 2023-10-19 ENCOUNTER — APPOINTMENT (EMERGENCY)
Dept: RADIOLOGY | Facility: HOSPITAL | Age: 88
End: 2023-10-19
Payer: MEDICARE

## 2023-10-19 ENCOUNTER — HOSPITAL ENCOUNTER (EMERGENCY)
Facility: HOSPITAL | Age: 88
Discharge: HOME | End: 2023-10-19
Attending: EMERGENCY MEDICINE
Payer: MEDICARE

## 2023-10-19 VITALS
OXYGEN SATURATION: 94 % | SYSTOLIC BLOOD PRESSURE: 166 MMHG | HEART RATE: 74 BPM | HEIGHT: 64 IN | WEIGHT: 137 LBS | DIASTOLIC BLOOD PRESSURE: 72 MMHG | TEMPERATURE: 98 F | BODY MASS INDEX: 23.39 KG/M2 | RESPIRATION RATE: 18 BRPM
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
## 2023-10-19 DIAGNOSIS — M53.3 SACRAL BACK PAIN: ICD-10-CM

## 2023-10-19 LAB
ABO GROUP BLD: NORMAL
ALBUMIN SERPL BCP-MCNC: 4 G/DL (ref 3.5–5)
ALP SERPL-CCNC: 57 U/L (ref 34–104)
ALT SERPL W P-5'-P-CCNC: 13 U/L (ref 7–52)
ANION GAP SERPL CALCULATED.3IONS-SCNC: 9 MMOL/L
APTT PPP: 27 SECONDS (ref 23–37)
AST SERPL W P-5'-P-CCNC: 17 U/L (ref 13–39)
ATRIAL RATE: 75 BPM
BASOPHILS # BLD AUTO: 0.05 THOUSANDS/ÂΜL (ref 0–0.1)
BASOPHILS NFR BLD AUTO: 1 % (ref 0–1)
BILIRUB SERPL-MCNC: 0.4 MG/DL (ref 0.2–1)
BLD GP AB SCN SERPL QL: NEGATIVE
BUN SERPL-MCNC: 33 MG/DL (ref 5–25)
CALCIUM SERPL-MCNC: 8.9 MG/DL (ref 8.4–10.2)
CHLORIDE SERPL-SCNC: 105 MMOL/L (ref 96–108)
CO2 SERPL-SCNC: 25 MMOL/L (ref 21–32)
CREAT SERPL-MCNC: 1.11 MG/DL (ref 0.6–1.3)
EOSINOPHIL # BLD AUTO: 0.03 THOUSAND/ÂΜL (ref 0–0.61)
EOSINOPHIL NFR BLD AUTO: 1 % (ref 0–6)
ERYTHROCYTE [DISTWIDTH] IN BLOOD BY AUTOMATED COUNT: 14.5 % (ref 11.6–15.1)
GFR SERPL CREATININE-BSD FRML MDRD: 43 ML/MIN/1.73SQ M
GLUCOSE SERPL-MCNC: 133 MG/DL (ref 65–140)
HCT VFR BLD AUTO: 42.9 % (ref 34.8–46.1)
HGB BLD-MCNC: 13.4 G/DL (ref 11.5–15.4)
IMM GRANULOCYTES # BLD AUTO: 0.02 THOUSAND/UL (ref 0–0.2)
IMM GRANULOCYTES NFR BLD AUTO: 0 % (ref 0–2)
INR PPP: 1.01 (ref 0.84–1.19)
LYMPHOCYTES # BLD AUTO: 0.76 THOUSANDS/ÂΜL (ref 0.6–4.47)
LYMPHOCYTES NFR BLD AUTO: 12 % (ref 14–44)
MCH RBC QN AUTO: 31.6 PG (ref 26.8–34.3)
MCHC RBC AUTO-ENTMCNC: 31.2 G/DL (ref 31.4–37.4)
MCV RBC AUTO: 101 FL (ref 82–98)
MONOCYTES # BLD AUTO: 0.4 THOUSAND/ÂΜL (ref 0.17–1.22)
MONOCYTES NFR BLD AUTO: 6 % (ref 4–12)
NEUTROPHILS # BLD AUTO: 5.1 THOUSANDS/ÂΜL (ref 1.85–7.62)
NEUTS SEG NFR BLD AUTO: 80 % (ref 43–75)
NRBC BLD AUTO-RTO: 0 /100 WBCS
P AXIS: 66 DEGREES
PLATELET # BLD AUTO: 157 THOUSANDS/UL (ref 149–390)
PMV BLD AUTO: 11.1 FL (ref 8.9–12.7)
POTASSIUM SERPL-SCNC: 4 MMOL/L (ref 3.5–5.3)
PR INTERVAL: 148 MS
PROT SERPL-MCNC: 6.7 G/DL (ref 6.4–8.4)
PROTHROMBIN TIME: 13.4 SECONDS (ref 11.6–14.5)
QRS AXIS: 11 DEGREES
QRSD INTERVAL: 82 MS
QT INTERVAL: 412 MS
QTC INTERVAL: 460 MS
RBC # BLD AUTO: 4.24 MILLION/UL (ref 3.81–5.12)
RH BLD: POSITIVE
SODIUM SERPL-SCNC: 139 MMOL/L (ref 135–147)
SPECIMEN EXPIRATION DATE: NORMAL
T WAVE AXIS: 83 DEGREES
VENTRICULAR RATE: 75 BPM
WBC # BLD AUTO: 6.36 THOUSAND/UL (ref 4.31–10.16)

## 2023-10-19 PROCEDURE — 71045 X-RAY EXAM CHEST 1 VIEW: CPT

## 2023-10-19 PROCEDURE — 85610 PROTHROMBIN TIME: CPT | Performed by: EMERGENCY MEDICINE

## 2023-10-19 PROCEDURE — 70450 CT HEAD/BRAIN W/O DYE: CPT

## 2023-10-19 PROCEDURE — 86850 RBC ANTIBODY SCREEN: CPT | Performed by: EMERGENCY MEDICINE

## 2023-10-19 PROCEDURE — 86900 BLOOD TYPING SEROLOGIC ABO: CPT | Performed by: EMERGENCY MEDICINE

## 2023-10-19 PROCEDURE — 80053 COMPREHEN METABOLIC PANEL: CPT | Performed by: EMERGENCY MEDICINE

## 2023-10-19 PROCEDURE — 85730 THROMBOPLASTIN TIME PARTIAL: CPT | Performed by: EMERGENCY MEDICINE

## 2023-10-19 PROCEDURE — 74177 CT ABD & PELVIS W/CONTRAST: CPT

## 2023-10-19 PROCEDURE — 93005 ELECTROCARDIOGRAM TRACING: CPT

## 2023-10-19 PROCEDURE — 71260 CT THORAX DX C+: CPT

## 2023-10-19 PROCEDURE — 86901 BLOOD TYPING SEROLOGIC RH(D): CPT | Performed by: EMERGENCY MEDICINE

## 2023-10-19 PROCEDURE — 85025 COMPLETE CBC W/AUTO DIFF WBC: CPT | Performed by: EMERGENCY MEDICINE

## 2023-10-19 PROCEDURE — 72125 CT NECK SPINE W/O DYE: CPT

## 2023-10-19 PROCEDURE — 36415 COLL VENOUS BLD VENIPUNCTURE: CPT | Performed by: EMERGENCY MEDICINE

## 2023-10-19 RX ADMIN — IOHEXOL 100 ML: 350 INJECTION, SOLUTION INTRAVENOUS at 11:24

## 2023-10-19 NOTE — ED PROVIDER NOTES
Emergency Department Trauma Note  Wing Noel 80 y.o. female MRN: [id number]  Unit/Bed#: TR 03/TR 03 Encounter: [id number]      Trauma Alert: Trauma Acuity: Trauma Evaluation  Model of Arrival: Mode of Arrival: BLS via    Trauma Team: Current Providers  Attending Provider: Richard Bales DO  Registered Nurse: Bharathi Espinoza RN  Registered Nurse: Desirae Quick RN  Consultants:     None      History of Present Illness     Chief Complaint:   Chief Complaint   Patient presents with    Fall     Patient fell in the bathroom, striking her head, taking eliquis     HPI:  Wing Noel is a 80 y.o. female who presents with . Mechanism:Details of Incident: patient fell in bathroom, striking head, on eliquis Injury Date: 10/19/23   Injury Occurence Location - [address] [place] [address]: [place]    Patient is a 80-year-old female who presents for evaluation after mechanical fall. Patient says that her leg gave out on her causing her to fall. She thinks that she "might of hit her head a little bit."  She denies loss of conscious. She is on Eliquis. She is complaining of some right SI pain which is chronic for her. She denies headache, Lancer Wind-jennifer, nausea, vomit, chest pain, shortness breath or abdominal pain. Patient says that she does have a T11 fracture and a cervical fracture from a previous fall. Review of Systems   Constitutional:  Negative for fever and unexpected weight change. HENT:  Negative for congestion, ear pain, sore throat and trouble swallowing. Eyes:  Negative for pain and redness. Respiratory:  Negative for cough, chest tightness and shortness of breath. Cardiovascular:  Negative for chest pain and leg swelling. Gastrointestinal:  Negative for abdominal distention, abdominal pain, diarrhea and vomiting. Endocrine: Negative for polyuria. Genitourinary:  Negative for dysuria, hematuria, pelvic pain and vaginal bleeding. Musculoskeletal:  Positive for back pain. Negative for myalgias.    Skin: Negative for rash. Neurological:  Negative for dizziness, syncope, weakness, light-headedness and headaches.        Historical Information     Immunizations:   Immunization History   Administered Date(s) Administered    COVID-19 MODERNA VACC 0.5 ML IM 04/01/2021    INFLUENZA 10/26/2015, 09/01/2016, 08/17/2017, 09/02/2018    Influenza, high dose seasonal 0.7 mL 10/10/2022    Pneumococcal Conjugate 13-Valent 12/31/2018    Pneumococcal Polysaccharide PPV23 10/23/2017       Past Medical History:   Diagnosis Date    Abnormal gait     Accidental fall from chair, subsequent encounter     Atrial fibrillation ([address])     Benign essential hypertension     Bite of animal     Cataract     Cellulitis     Chronic kidney disease     Chronic kidney disease, stage 3 (HCC)     Colon cancer ([address])     Colon polyp     Current tear of lateral cartilage or meniscus of knee     Diabetes mellitus ([address])     Disease of thyroid gland     Disorder of magnesium metabolism     Dvt femoral (deep venous thrombosis)     Embolism from thrombosis of vein of distal end of lower extremity ([address])     Essential hypertension     Fall     Hyperlipidemia     Hyperlipidemia     Hypertension     Hypothyroidism     Insomnia     Kidney stone     Knee joint effusion     Leukocytosis     Malaise and fatigue     MI (myocardial infarction) ([address])     Hospitalization     Orbital hemorrhage     Other sleep disorders     PAF (paroxysmal atrial fibrillation) (HCC)     Presence of vena cava filter     Pulmonary embolism (HCC)     Tear film insufficiency     Type 2 diabetes mellitus ([address])     Unspecified osteoarthritis, unspecified site     Weight decreased        Family History   Problem Relation Age of Onset    Deep vein thrombosis Mother      Past Surgical History:   Procedure Laterality Date    ABDOMINAL ADHESION SURGERY  2015    Had wound vac    ABDOMINAL SURGERY      APPENDECTOMY      CATARACT EXTRACTION      CHOLECYSTECTOMY      COLON SURGERY      COLOSTOMY  2011 Due to Ileus, then reanastomosis in     COLOSTOMY TAKEDOWN/REVERSE [address] [facility] W/ LASER      HERNIA REPAIR      IVC FILTER INSERTION      KNEE SURGERY  2014    repair     AR CYSTOURETHROSCOPY W/DEST &/RMVL TUMOR LARGE N/A 2021    Procedure: CYSTOSCOPY, TRANSURETHRAL RESECTION OF BLADDER TUMOR (TURBT); Surgeon: Klaus Torres MD;  Location: [facility] MAIN OR;  Service: Urology     Social History     Tobacco Use    Smoking status: Former     Packs/day: 0.50     Years: 10.00     Total pack years: 5.00     Types: Cigarettes     Quit date: 3/19/2001     Years since quittin.6    Smokeless tobacco: Never   Vaping Use    Vaping Use: Never used   Substance Use Topics    Alcohol use: Never    Drug use: Never     E-Cigarette/Vaping    E-Cigarette Use Never User      E-Cigarette/Vaping Substances    Nicotine No     THC No     CBD No     Flavoring No     Other No     Unknown No        Family History: non-contributory    Meds/Allergies   Prior to Admission Medications   Prescriptions Last Dose Informant Patient Reported? Taking?    Microlet Lancets MISC  Self No No   Sig: Testing twice daily   acetaminophen (TYLENOL) 325 mg tablet  Self No No   Sig: Take 2 tablets (650 mg total) by mouth every 6 (six) hours as needed for mild pain or fever   amLODIPine (NORVASC) 5 mg tablet  Self No No   Sig: Take 1 tablet (5 mg total) by mouth daily at bedtime   apixaban (Eliquis) 2.5 mg  Self No No   Sig: Take 1 tablet (2.5 mg total) by mouth 2 (two) times a day   calcium carbonate (OS-CARLTON) 600 MG tablet  Self Yes No   Sig: Take 600 mg by mouth 2 (two) times a day with meals   cholecalciferol (VITAMIN D3) 1,000 units tablet  Self Yes No   Sig: Take 1,000 Units by mouth daily   cyanocobalamin (VITAMIN B-12) 100 mcg tablet  Self Yes No   Sig: Take 1,000 mcg by mouth daily   glipiZIDE (GLUCOTROL) 5 mg tablet  Self No No   Sig: Take 1 tablet (5 mg total) by mouth 2 (two) times a day before meals glucose blood (Contour Next Test) test strip  Self No No   Sig: Test BID   levothyroxine 25 mcg tablet  Self No No   Sig: Take 1 tablet (25 mcg total) by mouth daily   lidocaine (Lidoderm) 5 %  Self No No   Sig: Apply 2 patches topically over 12 hours daily Remove & Discard patch within 12 hours or as directed by MD   Patient not taking: Reported on 5/15/2023   lovastatin (MEVACOR) 20 mg tablet  Self No No   Sig: Take 1 tablet (20 mg total) by mouth daily at bedtime   metoprolol tartrate (LOPRESSOR) 25 mg tablet  Self No No   Sig: Take 1 tablet (25 mg total) by mouth every 12 (twelve) hours      Facility-Administered Medications: None       No Known Allergies    PHYSICAL EXAM    PE limited by:     Objective   Vitals:   First set: Temperature: 97.6 °F (36.4 °C) (10/19/23 1031)  Pulse: 76 (10/19/23 1025)  Respirations: 18 (10/19/23 1026)  Blood Pressure: (!) 192/89 (10/19/23 1024)  SpO2: 96 % (10/19/23 1025)    Primary Survey:   (A) Airway: intact  (B) Breathing: CTA b/l  (C) Circulation: Pulses:   normal  (D) Disabliity:  GCS Total:  15  (E) Expose:  Completed    Secondary Survey: (Click on Physical Exam tab above)  Physical Exam  Vitals and nursing note reviewed. Constitutional:       General: She is not in acute distress. Appearance: She is well-developed. HENT:      Head: Normocephalic and atraumatic. Right Ear: External ear normal.      Left Ear: External ear normal.      Nose: Nose normal.      Mouth/Throat:      Mouth: Mucous membranes are moist.      Pharynx: No oropharyngeal exudate. Eyes:      Conjunctiva/sclera: Conjunctivae normal.      Pupils: Pupils are equal, round, and reactive to light. Cardiovascular:      Rate and Rhythm: Normal rate and regular rhythm. Heart sounds: Normal heart sounds. No murmur heard. No friction rub. No gallop. Pulmonary:      Effort: Pulmonary effort is normal. No respiratory distress. Breath sounds: Normal breath sounds. No wheezing or rales. Abdominal:      General: There is no distension. Palpations: Abdomen is soft. Tenderness: There is abdominal tenderness (mild, diffuse). There is no guarding. Musculoskeletal:         General: Tenderness (T spine) present. No swelling or deformity. Normal range of motion. Cervical back: Normal range of motion and neck supple. No rigidity. Comments: No obvious sacral wound noted   Lymphadenopathy:      Cervical: No cervical adenopathy. Skin:     General: Skin is warm and dry. Neurological:      General: No focal deficit present. Mental Status: She is alert and oriented to person, place, and time. Mental status is at baseline. Cranial Nerves: No cranial nerve deficit. Sensory: No sensory deficit. Motor: No weakness or abnormal muscle tone. Coordination: Coordination normal.         Cervical spine cleared by clinical criteria?  No (imaging required)      Invasive Devices       None                   Lab Results:   Results Reviewed       Procedure Component Value Units Date/Time    Comprehensive metabolic panel [[id number]]  (Abnormal) Collected: 10/19/23 1042    Lab Status: Final result Specimen: Blood from Arm, Right Updated: 10/19/23 1103     Sodium 139 mmol/L      Potassium 4.0 mmol/L      Chloride 105 mmol/L      CO2 25 mmol/L      ANION GAP 9 mmol/L      BUN 33 mg/dL      Creatinine 1.11 mg/dL      Glucose 133 mg/dL      Calcium 8.9 mg/dL      AST 17 U/L      ALT 13 U/L      Alkaline Phosphatase 57 U/L      Total Protein 6.7 g/dL      Albumin 4.0 g/dL      Total Bilirubin 0.40 mg/dL      eGFR 43 ml/min/1.73sq m     Narrative:      [facility] guidelines for Chronic Kidney Disease (CKD):     Stage 1 with normal or high GFR (GFR > 90 mL/min/1.73 square meters)    Stage 2 Mild CKD (GFR = 60-89 mL/min/1.73 square meters)    Stage 3A Moderate CKD (GFR = 45-59 mL/min/1.73 square meters)    Stage 3B Moderate CKD (GFR = 30-44 mL/min/1.73 square meters)    Stage 4 Severe CKD (GFR = 15-29 mL/min/1.73 square meters)    Stage 5 End Stage CKD (GFR <15 mL/min/1.73 square meters)  Note: GFR calculation is accurate only with a steady state creatinine    Protime-INR [[id number]]  (Normal) Collected: 10/19/23 1042    Lab Status: Final result Specimen: Blood from Arm, Right Updated: 10/19/23 1059     Protime 13.4 seconds      INR 1.01    APTT [[id number]]  (Normal) Collected: 10/19/23 1042    Lab Status: Final result Specimen: Blood from Arm, Right Updated: 10/19/23 1059     PTT 27 seconds     CBC and differential [[id number]]  (Abnormal) Collected: 10/19/23 1042    Lab Status: Final result Specimen: Blood from Arm, Right Updated: 10/19/23 1048     WBC 6.36 Thousand/uL      RBC 4.24 Million/uL      Hemoglobin 13.4 g/dL      Hematocrit 42.9 %       fL      MCH 31.6 pg      MCHC 31.2 g/dL      RDW 14.5 %      MPV 11.1 fL      Platelets 915 Thousands/uL      nRBC 0 /100 WBCs      Neutrophils Relative 80 %      Immat GRANS % 0 %      Lymphocytes Relative 12 %      Monocytes Relative 6 %      Eosinophils Relative 1 %      Basophils Relative 1 %      Neutrophils Absolute 5.10 Thousands/µL      Immature Grans Absolute 0.02 Thousand/uL      Lymphocytes Absolute 0.76 Thousands/µL      Monocytes Absolute 0.40 Thousand/µL      Eosinophils Absolute 0.03 Thousand/µL      Basophils Absolute 0.05 Thousands/µL                    Imaging Studies:   Direct to CT: No  TRAUMA - CT chest abdomen pelvis w contrast   Final Result by Monty Martin MD (10/19 1207)      No acute traumatic injury in the chest, abdomen, and pelvis. Right hilar lung mass with associated severe narrowing of the right lower lobe bronchus, increased in size since 4/8/2023 and FDG avid on 6/5/2023, most compatible with malignancy. Grossly stable enhancing bladder lesions, suspicious for recurrent urothelial carcinoma.  Recommend continued management by urology and consider direct visualization with cystoscopy. Left sacral decubitus ulcer. Multiple chronic incidental findings, as described above. I personally discussed this study with Manny Matson on 10/19/2023 12:06 PM.            Workstation performed: [id number]         TRAUMA - CT spine cervical wo contrast   Final Result by Hope Morales MD (10/19 1208)      No cervical spine fracture or traumatic malalignment. I personally discussed this study with Manny Matson on 10/19/2023 12:06 PM.      Workstation performed: [id number]         TRAUMA - CT head wo contrast   Final Result by Hope Morales MD (10/19 1207)      No acute intracranial abnormality. I personally discussed this study with Manny Matson on 10/19/2023 12:06 PM.         Workstation performed: [id number]         XR Trauma chest portable   Final Result by Hope Morales MD (10/19 1110)      No acute cardiopulmonary disease. Fullness of the bilateral hilar regions, likely corresponding to the known right hilar mass and possible hilar lymphadenopathy. Workstation performed: [id number]               Procedures  Procedures         ED Course  ED Course as of 10/19/23 1519   Thu Oct 19, 2023   1234 No sacral wound noted on physical examination   1303 Patient ambulated in department with walker. Will discharge to home           Medical Decision Making  80-year-old female presenting for mechanical fall. Says that she hit her head but did not lose conscious. Is on Eliquis. Complaining of some sacral pain which is chronic. No chest pain, shortness breath, abdominal pain. Vitals within normal limits. She does have some diffuse mild abdominal tenderness. Will obtain CT head, C-spine, chest abdomen pelvis. CT imaging shows no acute traumatic injuries. Results with the patient, told her to follow-up in regards to the CT imaging. She was able to ambulate in the department.   Patient discharged home    Problems Addressed:  Fall, initial encounter: acute illness or injury  Sacral back pain: chronic illness or injury    Amount and/or Complexity of Data Reviewed  Labs: ordered. Radiology: ordered. Risk  Prescription drug management. Disposition  [facility] Transfer: No  Final diagnoses:   Fall, initial encounter   Sacral back pain     Time reflects when diagnosis was documented in both MDM as applicable and the Disposition within this note       Time User Action Codes Description Comment    10/19/2023 12:17 PM Sergio Box Add RobertGalicia. [facility]] Fall, initial encounter     10/19/2023 12:17 PM Sergio Box Add [M53.3] Sacral back pain     10/19/2023 12:17 PM Sergio Box Add [P36.693] Sacral decubitus ulcer     10/19/2023  3:19 PM Sergio Box Remove [O26.657] Sacral decubitus ulcer           ED Disposition       ED Disposition   Discharge    Condition   Stable    Date/Time   u Oct 19, 2023 12:17 PM    Comment   Ramsey Kell Long discharge to home/self care. Follow-up Information       Follow up With Specialties Details Why Contact Info    Yisel Singh MD Internal Medicine Schedule an appointment as soon as possible for a visit  For follow up of symptoms and CT findings [address] [place] [address]  [phone]            Patient's Medications   Discharge Prescriptions    No medications on file     No discharge procedures on file.     PDMP Review         Value Time User    PDMP Reviewed  Yes 4/26/2023  2:13 PM Betty Hinds PA-C            ED Provider  Electronically Signed by           Sukhdev Bradford DO  10/19/23 1521

## 2023-10-19 NOTE — DISCHARGE INSTRUCTIONS
It is important that you follow-up with your family doctor in regards to the following CT findings:       Right hilar lung mass with associated severe narrowing of the right lower lobe bronchus, increased in size since 4/8/2023 and FDG avid on 6/5/2023, most compatible with malignancy. Grossly stable enhancing bladder lesions, suspicious for recurrent urothelial carcinoma. Recommend continued management by urology and consider direct visualization with cystoscopy. Left sacral decubitus ulcer.

## 2023-11-08 ENCOUNTER — TELEPHONE (OUTPATIENT)
Dept: NEPHROLOGY | Facility: CLINIC | Age: 88
End: 2023-11-08

## 2023-11-08 NOTE — TELEPHONE ENCOUNTER
Patient's family called stating patient needed a refill on metformin. Patient's family stated they thought you wanted to put patient on a different medication rather then metformin. Please advise.

## 2023-11-11 ENCOUNTER — HOSPITAL ENCOUNTER (EMERGENCY)
Facility: HOSPITAL | Age: 88
Discharge: HOME | End: 2023-11-12
Attending: EMERGENCY MEDICINE
Payer: MEDICARE

## 2023-11-11 ENCOUNTER — APPOINTMENT (EMERGENCY)
Dept: RADIOLOGY | Facility: HOSPITAL | Age: 88
End: 2023-11-11
Payer: MEDICARE

## 2023-11-11 ENCOUNTER — APPOINTMENT (EMERGENCY)
Dept: CT IMAGING | Facility: HOSPITAL | Age: 88
End: 2023-11-11
Payer: MEDICARE

## 2023-11-11 VITALS
RESPIRATION RATE: 17 BRPM | DIASTOLIC BLOOD PRESSURE: 72 MMHG | OXYGEN SATURATION: 95 % | HEART RATE: 73 BPM | SYSTOLIC BLOOD PRESSURE: 172 MMHG | TEMPERATURE: 97.6 F

## 2023-11-11 DIAGNOSIS — N32.89 BLADDER MASS: ICD-10-CM

## 2023-11-11 DIAGNOSIS — N39.0 UTI (URINARY TRACT INFECTION): ICD-10-CM
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
## 2023-11-11 DIAGNOSIS — R91.8 LUNG MASS: ICD-10-CM

## 2023-11-11 LAB
ABO GROUP BLD: NORMAL
ALBUMIN SERPL BCP-MCNC: 3.8 G/DL (ref 3.5–5)
ALP SERPL-CCNC: 89 U/L (ref 34–104)
ALT SERPL W P-5'-P-CCNC: 29 U/L (ref 7–52)
ANION GAP SERPL CALCULATED.3IONS-SCNC: 8 MMOL/L
AST SERPL W P-5'-P-CCNC: 26 U/L (ref 13–39)
BACTERIA UR QL AUTO: ABNORMAL /HPF
BASOPHILS # BLD AUTO: 0.02 THOUSANDS/ÂΜL (ref 0–0.1)
BASOPHILS NFR BLD AUTO: 0 % (ref 0–1)
BILIRUB SERPL-MCNC: 0.36 MG/DL (ref 0.2–1)
BILIRUB UR QL STRIP: NEGATIVE
BLD GP AB SCN SERPL QL: NEGATIVE
BUN SERPL-MCNC: 30 MG/DL (ref 5–25)
CALCIUM SERPL-MCNC: 9 MG/DL (ref 8.4–10.2)
CARDIAC TROPONIN I PNL SERPL HS: 13 NG/L
CHLORIDE SERPL-SCNC: 100 MMOL/L (ref 96–108)
CLARITY UR: ABNORMAL
CO2 SERPL-SCNC: 28 MMOL/L (ref 21–32)
COLOR UR: YELLOW
CREAT SERPL-MCNC: 1.61 MG/DL (ref 0.6–1.3)
EOSINOPHIL # BLD AUTO: 0.03 THOUSAND/ÂΜL (ref 0–0.61)
EOSINOPHIL NFR BLD AUTO: 0 % (ref 0–6)
ERYTHROCYTE [DISTWIDTH] IN BLOOD BY AUTOMATED COUNT: 14 % (ref 11.6–15.1)
GFR SERPL CREATININE-BSD FRML MDRD: 27 ML/MIN/1.73SQ M
GLUCOSE SERPL-MCNC: 361 MG/DL (ref 65–140)
GLUCOSE UR STRIP-MCNC: ABNORMAL MG/DL
HCT VFR BLD AUTO: 44.2 % (ref 34.8–46.1)
HGB BLD-MCNC: 14 G/DL (ref 11.5–15.4)
HGB UR QL STRIP.AUTO: ABNORMAL
IMM GRANULOCYTES # BLD AUTO: 0.05 THOUSAND/UL (ref 0–0.2)
IMM GRANULOCYTES NFR BLD AUTO: 1 % (ref 0–2)
KETONES UR STRIP-MCNC: NEGATIVE MG/DL
LEUKOCYTE ESTERASE UR QL STRIP: ABNORMAL
LYMPHOCYTES # BLD AUTO: 0.88 THOUSANDS/ÂΜL (ref 0.6–4.47)
LYMPHOCYTES NFR BLD AUTO: 13 % (ref 14–44)
MCH RBC QN AUTO: 32 PG (ref 26.8–34.3)
MCHC RBC AUTO-ENTMCNC: 31.7 G/DL (ref 31.4–37.4)
MCV RBC AUTO: 101 FL (ref 82–98)
MONOCYTES # BLD AUTO: 0.6 THOUSAND/ÂΜL (ref 0.17–1.22)
MONOCYTES NFR BLD AUTO: 9 % (ref 4–12)
NEUTROPHILS # BLD AUTO: 5.12 THOUSANDS/ÂΜL (ref 1.85–7.62)
NEUTS SEG NFR BLD AUTO: 77 % (ref 43–75)
NITRITE UR QL STRIP: NEGATIVE
NON-SQ EPI CELLS URNS QL MICRO: ABNORMAL /HPF
NRBC BLD AUTO-RTO: 0 /100 WBCS
OTHER STN SPEC: ABNORMAL
PH UR STRIP.AUTO: 7 [PH]
PLATELET # BLD AUTO: 152 THOUSANDS/UL (ref 149–390)
PMV BLD AUTO: 11.4 FL (ref 8.9–12.7)
POTASSIUM SERPL-SCNC: 4.2 MMOL/L (ref 3.5–5.3)
PROT SERPL-MCNC: 7.2 G/DL (ref 6.4–8.4)
PROT UR STRIP-MCNC: ABNORMAL MG/DL
RBC # BLD AUTO: 4.38 MILLION/UL (ref 3.81–5.12)
RBC #/AREA URNS AUTO: ABNORMAL /HPF
RH BLD: POSITIVE
SODIUM SERPL-SCNC: 136 MMOL/L (ref 135–147)
SP GR UR STRIP.AUTO: 1.01
SPECIMEN EXPIRATION DATE: NORMAL
UROBILINOGEN UR QL STRIP.AUTO: 0.2 E.U./DL
WBC # BLD AUTO: 6.7 THOUSAND/UL (ref 4.31–10.16)
WBC #/AREA URNS AUTO: ABNORMAL /HPF

## 2023-11-11 PROCEDURE — 71045 X-RAY EXAM CHEST 1 VIEW: CPT

## 2023-11-11 PROCEDURE — 81001 URINALYSIS AUTO W/SCOPE: CPT | Performed by: EMERGENCY MEDICINE

## 2023-11-11 PROCEDURE — 36415 COLL VENOUS BLD VENIPUNCTURE: CPT | Performed by: EMERGENCY MEDICINE

## 2023-11-11 PROCEDURE — 99284 EMERGENCY DEPT VISIT MOD MDM: CPT

## 2023-11-11 PROCEDURE — 86850 RBC ANTIBODY SCREEN: CPT | Performed by: EMERGENCY MEDICINE

## 2023-11-11 PROCEDURE — 84484 ASSAY OF TROPONIN QUANT: CPT | Performed by: EMERGENCY MEDICINE

## 2023-11-11 PROCEDURE — 74177 CT ABD & PELVIS W/CONTRAST: CPT

## 2023-11-11 PROCEDURE — 72125 CT NECK SPINE W/O DYE: CPT

## 2023-11-11 PROCEDURE — 85025 COMPLETE CBC W/AUTO DIFF WBC: CPT | Performed by: EMERGENCY MEDICINE

## 2023-11-11 PROCEDURE — 70450 CT HEAD/BRAIN W/O DYE: CPT

## 2023-11-11 PROCEDURE — 86901 BLOOD TYPING SEROLOGIC RH(D): CPT | Performed by: EMERGENCY MEDICINE

## 2023-11-11 PROCEDURE — 87086 URINE CULTURE/COLONY COUNT: CPT | Performed by: EMERGENCY MEDICINE

## 2023-11-11 PROCEDURE — 71260 CT THORAX DX C+: CPT

## 2023-11-11 PROCEDURE — 96365 THER/PROPH/DIAG IV INF INIT: CPT

## 2023-11-11 PROCEDURE — 86900 BLOOD TYPING SEROLOGIC ABO: CPT | Performed by: EMERGENCY MEDICINE

## 2023-11-11 PROCEDURE — 80053 COMPREHEN METABOLIC PANEL: CPT | Performed by: EMERGENCY MEDICINE

## 2023-11-11 PROCEDURE — 72170 X-RAY EXAM OF PELVIS: CPT

## 2023-11-11 RX ORDER — CEFTRIAXONE 1 G/50ML
1000 INJECTION, SOLUTION INTRAVENOUS ONCE
Status: COMPLETED | OUTPATIENT
Start: 2023-11-11 | End: 2023-11-11

## 2023-11-11 RX ADMIN — SODIUM CHLORIDE 1000 ML: 0.9 INJECTION, SOLUTION INTRAVENOUS at 20:19

## 2023-11-11 RX ADMIN — CEFTRIAXONE 1000 MG: 1 INJECTION, SOLUTION INTRAVENOUS at 20:18

## 2023-11-11 RX ADMIN — IOHEXOL 100 ML: 350 INJECTION, SOLUTION INTRAVENOUS at 19:01

## 2023-11-12 PROCEDURE — 99285 EMERGENCY DEPT VISIT HI MDM: CPT | Performed by: EMERGENCY MEDICINE

## 2023-11-13 LAB — BACTERIA UR CULT: NORMAL

## 2023-11-13 NOTE — ED PROVIDER NOTES
History  Chief Complaint   Patient presents with    Fall     Patient reports symptoms of been a mechanical fall just prior to arrival.  Reports pain in multiple areas including abdomen, upper back and head. Patient is on Eliquis our records, although chart review does demonstrate that she should be on Coumadin. Pain is worse in areas of tenderness with palpation. Pain worse with movement. Nothing seems to make it better. Rates pain is moderate. Prior to Admission Medications   Prescriptions Last Dose Informant Patient Reported? Taking?    Microlet Lancets MISC  Self No No   Sig: Testing twice daily   acetaminophen (TYLENOL) 325 mg tablet  Self No No   Sig: Take 2 tablets (650 mg total) by mouth every 6 (six) hours as needed for mild pain or fever   amLODIPine (NORVASC) 5 mg tablet  Self No No   Sig: Take 1 tablet (5 mg total) by mouth daily at bedtime   apixaban (Eliquis) 2.5 mg  Self No No   Sig: Take 1 tablet (2.5 mg total) by mouth 2 (two) times a day   calcium carbonate (OS-CARLTON) 600 MG tablet  Self Yes No   Sig: Take 600 mg by mouth 2 (two) times a day with meals   cholecalciferol (VITAMIN D3) 1,000 units tablet  Self Yes No   Sig: Take 1,000 Units by mouth daily   cyanocobalamin (VITAMIN B-12) 100 mcg tablet  Self Yes No   Sig: Take 1,000 mcg by mouth daily   glipiZIDE (GLUCOTROL) 5 mg tablet  Self No No   Sig: Take 1 tablet (5 mg total) by mouth 2 (two) times a day before meals   glucose blood (Contour Next Test) test strip  Self No No   Sig: Test BID   levothyroxine 25 mcg tablet  Self No No   Sig: Take 1 tablet (25 mcg total) by mouth daily   lidocaine (Lidoderm) 5 %  Self No No   Sig: Apply 2 patches topically over 12 hours daily Remove & Discard patch within 12 hours or as directed by MD   Patient not taking: Reported on 5/15/2023   lovastatin (MEVACOR) 20 mg tablet  Self No No   Sig: Take 1 tablet (20 mg total) by mouth daily at bedtime   metoprolol tartrate (LOPRESSOR) 25 mg tablet  Self No No Sig: Take 1 tablet (25 mg total) by mouth every 12 (twelve) hours      Facility-Administered Medications: None       Past Medical History:   Diagnosis Date    Abnormal gait     Accidental fall from chair, subsequent encounter     Atrial fibrillation ([address])     Benign essential hypertension     Bite of animal     Cataract     Cellulitis     Chronic kidney disease     Chronic kidney disease, stage 3 (HCC)     Colon cancer ([address])     Colon polyp     Current tear of lateral cartilage or meniscus of knee     Diabetes mellitus ([address])     Disease of thyroid gland     Disorder of magnesium metabolism     Dvt femoral (deep venous thrombosis)     Embolism from thrombosis of vein of distal end of lower extremity ([address])     Essential hypertension     Fall     Hyperlipidemia     Hyperlipidemia     Hypertension     Hypothyroidism     Insomnia     Kidney stone     Knee joint effusion     Leukocytosis     Malaise and fatigue     MI (myocardial infarction) ([address])     Hospitalization     Orbital hemorrhage     Other sleep disorders     PAF (paroxysmal atrial fibrillation) (HCC)     Presence of vena cava filter     Pulmonary embolism (HCC)     Tear film insufficiency     Type 2 diabetes mellitus ([address])     Unspecified osteoarthritis, unspecified site     Weight decreased        Past Surgical History:   Procedure Laterality Date    ABDOMINAL ADHESION SURGERY  2015    Had wound vac    ABDOMINAL SURGERY      APPENDECTOMY      CATARACT EXTRACTION      CHOLECYSTECTOMY      COLON SURGERY      COLOSTOMY  2011    Due to Ileus, then reanastomosis in 2002    COLOSTOMY TAKEDOWN/REVERSE [address] [facility] W/ LASER      HERNIA REPAIR      IVC FILTER INSERTION      KNEE SURGERY  2014    repair     WA CYSTOURETHROSCOPY W/DEST &/RMVL TUMOR LARGE N/A 4/30/2021    Procedure: CYSTOSCOPY, TRANSURETHRAL RESECTION OF BLADDER TUMOR (TURBT);   Surgeon: Cristine Barroso MD;  Location: [address] [place] [address];  Service: Urology       Family History Problem Relation Age of Onset    Deep vein thrombosis Mother      I have reviewed and agree with the history as documented. E-Cigarette/Vaping    E-Cigarette Use Never User      E-Cigarette/Vaping Substances    Nicotine No     THC No     CBD No     Flavoring No     Other No     Unknown No      Social History     Tobacco Use    Smoking status: Former     Packs/day: 0.50     Years: 10.00     Total pack years: 5.00     Types: Cigarettes     Quit date: 3/19/2001     Years since quittin.6    Smokeless tobacco: Never   Vaping Use    Vaping Use: Never used   Substance Use Topics    Alcohol use: Never    Drug use: Never       Review of Systems   Constitutional:  Negative for activity change, chills, fatigue and fever. HENT:  Negative for congestion. Eyes:  Negative for visual disturbance. Respiratory:  Negative for cough, chest tightness and shortness of breath. Cardiovascular:  Negative for chest pain. Gastrointestinal:  Negative for abdominal pain, diarrhea and vomiting. Genitourinary:  Positive for difficulty urinating. Skin:  Negative for rash. Neurological:  Negative for weakness and numbness. Physical Exam  Physical Exam  Constitutional:       General: She is not in acute distress. Appearance: She is well-developed. She is not ill-appearing, toxic-appearing or diaphoretic. HENT:      Head: Normocephalic and atraumatic. Nose: Nose normal.      Mouth/Throat:      Mouth: Mucous membranes are moist.      Pharynx: Oropharynx is clear. Eyes:      Conjunctiva/sclera: Conjunctivae normal.      Pupils: Pupils are equal, round, and reactive to light. Cardiovascular:      Rate and Rhythm: Normal rate and regular rhythm. Heart sounds: Normal heart sounds. Pulmonary:      Effort: Pulmonary effort is normal. No respiratory distress. Breath sounds: Normal breath sounds. Abdominal:      General: Bowel sounds are normal. There is no distension.       Palpations: Abdomen is soft.      Tenderness: There is abdominal tenderness. There is no guarding or rebound. Musculoskeletal:         General: Normal range of motion. Cervical back: Normal range of motion and neck supple. Skin:     General: Skin is warm and dry. Capillary Refill: Capillary refill takes less than 2 seconds. Neurological:      General: No focal deficit present. Mental Status: She is alert and oriented to person, place, and time. Cranial Nerves: No cranial nerve deficit. Sensory: No sensory deficit. Psychiatric:         Mood and Affect: Mood normal.         Behavior: Behavior normal.         Thought Content:  Thought content normal.         Judgment: Judgment normal.         Vital Signs  ED Triage Vitals [11/11/23 1835]   Temperature Pulse Respirations Blood Pressure SpO2   97.6 °F (36.4 °C) 79 18 (!) 199/93 97 %      Temp Source Heart Rate Source Patient Position - Orthostatic VS BP Location FiO2 (%)   Temporal Monitor Lying -- --      Pain Score       --           Vitals:    11/11/23 1835 11/11/23 1930 11/11/23 2000 11/11/23 2030   BP: (!) 199/93 (!) 185/77 (!) 173/72 (!) 172/72   Pulse: 79 75 69 73   Patient Position - Orthostatic VS: Lying            Visual Acuity  Visual Acuity      Flowsheet Row Most Recent Value   L Pupil Size (mm) 3   R Pupil Size (mm) 3            ED Medications  Medications   iohexol (OMNIPAQUE) 350 MG/ML injection (MULTI-DOSE) 100 mL (100 mL Intravenous Given 11/11/23 1901)   sodium chloride 0.9 % bolus 1,000 mL (0 mL Intravenous Stopped 11/11/23 2103)   cefTRIAXone (ROCEPHIN) IVPB (premix in dextrose) 1,000 mg 50 mL (0 mg Intravenous Stopped 11/11/23 2103)       Diagnostic Studies  Results Reviewed       Procedure Component Value Units Date/Time    Urine Microscopic [[id number]]  (Abnormal) Collected: 11/11/23 1927    Lab Status: Final result Specimen: Urine, Clean Catch Updated: 11/11/23 1953     RBC, UA 1-2 /hpf      WBC, UA Innumerable /hpf      Epithelial Cells Occasional /hpf      Bacteria, UA Occasional /hpf      OTHER OBSERVATIONS WBCs Clumped    Urine culture [[id number]] Collected: 11/11/23 1927    Lab Status:  In process Specimen: Urine, Clean Catch Updated: 11/11/23 1953    UA w Reflex to Microscopic w Reflex to Culture [[id number]]  (Abnormal) Collected: 11/11/23 1927    Lab Status: Final result Specimen: Urine, Clean Catch Updated: 11/11/23 1941     Color, UA Yellow     Clarity, UA Slightly Cloudy     Specific Gravity, UA 1.010     pH, UA 7.0     Leukocytes, UA 1+     Nitrite, UA Negative     Protein, UA 1+ mg/dl      Glucose, UA 2+ mg/dl      Ketones, UA Negative mg/dl      Urobilinogen, UA 0.2 E.U./dl      Bilirubin, UA Negative     Occult Blood, UA Trace-Intact    HS Troponin 0hr (reflex protocol) [[id number]]  (Normal) Collected: 11/11/23 1853    Lab Status: Final result Specimen: Blood from Arm, Left Updated: 11/11/23 1925     hs TnI 0hr 13 ng/L     Comprehensive metabolic panel [[id number]]  (Abnormal) Collected: 11/11/23 1853    Lab Status: Final result Specimen: Blood from Arm, Left Updated: 11/11/23 1921     Sodium 136 mmol/L      Potassium 4.2 mmol/L      Chloride 100 mmol/L      CO2 28 mmol/L      ANION GAP 8 mmol/L      BUN 30 mg/dL      Creatinine 1.61 mg/dL      Glucose 361 mg/dL      Calcium 9.0 mg/dL      AST 26 U/L      ALT 29 U/L      Alkaline Phosphatase 89 U/L      Total Protein 7.2 g/dL      Albumin 3.8 g/dL      Total Bilirubin 0.36 mg/dL      eGFR 27 ml/min/1.73sq m     Narrative:      [place] guidelines for Chronic Kidney Disease (CKD):     Stage 1 with normal or high GFR (GFR > 90 mL/min/1.73 square meters)    Stage 2 Mild CKD (GFR = 60-89 mL/min/1.73 square meters)    Stage 3A Moderate CKD (GFR = 45-59 mL/min/1.73 square meters)    Stage 3B Moderate CKD (GFR = 30-44 mL/min/1.73 square meters)    Stage 4 Severe CKD (GFR = 15-29 mL/min/1.73 square meters)    Stage 5 End Stage CKD (GFR <15 mL/min/1.73 square meters)  Note: GFR calculation is accurate only with a steady state creatinine    CBC and differential [[id number]]  (Abnormal) Collected: 11/11/23 1853    Lab Status: Final result Specimen: Blood from Arm, Left Updated: 11/11/23 1900     WBC 6.70 Thousand/uL      RBC 4.38 Million/uL      Hemoglobin 14.0 g/dL      Hematocrit 44.2 %       fL      MCH 32.0 pg      MCHC 31.7 g/dL      RDW 14.0 %      MPV 11.4 fL      Platelets 069 Thousands/uL      nRBC 0 /100 WBCs      Neutrophils Relative 77 %      Immat GRANS % 1 %      Lymphocytes Relative 13 %      Monocytes Relative 9 %      Eosinophils Relative 0 %      Basophils Relative 0 %      Neutrophils Absolute 5.12 Thousands/µL      Immature Grans Absolute 0.05 Thousand/uL      Lymphocytes Absolute 0.88 Thousands/µL      Monocytes Absolute 0.60 Thousand/µL      Eosinophils Absolute 0.03 Thousand/µL      Basophils Absolute 0.02 Thousands/µL                    TRAUMA - CT head wo contrast   Final Result by Veronica Mayer MD (11/11 1923)      No acute intracranial abnormality. Stable chronic microangiopathic changes within the brain. Workstation performed: [id number]         TRAUMA - CT spine cervical wo contrast   Final Result by Veronica Mayer MD (1934)      No cervical spine fracture or traumatic malalignment. Reversed lordosis with stable degenerative listhesis of C3 on C4 and severe spondylosis. Workstation performed: [id number]         TRAUMA - CT chest abdomen pelvis w contrast   Final Result by Veronica Mayer MD (11/11 2002)         1. No acute traumatic injury to the chest, abdomen or pelvis. 2. Interval increase in size of right hilar mass with worsened bronchial narrowing. 3. 2 bladder masses measures slightly larger than on the previous exam.   4. Sacral decubitus ulcer which appears to extend to the right of midline without CT evidence for osteomyelitis.    The study was marked in [facility] for immediate notification. Workstation performed: [id number]         XR Trauma chest portable   Final Result by Leotha Prader, MD (11/11 1925)      Right hilar mass corresponding to known neoplasm. No traumatic injury. Workstation performed: [id number]         XR Trauma pelvis ap only 1 or 2 vw   Final Result by Leotha Prader, MD (11/11 1926)      No acute osseous abnormality. Workstation performed: [id number]                    Procedures  Procedures         ED Course                                             Medical Decision Making  72-year-old female with a fall. Extensive work-up completed however no acute traumatic injuries or internal bleeding identified from the fall. Patient was found on imaging to have increase in size of lung mass and bladder masses. We had a lengthy discussion about these. She stated she did not want further care on them as she said "I am 89, its enough."  Specifically discussed how these are likely a form of malignancy. She did not want further hospitalization for dehydration. Started on antibiotics in the emergency department given a prescription to go home with. She was eager to return home. Discussed warning signs and symptoms with the patient as well as when to return to the emergency department versus follow up with [facility]. Patient states understanding and agreement with the plan. This note was completed using dictation software. Problems Addressed:  Bladder mass: chronic illness or injury     Details: Patient is aware, she is electing not to follow-up with urology  Fall, initial encounter: acute illness or injury  Lung mass: chronic illness or injury that poses a threat to life or bodily functions  UTI (urinary tract infection): acute illness or injury    Amount and/or Complexity of Data Reviewed  Independent Historian: EMS  External Data Reviewed: notes. Labs: ordered. Radiology: ordered.   ECG/medicine tests: ordered and independent interpretation performed. Risk  Prescription drug management. Disposition  Final diagnoses:   Fall, initial encounter   UTI (urinary tract infection)   Lung mass   Bladder mass     Time reflects when diagnosis was documented in both MDM as applicable and the Disposition within this note       Time User Action Codes Description Comment    11/11/2023  8:34 PM Ashlyn Jose Add Kate.Amend. VOMK] Fall, initial encounter     11/11/2023  8:35 PM Ashlyn Jose Add [N39.0] UTI (urinary tract infection)     11/11/2023  8:35 PM Ashlyn Jose Add [R91.8] Lung mass     11/11/2023  8:35 PM Ashlyn Jose Add [N32.89] Bladder mass           ED Disposition       ED Disposition   Discharge    Condition   Stable    Date/Time   Sat Nov 11, 2023  8:34 PM    Comment   Trista Sanderson discharge to home/self care.                    Follow-up Information       Follow up With Specialties Details Why Contact Info    Ghada Gonzalez MD Internal Medicine In 1 day  [address] [place] [address]  [phone]              Discharge Medication List as of 11/11/2023  8:36 PM        CONTINUE these medications which have NOT CHANGED    Details   acetaminophen (TYLENOL) 325 mg tablet Take 2 tablets (650 mg total) by mouth every 6 (six) hours as needed for mild pain or fever, Starting u 4/13/2023, No Print      amLODIPine (NORVASC) 5 mg tablet Take 1 tablet (5 mg total) by mouth daily at bedtime, Starting Wed 5/17/2023, Normal      apixaban (Eliquis) 2.5 mg Take 1 tablet (2.5 mg total) by mouth 2 (two) times a day, Starting Wed 5/3/2023, Normal      calcium carbonate (OS-CARLTON) 600 MG tablet Take 600 mg by mouth 2 (two) times a day with meals, Historical Med      cholecalciferol (VITAMIN D3) 1,000 units tablet Take 1,000 Units by mouth daily, Historical Med      cyanocobalamin (VITAMIN B-12) 100 mcg tablet Take 1,000 mcg by mouth daily, Historical Med      glipiZIDE (GLUCOTROL) 5 mg tablet Take 1 tablet (5 mg total) by mouth 2 (two) times a day before meals, Starting Tue 6/6/2023, Normal      glucose blood (Contour Next Test) test strip Test BID, Normal      levothyroxine 25 mcg tablet Take 1 tablet (25 mcg total) by mouth daily, Starting Wed 5/3/2023, Normal      lidocaine (Lidoderm) 5 % Apply 2 patches topically over 12 hours daily Remove & Discard patch within 12 hours or as directed by MD, Starting Thu 5/4/2023, Normal      lovastatin (MEVACOR) 20 mg tablet Take 1 tablet (20 mg total) by mouth daily at bedtime, Starting Mon 1/30/2023, Normal      metoprolol tartrate (LOPRESSOR) 25 mg tablet Take 1 tablet (25 mg total) by mouth every 12 (twelve) hours, Starting Mon 1/30/2023, Normal      Microlet Lancets MISC Testing twice daily, Normal             No discharge procedures on file.     PDMP Review         Value Time User    PDMP Reviewed  Yes 4/26/2023  2:13 PM Maye Lugo PA-C            ED Provider  Electronically Signed by             Bruno Baer MD  11/12/23 1450

## 2023-11-15 ENCOUNTER — APPOINTMENT (EMERGENCY)
Dept: CT IMAGING | Facility: HOSPITAL | Age: 88
DRG: 092 | End: 2023-11-15
Payer: MEDICARE

## 2023-11-15 ENCOUNTER — APPOINTMENT (EMERGENCY)
Dept: RADIOLOGY | Facility: HOSPITAL | Age: 88
DRG: 092 | End: 2023-11-15
Payer: MEDICARE

## 2023-11-15 ENCOUNTER — HOSPITAL ENCOUNTER (INPATIENT)
Facility: HOSPITAL | Age: 88
LOS: 1 days | Discharge: HOME HEALTH | DRG: 092 | End: 2023-11-17
Attending: EMERGENCY MEDICINE | Admitting: HOSPITALIST
Payer: MEDICARE

## 2023-11-15 DIAGNOSIS — E11.22 TYPE 2 DIABETES MELLITUS WITH STAGE 3B CHRONIC KIDNEY DISEASE, WITHOUT LONG-TERM CURRENT USE OF INSULIN (HCC): Chronic | ICD-10-CM

## 2023-11-15 DIAGNOSIS — I10 ESSENTIAL HYPERTENSION: ICD-10-CM
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
## 2023-11-15 DIAGNOSIS — C80.1 CANCER (HCC): ICD-10-CM

## 2023-11-15 DIAGNOSIS — N18.32 TYPE 2 DIABETES MELLITUS WITH STAGE 3B CHRONIC KIDNEY DISEASE, WITHOUT LONG-TERM CURRENT USE OF INSULIN (HCC): Chronic | ICD-10-CM

## 2023-11-15 DIAGNOSIS — R53.81 DEBILITY: Primary | Chronic | ICD-10-CM

## 2023-11-15 DIAGNOSIS — E86.0 DEHYDRATION: ICD-10-CM

## 2023-11-15 DIAGNOSIS — E83.42 HYPOMAGNESEMIA: ICD-10-CM

## 2023-11-15 DIAGNOSIS — R53.1 WEAKNESS: ICD-10-CM

## 2023-11-15 DIAGNOSIS — L98.429 SKIN ULCER OF SACRUM, UNSPECIFIED ULCER STAGE (HCC): ICD-10-CM

## 2023-11-15 LAB
ALBUMIN SERPL BCP-MCNC: 3.6 G/DL (ref 3.5–5)
ALP SERPL-CCNC: 89 U/L (ref 34–104)
ALT SERPL W P-5'-P-CCNC: 22 U/L (ref 7–52)
ANION GAP SERPL CALCULATED.3IONS-SCNC: 9 MMOL/L
AST SERPL W P-5'-P-CCNC: 21 U/L (ref 13–39)
BASE EX.OXY STD BLDV CALC-SCNC: 75.4 % (ref 60–80)
BASE EXCESS BLDV CALC-SCNC: 2.4 MMOL/L
BASOPHILS # BLD AUTO: 0.04 THOUSANDS/ÂΜL (ref 0–0.1)
BASOPHILS NFR BLD AUTO: 1 % (ref 0–1)
BILIRUB SERPL-MCNC: 0.38 MG/DL (ref 0.2–1)
BUN SERPL-MCNC: 28 MG/DL (ref 5–25)
CALCIUM SERPL-MCNC: 9.2 MG/DL (ref 8.4–10.2)
CHLORIDE SERPL-SCNC: 97 MMOL/L (ref 96–108)
CO2 SERPL-SCNC: 27 MMOL/L (ref 21–32)
CREAT SERPL-MCNC: 1.33 MG/DL (ref 0.6–1.3)
EOSINOPHIL # BLD AUTO: 0.08 THOUSAND/ÂΜL (ref 0–0.61)
EOSINOPHIL NFR BLD AUTO: 1 % (ref 0–6)
ERYTHROCYTE [DISTWIDTH] IN BLOOD BY AUTOMATED COUNT: 13.7 % (ref 11.6–15.1)
GFR SERPL CREATININE-BSD FRML MDRD: 35 ML/MIN/1.73SQ M
GLUCOSE SERPL-MCNC: 428 MG/DL (ref 65–140)
HCO3 BLDV-SCNC: 27 MMOL/L (ref 24–30)
HCT VFR BLD AUTO: 42.3 % (ref 34.8–46.1)
HGB BLD-MCNC: 13.7 G/DL (ref 11.5–15.4)
IMM GRANULOCYTES # BLD AUTO: 0.03 THOUSAND/UL (ref 0–0.2)
IMM GRANULOCYTES NFR BLD AUTO: 0 % (ref 0–2)
LYMPHOCYTES # BLD AUTO: 1.06 THOUSANDS/ÂΜL (ref 0.6–4.47)
LYMPHOCYTES NFR BLD AUTO: 13 % (ref 14–44)
MCH RBC QN AUTO: 31.9 PG (ref 26.8–34.3)
MCHC RBC AUTO-ENTMCNC: 32.4 G/DL (ref 31.4–37.4)
MCV RBC AUTO: 99 FL (ref 82–98)
MONOCYTES # BLD AUTO: 0.87 THOUSAND/ÂΜL (ref 0.17–1.22)
MONOCYTES NFR BLD AUTO: 11 % (ref 4–12)
NEUTROPHILS # BLD AUTO: 5.87 THOUSANDS/ÂΜL (ref 1.85–7.62)
NEUTS SEG NFR BLD AUTO: 74 % (ref 43–75)
NRBC BLD AUTO-RTO: 0 /100 WBCS
O2 CT BLDV-SCNC: 15.9 ML/DL
PCO2 BLDV: 41.7 MM HG (ref 42–50)
PH BLDV: 7.43 [PH] (ref 7.3–7.4)
PLATELET # BLD AUTO: 149 THOUSANDS/UL (ref 149–390)
PMV BLD AUTO: 11.6 FL (ref 8.9–12.7)
PO2 BLDV: 39.2 MM HG (ref 35–45)
POTASSIUM SERPL-SCNC: 4.1 MMOL/L (ref 3.5–5.3)
PROT SERPL-MCNC: 7 G/DL (ref 6.4–8.4)
RBC # BLD AUTO: 4.29 MILLION/UL (ref 3.81–5.12)
SODIUM SERPL-SCNC: 133 MMOL/L (ref 135–147)
WBC # BLD AUTO: 7.95 THOUSAND/UL (ref 4.31–10.16)

## 2023-11-15 PROCEDURE — 82805 BLOOD GASES W/O2 SATURATION: CPT | Performed by: EMERGENCY MEDICINE

## 2023-11-15 PROCEDURE — 71045 X-RAY EXAM CHEST 1 VIEW: CPT

## 2023-11-15 PROCEDURE — 99284 EMERGENCY DEPT VISIT MOD MDM: CPT

## 2023-11-15 PROCEDURE — 72125 CT NECK SPINE W/O DYE: CPT

## 2023-11-15 PROCEDURE — 85025 COMPLETE CBC W/AUTO DIFF WBC: CPT | Performed by: EMERGENCY MEDICINE

## 2023-11-15 PROCEDURE — 72170 X-RAY EXAM OF PELVIS: CPT

## 2023-11-15 PROCEDURE — 93005 ELECTROCARDIOGRAM TRACING: CPT

## 2023-11-15 PROCEDURE — 74177 CT ABD & PELVIS W/CONTRAST: CPT

## 2023-11-15 PROCEDURE — 36415 COLL VENOUS BLD VENIPUNCTURE: CPT | Performed by: EMERGENCY MEDICINE

## 2023-11-15 PROCEDURE — 80053 COMPREHEN METABOLIC PANEL: CPT | Performed by: EMERGENCY MEDICINE

## 2023-11-15 PROCEDURE — 70450 CT HEAD/BRAIN W/O DYE: CPT

## 2023-11-15 PROCEDURE — 99285 EMERGENCY DEPT VISIT HI MDM: CPT | Performed by: EMERGENCY MEDICINE

## 2023-11-15 PROCEDURE — 71260 CT THORAX DX C+: CPT

## 2023-11-15 RX ORDER — AMLODIPINE BESYLATE 5 MG/1
5 TABLET ORAL ONCE
Status: COMPLETED | OUTPATIENT
Start: 2023-11-15 | End: 2023-11-15

## 2023-11-15 RX ADMIN — AMLODIPINE BESYLATE 5 MG: 5 TABLET ORAL at 23:35

## 2023-11-15 RX ADMIN — IOHEXOL 100 ML: 350 INJECTION, SOLUTION INTRAVENOUS at 23:17

## 2023-11-16 ENCOUNTER — HOME HEALTH ADMISSION (OUTPATIENT)
Dept: HOME HEALTH SERVICES | Facility: HOME HEALTHCARE | Age: 88
End: 2023-11-16
Payer: MEDICARE

## 2023-11-16 PROBLEM — C67.8 MALIGNANT NEOPLASM OF OVERLAPPING SITES OF BLADDER (HCC): Chronic | Status: ACTIVE | Noted: 2022-10-10

## 2023-11-16 PROBLEM — R53.81 DEBILITY: Status: ACTIVE | Noted: 2023-04-08

## 2023-11-16 PROBLEM — I10 ESSENTIAL HYPERTENSION: Chronic | Status: ACTIVE | Noted: 2019-06-05

## 2023-11-16 PROBLEM — L98.429 SACRAL ULCER (HCC): Chronic | Status: ACTIVE | Noted: 2023-11-16

## 2023-11-16 PROBLEM — R53.81 DEBILITY: Chronic | Status: ACTIVE | Noted: 2023-04-08

## 2023-11-16 PROBLEM — S32.030A COMPRESSION FRACTURE OF L3 VERTEBRA (HCC): Status: RESOLVED | Noted: 2023-04-08 | Resolved: 2023-11-16

## 2023-11-16 PROBLEM — R91.8 LUNG MASS: Chronic | Status: ACTIVE | Noted: 2023-04-10

## 2023-11-16 PROBLEM — N18.31 STAGE 3A CHRONIC KIDNEY DISEASE (HCC): Status: RESOLVED | Noted: 2020-02-28 | Resolved: 2023-11-16

## 2023-11-16 PROBLEM — M48.54XS COMPRESSION FRACTURE OF THORACIC SPINE, NON-TRAUMATIC, SEQUELA: Status: RESOLVED | Noted: 2020-02-28 | Resolved: 2023-11-16

## 2023-11-16 LAB
ATRIAL RATE: 70 BPM
BACTERIA UR QL AUTO: NORMAL /HPF
BILIRUB UR QL STRIP: NEGATIVE
CLARITY UR: CLEAR
COLOR UR: YELLOW
GLUCOSE SERPL-MCNC: 142 MG/DL (ref 65–140)
GLUCOSE SERPL-MCNC: 222 MG/DL (ref 65–140)
GLUCOSE SERPL-MCNC: 242 MG/DL (ref 65–140)
GLUCOSE SERPL-MCNC: 262 MG/DL (ref 65–140)
GLUCOSE SERPL-MCNC: 322 MG/DL (ref 65–140)
GLUCOSE UR STRIP-MCNC: ABNORMAL MG/DL
HGB UR QL STRIP.AUTO: NEGATIVE
KETONES UR STRIP-MCNC: NEGATIVE MG/DL
LEUKOCYTE ESTERASE UR QL STRIP: NEGATIVE
NITRITE UR QL STRIP: NEGATIVE
NON-SQ EPI CELLS URNS QL MICRO: NORMAL /HPF
P AXIS: 62 DEGREES
PH UR STRIP.AUTO: 6.5 [PH]
PR INTERVAL: 146 MS
PROT UR STRIP-MCNC: ABNORMAL MG/DL
QRS AXIS: 35 DEGREES
QRSD INTERVAL: 80 MS
QT INTERVAL: 426 MS
QTC INTERVAL: 460 MS
RBC #/AREA URNS AUTO: NORMAL /HPF
SP GR UR STRIP.AUTO: 1.01
T WAVE AXIS: 60 DEGREES
TSH SERPL DL<=0.05 MIU/L-ACNC: 3.83 UIU/ML (ref 0.45–4.5)
UROBILINOGEN UR QL STRIP.AUTO: 0.2 E.U./DL
VENTRICULAR RATE: 70 BPM
WBC #/AREA URNS AUTO: NORMAL /HPF

## 2023-11-16 PROCEDURE — 99222 1ST HOSP IP/OBS MODERATE 55: CPT | Performed by: PHYSICIAN ASSISTANT

## 2023-11-16 PROCEDURE — 82948 REAGENT STRIP/BLOOD GLUCOSE: CPT

## 2023-11-16 PROCEDURE — 84443 ASSAY THYROID STIM HORMONE: CPT | Performed by: INTERNAL MEDICINE

## 2023-11-16 PROCEDURE — 81003 URINALYSIS AUTO W/O SCOPE: CPT | Performed by: EMERGENCY MEDICINE

## 2023-11-16 PROCEDURE — 93010 ELECTROCARDIOGRAM REPORT: CPT | Performed by: INTERNAL MEDICINE

## 2023-11-16 PROCEDURE — 81001 URINALYSIS AUTO W/SCOPE: CPT | Performed by: EMERGENCY MEDICINE

## 2023-11-16 PROCEDURE — 97166 OT EVAL MOD COMPLEX 45 MIN: CPT

## 2023-11-16 PROCEDURE — 97163 PT EVAL HIGH COMPLEX 45 MIN: CPT

## 2023-11-16 RX ORDER — ACETAMINOPHEN 325 MG/1
650 TABLET ORAL EVERY 4 HOURS PRN
Status: DISCONTINUED | OUTPATIENT
Start: 2023-11-16 | End: 2023-11-17 | Stop reason: HOSPADM

## 2023-11-16 RX ORDER — MELATONIN
1000 DAILY
Status: DISCONTINUED | OUTPATIENT
Start: 2023-11-16 | End: 2023-11-17 | Stop reason: HOSPADM

## 2023-11-16 RX ORDER — HYDRALAZINE HYDROCHLORIDE 20 MG/ML
5 INJECTION INTRAMUSCULAR; INTRAVENOUS EVERY 6 HOURS PRN
Status: DISCONTINUED | OUTPATIENT
Start: 2023-11-16 | End: 2023-11-17 | Stop reason: HOSPADM

## 2023-11-16 RX ORDER — LEVOTHYROXINE SODIUM 0.03 MG/1
25 TABLET ORAL
Status: DISCONTINUED | OUTPATIENT
Start: 2023-11-16 | End: 2023-11-17 | Stop reason: HOSPADM

## 2023-11-16 RX ORDER — INSULIN LISPRO 100 [IU]/ML
1-5 INJECTION, SOLUTION INTRAVENOUS; SUBCUTANEOUS
Status: DISCONTINUED | OUTPATIENT
Start: 2023-11-16 | End: 2023-11-17 | Stop reason: HOSPADM

## 2023-11-16 RX ORDER — AMLODIPINE BESYLATE 5 MG/1
5 TABLET ORAL
Status: DISCONTINUED | OUTPATIENT
Start: 2023-11-16 | End: 2023-11-17 | Stop reason: HOSPADM

## 2023-11-16 RX ORDER — ONDANSETRON 2 MG/ML
4 INJECTION INTRAMUSCULAR; INTRAVENOUS EVERY 6 HOURS PRN
Status: DISCONTINUED | OUTPATIENT
Start: 2023-11-16 | End: 2023-11-17 | Stop reason: HOSPADM

## 2023-11-16 RX ORDER — CALCIUM CARBONATE 500(1250)
1 TABLET ORAL
Status: DISCONTINUED | OUTPATIENT
Start: 2023-11-16 | End: 2023-11-17 | Stop reason: HOSPADM

## 2023-11-16 RX ORDER — PRAVASTATIN SODIUM 20 MG
20 TABLET ORAL
Status: DISCONTINUED | OUTPATIENT
Start: 2023-11-16 | End: 2023-11-17 | Stop reason: HOSPADM

## 2023-11-16 RX ORDER — SODIUM CHLORIDE 9 MG/ML
100 INJECTION, SOLUTION INTRAVENOUS CONTINUOUS
Status: DISCONTINUED | OUTPATIENT
Start: 2023-11-16 | End: 2023-11-17 | Stop reason: HOSPADM

## 2023-11-16 RX ORDER — SODIUM CHLORIDE 9 MG/ML
100 INJECTION, SOLUTION INTRAVENOUS CONTINUOUS
Status: DISCONTINUED | OUTPATIENT
Start: 2023-11-16 | End: 2023-11-16

## 2023-11-16 RX ADMIN — ACETAMINOPHEN 650 MG: 325 TABLET ORAL at 06:07

## 2023-11-16 RX ADMIN — INSULIN LISPRO 3 UNITS: 100 INJECTION, SOLUTION INTRAVENOUS; SUBCUTANEOUS at 03:09

## 2023-11-16 RX ADMIN — CALCIUM 1 TABLET: 500 TABLET ORAL at 10:03

## 2023-11-16 RX ADMIN — INSULIN LISPRO 2 UNITS: 100 INJECTION, SOLUTION INTRAVENOUS; SUBCUTANEOUS at 16:35

## 2023-11-16 RX ADMIN — PRAVASTATIN SODIUM 20 MG: 20 TABLET ORAL at 16:34

## 2023-11-16 RX ADMIN — METOPROLOL TARTRATE 25 MG: 25 TABLET, FILM COATED ORAL at 20:16

## 2023-11-16 RX ADMIN — SODIUM CHLORIDE 100 ML/HR: 0.9 INJECTION, SOLUTION INTRAVENOUS at 10:03

## 2023-11-16 RX ADMIN — APIXABAN 2.5 MG: 2.5 TABLET, FILM COATED ORAL at 09:04

## 2023-11-16 RX ADMIN — SODIUM CHLORIDE 100 ML/HR: 0.9 INJECTION, SOLUTION INTRAVENOUS at 02:12

## 2023-11-16 RX ADMIN — SODIUM CHLORIDE 100 ML/HR: 0.9 INJECTION, SOLUTION INTRAVENOUS at 20:10

## 2023-11-16 RX ADMIN — AMLODIPINE BESYLATE 5 MG: 5 TABLET ORAL at 22:24

## 2023-11-16 RX ADMIN — LEVOTHYROXINE SODIUM 25 MCG: 25 TABLET ORAL at 06:07

## 2023-11-16 RX ADMIN — APIXABAN 2.5 MG: 2.5 TABLET, FILM COATED ORAL at 17:50

## 2023-11-16 RX ADMIN — INSULIN LISPRO 2 UNITS: 100 INJECTION, SOLUTION INTRAVENOUS; SUBCUTANEOUS at 11:51

## 2023-11-16 RX ADMIN — Medication 1000 UNITS: at 09:04

## 2023-11-16 RX ADMIN — INSULIN LISPRO 1 UNITS: 100 INJECTION, SOLUTION INTRAVENOUS; SUBCUTANEOUS at 22:29

## 2023-11-16 RX ADMIN — CYANOCOBALAMIN TAB 500 MCG 1000 MCG: 500 TAB at 09:04

## 2023-11-16 RX ADMIN — METOPROLOL TARTRATE 25 MG: 25 TABLET, FILM COATED ORAL at 09:04

## 2023-11-16 NOTE — ASSESSMENT & PLAN NOTE
Known bladder cancer  Previously seen by Dr. Evgeny Kwok, she reports she has not seen them in some time secondary to transportation issues

## 2023-11-16 NOTE — ASSESSMENT & PLAN NOTE
Lab Results   Component Value Date    HGBA1C 7.8 (H) 05/15/2023       No results for input(s): "POCGLU" in the last 72 hours.     Blood Sugar Average: Last 72 hrs:  Patient ran out of Metformin and her Blood sugars have been elevated, 428 on labs in ED  Sliding scale insulin coverage while in the hospital, adjust as needed  Creatinine stable, monitor

## 2023-11-16 NOTE — PROGRESS NOTES
Patient:    MRN:  [id number]    Yury Request ID:  [id number]    Level of care reserved:  Nestor5 Lázaro Curtis    Partner Reserved:  [facility] [address] [place] [address] [phone]    Clinical needs requested:    Geography searched:  [phone]    Start of Service:    Request sent:  1:41pm EST on 11/16/2023 by Sloane Turner    Partner reserved:  1:58pm EST on 11/16/2023 by Sloane Turner    Choice list shared:  1:54pm EST on 11/16/2023 by Sloane Turner

## 2023-11-16 NOTE — CASE MANAGEMENT
I notified patient that [facility] was able to accept the patient . Pt would like to have [facility]. I placed Reservation in [address] [place] [address] for [facility].

## 2023-11-16 NOTE — H&P
[id number] [employer]  H&P  Name: Ted Richardson 80 y.o. female I MRN: [id number]  Unit/Bed#: -01 I Date of Admission: 11/15/2023   Date of Service: 11/16/2023 I Hospital Day: 0      Assessment/Plan   * Debility  Assessment & Plan  Patient with recurrent falls she was seen on November 12th and then again tonight secondary to falls. Tonight reports she stood up and had loss of balance. Trauma work up performed with no acute findings. PT/OT and case management    Sacral ulcer ([address])  Assessment & Plan  No open areas on skin, appears possible previous wound that has resolved, she does note some pain on sacral area when pressing but has had recent falls  Surgery consult placed ? Need for MRI   CT: There is a sacral decubitus ulcer which appears to extend to the underlying coccyx. There is no definite evidence of osseous destruction of the coccyx, however, clinical correlation is necessary. If there is concern for underlying osteomyelitis, MRI could be performed, if there are no contraindications. Alternatively, if further evaluation is indicated and the patient is unable to undergo MRI, nuclear medicine imaging could be performed. Lung mass  Assessment & Plan  CT: Right hilar/lung mass, as described above, without significant change compared with November 11, 2023. The appearance of this mass is in keeping with malignant neoplasm.    Mass previously noted and seen by Hematology and Pulmonary, Daughter and patient declined biopsy and procedures per note 6/23/23 w/ Pulmonary        Malignant neoplasm of overlapping sites of bladder [facility])  Assessment & Plan  Known bladder cancer  Previously seen by Dr. Khris Pal, she reports she has not seen them in some time secondary to transportation issues        Type 2 diabetes mellitus with stage 3b chronic kidney disease [facility])  Assessment & Plan  Lab Results   Component Value Date    HGBA1C 7.8 (H) 05/15/2023       No results for input(s): "POCGLU" in the last 72 hours. Blood Sugar Average: Last 72 hrs:  Patient ran out of Metformin and her Blood sugars have been elevated, 428 on labs in ED  Sliding scale insulin coverage while in the hospital, adjust as needed  Creatinine stable, monitor      Essential hypertension  Assessment & Plan  BP was elevated in ED, home meds given and BP improved  Continue home meds with hold parameters    Chronic a-fib (HCC)  Assessment & Plan  Rate is controlled  Continue home Eliquis         VTE Pharmacologic Prophylaxis: VTE Score: 5 High Risk (Score >/= 5) - Pharmacological DVT Prophylaxis Ordered: apixaban (Eliquis). Sequential Compression Devices Ordered. Code Status: DNR/DNI  Discussion with patient    Anticipated Length of Stay: Patient will be admitted on an inpatient basis with an anticipated length of stay of greater than 2 midnights secondary to PT/OT and Case management for d/c needs, specialist input regarding sacral ulcer. Total Time Spent on Date of Encounter in care of patient: 65 mins. This time was spent on one or more of the following: performing physical exam; counseling and coordination of care; obtaining or reviewing history; documenting in the medical record; reviewing/ordering tests, medications or procedures; communicating with other healthcare professionals and discussing with patient's family/caregivers. Chief Complaint: fall    History of Present Illness:  Jacklyn Burkitt is a 80 y.o. female with a PMH of diabetes mellitus type 2 with chronic kidney disease stage IIIb, hypertension, chronic atrial fibrillation on anticoagulation, history of PE, known lung mass and bladder cancer who presents with fall. Patient with recurrent falls, tonight she reports she stood up trying to go to the bathroom and had loss of balance, denied loss of consciousness but unclear if she hit her head she does take Eliquis, trauma eval performed with no acute findings on imaging.   She had a recent fall and seen on November 12 with trauma evaluation performed at that time. She was treated for a UTI. She has noted she ran out of metformin and her blood sugars are elevated. Patient reports this is 6th-8th fall recently. She does report when she went to rehab in the past she had good results however she is concerned about going to rehab and leaving her  at home as he is 80 and she is his caretaker. She reports that food is delivered to the house, her neighbor checks on them daily and picks up their mail for them. Review of Systems:  Review of Systems   Constitutional:  Positive for chills. Negative for fever. HENT:  Positive for rhinorrhea. Negative for sore throat and trouble swallowing. Eyes:  Negative for discharge and redness. Respiratory:  Negative for cough and shortness of breath. Cardiovascular:  Negative for chest pain and leg swelling. Gastrointestinal:  Positive for abdominal pain. Negative for diarrhea, nausea and vomiting. Genitourinary:  Negative for dysuria and frequency. Musculoskeletal:  Positive for back pain and neck pain. Skin:  Positive for wound. Negative for pallor. Neurological:  Positive for dizziness and weakness. Negative for headaches. Psychiatric/Behavioral:  Negative for agitation and confusion.         Past Medical and Surgical History:   Past Medical History:   Diagnosis Date    Abnormal gait     Accidental fall from chair, subsequent encounter     Atrial fibrillation ([address])     Benign essential hypertension     Bite of animal     Cataract     Cellulitis     Chronic kidney disease     Chronic kidney disease, stage 3 (HCC)     Colon cancer ([address])     Colon polyp     Compression fracture of L3 vertebra ([address]) 04/08/2023    Compression fracture of thoracic spine, non-traumatic, sequela 02/28/2020    Current tear of lateral cartilage or meniscus of knee     Diabetes mellitus (HCC)     Disease of thyroid gland     Disorder of magnesium metabolism     Dvt femoral (deep venous thrombosis) Embolism from thrombosis of vein of distal end of lower extremity ([address])     Essential hypertension     Fall     Hyperlipidemia     Hyperlipidemia     Hypertension     Hypothyroidism     Insomnia     Kidney stone     Knee joint effusion     Leukocytosis     Malaise and fatigue     MI (myocardial infarction) ([address])     Hospitalization     Orbital hemorrhage     Other sleep disorders     PAF (paroxysmal atrial fibrillation) (HCC)     Presence of vena cava filter     Pulmonary embolism (HCC)     Tear film insufficiency     Type 2 diabetes mellitus ([address])     Unspecified osteoarthritis, unspecified site     Weight decreased        Past Surgical History:   Procedure Laterality Date    ABDOMINAL ADHESION SURGERY  2015    Had wound vac    ABDOMINAL SURGERY      APPENDECTOMY      CATARACT EXTRACTION      CHOLECYSTECTOMY      COLON SURGERY      COLOSTOMY  2011    Due to Ileus, then reanastomosis in 2002    COLOSTOMY TAKEDOWN/REVERSE [facility] W/ LASER      HERNIA REPAIR      IVC FILTER INSERTION      KNEE SURGERY  2014    repair     OR CYSTOURETHROSCOPY W/DEST &/RMVL TUMOR LARGE N/A 4/30/2021    Procedure: CYSTOSCOPY, TRANSURETHRAL RESECTION OF BLADDER TUMOR (TURBT); Surgeon: Benedicto Cardona MD;  Location: [address] [place] [address];  Service: Urology       Meds/Allergies:  Prior to Admission medications    Medication Sig Start Date End Date Taking?  Authorizing Provider   metFORMIN (GLUCOPHAGE) 500 mg tablet Take 500 mg by mouth 2 (two) times a day with meals   Yes Historical Provider, MD   acetaminophen (TYLENOL) 325 mg tablet Take 2 tablets (650 mg total) by mouth every 6 (six) hours as needed for mild pain or fever 4/13/23   Nataliia Brunner PA-C   amLODIPine (NORVASC) 5 mg tablet Take 1 tablet (5 mg total) by mouth daily at bedtime 5/17/23   Fito Kruse MD   apixaban (Eliquis) 2.5 mg Take 1 tablet (2.5 mg total) by mouth 2 (two) times a day 5/3/23   Fito Kruse MD   calcium carbonate (OS-CARLTON) 600 MG tablet Take 600 mg by mouth 2 (two) times a day with meals    Historical Provider, MD   cholecalciferol (VITAMIN D3) 1,000 units tablet Take 1,000 Units by mouth daily    Historical Provider, MD   cyanocobalamin (VITAMIN B-12) 100 mcg tablet Take 1,000 mcg by mouth daily    Historical Provider, MD   glipiZIDE (GLUCOTROL) 5 mg tablet Take 1 tablet (5 mg total) by mouth 2 (two) times a day before meals 6/6/23   Nimesh Holman MD   glucose blood (Contour Next Test) test strip Test BID 7/5/23   Nimesh Holman MD   levothyroxine 25 mcg tablet Take 1 tablet (25 mcg total) by mouth daily 5/3/23   Nimesh Holman MD   lovastatin (MEVACOR) 20 mg tablet Take 1 tablet (20 mg total) by mouth daily at bedtime 1/30/23   Nimesh Holman MD   metoprolol tartrate (LOPRESSOR) 25 mg tablet Take 1 tablet (25 mg total) by mouth every 12 (twelve) hours 1/30/23   Nimesh Holman MD   Microlet Lancets MISC Testing twice daily 9/12/22   Nimesh Holman MD   lidocaine (Lidoderm) 5 % Apply 2 patches topically over 12 hours daily Remove & Discard patch within 12 hours or as directed by MD  Patient not taking: Reported on 5/15/2023 5/4/23 11/16/23  Nimesh Holman MD   linaGLIPtin (Tradjenta) 5 MG TABS Take 5 mg by mouth daily  Patient not taking: Reported on 3/30/2021 2/25/21 3/30/21  Margot Melchor MD   lisinopril (ZESTRIL) 5 mg tablet take 1 tablet by mouth once daily 11/28/22 5/4/23  Lubna Goodrich PA-C     I have reviewed home medications with patient personally. Allergies:    Allergies   Allergen Reactions    Other GI Intolerance     Imitation crab meat         Social History:  Marital Status: /Civil Union   Occupation: Retired  Patient Pre-hospital Living Situation: With spouse  Patient Pre-hospital Level of Mobility: walks with walker  Patient Pre-hospital Diet Restrictions: Diabetic  Substance Use History:   Social History     Substance and Sexual Activity   Alcohol Use Never     Social History     Tobacco Use   Smoking Status Former    Packs/day: 0.50    Years: 10.00    Total pack years: 5.00    Types: Cigarettes    Quit date: 3/19/2001    Years since quittin.6   Smokeless Tobacco Never     Social History     Substance and Sexual Activity   Drug Use Never       Family History:  Family History   Problem Relation Age of Onset    Deep vein thrombosis Mother     No Known Problems Father        Physical Exam:     Vitals:   Blood Pressure: 167/68 (23)  Pulse: 63 (23)  Temperature: 97.8 °F (36.6 °C) (23)  Temp Source: Temporal (11/15/23 2251)  Respirations: 18 (23)  Height: 5' 4" (162.6 cm) (23)  Weight - Scale: 63.4 kg (139 lb 12.4 oz) (23)  SpO2: 96 % (23)    Physical Exam  Vitals reviewed. Constitutional:       Appearance: Normal appearance. Comments: Frail elderly  female   HENT:      Head: Normocephalic and atraumatic. Nose: Nose normal.      Mouth/Throat:      Comments: Poor dentition  Eyes:      General:         Right eye: No discharge. Left eye: No discharge. Extraocular Movements: Extraocular movements intact. Conjunctiva/sclera: Conjunctivae normal.   Cardiovascular:      Rate and Rhythm: Normal rate and regular rhythm. Pulmonary:      Effort: Pulmonary effort is normal. No respiratory distress. Breath sounds: Normal breath sounds. No wheezing. Abdominal:      General: Bowel sounds are normal. There is no distension. Palpations: Abdomen is soft. Tenderness: There is generalized abdominal tenderness. There is no guarding. Musculoskeletal:         General: No swelling or tenderness. Normal range of motion. Cervical back: Normal range of motion. Right lower leg: No edema. Left lower leg: No edema. Skin:     General: Skin is warm and dry.       Capillary Refill: Capillary refill takes less than 2 seconds. Coloration: Skin is pale. Comments: Patient with no open wound in her sacral area, appears to have a resolved wound with scarring. She does have tenderness with palpation but has had recent falls. Neurological:      General: No focal deficit present. Mental Status: She is alert. Mental status is at baseline. Motor: Weakness present. Psychiatric:         Mood and Affect: Mood normal.         Behavior: Behavior normal.            Additional Data:     Lab Results:  Results from last 7 days   Lab Units 11/15/23  2255   WBC Thousand/uL 7.95   HEMOGLOBIN g/dL 13.7   HEMATOCRIT % 42.3   PLATELETS Thousands/uL 149   NEUTROS PCT % 74   LYMPHS PCT % 13*   MONOS PCT % 11   EOS PCT % 1     Results from last 7 days   Lab Units 11/15/23  2255   SODIUM mmol/L 133*   POTASSIUM mmol/L 4.1   CHLORIDE mmol/L 97   CO2 mmol/L 27   BUN mg/dL 28*   CREATININE mg/dL 1.33*   ANION GAP mmol/L 9   CALCIUM mg/dL 9.2   ALBUMIN g/dL 3.6   TOTAL BILIRUBIN mg/dL 0.38   ALK PHOS U/L 89   ALT U/L 22   AST U/L 21   GLUCOSE RANDOM mg/dL 428*     Lines/Drains:  Invasive Devices       Peripheral Intravenous Line  Duration             Peripheral IV 11/15/23 Right Antecubital <1 day                  Imaging: Reviewed radiology reports from this admission including: chest CT scan, abdominal/pelvic CT, CT head, and CT C spine, CXR, pelvis  TRAUMA - CT chest abdomen pelvis w contrast   Final Result by Chandan Sharma MD (11/16 0040)      No evidence of acute posttraumatic intrathoracic intra-abdominal or intrapelvic parenchymal organ injury. No pneumothorax. There is a sacral decubitus ulcer which appears to extend to the underlying coccyx. There is no definite evidence of osseous destruction of the coccyx, however, clinical correlation is necessary. If there is concern for underlying osteomyelitis, MRI    could be performed, if there are no contraindications.  Alternatively, if further evaluation is indicated and the patient is unable to undergo MRI, nuclear medicine imaging could be performed. Right hilar/lung mass, as described above, without significant change compared with November 11, 2023. The appearance of this mass is in keeping with malignant neoplasm. There are two enhancing masses within the urinary bladder. These appear similar to November 11, 2023. The appearance of these masses is highly suggestive of malignant neoplasm. Other nonemergent and chronic findings as above. The study was marked in [facility] for immediate notification. Workstation performed: [id number]         TRAUMA - CT head wo contrast   Final Result by Lilliana Grissom MD (11/15 2332)      No acute intracranial abnormality. Chronic microangiopathy and age-appropriate generalized atrophy appears unchanged compared with November 11, 2023. The study was marked in [facility] for immediate notification. Workstation performed: [id number]         TRAUMA - CT spine cervical wo contrast   Final Result by Lilliana Grissom MD (11/15 2430)      No acute cervical spine fracture or traumatic malalignment. No significant interval change. The study was marked in [facility] for immediate notification. Workstation performed: [id number]         XR Trauma chest portable   Final Result by Lilliana Grissom MD (64/57 4026)      No evidence of acute traumatic injury to the chest. Right hilar/lung mass in keeping with known neoplasm unchanged. Please refer to the report of the CT of the chest performed on this same visit. Workstation performed: [id number]         XR Trauma pelvis ap only 1 or 2 vw   Final Result by Lilliana Grissom MD (/[id number])      No evidence of acute fracture. There is very limited visualization of the distal sacrum and coccyx due to overlying bowel contents. Please refer to the report of the CT performed on the same visit.       Workstation performed: [id number] EKG and Other Studies Reviewed on Admission:   EKG:  Sinus w/ PAC, rate 70, reviewed in MUSE personally. ** Please Note: This note has been constructed using a voice recognition system.  **

## 2023-11-16 NOTE — PLAN OF CARE
Problem: OCCUPATIONAL THERAPY ADULT  Goal: Performs self-care activities at highest level of function for planned discharge setting. See evaluation for individualized goals. Description: Treatment Interventions: ADL retraining, Functional transfer training, UE strengthening/ROM, Endurance training, Patient/family training, Equipment evaluation/education, Compensatory technique education, Energy conservation, Activityengagement    See flowsheet documentation for full assessment, interventions and recommendations. Note: Limitation: Decreased ADL status, Decreased UE strength, Decreased Safe judgement during ADL, Decreased endurance, Decreased self-care trans, Decreased high-level ADLs  Prognosis: Good  Assessment: Pt is a 80 y.o. female seen for OT evaluation s/p admit to [facility] on 11/15/2023 w/ Debility. Comorbidities affecting pt's functional performance at time of assessment include:  A-fib, DM II, Lung mass . Personal factors affecting pt at time of IE include:steps to enter environment, limited home support, and difficulty performing ADLS. Prior to admission, pt was Mod I with ADLs and IADLs. Upon evaluation: the following deficits impact occupational performance: decreased strength, decreased balance, decreased tolerance, decreased safety awareness, and increased pain. Pt to benefit from continued skilled OT tx while in the hospital to address deficits as defined above and maximize level of functional independence w ADL's and functional mobility. Occupational Performance areas to address include: bathing/shower, toilet hygiene, dressing, functional mobility, and clothing management. From OT standpoint, recommendation at time of d/c would be Level II (Moderate Resource Intensity.      Rehab Resource Intensity Level, OT: II (Moderate Resource Intensity)     Derick Gallego MS, OTR/L

## 2023-11-16 NOTE — PHYSICAL THERAPY NOTE
Physical Therapy Evaluation   Time in: 0825  Time out: 0849  Total evaluation time: 24 minutes    Patient's Name: Tony Chahal    Admitting Diagnosis  Dehydration [E86.0]  Cancer ([address]) [C80.1]  Weakness [R53.1]  Fall, initial encounter [W19. XXXA]  Fall in elderly patient [R29.6]  Skin ulcer of sacrum, unspecified ulcer stage ([address]) [L98.429]    Problem List  Patient Active Problem List   Diagnosis    Chronic a-fib ([address])    Essential hypertension    History of pulmonary embolism    Type 2 diabetes mellitus with stage 3b chronic kidney disease ([address])    Osteoarthritis of knee    Other pulmonary embolism without acute cor pulmonale (HCC)    Malignant neoplasm of overlapping sites of bladder ([address])    Debility    Lung mass    Sacral ulcer ([address])       Past Medical History  Past Medical History:   Diagnosis Date    Abnormal gait     Accidental fall from chair, subsequent encounter     Atrial fibrillation ([address])     Benign essential hypertension     Bite of animal     Cataract     Cellulitis     Chronic kidney disease     Chronic kidney disease, stage 3 ([address])     Colon cancer ([address])     Colon polyp     Compression fracture of L3 vertebra ([address]) 04/08/2023    Compression fracture of thoracic spine, non-traumatic, sequela 02/28/2020    Current tear of lateral cartilage or meniscus of knee     Diabetes mellitus ([address])     Disease of thyroid gland     Disorder of magnesium metabolism     Dvt femoral (deep venous thrombosis)     Embolism from thrombosis of vein of distal end of lower extremity ([address])     Essential hypertension     Fall     Hyperlipidemia     Hyperlipidemia     Hypertension     Hypothyroidism     Insomnia     Kidney stone     Knee joint effusion     Leukocytosis     Malaise and fatigue     MI (myocardial infarction) ([address])     Hospitalization     Orbital hemorrhage     Other sleep disorders     PAF (paroxysmal atrial fibrillation) (HCC)     Presence of vena cava filter     Pulmonary embolism (HCC)     Tear film insufficiency     Type 2 diabetes mellitus (HCC)     Unspecified osteoarthritis, unspecified site     Weight decreased        Past Surgical History  Past Surgical History:   Procedure Laterality Date    ABDOMINAL ADHESION SURGERY  2015    Had wound vac    ABDOMINAL SURGERY      APPENDECTOMY      CATARACT EXTRACTION      CHOLECYSTECTOMY      COLON SURGERY      COLOSTOMY  2011    Due to Ileus, then reanastomosis in 2002    COLOSTOMY TAKEDOWN/REVERSE [facility] W/ LASER      HERNIA REPAIR      IVC FILTER INSERTION      KNEE SURGERY  2014    repair     NC CYSTOURETHROSCOPY W/DEST &/RMVL TUMOR LARGE N/A 4/30/2021    Procedure: CYSTOSCOPY, TRANSURETHRAL RESECTION OF BLADDER TUMOR (TURBT); Surgeon: Patty Simental MD;  Location: [facility] MAIN OR;  Service: Urology       PT performed at least 2 patient identifiers during session: Name and wristband. 11/16/23 0831   PT Last Visit   PT Visit Date 11/16/23   Note Type   Note type Evaluation   Pain Assessment   Pain Assessment Tool 0-10   Pain Score No Pain  (increased with movement)   Pain Location/Orientation Location: Buttocks; Location: Back   Pain Onset/Description Onset: Ongoing   Hospital Pain Intervention(s) Emotional support;Repositioned   Multiple Pain Sites No   Restrictions/Precautions   Weight Bearing Precautions Per Order No   Other Precautions Chair Alarm; Bed Alarm; Fall Risk;Pain   Home Living   Type of [address] [place] [address] One level; Laundry in basement;Performs ADLs on one level;Stairs to enter with rails  (5 ALYX)   Bathroom Shower/Tub Tub/shower unit   Bathroom Toilet Standard   Bathroom Equipment Grab bars in shower  (pt reports use of standard walker to help assist her into the bathtub)   [address] Walker;Cane;Wheelchair-manual  (use of walker at baseline, reports wheelchair use by )   Prior Function   Level of [place] Independent with ADLs; Independent with functional mobility; Independent with IADLS   Lives With Spouse   Receives Help From Family  (sons and daughter)   IADLs Family/Friend/Other provides transportation; Independent with meal prep; Independent with medication management   Falls in the last 6 months 5 to 10  (5 falls)   Vocational Retired   General   Additional Pertinent History OT Myrisa present for co-evaluation due to medical complexity   Family/Caregiver Present No   Cognition   Overall Cognitive Status WFL   Arousal/Participation Alert   Orientation Level Oriented X4   Memory Within functional limits   Following Commands Follows one step commands without difficulty   Subjective   Subjective "I do not want any help"   RLE Assessment   RLE Assessment X   Strength RLE   RLE Overall Strength 3/5  (Atleast 3/5 due to functional activity tolerance)   LLE Assessment   LLE Assessment X   Strength LLE   LLE Overall Strength 3/5  (Atleast 3/5 due to functional activity tolerance)   Vision-Basic Assessment   Current Vision Wears glasses only for reading   Bed Mobility   Supine to Sit 5  Supervision   Additional items Assist x 1; Increased time required;HOB elevated; Bedrails;Verbal cues  (Pt required increased time to perform and stated she did not want any help)   Additional Comments Pt remained OOB at end of session   Transfers   Sit to Stand 5  Supervision  (excessive unsteadiness, but pt requested no help, CGA assist would be advised with next trial)   Additional items Increased time required;Armrests;Assist x 2  (close supervision x2 was provided)   Stand to Sit 5  Supervision  (excessive unsteadiness, but pt requested no help, CGA assist would be advised with next trial)   Additional items Increased time required;Armrests; Verbal cues; Assist x 2  (close supervision x2 was provided)   Stand pivot 5  Supervision  (excessive unsteadiness, but pt requested no help, CGA assist would be advised with next trial)   Additional items Increased time required;Assist x 2  (close supervision x2 was provided)   Ambulation/Elevation   Gait pattern Improper Weight shift; Antalgic;Decreased foot clearance; Short stride; Step to   Gait Assistance 5  Supervision   Additional items Assist x 2  (excessive unsteadiness, but pt requested no help, CGA assist would be advised with next trial)   Assistive Device Rolling walker   Distance 3ft from bed to bedside chair   Balance   Static Sitting Fair +   Dynamic Sitting Fair   Static Standing Fair   Dynamic Standing Fair -   Ambulatory Poor +   Endurance Deficit   Endurance Deficit Yes   Endurance Deficit Description limited by fatigue and weakness   Activity Tolerance   Activity Tolerance Patient limited by fatigue;Patient limited by pain   Medical Staff Made Aware Yes, medical staff made aware   Nurse Made Aware Yes, RN Brandon Lerma made aware of session outcomes   Assessment   Prognosis Fair   Problem List Decreased strength;Decreased endurance; Impaired balance;Decreased mobility; Decreased coordination;Decreased cognition;Decreased safety awareness;Pain   Assessment Pt is a 80 y.o. female seen for PT evaluation s/p admit to [facility] w/ debility. PTA, pt was independent w/ all functional mobility, ADL's and IADL's and used RW assistive device for mobility. PT consulted to assess pt's functional mobility and d/c needs. At time of PT eval, pt found supine in bed and was agreeable to PT session. Pt stated she has pain in her low back and buttock with movement. Bed mobility, transfers and ambulation were all preformed with supervision due to pt requesting no help from PT with mobility. Pt presented with unsteadiness for transfers and ambulation to bedside chair and close supervision x2 was provided. Next trial with mobility, CGA should be provided to ensure safety. Pt needed multiple attempts with bed mobility supine to sit and needed increased time to sit EOB.  Overall, pt presented with impaired balance and unsteadiness with mobility and needs more assistance than she is requesting. Pt will continue to benefit from immediate acute skilled PT services in order to address the deficits listed above. Areas for improvement include: decreased coordination,  strength, decreased endurance and impaired balance. From PT/mobility standpoint, at this time recommendation is a moderate resource intensity, in order to maximize pt's functional independence and safety/consistency w/ mobility. Barriers to Discharge Inaccessible home environment;Decreased caregiver support   Goals   Patient Goals to go home   STG Expiration Date 23   Short Term Goal #1 In 7-10 days, the pt will increase LE strength by 1/2 grade in order to increase functional activity tolerance. Pt will ambulate >50 ft MOD I with RW in order to safely maneuver her home. Pt will increase balance scores by 1/2 grade in order to decrease fall risk. Pt will perform bed mobility MOD I in order to safely get in/out of bed. Pt will perform transfers MOD I with RW in order to increase independence and decreased caregiver burden. Pt will navigate 5 ALYX with HR in order to safely enter/exit her home. PT Treatment Day 0   Plan   Treatment/Interventions Functional transfer training;LE strengthening/ROM; Elevations; Therapeutic exercise; Endurance training;Patient/family training;Equipment eval/education; Bed mobility;Gait training;Spoke to nursing   PT Frequency 3-5x/wk   Discharge Recommendation   Rehab Resource Intensity Level, PT II (Moderate Resource Intensity)   Equipment Recommended Walker   AM-PAC Basic Mobility Inpatient   Turning in Flat Bed Without Bedrails 3   Lying on Back to Sitting on Edge of Flat Bed Without Bedrails 3   Moving Bed to Chair 3   Standing Up From Chair Using Arms 3   Walk in Room 2   Climb 3-5 Stairs With Railing 1   Basic Mobility Inpatient Raw Score 15   Basic Mobility Standardized Score 36.97   Highest Level Of Mobility   -[facility] Goal 4: Move to chair/commode   -[facility] Achieved 4: Move to chair/commode   End of Consult   Patient Position at End of Consult Bedside chair;Bed/Chair alarm activated; All needs within reach       [facility], SPT

## 2023-11-16 NOTE — CASE MANAGEMENT
Case Management Discharge Planning Note    Patient name Ted Richardson  Location [address] [facility] [place] 222/-56 MRN [id number]  : 1933 Date 2023       Current Admission Date: 11/15/2023  Current Admission Diagnosis:Debility   Patient Active Problem List    Diagnosis Date Noted    Sacral ulcer ([address]) 2023    Lung mass 04/10/2023    Debility 2023    Malignant neoplasm of overlapping sites of bladder ([address] 10/10/2022    Other pulmonary embolism without acute cor pulmonale ([address] [facility]) 2021    Essential hypertension 2019    History of pulmonary embolism 2019    Osteoarthritis of knee 2018    Chronic a-fib ([address] [facility]) 2016    Type 2 diabetes mellitus with stage 3b chronic kidney disease ([facility]) 2016      LOS (days): 0  Geometric Mean LOS (GMLOS) (days): 3.10  Days to GMLOS:2.6     OBJECTIVE:  Risk of Unplanned Readmission Score: 28.07         Current admission status: Inpatient   Preferred Pharmacy:   [address] [place] [address] [id number] [address]  Phone: [phone] Fax: [phone]    [facility] ([address] [place] [address] Crossing [address]  Phone: [phone] Fax: [phone]    Primary Care Provider: Yisel Snigh MD    Primary Insurance: MEDICARE  Secondary Insurance: [facility]    DISCHARGE DETAILS:       PT and OT are recommending that the patient should go to [facility] on discharge. I notified Patient that PT and OT are recommending STR. Pt adamantly refused to go to STR, I need to take care of my . I called patient's son Rosa Maria Saleh and notified him that PT is recommending that patient should go to STR but patient is adamantly refusing to go. Rosa Maria Saleh is agreeable to patient going home with home care only. I notified Rosa Maria Saleh I will make a blanket referral to see who is able to accept the patient. Referral made as requested.

## 2023-11-16 NOTE — CASE MANAGEMENT
Case Management Assessment & Discharge Planning Note    Patient name Mikey Ellington  Location [address] [facility] [place] 222/-71 MRN [id number]  : 1933 Date 2023       Current Admission Date: 11/15/2023  Current Admission Diagnosis:Debility   Patient Active Problem List    Diagnosis Date Noted    Sacral ulcer ([address]) 2023    Lung mass 04/10/2023    Debility 2023    Malignant neoplasm of overlapping sites of bladder ([address]) 10/10/2022    Other pulmonary embolism without acute cor pulmonale ([address]) 2021    Essential hypertension 2019    History of pulmonary embolism 2019    Osteoarthritis of knee 2018    Chronic a-fib ([address]) 2016    Type 2 diabetes mellitus with stage 3b chronic kidney disease ([address]) 2016      LOS (days): 0  Geometric Mean LOS (GMLOS) (days): 3.10  Days to GMLOS:2.6     OBJECTIVE:    Risk of Unplanned Readmission Score: 28.07         Current admission status: Inpatient       Preferred Pharmacy:   [address] [place] [address] [address] [address]  Phone: [phone] Fax: [phone]    [facility] ([address] [place] [address] Crossing [address]  Phone: [phone] Fax: [phone]    Primary Care Provider: Brooke Lucas MD    Primary Insurance: MEDICARE  Secondary Insurance: AARP    ASSESSMENT:  Active Health Care Proxies    There are no active Health Care Proxies on file.        Advance Directives  Does patient have a Health Care POA?: No  Was patient offered paperwork?: No  Does patient currently have a Health Care decision maker?: No  Does patient have Advance Directives?: No  Was patient offered paperwork?: Yes         Readmission Root Cause  30 Day Readmission: No    Patient Information  Admitted from[de-identified] Home  Mental Status: Alert  During Assessment patient was accompanied by: Not accompanied during assessment  Assessment information provided by[de-identified] Patient  Primary Caregiver: Self  Support Systems: Spouse/significant other, Son (Pt has 3 sons . None of them live close to patient)  [place] of Residence: [facility] do you live in?: [address] [facility] entry access options. Select all that apply.: Stairs  Number of steps to enter home.: 4  Do the steps have railings?: Yes  Type of Current Residence: [facility]  Living Arrangements: Lives w/ Spouse/significant other  Is patient a ?: No    Activities of Daily Living Prior to Admission  Functional Status: Independent  Completes ADLs independently?: Yes  Ambulates independently?: Yes (Pt uses a walker to ambulate)  Does patient use assisted devices?: Yes  Assisted Devices (DME) used: Thomas Fischer, Wheelchair  Does patient currently own DME?: Yes  What DME does the patient currently own?: Straight Shekhar Leahy, Wheelchair  Does patient have a history of Outpatient Therapy (PT/OT)?: No  Does the patient have a history of Short-Term Rehab?: Yes (Pt was in the [address] [place] [address] in the past.)  Does patient have a history of HHC?: Yes (Pt is not sure what agency it is .)  Does patient currently have [address]?: No    Current Home Health Care  Type of Current Home Care Services: Me[employer] on Wheels (Pt does get Meals on Wheels)    Patient Information Continued  Income Source: Pension/long term  Does patient have prescription coverage?: Yes  Does patient have a history of substance abuse?: No  Does patient have a history of Mental Health Diagnosis?: No         Means of Transportation  [facility] (Pt's neighbor drives the patient to [facility].  Pt has tried [employer] bus in the past but does not like them.)      Housing Stability: Low Risk  (11/16/2023)    Housing Stability Vital Sign     Unable to Pay for Housing in the Last Year: No     Number of Places Lived in the Last Year: 1     Unstable Housing in the Last Year: No   Food Insecurity: No Food Insecurity (11/16/2023)    Hunger Vital Sign     Worried About Running Out of Food in the Last Year: Never true     [address] in the Last Year: Never true   Transportation Needs: No Transportation Needs (11/16/2023)    PRAPARE - Transportation     Lack of Transportation (Medical): No     Lack of Transportation (Non-Medical): No   Utilities: Not on file       DISCHARGE DETAILS:    Discharge planning discussed with[de-identified] Pt  Freedom of Choice: Yes     CM contacted family/caregiver?: No- see comments (Pt is alert and oriented x3.)           Pt lives with her . Pt is independent with ADL's and functional mobility . Pt uses walker to ambulate. Pt has been falling at home . Pt has 3 sons, the one lives in [facility] but the others live further away. Pt does get Meals on Wheels . I am awaiting PT and Ot's recommendations. I will continue to follow for any Case Management needs.

## 2023-11-16 NOTE — ASSESSMENT & PLAN NOTE
CT: Right hilar/lung mass, as described above, without significant change compared with November 11, 2023. The appearance of this mass is in keeping with malignant neoplasm.    Mass previously noted and seen by Hematology and Pulmonary, Daughter and patient declined biopsy and procedures per note 6/23/23 w/ Pulmonary

## 2023-11-16 NOTE — PLAN OF CARE
Problem: PHYSICAL THERAPY ADULT  Goal: Performs mobility at highest level of function for planned discharge setting. See evaluation for individualized goals. Description: Treatment/Interventions: Functional transfer training, LE strengthening/ROM, Elevations, Therapeutic exercise, Endurance training, Patient/family training, Equipment eval/education, Bed mobility, Gait training, Spoke to nursing  Equipment Recommended: Randa Mackey       See flowsheet documentation for full assessment, interventions and recommendations. Note: Prognosis: Fair  Problem List: Decreased strength, Decreased endurance, Impaired balance, Decreased mobility, Decreased coordination, Decreased cognition, Decreased safety awareness, Pain  Assessment: Pt is a 80 y.o. female seen for PT evaluation s/p admit to [address] [place] [address] “” [place] w/ debility. PTA, pt was independent w/ all functional mobility, ADL's and IADL's and used RW assistive device for mobility. PT consulted to assess pt's functional mobility and d/c needs. At time of PT eval, pt found supine in bed and was agreeable to PT session. Pt stated she has pain in her low back and buttock with movement. Bed mobility, transfers and ambulation were all preformed with supervision due to pt requesting no help from PT with mobility. Pt presented with unsteadiness for transfers and ambulation to bedside chair and close supervision x2 was provided. Next trial with mobility, CGA should be provided to ensure safety. Pt needed multiple attempts with bed mobility supine to sit and needed increased time to sit EOB. Overall, pt presented with impaired balance and unsteadiness with mobility and needs more assistance than she is requesting. Pt will continue to benefit from immediate acute skilled PT services in order to address the deficits listed above. Areas for improvement include: decreased coordination,  strength, decreased endurance and impaired balance.  From PT/mobility standpoint, at this time recommendation is a moderate resource intensity, in order to maximize pt's functional independence and safety/consistency w/ mobility. Barriers to Discharge: Inaccessible home environment, Decreased caregiver support     Rehab Resource Intensity Level, PT: II (Moderate Resource Intensity)    See flowsheet documentation for full assessment. Additional Safety/Bands:

## 2023-11-16 NOTE — ED PROVIDER NOTES
History  Chief Complaint   Patient presents with    Fall     Pt has had multiple falls over the last couple weeks. Today pt fell from bed     States she stood up while trying to go to the bathroom had a loss of balance. No loss of consciousness. Unclear if she hit her head. She does take Eliquis. She notes that she has run out of metformin and her sugars have been elevated since then. She has multiple falls recently and was seen in this facility a few days ago for similar presentation. Reports pain in most areas of torso, chest neck, head and chronic pain in the bilateral lower extremities. Prior to Admission Medications   Prescriptions Last Dose Informant Patient Reported? Taking?    Microlet Lancets MISC  Self No No   Sig: Testing twice daily   acetaminophen (TYLENOL) 325 mg tablet  Self No No   Sig: Take 2 tablets (650 mg total) by mouth every 6 (six) hours as needed for mild pain or fever   amLODIPine (NORVASC) 5 mg tablet  Self No No   Sig: Take 1 tablet (5 mg total) by mouth daily at bedtime   apixaban (Eliquis) 2.5 mg  Self No No   Sig: Take 1 tablet (2.5 mg total) by mouth 2 (two) times a day   calcium carbonate (OS-CARLTON) 600 MG tablet  Self Yes No   Sig: Take 600 mg by mouth 2 (two) times a day with meals   cholecalciferol (VITAMIN D3) 1,000 units tablet  Self Yes No   Sig: Take 1,000 Units by mouth daily   cyanocobalamin (VITAMIN B-12) 100 mcg tablet  Self Yes No   Sig: Take 1,000 mcg by mouth daily   glipiZIDE (GLUCOTROL) 5 mg tablet  Self No No   Sig: Take 1 tablet (5 mg total) by mouth 2 (two) times a day before meals   glucose blood (Contour Next Test) test strip  Self No No   Sig: Test BID   levothyroxine 25 mcg tablet  Self No No   Sig: Take 1 tablet (25 mcg total) by mouth daily   lidocaine (Lidoderm) 5 %  Self No No   Sig: Apply 2 patches topically over 12 hours daily Remove & Discard patch within 12 hours or as directed by MD   Patient not taking: Reported on 5/15/2023   lovastatin (MEVACOR) 20 mg tablet  Self No No   Sig: Take 1 tablet (20 mg total) by mouth daily at bedtime   metoprolol tartrate (LOPRESSOR) 25 mg tablet  Self No No   Sig: Take 1 tablet (25 mg total) by mouth every 12 (twelve) hours      Facility-Administered Medications: None       Past Medical History:   Diagnosis Date    Abnormal gait     Accidental fall from chair, subsequent encounter     Atrial fibrillation ([address])     Benign essential hypertension     Bite of animal     Cataract     Cellulitis     Chronic kidney disease     Chronic kidney disease, stage 3 ([address])     Colon cancer ([address])     Colon polyp     Compression fracture of L3 vertebra ([address]) 04/08/2023    Compression fracture of thoracic spine, non-traumatic, sequela 02/28/2020    Current tear of lateral cartilage or meniscus of knee     Diabetes mellitus ([address])     Disease of thyroid gland     Disorder of magnesium metabolism     Dvt femoral (deep venous thrombosis)     Embolism from thrombosis of vein of distal end of lower extremity ([address])     Essential hypertension     Fall     Hyperlipidemia     Hyperlipidemia     Hypertension     Hypothyroidism     Insomnia     Kidney stone     Knee joint effusion     Leukocytosis     Malaise and fatigue     MI (myocardial infarction) ([address])     Hospitalization     Orbital hemorrhage     Other sleep disorders     PAF (paroxysmal atrial fibrillation) (HCC)     Presence of vena cava filter     Pulmonary embolism (HCC)     Tear film insufficiency     Type 2 diabetes mellitus ([address])     Unspecified osteoarthritis, unspecified site     Weight decreased        Past Surgical History:   Procedure Laterality Date    ABDOMINAL ADHESION SURGERY  2015    Had wound vac    ABDOMINAL SURGERY      APPENDECTOMY      CATARACT EXTRACTION      CHOLECYSTECTOMY      COLON SURGERY      COLOSTOMY  2011    Due to Ileus, then reanastomosis in 2002    COLOSTOMY TAKEDOWN/REVERSE [address] [facility] W/ LASER      HERNIA REPAIR IVC FILTER INSERTION      KNEE SURGERY  2014    repair     MI CYSTOURETHROSCOPY W/DEST &/RMVL TUMOR LARGE N/A 2021    Procedure: CYSTOSCOPY, TRANSURETHRAL RESECTION OF BLADDER TUMOR (TURBT); Surgeon: Klaus Torres MD;  Location: [facility] MAIN OR;  Service: Urology       Family History   Problem Relation Age of Onset    Deep vein thrombosis Mother      I have reviewed and agree with the history as documented. E-Cigarette/Vaping    E-Cigarette Use Never User      E-Cigarette/Vaping Substances    Nicotine No     THC No     CBD No     Flavoring No     Other No     Unknown No      Social History     Tobacco Use    Smoking status: Former     Packs/day: 0.50     Years: 10.00     Total pack years: 5.00     Types: Cigarettes     Quit date: 3/19/2001     Years since quittin.6    Smokeless tobacco: Never   Vaping Use    Vaping Use: Never used   Substance Use Topics    Alcohol use: Never    Drug use: Never       Review of Systems   Constitutional:  Negative for activity change, chills, fatigue and fever. HENT:  Negative for congestion. Eyes:  Negative for visual disturbance. Respiratory:  Negative for cough, chest tightness and shortness of breath. Cardiovascular:  Negative for chest pain. Gastrointestinal:  Negative for abdominal pain, diarrhea and vomiting. Genitourinary:  Negative for dysuria. Skin:  Negative for rash. Neurological:  Negative for dizziness, weakness and numbness. Physical Exam  Physical Exam  Constitutional:       General: She is not in acute distress. Appearance: She is well-developed. She is not toxic-appearing. HENT:      Head: Normocephalic and atraumatic. Right Ear: External ear normal.      Left Ear: External ear normal.      Nose: Nose normal.      Mouth/Throat:      Mouth: Mucous membranes are moist.      Comments: Many damaged teeth, unclear if any of these are new as she thinks most of them are chronic fractures.   Eyes:      Conjunctiva/sclera: Conjunctivae normal.      Pupils: Pupils are equal, round, and reactive to light. Cardiovascular:      Rate and Rhythm: Normal rate and regular rhythm. Heart sounds: Normal heart sounds. Pulmonary:      Effort: Pulmonary effort is normal. No respiratory distress. Breath sounds: Normal breath sounds. Abdominal:      General: Bowel sounds are normal. There is no distension. Palpations: Abdomen is soft. Tenderness: There is abdominal tenderness. There is no guarding or rebound. Musculoskeletal:         General: Tenderness present. Normal range of motion. Cervical back: Normal range of motion and neck supple. Skin:     General: Skin is warm and dry. Capillary Refill: Capillary refill takes less than 2 seconds. Neurological:      General: No focal deficit present. Mental Status: She is alert and oriented to person, place, and time.    Psychiatric:         Behavior: Behavior normal.         Vital Signs  ED Triage Vitals [11/15/23 2251]   Temperature Pulse Respirations Blood Pressure SpO2   (!) 97.3 °F (36.3 °C) 70 18 (!) 205/92 94 %      Temp Source Heart Rate Source Patient Position - Orthostatic VS BP Location FiO2 (%)   Temporal Monitor -- -- --      Pain Score       --           Vitals:    11/15/23 2335 11/16/23 0000 11/16/23 0012 11/16/23 0100   BP: (!) 234/109 (!) 197/83 (!) 197/83 169/76   Pulse:  64 62 64         Visual Acuity  Visual Acuity      Flowsheet Row Most Recent Value   L Pupil Size (mm) 3   R Pupil Size (mm) 3            ED Medications  Medications   metoprolol tartrate (LOPRESSOR) tablet 25 mg (25 mg Oral Not Given 11/16/23 0028)   insulin lispro (HumaLOG) 100 units/mL subcutaneous injection 1-5 Units (has no administration in time range)   insulin lispro (HumaLOG) 100 units/mL subcutaneous injection 1-5 Units (has no administration in time range)   sodium chloride 0.9 % infusion (has no administration in time range)   iohexol (OMNIPAQUE) 350 MG/ML injection (MULTI-DOSE) 100 mL (100 mL Intravenous Given 11/15/23 2317)   amLODIPine (NORVASC) tablet 5 mg (5 mg Oral Given 11/15/23 2335)       Diagnostic Studies  Results Reviewed       Procedure Component Value Units Date/Time    UA w Reflex to Microscopic w Reflex to Culture [[id number]]     Lab Status: No result Specimen: Urine     Comprehensive metabolic panel [[id number]]  (Abnormal) Collected: 11/15/23 2255    Lab Status: Final result Specimen: Blood from Arm, Right Updated: 11/15/23 2322     Sodium 133 mmol/L      Potassium 4.1 mmol/L      Chloride 97 mmol/L      CO2 27 mmol/L      ANION GAP 9 mmol/L      BUN 28 mg/dL      Creatinine 1.33 mg/dL      Glucose 428 mg/dL      Calcium 9.2 mg/dL      AST 21 U/L      ALT 22 U/L      Alkaline Phosphatase 89 U/L      Total Protein 7.0 g/dL      Albumin 3.6 g/dL      Total Bilirubin 0.38 mg/dL      eGFR 35 ml/min/1.73sq m     Narrative:      [place] guidelines for Chronic Kidney Disease (CKD):     Stage 1 with normal or high GFR (GFR > 90 mL/min/1.73 square meters)    Stage 2 Mild CKD (GFR = 60-89 mL/min/1.73 square meters)    Stage 3A Moderate CKD (GFR = 45-59 mL/min/1.73 square meters)    Stage 3B Moderate CKD (GFR = 30-44 mL/min/1.73 square meters)    Stage 4 Severe CKD (GFR = 15-29 mL/min/1.73 square meters)    Stage 5 End Stage CKD (GFR <15 mL/min/1.73 square meters)  Note: GFR calculation is accurate only with a steady state creatinine    Blood gas, venous [[id number]]  (Abnormal) Collected: 11/15/23 2255    Lab Status: Final result Specimen: Blood from Arm, Right Updated: 11/15/23 2303     pH, Bay 7.429     pCO2, Bay 41.7 mm Hg      pO2, Bay 39.2 mm Hg      HCO3, [place] 27.0 mmol/L      Base Excess, Bay 2.4 mmol/L      O2 Content, Bay 15.9 ml/dL      O2 HGB, VENOUS 75.4 %     CBC and differential [[id number]]  (Abnormal) Collected: 11/15/23 2255    Lab Status: Final result Specimen: Blood from Arm, Right Updated: 11/15/23 2302     WBC 7.95 Thousand/uL      RBC 4.29 Million/uL      Hemoglobin 13.7 g/dL      Hematocrit 42.3 %      MCV 99 fL      MCH 31.9 pg      MCHC 32.4 g/dL      RDW 13.7 %      MPV 11.6 fL      Platelets 712 Thousands/uL      nRBC 0 /100 WBCs      Neutrophils Relative 74 %      Immat GRANS % 0 %      Lymphocytes Relative 13 %      Monocytes Relative 11 %      Eosinophils Relative 1 %      Basophils Relative 1 %      Neutrophils Absolute 5.87 Thousands/µL      Immature Grans Absolute 0.03 Thousand/uL      Lymphocytes Absolute 1.06 Thousands/µL      Monocytes Absolute 0.87 Thousand/µL      Eosinophils Absolute 0.08 Thousand/µL      Basophils Absolute 0.04 Thousands/µL                    TRAUMA - CT chest abdomen pelvis w contrast   Final Result by Ricardo Monzon MD (11/16 0040)      No evidence of acute posttraumatic intrathoracic intra-abdominal or intrapelvic parenchymal organ injury. No pneumothorax. There is a sacral decubitus ulcer which appears to extend to the underlying coccyx. There is no definite evidence of osseous destruction of the coccyx, however, clinical correlation is necessary. If there is concern for underlying osteomyelitis, MRI    could be performed, if there are no contraindications. Alternatively, if further evaluation is indicated and the patient is unable to undergo MRI, nuclear medicine imaging could be performed. Right hilar/lung mass, as described above, without significant change compared with November 11, 2023. The appearance of this mass is in keeping with malignant neoplasm. There are two enhancing masses within the urinary bladder. These appear similar to November 11, 2023. The appearance of these masses is highly suggestive of malignant neoplasm. Other nonemergent and chronic findings as above. The study was marked in [facility] for immediate notification.       Workstation performed: [id number]         TRAUMA - CT head wo contrast   Final Result by Ricardo Monzon MD (11/15 2332)      No acute intracranial abnormality. Chronic microangiopathy and age-appropriate generalized atrophy appears unchanged compared with November 11, 2023. The study was marked in [facility] for immediate notification. Workstation performed: [id number]         TRAUMA - CT spine cervical wo contrast   Final Result by Lashell Ellis MD (11/15 1909)      No acute cervical spine fracture or traumatic malalignment. No significant interval change. The study was marked in [facility] for immediate notification. Workstation performed: [id number]         XR Trauma chest portable   Final Result by Lashell Ellis MD (83/54 5556)      No evidence of acute traumatic injury to the chest. Right hilar/lung mass in keeping with known neoplasm unchanged. Please refer to the report of the CT of the chest performed on this same visit. Workstation performed: [id number]         XR Trauma pelvis ap only 1 or 2 vw   Final Result by Lashell Ellis MD (64/78 6875)      No evidence of acute fracture. There is very limited visualization of the distal sacrum and coccyx due to overlying bowel contents. Please refer to the report of the CT performed on the same visit. Workstation performed: [id number]                    Procedures  Procedures         ED Course                                             Medical Decision Making  Is a 66-year-old female with repeated falls. Suspect there may be some element of dehydration and her suspected multiple cancers including bowel and lung which are contributing to this weakness. Requires PT OT evaluation. We will slowly rehydrate. Stable at the time of admission. States understanding agreement with plan. Still awaiting urine which we will follow-up on. Patient had borderline urine the other day. Amount and/or Complexity of Data Reviewed  Independent Historian: EMS  External Data Reviewed: notes. Labs: ordered.   Radiology: ordered. ECG/medicine tests: ordered and independent interpretation performed. Details: Normal sinus rhythm, no ST elevations    Risk  Prescription drug management. Decision regarding hospitalization. Disposition  Final diagnoses:   Fall, initial encounter   Weakness   Cancer ([address])   Dehydration     Time reflects when diagnosis was documented in both MDM as applicable and the Disposition within this note       Time User Action Codes Description Comment    11/16/2023 12:48 AM Inge Idler Add Darya.[email or web address]. SOEE] Fall, initial encounter     11/16/2023 12:48 AM Inge Idler Add [R53.1] Weakness     11/16/2023 12:48 AM Inge Idler Add [C80.1] Cancer ([address])     11/16/2023 12:48 AM Inge Idler Add [E86.0] Dehydration     11/16/2023  1:46 AM Destin LARA Add [P61.094] Skin ulcer of sacrum, unspecified ulcer stage [facility])           ED Disposition       ED Disposition   Admit    Condition   Stable    Date/Time   Thu Nov 16, 2023 12:48 AM    Comment   Case was discussed with ariadne and the patient's admission status was agreed to be Admission Status: inpatient status to the service of Dr. Farrell Sacks   . Follow-up Information    None         Patient's Medications   Discharge Prescriptions    No medications on file       No discharge procedures on file.     PDMP Review         Value Time User    PDMP Reviewed  Yes 4/26/2023  2:13 PM Maru Baez PA-C            ED Provider  Electronically Signed by             Praveen Arceo MD  11/16/23 7422

## 2023-11-16 NOTE — ASSESSMENT & PLAN NOTE
Patient with recurrent falls she was seen on November 12th and then again tonight secondary to falls. Tonight reports she stood up and had loss of balance. Trauma work up performed with no acute findings.    PT/OT and case management

## 2023-11-16 NOTE — OCCUPATIONAL THERAPY NOTE
Occupational Therapy Evaluation      Mickey Segundo    11/16/2023    Principal Problem:    Debility  Active Problems:    Chronic a-fib ([address])    Essential hypertension    Type 2 diabetes mellitus with stage 3b chronic kidney disease ([address])    Malignant neoplasm of overlapping sites of bladder ([address])    Lung mass    Sacral ulcer ([address])      Past Medical History:   Diagnosis Date    Abnormal gait     Accidental fall from chair, subsequent encounter     Atrial fibrillation ([address])     Benign essential hypertension     Bite of animal     Cataract     Cellulitis     Chronic kidney disease     Chronic kidney disease, stage 3 (HCC)     Colon cancer ([address])     Colon polyp     Compression fracture of L3 vertebra ([address]) 04/08/2023    Compression fracture of thoracic spine, non-traumatic, sequela 02/28/2020    Current tear of lateral cartilage or meniscus of knee     Diabetes mellitus (HCC)     Disease of thyroid gland     Disorder of magnesium metabolism     Dvt femoral (deep venous thrombosis)     Embolism from thrombosis of vein of distal end of lower extremity ([address])     Essential hypertension     Fall     Hyperlipidemia     Hyperlipidemia     Hypertension     Hypothyroidism     Insomnia     Kidney stone     Knee joint effusion     Leukocytosis     Malaise and fatigue     MI (myocardial infarction) ([address])     Hospitalization     Orbital hemorrhage     Other sleep disorders     PAF (paroxysmal atrial fibrillation) (HCC)     Presence of vena cava filter     Pulmonary embolism ([facility])     Tear film insufficiency     Type 2 diabetes mellitus ([address])     Unspecified osteoarthritis, unspecified site     Weight decreased        Past Surgical History:   Procedure Laterality Date    ABDOMINAL ADHESION SURGERY  2015    Had wound vac    ABDOMINAL SURGERY      APPENDECTOMY      CATARACT EXTRACTION      CHOLECYSTECTOMY      COLON SURGERY      COLOSTOMY  2011    Due to Ileus, then reanastomosis in 2002    COLOSTOMY TAKEDOWN/REVERSE [facility]      EXCISION BLADDER MESH TRANSVESICLEPORT W/ LASER      HERNIA REPAIR      IVC FILTER INSERTION      KNEE SURGERY  2014    repair     NM CYSTOURETHROSCOPY W/DEST &/RMVL TUMOR LARGE N/A 4/30/2021    Procedure: CYSTOSCOPY, TRANSURETHRAL RESECTION OF BLADDER TUMOR (TURBT); Surgeon: Selam Curtis MD;  Location: [address] [place] [address] OR;  Service: Urology        11/16/23 0850   OT Last Visit   OT Visit Date 11/16/23   Note Type   Note type Evaluation   Pain Assessment   Pain Assessment Tool 0-10   Pain Score No Pain  (at rest, Pt c/o increased pain with movement but does not report severity level)   Pain Location/Orientation Location: Buttocks; Location: Back   Pain Onset/Description Onset: Ongoing   Hospital Pain Intervention(s) Emotional support;Repositioned   Multiple Pain Sites No   Restrictions/Precautions   Weight Bearing Precautions Per Order No   Other Precautions Chair Alarm; Bed Alarm; Fall Risk;Pain   Home Living   Type of [address] [place] [address] One level; Laundry in basement;Performs ADLs on one level;Stairs to enter with rails  (5 ALYX)   Bathroom Shower/Tub Tub/shower unit   Bathroom Toilet Standard   Bathroom Equipment Grab bars in shower  (pt reports use of standard walker to help assist her into the bathtub)   [address] Walker;Cane;Wheelchair-manual  (use of walker at baseline, reports wheelchair use by )   Prior Function   Level of [place] Independent with ADLs; Independent with functional mobility; Independent with IADLS   Lives With Spouse  (Pt reports she cares for )   Receives Help From Family  (sons and daughter)   IADLs Family/Friend/Other provides transportation; Independent with meal prep; Independent with medication management   Falls in the last 6 months 5 to 10  (5 falls)   Vocational Retired   ADL   UB Bathing Assistance 5  [id number] [facility] 2  [address] [facility] 1 Total Assistance   Bed Mobility   Supine to Sit 5  Supervision   Additional items Assist x 1; Increased time required;HOB elevated; Bedrails;Verbal cues  (Pt required increased time to perform and stated she did not want any help)   Additional Comments Pt remained OOB in recliner upon conclusion   Transfers   Sit to Stand 5  Supervision  (excessive unsteadiness, but pt requested no help, CGA assist would be advised with next trial)   Additional items Assist x 1; Increased time required; Bedrails;Verbal cues   Stand to Sit 5  Supervision  (excessive unsteadiness, but pt requested no help, CGA assist would be advised with next trial)   Additional items Assist x 1; Increased time required;Armrests; Verbal cues   Stand pivot 5  Supervision  (excessive unsteadiness, but pt requested no help, CGA assist would be advised with next trial)   Additional items Assist x 1; Increased time required  (RW)   Balance   Static Sitting Fair +   Dynamic Sitting Fair   Static Standing Fair   Dynamic Standing Fair -   Ambulatory Poor +   Activity Tolerance   Activity Tolerance Patient limited by fatigue;Patient limited by pain   Medical Staff Made Aware CM notified   Nurse Made Aware RICHIE Goncalves   RUE Assessment   RUE Assessment X  (AROM WFL)   RUE Strength   RUE Overall Strength Deficits  (3+/5)   LUE Assessment   LUE Assessment X  (AROM WFL)   LUE Strength   LUE Overall Strength Deficits  (3+/5)   Vision-Basic Assessment   Current Vision Wears glasses only for reading   Cognition   Overall Cognitive Status [facility]   Arousal/Participation Alert   Attention Attends with cues to redirect   Orientation Level Oriented X4   Memory Within functional limits   Following Commands Follows one step commands without difficulty   Assessment   Limitation Decreased ADL status; Decreased UE strength;Decreased Safe judgement during ADL;Decreased endurance;Decreased self-care trans;Decreased high-level ADLs   Prognosis Good   Assessment Pt is a 80 y.o. female seen for OT evaluation s/p admit to [facility] on 11/15/2023 w/ Debility. Comorbidities affecting pt's functional performance at time of assessment include:  A-fib, DM II, Lung mass . Personal factors affecting pt at time of IE include:steps to enter environment, limited home support, and difficulty performing ADLS. Prior to admission, pt was Mod I with ADLs and IADLs. Upon evaluation: the following deficits impact occupational performance: decreased strength, decreased balance, decreased tolerance, decreased safety awareness, and increased pain. Pt to benefit from continued skilled OT tx while in the hospital to address deficits as defined above and maximize level of functional independence w ADL's and functional mobility. Occupational Performance areas to address include: bathing/shower, toilet hygiene, dressing, functional mobility, and clothing management. From OT standpoint, recommendation at time of d/c would be Level II (Moderate Resource Intensity. Goals   Patient Goals to go home   Plan   Treatment Interventions ADL retraining;Functional transfer training;UE strengthening/ROM; Endurance training;Patient/family training;Equipment evaluation/education; Compensatory technique education; Energy conservation; Activityengagement   Goal Expiration Date 11/26/23   OT Treatment Day 0   OT Frequency 3-5x/wk   Discharge Recommendation   Rehab Resource Intensity Level, OT II (Moderate Resource Intensity)   Additional Comments  Pt seen as a co-eval with PT due to the patient's co-morbidities, clinically unstable presentation, and present impairments which are a regression from the patient's baseline. Additional Comments 2 The patient's raw score on the AM-PAC Daily Activity Inpatient Short Form is 15. A raw score of less than 19 suggests the patient may benefit from discharge to post-acute rehabilitation services. Please refer to the recommendation of the Occupational Therapist for safe discharge planning.    AM-PAC Daily Activity Inpatient   Lower Body Dressing 1   Bathing 2   Toileting 2   Upper Body Dressing 3   Grooming 3   Eating 4   Daily Activity Raw Score 15   Daily Activity Standardized Score (Calc for Raw Score >=11) 34.69   AM-PAC Applied Cognition Inpatient   Following a Speech/Presentation 4   Understanding Ordinary Conversation 4   Taking Medications 4   Remembering Where Things Are Placed or Put Away 4   Remembering List of 4-5 Errands 3   Taking Care of Complicated Tasks 3   Applied Cognition Raw Score 22   Applied Cognition Standardized Score 47.83     GOALS:    Pt will achieve the following within specified time frame: STG  Pt will achieve the following goals within 5 days    *ADL transfers with (I) for inc'd independence with ADLs/purposeful tasks    *UB ADL with (I) for inc'd independence with self cares    *LB ADL with Mod (A) using AE prn for inc'd independence with self cares    *Toileting with Mod (A) for clothing management and hygiene for return to PLOF with personal care    *Increase static stand balance to F+ and dyn stand balance to F for inc'd safety with standing purposeful tasks    *Increase stand tolerance x3 m for inc'd tolerance with standing purposeful tasks    *Participate in 10m UE therex to increase overall stamina/activity tolerance for purposeful tasks    *Bed mobility- (I) for inc'd independence to manage own comfort and initiate EOB & OOB purposeful tasks    Pt will achieve the following within specified time frame: LTG  Pt will achieve the following goals within 10 days    *LB ADL with Min (A) using AE prn for inc'd independence with self cares    *Toileting with Min (A) for clothing management and hygiene for return to PLOF with personal care    *Increase static stand balance to G- and dyn stand balance to F+ for inc'd safety with standing purposeful tasks    *Increase stand tolerance x5 m for inc'd tolerance with standing purposeful tasks      Derick Corea MS, OTR/L

## 2023-11-16 NOTE — ASSESSMENT & PLAN NOTE
No open areas on skin, appears possible previous wound that has resolved, she does note some pain on sacral area when pressing but has had recent falls  Surgery consult placed ? Need for MRI   CT: There is a sacral decubitus ulcer which appears to extend to the underlying coccyx. There is no definite evidence of osseous destruction of the coccyx, however, clinical correlation is necessary. If there is concern for underlying osteomyelitis, MRI could be performed, if there are no contraindications. Alternatively, if further evaluation is indicated and the patient is unable to undergo MRI, nuclear medicine imaging could be performed.

## 2023-11-17 VITALS
RESPIRATION RATE: 20 BRPM | BODY MASS INDEX: 23.86 KG/M2 | TEMPERATURE: 98.1 F | HEIGHT: 64 IN | DIASTOLIC BLOOD PRESSURE: 81 MMHG | WEIGHT: 139.77 LBS | SYSTOLIC BLOOD PRESSURE: 172 MMHG | OXYGEN SATURATION: 94 % | HEART RATE: 62 BPM

## 2023-11-17 LAB
ANION GAP SERPL CALCULATED.3IONS-SCNC: 10 MMOL/L
BUN SERPL-MCNC: 18 MG/DL (ref 5–25)
CALCIUM SERPL-MCNC: 8.5 MG/DL (ref 8.4–10.2)
CHLORIDE SERPL-SCNC: 102 MMOL/L (ref 96–108)
CO2 SERPL-SCNC: 24 MMOL/L (ref 21–32)
CREAT SERPL-MCNC: 1 MG/DL (ref 0.6–1.3)
GFR SERPL CREATININE-BSD FRML MDRD: 49 ML/MIN/1.73SQ M
GLUCOSE SERPL-MCNC: 254 MG/DL (ref 65–140)
GLUCOSE SERPL-MCNC: 255 MG/DL (ref 65–140)
GLUCOSE SERPL-MCNC: 276 MG/DL (ref 65–140)
GLUCOSE SERPL-MCNC: 372 MG/DL (ref 65–140)
MAGNESIUM SERPL-MCNC: 1.6 MG/DL (ref 1.9–2.7)
PHOSPHATE SERPL-MCNC: 3.6 MG/DL (ref 2.3–4.1)
POTASSIUM SERPL-SCNC: 3.8 MMOL/L (ref 3.5–5.3)
SODIUM SERPL-SCNC: 136 MMOL/L (ref 135–147)

## 2023-11-17 PROCEDURE — 82948 REAGENT STRIP/BLOOD GLUCOSE: CPT

## 2023-11-17 PROCEDURE — 84100 ASSAY OF PHOSPHORUS: CPT | Performed by: INTERNAL MEDICINE

## 2023-11-17 PROCEDURE — 97110 THERAPEUTIC EXERCISES: CPT

## 2023-11-17 PROCEDURE — 83735 ASSAY OF MAGNESIUM: CPT | Performed by: INTERNAL MEDICINE

## 2023-11-17 PROCEDURE — 97535 SELF CARE MNGMENT TRAINING: CPT

## 2023-11-17 PROCEDURE — 99239 HOSP IP/OBS DSCHRG MGMT >30: CPT | Performed by: INTERNAL MEDICINE

## 2023-11-17 PROCEDURE — 80048 BASIC METABOLIC PNL TOTAL CA: CPT | Performed by: INTERNAL MEDICINE

## 2023-11-17 RX ORDER — LANOLIN ALCOHOL/MO/W.PET/CERES
400 CREAM (GRAM) TOPICAL ONCE
Status: COMPLETED | OUTPATIENT
Start: 2023-11-17 | End: 2023-11-17

## 2023-11-17 RX ORDER — LANOLIN ALCOHOL/MO/W.PET/CERES
400 CREAM (GRAM) TOPICAL 2 TIMES DAILY
Qty: 3 TABLET | Refills: 0 | Status: SHIPPED | OUTPATIENT
Start: 2023-11-17 | End: 2023-11-28

## 2023-11-17 RX ORDER — CALCIUM CARBONATE 500(1250)
1 TABLET ORAL
Qty: 30 TABLET | Refills: 0 | Status: SHIPPED | OUTPATIENT
Start: 2023-11-18

## 2023-11-17 RX ORDER — MAGNESIUM SULFATE HEPTAHYDRATE 40 MG/ML
2 INJECTION, SOLUTION INTRAVENOUS ONCE
Status: DISCONTINUED | OUTPATIENT
Start: 2023-11-17 | End: 2023-11-17

## 2023-11-17 RX ADMIN — SODIUM CHLORIDE 100 ML/HR: 0.9 INJECTION, SOLUTION INTRAVENOUS at 06:32

## 2023-11-17 RX ADMIN — CYANOCOBALAMIN TAB 500 MCG 1000 MCG: 500 TAB at 08:07

## 2023-11-17 RX ADMIN — INSULIN LISPRO 2 UNITS: 100 INJECTION, SOLUTION INTRAVENOUS; SUBCUTANEOUS at 11:50

## 2023-11-17 RX ADMIN — Medication 400 MG: at 14:37

## 2023-11-17 RX ADMIN — INSULIN LISPRO 4 UNITS: 100 INJECTION, SOLUTION INTRAVENOUS; SUBCUTANEOUS at 16:48

## 2023-11-17 RX ADMIN — METOPROLOL TARTRATE 25 MG: 25 TABLET, FILM COATED ORAL at 08:07

## 2023-11-17 RX ADMIN — LEVOTHYROXINE SODIUM 25 MCG: 25 TABLET ORAL at 05:36

## 2023-11-17 RX ADMIN — Medication 1000 UNITS: at 08:07

## 2023-11-17 RX ADMIN — CALCIUM 1 TABLET: 500 TABLET ORAL at 07:46

## 2023-11-17 RX ADMIN — PRAVASTATIN SODIUM 20 MG: 20 TABLET ORAL at 16:48

## 2023-11-17 RX ADMIN — INSULIN LISPRO 3 UNITS: 100 INJECTION, SOLUTION INTRAVENOUS; SUBCUTANEOUS at 07:47

## 2023-11-17 RX ADMIN — APIXABAN 2.5 MG: 2.5 TABLET, FILM COATED ORAL at 08:07

## 2023-11-17 NOTE — OCCUPATIONAL THERAPY NOTE
11/17/23 0937   OT Last Visit   OT Visit Date 11/17/23   Note Type   Note Type Treatment  (completed [id number])   Pain Assessment   Pain Assessment Tool 0-10   Pain Score No Pain  (reports having some chroinic pain to R knee, sacroiliac, and neck)   Restrictions/Precautions   Weight Bearing Precautions Per Order No   Other Precautions Chair Alarm; Bed Alarm; Fall Risk;Pain;Cognitive   Lifestyle   Reciprocal Relationships got a phone call during session > she reports it was her  And that he's 80, home alone, and she usually makes his breakfast   Intrinsic Gratification cryptograms > patient added : "but I haven't been doing much of that because I have so much going on. .. bills, and what's going on in the world"   ADL   Where Assessed Other (Comment)  (seated in bed (back supported))   Grooming Comments patient brushed teeth and combed hair with adequate results post set-up  (she also wiped face and hands with washcloth provided, per verbal prompt)   Bed Mobility   Additional Comments patient required some assist. to reposition in bed - particularly Leg management   Therapeutic Excerise-Strength   UE Strength Yes   Right Upper Extremity- Strength   R Shoulder Flexion; Horizontal ABduction; Other (Comment)   R Elbow Elbow flexion;Elbow extension  (in forward anmd reverse')   R Weight/Reps/Sets 1 pound weight - 15 reps shoulders and 25 reps elbows   Left Upper Extremity-Strength   L Weights/Reps/Sets all exers. same as RUE above   Coordination   Gross Motor WFL   Subjective   Subjective "I can't get up". (Seemed to enjoy doing some reminiscing during the session.)   Cognition   Overall Cognitive Status [facility]   Arousal/Participation Alert; Cooperative   Attention Attends with cues to redirect   Orientation Level Oriented to person;Disoriented to place  (heard telling  on the phone that she was at the "dentist")   Following Commands Follows one step commands without difficulty   Activity Tolerance   Activity Tolerance Patient tolerated treatment well   Assessment   Assessment Patient participated in Skilled OT session this date with interventions consisting of ADL re training with the use of correct body mechnaics, therapeutic exercise to: increase functional use of BUEs, increase BUE muscle strength , and increase postural control . Patient agreeable to OT treatment session, upon arrival patient was found seated in bed (back supported). Patient requiring frequent re direction, verbal cues for correct technique, and one step directives, occasional rest periods, and self-care assistance as noted in flow sheet/AM-PAC. Patient continues to be functioning below baseline level, occupational performance remains limited secondary to factors listed above and increased risk for falls and injury. Patient to benefit from continued Occupational Therapy treatment while in the hospital to address deficits as defined above and maximize level of functional independence with ADLs and functional mobility. Plan   Treatment Interventions ADL retraining;UE strengthening/ROM; Patient/family training; Activityengagement   Goal Expiration Date 11/26/23   OT Treatment Day 1   Discharge Recommendation   Rehab Resource Intensity Level, OT II (Moderate Resource Intensity)   Additional Comments 2 The patient's raw score on the AM-PAC Daily Activity Inpatient Short Form is 14. A raw score of less than 19 suggests the patient may benefit from discharge to post-acute rehabilitation services. Please refer to the recommendation of the Occupational Therapist for safe discharge planning.    AM-PAC Daily Activity Inpatient   Lower Body Dressing 1   Bathing 2   Toileting 2   Upper Body Dressing 3   Grooming 3   Eating 3   Daily Activity Raw Score 14   Daily Activity Standardized Score (Calc for Raw Score >=11) 33.39   AM-PAC Applied Cognition Inpatient   Following a Speech/Presentation 3   Understanding Ordinary Conversation 4   Taking Medications 4 Remembering Where Things Are Placed or Put Away 4   Remembering List of 4-5 Errands 3   Taking Care of Complicated Tasks 3   Applied Cognition Raw Score 21   Applied Cognition Standardized Score 44.3       NATHAN Bonner/L

## 2023-11-17 NOTE — CASE MANAGEMENT
Case Management Discharge Planning Note    Patient name Timi Hannah  Location [address] [facility] 222/-[id number] MRN [id number]  : 1933 Date 2023       Current Admission Date: 11/15/2023  Current Admission Diagnosis:Debility   Patient Active Problem List    Diagnosis Date Noted    Sacral ulcer ([address] 2023    Lung mass 04/10/2023    Debility 2023    Malignant neoplasm of overlapping sites of bladder ([address] ) 10/10/2022    Other pulmonary embolism without acute cor pulmonale ([facility]) 2021    Essential hypertension 2019    History of pulmonary embolism 2019    Osteoarthritis of knee 2018    Chronic a-fib ([facility]) 2016    Type 2 diabetes mellitus with stage 3b chronic kidney disease ([facility]) 2016      LOS (days): 1  Geometric Mean LOS (GMLOS) (days): 3.10  Days to GMLOS:1.6     OBJECTIVE:  Risk of Unplanned Readmission Score: 25.7         Current admission status: Inpatient   Preferred Pharmacy:   [address] [place] [address] [address] [address]  [address] [facility] [address] [facility] [address]  Phone: [phone] Fax: [phone]    [facility] ([address]  [address] [place] [id number]  Phone: [phone] Fax: [phone]    Primary Care Provider: Ghada Gonzalez MD    Primary Insurance: MEDICARE  Secondary Insurance: AARP    DISCHARGE DETAILS:    Discharge planning discussed with[de-identified] Pt and her son Florentin Gandhi of Choice: Yes     CM contacted family/caregiver?: Yes  Were Treatment Team discharge recommendations reviewed with patient/caregiver?: Yes  Did patient/caregiver verbalize understanding of patient care needs?: Yes  Were patient/caregiver advised of the risks associated with not following Treatment Team discharge recommendations?: Yes         Requested [address]         Is the patient interested in [facility] at discharge?: Yes  [facility] requested[de-identified] Nursing, Occupational Therapy, Physical Therapy  Home Health Agency Name[de-identified] [address] [place] Follow-Up Provider[de-identified] PCP  Home Health Services Needed[de-identified] Evaluate Functional Status and Safety, Gait/ADL Training  Homebound Criteria Met[de-identified] Requires the Assistance of Another Person for Safe Ambulation or to Leave the Home  Supporting Clincal Findings[de-identified] Fatigues Easliy in Short Distances, Limited Endurance    DME Referral Provided  Referral made for DME?: No         Would you like to participate in our [address] service program?  : No - Declined    Treatment Team Recommendation: Home with [address] [facility]  Discharge Destination Plan[de-identified] Home with [address] [place] [address] at Discharge : BLS Ambulance        Transported by [facility] and Unit #): [place]  ETA of Transport (Date): 11/17/23  ETA of Transport (Time): 1630           As per physician pt is medically ready for discharge. I notified patient's son Nancy Liner that patient is ready for discharge today and is still refusing rehab. Kalebis Liner is agreeable to son going home with [facility] care. I notified Samuel VELEZ that patient is going home today. I notified Nancy Liner of transport time. Discussed with patient preferences on discharge : Understanding how to manage health at home , purpose of taking meds, importance of follow up appointments and symptoms to watch out for. Nurse to review AVS with patient. All follow up providers listed . [facility]FFILIATED WITH [facility] To transport the patient home.

## 2023-11-17 NOTE — WOUND OSTOMY CARE
Consult Note - Wound   Orvan Juan Ramon 80 y.o. female MRN: [id number]  Unit/Bed#: -01 Encounter: [id number]      History and Present Illness:  Patient is 80year old female admitted to [facility] with debility. Patient history significant for chronic a-fib, HTN, DDM2, malignant neoplasm of overlapping sites of the bladder, lung mass and PE. Bradly Nichole Wound care consulted for sacrum. Assessment Findings:   Patient in bed for assessment, she is to be discharged this afternoon. She is awake and alert, cooperative with care. At time of assessment patient was being dressed, had a purewick in earlier today, it has been removed. She is able to feed herself, was a minimal assist to turn for assessment of buttocks. 1- Hyperpigmented skin to the sacrum, skin is intact, pink and blanchable. Perianal skin mix of hypopigmented skin to the right outer area and hyperpigmented and pink blanchable skin to the inner buttocks and left outer edge. 2- Heels, abdominal fold and breast folds intact    Skin and Wound Care Plan:   1- Apply Hydraguard to b/l heels, buttocks and sacrum BID and PRN  2- Apply skin nourishing cream to the skin daily  3- Use EHOB offloading cushion when OOB to the chair  4- Turn and reposition patient Q2 hours    Wounds:  Wound 11/16/23 Coccyx (Active)   Wound Image   11/17/23 1425   Wound Description Clean;Dry; Intact 11/17/23 1425   Lucretia-wound Assessment Hyperpigmented 11/17/23 1425   Wound Length (cm) 0 cm 11/17/23 1425   Wound Width (cm) 0 cm 11/17/23 1425   Wound Depth (cm) 0 cm 11/17/23 1425   Wound Surface Area (cm^2) 0 cm^2 11/17/23 1425   Wound Volume (cm^3) 0 cm^3 11/17/23 1425   Calculated Wound Volume (cm^3) 0 cm^3 11/17/23 1425   Wound Site Closure Open to air 11/17/23 1425   Drainage Amount None 11/17/23 0746   Treatments Elevated 11/16/23 0215     Reviewed plan of care with primary RN  Recommendations written as orders  Wound care team to follow weekly while admitted  Questions or concerns [facility] Wound Care Nurse    Chapo Kinney BSN, RN, Renetta Zamora

## 2023-11-17 NOTE — DISCHARGE SUMMARY
Discharge Summary - [facility] Internal Medicine  Patient: Darrell Roberts 80 y.o. female   MRN: [id number]  PCP: Ellis Mcclain MD  Unit/Bed#: -[facility] Encounter: [id number]            Discharging Physician / Practitioner: Juni Horne MD  PCP: Ellis Mcclain MD  Admission Date:   Admission Orders (From admission, onward)       Ordered        11/16/23 0049  INPATIENT ADMISSION  Once                          Discharge Date: 11/17/23      Reason for Admission:  Weakness and falls      Discharge Diagnoses:     Principal Problem:    Debility with falls    Active Problems:    Chronic a-fib ([address])    Essential hypertension    Type 2 diabetes mellitus with stage 3b chronic kidney disease ([address])    Malignant neoplasm of overlapping sites of bladder ([address])    Lung mass    Sacral ulcer ([address])    Hypomagnesemia        Consultations During Hospital Stay:  None      Hospital Course:     Darrell Roberts is a 80 y.o. female patient who originally presented to the hospital on 11/15/2023 due to generalized weakness and recurrent falls at home. She was also found to have a sacral decubitus ulcer (present on admission) -she was seen by wound care nursing with the pressure still noted to be blanchable, without active wounds. She presented with an elevated creatinine of 1.33 with a typical baseline of 1.0-1.1, with improvement with IV fluid hydration. Her low magnesium level was orally repleted. She was evaluated by PT/OT with recommendation of skilled rehab, however, both patient and son refused, opting for home health services. Calcium and vitamin D supplementation were initiated. Urinalysis was negative for evidence of infection. A plethora of traumatic radiologic imaging conducted was negative for acute fractures or dislocations, or other evidence of trauma. Of note, requests for anticoagulation in the setting of known atrial fibrillation.   As stated above, she will be discharged home today with home health services. Condition at Discharge: fair       Discharge Day Visit / Exam:     Vitals:  Blood Pressure: (!) 172/81 (11/16/23 2221)  Pulse: 62 (11/16/23 2221)  Temperature: 98.1 °F (36.7 °C) (11/16/23 2221)  Temp Source: Temporal (11/16/23 2221)  Respirations: 20 (11/16/23 2221)  Height: 5' 4" (162.6 cm) (11/16/23 0200)  Weight - Scale: 63.4 kg (139 lb 12.4 oz) (11/16/23 0200)  SpO2: 94 % (11/16/23 2221)      Physical exam:  I had a face-to-face encounter with the patient on day of discharge. Discussion with Patient and/or Family:  The patient has been advised to return to the ER immediately if any symptoms recur or worsen. Discharge instructions/Information to Patient and/or Family:   See after visit summary for information provided to patient and/or family. Provisions for Follow-Up Care:  See after visit summary for information related to follow-up care and any pertinent home health orders. Disposition:   Home with home health services      Discharge Medications:  See after visit summary for reconciled discharge medications provided to patient and/or family. Discharge Statement:  I spent 38 minutes discharging the patient. This time was spent on the day of discharge. I had direct contact with the patient on the day of discharge. Greater than 50% of the total time was spent examining patient, answering all patient questions, arranging and discussing plan of care with patient as well as directly providing post-discharge instructions. Additional time then spent on discharge activities. ** Please Note: This note is constructed using a voice recognition dictation system. An occasional wrong word/phrase or “sound-a-like” substitution may have been picked up by dictation device due to the inherent limitations of voice recognition software. Read the chart carefully and recognize, using reasonable context, where substitutions may have occurred. **

## 2023-11-17 NOTE — DISCHARGE INSTR - OTHER ORDERS
Skin and Wound Care Plan:   1- Apply Hydraguard to b/l heels, buttocks and sacrum BID and PRN  2- Apply skin nourishing cream to the skin daily  3- Use EHOB offloading cushion when OOB to the chair  4- Turn and reposition patient Q2 hours

## 2023-11-17 NOTE — PLAN OF CARE
Problem: SAFETY ADULT  Goal: Patient will remain free of falls  Description: INTERVENTIONS:  - Educate patient/family on patient safety including physical limitations  - Instruct patient to call for assistance with activity   - Consult OT/PT to assist with strengthening/mobility   - Keep Call bell within reach  - Keep bed low and locked with side rails adjusted as appropriate  - Keep care items and personal belongings within reach  - Initiate and maintain comfort rounds  - Make Fall Risk Sign visible to staff  - Offer Toileting every 2 Hours, in advance of need  - Initiate/Maintain bed alarm  - Obtain necessary fall risk management equipment:   - Apply yellow socks and bracelet for high fall risk patients  - Consider moving patient to room near nurses station  Outcome: Progressing  Goal: Maintain or return to baseline ADL function  Description: INTERVENTIONS:  -  Assess patient's ability to carry out ADLs; assess patient's baseline for ADL function and identify physical deficits which impact ability to perform ADLs (bathing, care of mouth/teeth, toileting, grooming, dressing, etc.)  - Assess/evaluate cause of self-care deficits   - Assess range of motion  - Assess patient's mobility; develop plan if impaired  - Assess patient's need for assistive devices and provide as appropriate  - Encourage maximum independence but intervene and supervise when necessary  - Involve family in performance of ADLs  - Assess for home care needs following discharge   - Consider OT consult to assist with ADL evaluation and planning for discharge  - Provide patient education as appropriate  Outcome: Progressing

## 2023-11-18 ENCOUNTER — HOME CARE VISIT (OUTPATIENT)
Dept: HOME HEALTH SERVICES | Facility: HOME HEALTHCARE | Age: 88
End: 2023-11-18
Payer: MEDICARE

## 2023-11-18 PROCEDURE — G0299 HHS/HOSPICE OF RN EA 15 MIN: HCPCS

## 2023-11-18 PROCEDURE — 400013 VN SOC

## 2023-11-18 PROCEDURE — 10330081 VN NO-PAY CLAIM PROCEDURE

## 2023-11-18 RX ORDER — AMLODIPINE BESYLATE 5 MG/1
5 TABLET ORAL
Qty: 90 TABLET | Refills: 0 | Status: SHIPPED | OUTPATIENT
Start: 2023-11-18

## 2023-11-19 VITALS
DIASTOLIC BLOOD PRESSURE: 62 MMHG | SYSTOLIC BLOOD PRESSURE: 128 MMHG | RESPIRATION RATE: 20 BRPM | OXYGEN SATURATION: 95 % | HEART RATE: 80 BPM

## 2023-11-19 NOTE — CASE COMMUNICATION
[facility] has admitted your patient to [facility] service with the following disciplines:      SN, PT, OT and MSW  This report is informational only, no responses is needed  Primary focus of home health care CVP assess, DM assess med mgmt  Patient stated goals of care increase strength   Anticipated visit pattern and next visit date 2w4 next visit planned for Tues 11/21  See medication list - meds in home differ from AVS Missing M etformin and Amlodipine, TT to LOPEZ MD, electronically sent to patients pharmacy by MD. Patient DIL aware meds are ready for  or to be delivered to pts home Mon 11/20  Significant clinical findings see above med issues  Potential barriers to goal achievement comorbidities  Other pertinent information NA    Thank you for allowing us to participate in the care of your patient.

## 2023-11-20 ENCOUNTER — TRANSITIONAL CARE MANAGEMENT (OUTPATIENT)
Dept: FAMILY MEDICINE CLINIC | Facility: CLINIC | Age: 88
End: 2023-11-20

## 2023-11-20 ENCOUNTER — HOME CARE VISIT (OUTPATIENT)
Dept: HOME HEALTH SERVICES | Facility: HOME HEALTHCARE | Age: 88
End: 2023-11-20
Payer: MEDICARE

## 2023-11-20 VITALS — SYSTOLIC BLOOD PRESSURE: 142 MMHG | OXYGEN SATURATION: 98 % | HEART RATE: 58 BPM | DIASTOLIC BLOOD PRESSURE: 80 MMHG

## 2023-11-20 PROCEDURE — G0151 HHCP-SERV OF PT,EA 15 MIN: HCPCS

## 2023-11-20 NOTE — Clinical Note
----- Message -----  From: Justina House MD  Sent: 11/28/2023   9:59 AM EST  To: Cinthya Guthrie LCSW; Presley Nolan MD; *      Thank you, Esther Beth. Just an update that I spoke with her today about current issues and recent hospitalization. She is not interested in treating bladder cancer or lung mass, so I again brought up home hospice. She is going to consider it and discuss with her  and family. I also again recommend she stops eliquis due to frequent falls. She is agreeable to this. Her complaints right now are back pain and trouble sleeping but she declines anything to help with this right now. She is taking tylenol, however. I am also concerned about her  who has colostomy that pt currently manages. I've spoken with them in the past about getting an aide to help, especially when pt was hospitalized last spring. Son was going to look into it but it sounds like nothing has changed. Please feel free to contact me with questions. Thank you.   Rosalba Varghese   ----- Message -----  From: Jose Smith PT  Sent: 11/20/2023   5:47 PM EST  To: Cinthya Guthrie LCSW; Presley Nolan MD; [facility] PT evaluation completed. Patient seen with  who is wheelchair bound. Patient had been his primary caregiver but now they help each other. Other family and neighbors provide limited support. Patient complains of multiple areas of pain including back and R > L knee. She describes limited mobility due to pain with 8 falls over last few months. Multiple fall risks present including pain, reduced stability, strength, and endurance, clutter in home. She descibes progressing reduction in functional mobility and activity limitations. Patients goal is to stop falling. Attempted to discuss long term plan and need for additional assist in home but she is reluctant to participate in this conversation. Will plan 2 x week for 3 weeks in effort to establish HEP and improve mobility.  Prognosis guarded due to multiple issues and questionable awareness of safety. MSW is consulted.

## 2023-11-20 NOTE — CASE COMMUNICATION
[facility] PT evaluation completed. Patient seen with  who is wheelchair bound. Patient had been his primary caregiver but now they help each other. Other family and neighbors provide limited support. Patient complains of multiple areas of pain including back and R > L knee. She describes limited mobility due to pain with 8 falls over last few months. Multiple fall risks present including pain, reduced stability, strength, and endurance , clutter in home. She descibes progressing reduction in functional mobility and activity limitations. Patients goal is to stop falling. Attempted to discuss long term plan and need for additional assist in home but she is reluctant to participate in this conversation. Will plan 2 x week for 3 weeks in effort to establish HEP and improve mobility. Prognosis guarded due to multiple issues and questionable awareness of safety. MSW is con sulted.

## 2023-11-21 ENCOUNTER — HOME CARE VISIT (OUTPATIENT)
Dept: HOME HEALTH SERVICES | Facility: HOME HEALTHCARE | Age: 88
End: 2023-11-21
Payer: MEDICARE

## 2023-11-21 VITALS
RESPIRATION RATE: 18 BRPM | HEART RATE: 70 BPM | TEMPERATURE: 97.6 F | DIASTOLIC BLOOD PRESSURE: 70 MMHG | OXYGEN SATURATION: 97 % | SYSTOLIC BLOOD PRESSURE: 132 MMHG

## 2023-11-21 PROCEDURE — G0155 HHCP-SVS OF CSW,EA 15 MIN: HCPCS

## 2023-11-21 PROCEDURE — G0300 HHS/HOSPICE OF LPN EA 15 MIN: HCPCS

## 2023-11-22 ENCOUNTER — HOME CARE VISIT (OUTPATIENT)
Dept: HOME HEALTH SERVICES | Facility: HOME HEALTHCARE | Age: 88
End: 2023-11-22
Payer: MEDICARE

## 2023-11-22 VITALS — OXYGEN SATURATION: 98 % | DIASTOLIC BLOOD PRESSURE: 68 MMHG | SYSTOLIC BLOOD PRESSURE: 150 MMHG | HEART RATE: 71 BPM

## 2023-11-22 VITALS — OXYGEN SATURATION: 96 % | DIASTOLIC BLOOD PRESSURE: 60 MMHG | HEART RATE: 71 BPM | SYSTOLIC BLOOD PRESSURE: 134 MMHG

## 2023-11-22 PROCEDURE — G0151 HHCP-SERV OF PT,EA 15 MIN: HCPCS

## 2023-11-22 PROCEDURE — G0152 HHCP-SERV OF OT,EA 15 MIN: HCPCS

## 2023-11-24 ENCOUNTER — HOME CARE VISIT (OUTPATIENT)
Dept: HOME HEALTH SERVICES | Facility: HOME HEALTHCARE | Age: 88
End: 2023-11-24
Payer: MEDICARE

## 2023-11-24 ENCOUNTER — TELEPHONE (OUTPATIENT)
Dept: HOME HEALTH SERVICES | Facility: HOME HEALTHCARE | Age: 88
End: 2023-11-24

## 2023-11-27 ENCOUNTER — HOME CARE VISIT (OUTPATIENT)
Dept: HOME HEALTH SERVICES | Facility: HOME HEALTHCARE | Age: 88
End: 2023-11-27
Payer: MEDICARE

## 2023-11-27 VITALS — HEART RATE: 60 BPM | SYSTOLIC BLOOD PRESSURE: 138 MMHG | OXYGEN SATURATION: 97 % | DIASTOLIC BLOOD PRESSURE: 68 MMHG

## 2023-11-27 VITALS — SYSTOLIC BLOOD PRESSURE: 138 MMHG | DIASTOLIC BLOOD PRESSURE: 68 MMHG | OXYGEN SATURATION: 97 % | HEART RATE: 60 BPM

## 2023-11-27 PROCEDURE — G0152 HHCP-SERV OF OT,EA 15 MIN: HCPCS

## 2023-11-27 PROCEDURE — G0151 HHCP-SERV OF PT,EA 15 MIN: HCPCS

## 2023-11-28 ENCOUNTER — TELEMEDICINE (OUTPATIENT)
Dept: FAMILY MEDICINE CLINIC | Facility: CLINIC | Age: 88
End: 2023-11-28
Payer: MEDICARE

## 2023-11-28 DIAGNOSIS — R53.81 DEBILITY: Primary | Chronic | ICD-10-CM

## 2023-11-28 DIAGNOSIS — R91.8 LUNG MASS: Chronic | ICD-10-CM

## 2023-11-28 DIAGNOSIS — C67.8 MALIGNANT NEOPLASM OF OVERLAPPING SITES OF BLADDER (HCC): Chronic | ICD-10-CM

## 2023-11-28 DIAGNOSIS — L98.429 SKIN ULCER OF SACRUM, UNSPECIFIED ULCER STAGE (HCC): Chronic | ICD-10-CM

## 2023-11-28 PROCEDURE — 99495 TRANSJ CARE MGMT MOD F2F 14D: CPT | Performed by: INTERNAL MEDICINE

## 2023-11-28 NOTE — PROGRESS NOTES
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 [id number] [phone]    Recent Visits  Date Type Provider Dept   11/28/23 [address] [facility] Patty Hyatt MD [facility] Primary Care   Showing recent visits within past 7 days and meeting all other requirements  Future Appointments  No visits were found meeting these conditions. Showing future appointments within next 150 days and meeting all other requirements       After connecting through [employer], the patient was identified by name and date of birth. Tobi Royal was informed that this is a telemedicine visit and that the visit is being conducted through Telephone. My office door was closed. No one else was in the room. She acknowledged consent and understanding of privacy and security of the video platform. The patient has agreed to participate and understands they can discontinue the visit at any time. Patient is aware this is a billable service. Transitional Care Management Review:  Tobi Royal is a 80 y.o. female here for TCM follow up.      During the TCM phone call patient stated:    TCM Call     Date and time call was made  11/20/2023 12:38 PM    Hospital care reviewed  Records reviewed    Patient was hospitialized at  [address]    Date of Admission  11/15/23    Date of discharge  11/17/23    Diagnosis  spinal fracture    Disposition  Home    Were the patients medications reviewed and updated  Yes    Current Symptoms  -- pain in legs, hard to move, exhausted just trying to set up in bed, seems to be fainting alot      TCM Call     Post hospital issues  None    Should patient be enrolled in anticoag monitoring? No    Scheduled for follow up? Yes    Did you obtain your prescribed medications  Yes    Do you need help managing your prescriptions or medications  No    Is transportation to your appointment needed  No    Specify why  home bound    I have advised the patient to call PCP with any new or worsening symptoms  Daria ZARATE    Living Arrangements  Spouse or Significiant other; Children; Friends    Support System  Spouse; Children; Friends    The type of support provided  Emotional; Physical; Other (comment)    Do you have social support  Yes, as much as I need        Subjective:     Patient ID: Mynor Pate is a 80 y.o. female. 81 yo female with diabetes, hypothyroidism, history of DVT and PE presents for hospital follow up. Pt is home bound and no longer drives. Reports multiple falls in the past month, admitted 11/11 - 11/17 debility and weakness. Home with home health and PT/OT. No falls since discharge. Reports BG have been elevated: fasting 180-200s. Taking glipizide and metfromin. Complains of lower back pain and taking tylenol. Having difficulty sleeping but doesn't want to take anything because she doesn't want to urinate in her bed. Still helping  with ostomy. Lost one of her 5 sons about a month ago due to cancer. Closest son lives over an hour away. Stopped driving over a year ago. Meds and groceries delivered. Review of Systems   Constitutional:  Positive for activity change and fatigue. Negative for chills and fever. Respiratory:  Positive for cough. Musculoskeletal:  Positive for back pain. Neurological:  Negative for dizziness and light-headedness. Psychiatric/Behavioral:  Positive for decreased concentration and sleep disturbance. Objective:     There were no vitals filed for this visit. Physical Exam    Medications have been reviewed by provider in current encounter    I spent 20 minutes with the patient during this visit. Carlos Manuel Ricardo MD      VIRTUAL VISIT DISCLAIMER    Navid Sanderson verbally agrees to participate in [facility]. Pt is aware that [facility] could be limited without vital signs or the ability to perform a full hands-on physical exam. Sherwinarmando Tracy understands she or the provider may request at any time to terminate the video visit and request the patient to seek care or treatment in person.

## 2023-11-29 ENCOUNTER — HOME CARE VISIT (OUTPATIENT)
Dept: HOME HEALTH SERVICES | Facility: HOME HEALTHCARE | Age: 88
End: 2023-11-29
Payer: MEDICARE

## 2023-11-29 PROCEDURE — G0299 HHS/HOSPICE OF RN EA 15 MIN: HCPCS

## 2023-11-30 ENCOUNTER — HOME CARE VISIT (OUTPATIENT)
Dept: HOME HEALTH SERVICES | Facility: HOME HEALTHCARE | Age: 88
End: 2023-11-30
Payer: MEDICARE

## 2023-11-30 VITALS — OXYGEN SATURATION: 96 % | HEART RATE: 53 BPM | SYSTOLIC BLOOD PRESSURE: 128 MMHG | DIASTOLIC BLOOD PRESSURE: 68 MMHG

## 2023-11-30 VITALS — OXYGEN SATURATION: 96 % | SYSTOLIC BLOOD PRESSURE: 124 MMHG | DIASTOLIC BLOOD PRESSURE: 80 MMHG | HEART RATE: 59 BPM

## 2023-11-30 PROCEDURE — G0152 HHCP-SERV OF OT,EA 15 MIN: HCPCS

## 2023-11-30 PROCEDURE — G0151 HHCP-SERV OF PT,EA 15 MIN: HCPCS

## 2023-12-04 VITALS
RESPIRATION RATE: 20 BRPM | SYSTOLIC BLOOD PRESSURE: 126 MMHG | TEMPERATURE: 97.3 F | HEART RATE: 64 BPM | OXYGEN SATURATION: 98 % | DIASTOLIC BLOOD PRESSURE: 56 MMHG

## 2023-12-05 ENCOUNTER — HOME CARE VISIT (OUTPATIENT)
Dept: HOME HEALTH SERVICES | Facility: HOME HEALTHCARE | Age: 88
End: 2023-12-05
Payer: MEDICARE

## 2023-12-05 VITALS — HEART RATE: 68 BPM | DIASTOLIC BLOOD PRESSURE: 62 MMHG | OXYGEN SATURATION: 98 % | SYSTOLIC BLOOD PRESSURE: 118 MMHG

## 2023-12-05 PROCEDURE — G0151 HHCP-SERV OF PT,EA 15 MIN: HCPCS

## 2023-12-05 PROCEDURE — G0152 HHCP-SERV OF OT,EA 15 MIN: HCPCS

## 2023-12-06 ENCOUNTER — HOME CARE VISIT (OUTPATIENT)
Dept: HOME HEALTH SERVICES | Facility: HOME HEALTHCARE | Age: 88
End: 2023-12-06
Payer: MEDICARE

## 2023-12-06 PROCEDURE — G0299 HHS/HOSPICE OF RN EA 15 MIN: HCPCS

## 2023-12-07 ENCOUNTER — HOME CARE VISIT (OUTPATIENT)
Dept: HOME HEALTH SERVICES | Facility: HOME HEALTHCARE | Age: 88
End: 2023-12-07
Payer: MEDICARE

## 2023-12-07 PROCEDURE — G0151 HHCP-SERV OF PT,EA 15 MIN: HCPCS

## 2023-12-07 PROCEDURE — G0152 HHCP-SERV OF OT,EA 15 MIN: HCPCS

## 2023-12-08 ENCOUNTER — HOME CARE VISIT (OUTPATIENT)
Dept: HOME HEALTH SERVICES | Facility: HOME HEALTHCARE | Age: 88
End: 2023-12-08
Payer: MEDICARE

## 2023-12-08 NOTE — CASE COMMUNICATION
Pt discharged from [facility] PT at this time. Pt utilizes rollator for mobility inside home, will require cga for stair negotiation and continues wtih pain to low back and RLE/knee which has contributed to patients noncompliance with hep.

## 2023-12-11 VITALS
OXYGEN SATURATION: 98 % | DIASTOLIC BLOOD PRESSURE: 64 MMHG | SYSTOLIC BLOOD PRESSURE: 122 MMHG | RESPIRATION RATE: 20 BRPM | TEMPERATURE: 97.1 F | HEART RATE: 60 BPM

## 2023-12-13 ENCOUNTER — HOME CARE VISIT (OUTPATIENT)
Dept: HOME HEALTH SERVICES | Facility: HOME HEALTHCARE | Age: 88
End: 2023-12-13
Payer: MEDICARE

## 2023-12-14 ENCOUNTER — HOME CARE VISIT (OUTPATIENT)
Dept: HOME HEALTH SERVICES | Facility: HOME HEALTHCARE | Age: 88
End: 2023-12-14
Payer: MEDICARE

## 2023-12-14 VITALS
TEMPERATURE: 97.5 F | SYSTOLIC BLOOD PRESSURE: 138 MMHG | HEART RATE: 58 BPM | RESPIRATION RATE: 18 BRPM | DIASTOLIC BLOOD PRESSURE: 76 MMHG | OXYGEN SATURATION: 97 %

## 2023-12-14 PROCEDURE — G0299 HHS/HOSPICE OF RN EA 15 MIN: HCPCS

## 2023-12-18 ENCOUNTER — HOME CARE VISIT (OUTPATIENT)
Dept: HOME HEALTH SERVICES | Facility: HOME HEALTHCARE | Age: 88
End: 2023-12-18
Payer: MEDICARE

## 2023-12-18 PROCEDURE — G0299 HHS/HOSPICE OF RN EA 15 MIN: HCPCS

## 2023-12-19 ENCOUNTER — APPOINTMENT (OUTPATIENT)
Dept: LAB | Facility: CLINIC | Age: 88
End: 2023-12-19
Payer: MEDICARE

## 2023-12-19 DIAGNOSIS — N18.32 STAGE 3B CHRONIC KIDNEY DISEASE (HCC): ICD-10-CM

## 2023-12-19 DIAGNOSIS — N25.81 SECONDARY HYPERPARATHYROIDISM (HCC): ICD-10-CM

## 2023-12-19 DIAGNOSIS — E55.9 VITAMIN D DEFICIENCY: ICD-10-CM

## 2023-12-19 LAB
25(OH)D3 SERPL-MCNC: 71.1 NG/ML (ref 30–100)
ALBUMIN SERPL BCP-MCNC: 4.2 G/DL (ref 3.5–5)
ALP SERPL-CCNC: 125 U/L (ref 34–104)
ALT SERPL W P-5'-P-CCNC: 17 U/L (ref 7–52)
ANION GAP SERPL CALCULATED.3IONS-SCNC: 10 MMOL/L
AST SERPL W P-5'-P-CCNC: 23 U/L (ref 13–39)
BASOPHILS # BLD AUTO: 0.07 THOUSANDS/ÂΜL (ref 0–0.1)
BASOPHILS NFR BLD AUTO: 1 % (ref 0–1)
BILIRUB SERPL-MCNC: 0.45 MG/DL (ref 0.2–1)
BUN SERPL-MCNC: 25 MG/DL (ref 5–25)
CALCIUM SERPL-MCNC: 9.1 MG/DL (ref 8.4–10.2)
CHLORIDE SERPL-SCNC: 98 MMOL/L (ref 96–108)
CO2 SERPL-SCNC: 28 MMOL/L (ref 21–32)
CREAT SERPL-MCNC: 1.18 MG/DL (ref 0.6–1.3)
EOSINOPHIL # BLD AUTO: 0.16 THOUSAND/ÂΜL (ref 0–0.61)
EOSINOPHIL NFR BLD AUTO: 2 % (ref 0–6)
ERYTHROCYTE [DISTWIDTH] IN BLOOD BY AUTOMATED COUNT: 13.8 % (ref 11.6–15.1)
GFR SERPL CREATININE-BSD FRML MDRD: 40 ML/MIN/1.73SQ M
GLUCOSE P FAST SERPL-MCNC: 214 MG/DL (ref 65–99)
HCT VFR BLD AUTO: 44.8 % (ref 34.8–46.1)
HGB BLD-MCNC: 15.6 G/DL (ref 11.5–15.4)
IMM GRANULOCYTES # BLD AUTO: 0.04 THOUSAND/UL (ref 0–0.2)
IMM GRANULOCYTES NFR BLD AUTO: 0 % (ref 0–2)
LYMPHOCYTES # BLD AUTO: 1.14 THOUSANDS/ÂΜL (ref 0.6–4.47)
LYMPHOCYTES NFR BLD AUTO: 12 % (ref 14–44)
MAGNESIUM SERPL-MCNC: 1.8 MG/DL (ref 1.9–2.7)
MCH RBC QN AUTO: 34.1 PG (ref 26.8–34.3)
MCHC RBC AUTO-ENTMCNC: 34.8 G/DL (ref 31.4–37.4)
MCV RBC AUTO: 98 FL (ref 82–98)
MONOCYTES # BLD AUTO: 0.64 THOUSAND/ÂΜL (ref 0.17–1.22)
MONOCYTES NFR BLD AUTO: 7 % (ref 4–12)
NEUTROPHILS # BLD AUTO: 7.31 THOUSANDS/ÂΜL (ref 1.85–7.62)
NEUTS SEG NFR BLD AUTO: 78 % (ref 43–75)
NRBC BLD AUTO-RTO: 0 /100 WBCS
PHOSPHATE SERPL-MCNC: 3.5 MG/DL (ref 2.3–4.1)
PLATELET # BLD AUTO: 234 THOUSANDS/UL (ref 149–390)
PMV BLD AUTO: 12 FL (ref 8.9–12.7)
POTASSIUM SERPL-SCNC: 4.2 MMOL/L (ref 3.5–5.3)
PROT SERPL-MCNC: 7.7 G/DL (ref 6.4–8.4)
PTH-INTACT SERPL-MCNC: 98.9 PG/ML (ref 12–88)
RBC # BLD AUTO: 4.58 MILLION/UL (ref 3.81–5.12)
SODIUM SERPL-SCNC: 136 MMOL/L (ref 135–147)
WBC # BLD AUTO: 9.36 THOUSAND/UL (ref 4.31–10.16)

## 2023-12-19 PROCEDURE — 82306 VITAMIN D 25 HYDROXY: CPT

## 2023-12-19 PROCEDURE — 83970 ASSAY OF PARATHORMONE: CPT

## 2023-12-19 PROCEDURE — 36415 COLL VENOUS BLD VENIPUNCTURE: CPT

## 2023-12-19 PROCEDURE — 80053 COMPREHEN METABOLIC PANEL: CPT

## 2023-12-19 PROCEDURE — 85025 COMPLETE CBC W/AUTO DIFF WBC: CPT

## 2023-12-19 PROCEDURE — 83735 ASSAY OF MAGNESIUM: CPT

## 2023-12-19 PROCEDURE — 84100 ASSAY OF PHOSPHORUS: CPT

## 2023-12-20 DIAGNOSIS — N18.31 STAGE 3A CHRONIC KIDNEY DISEASE (HCC): Primary | ICD-10-CM

## 2023-12-20 DIAGNOSIS — N25.81 SECONDARY HYPERPARATHYROIDISM (HCC): ICD-10-CM

## 2023-12-22 VITALS
DIASTOLIC BLOOD PRESSURE: 64 MMHG | TEMPERATURE: 97.3 F | HEART RATE: 60 BPM | OXYGEN SATURATION: 98 % | SYSTOLIC BLOOD PRESSURE: 128 MMHG | RESPIRATION RATE: 16 BRPM

## 2023-12-29 DIAGNOSIS — I10 ESSENTIAL HYPERTENSION: ICD-10-CM

## 2023-12-29 DIAGNOSIS — E78.00 PURE HYPERCHOLESTEROLEMIA: ICD-10-CM

## 2023-12-29 DIAGNOSIS — I48.0 PAROXYSMAL ATRIAL FIBRILLATION (HCC): ICD-10-CM

## 2023-12-29 RX ORDER — LOVASTATIN 20 MG/1
20 TABLET ORAL
Qty: 90 TABLET | Refills: 3 | Status: SHIPPED | OUTPATIENT
Start: 2023-12-29

## 2023-12-29 NOTE — TELEPHONE ENCOUNTER
Patient requesting refill(s) of: metoprolol, lovastatin    Last filled: 1/30/23  Last appt: 5/4/23  Next appt:  [facility]

## 2024-02-01 DIAGNOSIS — E11.22 TYPE 2 DIABETES MELLITUS WITH STAGE 3B CHRONIC KIDNEY DISEASE, WITHOUT LONG-TERM CURRENT USE OF INSULIN (HCC): Chronic | ICD-10-CM

## 2024-02-01 DIAGNOSIS — N18.32 TYPE 2 DIABETES MELLITUS WITH STAGE 3B CHRONIC KIDNEY DISEASE, WITHOUT LONG-TERM CURRENT USE OF INSULIN (HCC): Chronic | ICD-10-CM

## 2024-02-01 NOTE — TELEPHONE ENCOUNTER
Patient requesting refill(s) of: metformin 500 mg BID    Last filled: 11/18/2023 #60 x 0  Last appt: 11/28/2023  Next appt: none  Pharmacy: [facility] [place]

## 2024-02-29 ENCOUNTER — TELEPHONE (OUTPATIENT)
Dept: NEPHROLOGY | Facility: CLINIC | Age: 89
End: 2024-02-29

## 2024-03-03 ENCOUNTER — TELEPHONE (OUTPATIENT)
Dept: OTHER | Facility: OTHER | Age: 89
End: 2024-03-03

## 2024-03-03 NOTE — TELEPHONE ENCOUNTER
"Pt stated \" I would like to know where my appointment is and who the doctor I am seeing is replacing.\"    Notified pt with location and time.    Please follow up with pt when office reopens. Thank you.  "

## 2024-03-06 ENCOUNTER — APPOINTMENT (OUTPATIENT)
Dept: LAB | Facility: CLINIC | Age: 89
End: 2024-03-06
Payer: MEDICARE

## 2024-03-06 ENCOUNTER — OFFICE VISIT (OUTPATIENT)
Dept: NEPHROLOGY | Facility: CLINIC | Age: 89
End: 2024-03-06
Payer: MEDICARE

## 2024-03-06 VITALS
DIASTOLIC BLOOD PRESSURE: 82 MMHG | OXYGEN SATURATION: 98 % | HEART RATE: 69 BPM | WEIGHT: 139 LBS | BODY MASS INDEX: 23.73 KG/M2 | HEIGHT: 64 IN | SYSTOLIC BLOOD PRESSURE: 144 MMHG

## 2024-03-06 DIAGNOSIS — N25.81 SECONDARY HYPERPARATHYROIDISM (HCC): ICD-10-CM

## 2024-03-06 DIAGNOSIS — I10 ESSENTIAL HYPERTENSION: Chronic | ICD-10-CM

## 2024-03-06 DIAGNOSIS — E83.42 HYPOMAGNESEMIA: ICD-10-CM

## 2024-03-06 DIAGNOSIS — N18.32 TYPE 2 DIABETES MELLITUS WITH STAGE 3B CHRONIC KIDNEY DISEASE, WITHOUT LONG-TERM CURRENT USE OF INSULIN (HCC): ICD-10-CM

## 2024-03-06 DIAGNOSIS — E11.22 TYPE 2 DIABETES MELLITUS WITH STAGE 3B CHRONIC KIDNEY DISEASE, WITHOUT LONG-TERM CURRENT USE OF INSULIN (HCC): ICD-10-CM

## 2024-03-06 DIAGNOSIS — N18.32 STAGE 3B CHRONIC KIDNEY DISEASE (HCC): Primary | ICD-10-CM

## 2024-03-06 DIAGNOSIS — N18.31 STAGE 3A CHRONIC KIDNEY DISEASE (HCC): ICD-10-CM

## 2024-03-06 DIAGNOSIS — C67.8 MALIGNANT NEOPLASM OF OVERLAPPING SITES OF BLADDER (HCC): Chronic | ICD-10-CM

## 2024-03-06 LAB
ALBUMIN SERPL BCP-MCNC: 4.2 G/DL (ref 3.5–5)
ALP SERPL-CCNC: 79 U/L (ref 34–104)
ALT SERPL W P-5'-P-CCNC: 11 U/L (ref 7–52)
ANION GAP SERPL CALCULATED.3IONS-SCNC: 11 MMOL/L
AST SERPL W P-5'-P-CCNC: 22 U/L (ref 13–39)
BACTERIA UR QL AUTO: ABNORMAL /HPF
BASOPHILS # BLD AUTO: 0.06 THOUSANDS/ÂΜL (ref 0–0.1)
BASOPHILS NFR BLD AUTO: 1 % (ref 0–1)
BILIRUB SERPL-MCNC: 0.42 MG/DL (ref 0.2–1)
BILIRUB UR QL STRIP: NEGATIVE
BUN SERPL-MCNC: 31 MG/DL (ref 5–25)
CALCIUM SERPL-MCNC: 9.6 MG/DL (ref 8.4–10.2)
CHLORIDE SERPL-SCNC: 100 MMOL/L (ref 96–108)
CLARITY UR: CLEAR
CO2 SERPL-SCNC: 29 MMOL/L (ref 21–32)
COLOR UR: YELLOW
CREAT SERPL-MCNC: 1.18 MG/DL (ref 0.6–1.3)
CREAT UR-MCNC: 118 MG/DL
EOSINOPHIL # BLD AUTO: 0.05 THOUSAND/ÂΜL (ref 0–0.61)
EOSINOPHIL NFR BLD AUTO: 1 % (ref 0–6)
ERYTHROCYTE [DISTWIDTH] IN BLOOD BY AUTOMATED COUNT: 14.4 % (ref 11.6–15.1)
GFR SERPL CREATININE-BSD FRML MDRD: 40 ML/MIN/1.73SQ M
GLUCOSE P FAST SERPL-MCNC: 167 MG/DL (ref 65–99)
GLUCOSE UR STRIP-MCNC: NEGATIVE MG/DL
HCT VFR BLD AUTO: 47.6 % (ref 34.8–46.1)
HGB BLD-MCNC: 14.8 G/DL (ref 11.5–15.4)
HGB UR QL STRIP.AUTO: NEGATIVE
IMM GRANULOCYTES # BLD AUTO: 0.02 THOUSAND/UL (ref 0–0.2)
IMM GRANULOCYTES NFR BLD AUTO: 0 % (ref 0–2)
KETONES UR STRIP-MCNC: NEGATIVE MG/DL
LEUKOCYTE ESTERASE UR QL STRIP: ABNORMAL
LYMPHOCYTES # BLD AUTO: 1.35 THOUSANDS/ÂΜL (ref 0.6–4.47)
LYMPHOCYTES NFR BLD AUTO: 18 % (ref 14–44)
MAGNESIUM SERPL-MCNC: 2.2 MG/DL (ref 1.9–2.7)
MCH RBC QN AUTO: 31.3 PG (ref 26.8–34.3)
MCHC RBC AUTO-ENTMCNC: 31.1 G/DL (ref 31.4–37.4)
MCV RBC AUTO: 101 FL (ref 82–98)
MICROALBUMIN UR-MCNC: 1686.4 MG/L
MICROALBUMIN/CREAT 24H UR: 1429 MG/G CREATININE (ref 0–30)
MONOCYTES # BLD AUTO: 0.61 THOUSAND/ÂΜL (ref 0.17–1.22)
MONOCYTES NFR BLD AUTO: 8 % (ref 4–12)
NEUTROPHILS # BLD AUTO: 5.36 THOUSANDS/ÂΜL (ref 1.85–7.62)
NEUTS SEG NFR BLD AUTO: 72 % (ref 43–75)
NITRITE UR QL STRIP: NEGATIVE
NON-SQ EPI CELLS URNS QL MICRO: ABNORMAL /HPF
NRBC BLD AUTO-RTO: 0 /100 WBCS
PH UR STRIP.AUTO: 6.5 [PH]
PHOSPHATE SERPL-MCNC: 3.6 MG/DL (ref 2.3–4.1)
PLATELET # BLD AUTO: 200 THOUSANDS/UL (ref 149–390)
PMV BLD AUTO: 12 FL (ref 8.9–12.7)
POTASSIUM SERPL-SCNC: 4.3 MMOL/L (ref 3.5–5.3)
PROT SERPL-MCNC: 7.7 G/DL (ref 6.4–8.4)
PROT UR STRIP-MCNC: ABNORMAL MG/DL
PTH-INTACT SERPL-MCNC: 82.2 PG/ML (ref 12–88)
RBC # BLD AUTO: 4.73 MILLION/UL (ref 3.81–5.12)
RBC #/AREA URNS AUTO: ABNORMAL /HPF
SODIUM SERPL-SCNC: 140 MMOL/L (ref 135–147)
SP GR UR STRIP.AUTO: 1.02 (ref 1–1.03)
UROBILINOGEN UR STRIP-ACNC: <2 MG/DL
WBC # BLD AUTO: 7.45 THOUSAND/UL (ref 4.31–10.16)
WBC #/AREA URNS AUTO: ABNORMAL /HPF

## 2024-03-06 PROCEDURE — 85025 COMPLETE CBC W/AUTO DIFF WBC: CPT

## 2024-03-06 PROCEDURE — 83735 ASSAY OF MAGNESIUM: CPT

## 2024-03-06 PROCEDURE — G2211 COMPLEX E/M VISIT ADD ON: HCPCS | Performed by: INTERNAL MEDICINE

## 2024-03-06 PROCEDURE — 99214 OFFICE O/P EST MOD 30 MIN: CPT | Performed by: INTERNAL MEDICINE

## 2024-03-06 PROCEDURE — 80053 COMPREHEN METABOLIC PANEL: CPT

## 2024-03-06 PROCEDURE — 83970 ASSAY OF PARATHORMONE: CPT

## 2024-03-06 PROCEDURE — 36415 COLL VENOUS BLD VENIPUNCTURE: CPT

## 2024-03-06 PROCEDURE — 84100 ASSAY OF PHOSPHORUS: CPT

## 2024-03-06 PROCEDURE — 81001 URINALYSIS AUTO W/SCOPE: CPT

## 2024-03-06 RX ORDER — WARFARIN SODIUM 1 MG/1
1 TABLET ORAL DAILY
COMMUNITY

## 2024-03-06 NOTE — ASSESSMENT & PLAN NOTE
Lab Results   Component Value Date    EGFR 40 12/19/2023    EGFR 49 11/17/2023    EGFR 35 11/15/2023    CREATININE 1.18 12/19/2023    CREATININE 1.00 11/17/2023    CREATININE 1.33 (H) 11/15/2023     Patient's kidney function is about stable, continue to monitor and optimize care in general.  Continue to avoid potential nephrotoxins.

## 2024-03-06 NOTE — ASSESSMENT & PLAN NOTE
Continue magnesium supplementation, level will need to be reevaluated in the near future.  Labs remain pending at this time.

## 2024-03-06 NOTE — PROGRESS NOTES
[facility]  Fermin Patton DO    Name: Sylvia Sanderson  YOB: 1933      Assessment/Plan:    Stage 3b chronic kidney disease (HCC)  Lab Results   Component Value Date    EGFR 40 12/19/2023    EGFR 49 11/17/2023    EGFR 35 11/15/2023    CREATININE 1.18 12/19/2023    CREATININE 1.00 11/17/2023    CREATININE 1.33 (H) 11/15/2023     Patient's kidney function is about stable, continue to monitor and optimize care in general.  Continue to avoid potential nephrotoxins.    Essential hypertension  Continue current medications, blood pressures appear to be well-controlled at this time.    Hypomagnesemia  Continue magnesium supplementation, level will need to be reevaluated in the near future.  Labs remain pending at this time.         Problem List Items Addressed This Visit          Endocrine    Type 2 diabetes mellitus with stage 3b chronic kidney disease (HCC) (Chronic)    Secondary hyperparathyroidism (HCC)    Relevant Orders    PTH, intact       Cardiovascular and Mediastinum    Essential hypertension (Chronic)     Continue current medications, blood pressures appear to be well-controlled at this time.            Genitourinary    Malignant neoplasm of overlapping sites of bladder (HCC) (Chronic)    Stage 3b chronic kidney disease (HCC) - Primary     Lab Results   Component Value Date    EGFR 40 12/19/2023    EGFR 49 11/17/2023    EGFR 35 11/15/2023    CREATININE 1.18 12/19/2023    CREATININE 1.00 11/17/2023    CREATININE 1.33 (H) 11/15/2023     Patient's kidney function is about stable, continue to monitor and optimize care in general.  Continue to avoid potential nephrotoxins.         Relevant Orders    Comprehensive metabolic panel    CBC and differential    Albumin / creatinine urine ratio    Urinalysis with microscopic    Magnesium    Phosphorus    Vitamin D 25 hydroxy       Other    Hypomagnesemia     Continue magnesium supplementation, level will need to be  reevaluated in the near future.  Labs remain pending at this time.            Patient is stable at this time, we will see her back for regular appointment in approximately 6 months.  She will have blood work done in the near future with additional labs to be done prior to her next appoint with us in 6 months.    Subjective:      Patient ID: Sylvia Sanderson is a 89 y.o. female.    Patient presents for follow up.    We reviewed labs in detail, most recent creatinine noted to be 1.18 mg/dL, please estimate GFR 40 mL/min.    There were no significant electrolyte abnormalities noted.  Patient is taking all medications as prescribed with no specific side effects and denies use of nonsteroid anti-inflammatory medications.    Patient is complaining of chronic right hip/back pain.  She has had this for many years and continues to try to control it with movement and stretches.        Hypertension  This is a chronic problem. The current episode started more than 1 year ago. The problem is unchanged. The problem is controlled. Pertinent negatives include no chest pain, orthopnea or peripheral edema. There are no associated agents to hypertension. Past treatments include calcium channel blockers, lifestyle changes and beta blockers. There are no compliance problems.  Hypertensive end-organ damage includes kidney disease. Identifiable causes of hypertension include chronic renal disease.       The following portions of the patient's history were reviewed and updated as appropriate: allergies, current medications, past family history, past medical history, past social history, past surgical history and problem list.    Review of Systems   Cardiovascular:  Negative for chest pain and orthopnea.   All other systems reviewed and are negative.        Social History     Socioeconomic History    Marital status: /Civil Union     Spouse name: None    Number of children: None    Years of education: None    Highest education level: None    Occupational History    Occupation: Retired    Tobacco Use    Smoking status: Former     Current packs/day: 0.00     Average packs/day: 0.5 packs/day for 10.0 years (5.0 ttl pk-yrs)     Types: Cigarettes     Start date: 3/19/1991     Quit date: 3/19/2001     Years since quittin.9    Smokeless tobacco: Never   Vaping Use    Vaping status: Never Used   Substance and Sexual Activity    Alcohol use: Never    Drug use: Never    Sexual activity: Not Currently   Other Topics Concern    None   Social History Narrative    Rarely consumes alcohol - As per [employer]    Consumes on average 2 cups of regular coffee per day    Consumes on average 12 oz of soda per day     Social Determinants of Health     Financial Resource Strain: Not on file   Food Insecurity: No Food Insecurity (2023)    Hunger Vital Sign     Worried About Running Out of Food in the Last Year: Never true     Ran Out of Food in the Last Year: Never true   Transportation Needs: No Transportation Needs (2023)    PRAPARE - Transportation     Lack of Transportation (Medical): No     Lack of Transportation (Non-Medical): No   Physical Activity: Not on file   Stress: Not on file   Social Connections: Not on file   Intimate Partner Violence: Not on file   Housing Stability: Low Risk  (2023)    Housing Stability Vital Sign     Unable to Pay for Housing in the Last Year: No     Number of Places Lived in the Last Year: 1     Unstable Housing in the Last Year: No     Past Medical History:   Diagnosis Date    Abnormal gait     Accidental fall from chair, subsequent encounter     Atrial fibrillation (HCC)     Benign essential hypertension     Bite of animal     Cataract     Cellulitis     Chronic kidney disease     Chronic kidney disease, stage 3 (HCC)     Colon cancer (HCC)     Colon polyp     Compression fracture of L3 vertebra (HCC) 2023    Compression fracture of thoracic spine, non-traumatic, sequela 2020    Current tear of lateral  cartilage or meniscus of knee     Diabetes mellitus (HCC)     Disease of thyroid gland     Disorder of magnesium metabolism     Dvt femoral (deep venous thrombosis)     Embolism from thrombosis of vein of distal end of lower extremity (HCC)     Essential hypertension     Fall     Hyperlipidemia     Hyperlipidemia     Hypertension     Hypothyroidism     Insomnia     Kidney stone     Knee joint effusion     Leukocytosis     Malaise and fatigue     MI (myocardial infarction) (HCC)     Hospitalization     Orbital hemorrhage     Other sleep disorders     PAF (paroxysmal atrial fibrillation) (HCC)     Presence of vena cava filter     Pulmonary embolism (HCC)     Tear film insufficiency     Type 2 diabetes mellitus (HCC)     Unspecified osteoarthritis, unspecified site     Weight decreased      Past Surgical History:   Procedure Laterality Date    ABDOMINAL ADHESION SURGERY  2015    Had wound vac    ABDOMINAL SURGERY      APPENDECTOMY      CATARACT EXTRACTION      CHOLECYSTECTOMY      COLON SURGERY      COLOSTOMY  2011    Due to Ileus, then reanastomosis in 2002    COLOSTOMY TAKEDOWN/REVERSE KUMAR      EXCISION BLADDER MESH TRANSVESICLEPORT W/ LASER      HERNIA REPAIR      IVC FILTER INSERTION      KNEE SURGERY  2014    repair     DE CYSTOURETHROSCOPY W/DEST &/RMVL TUMOR LARGE N/A 4/30/2021    Procedure: CYSTOSCOPY, TRANSURETHRAL RESECTION OF BLADDER TUMOR (TURBT);  Surgeon: Clarke Meyer MD;  Location: [facility];  Service: Urology       Current Outpatient Medications:     calcium carbonate (OYSTER SHELL,OSCAL) 500 mg, Take 1 tablet by mouth daily with breakfast Do not start before November 18, 2023., Disp: 30 tablet, Rfl: 0    cholecalciferol (VITAMIN D3) 1,000 units tablet, Take 1,000 Units by mouth daily, Disp: , Rfl:     cyanocobalamin (VITAMIN B-12) 100 mcg tablet, Take 1,000 mcg by mouth daily, Disp: , Rfl:     glipiZIDE (GLUCOTROL) 5 mg tablet, Take 1 tablet (5 mg total) by mouth 2 (two) times a day before meals,  Disp: 180 tablet, Rfl: 3    glucose blood (Contour Next Test) test strip, Test BID, Disp: 100 each, Rfl: 5    levothyroxine 25 mcg tablet, Take 1 tablet (25 mcg total) by mouth daily, Disp: 90 tablet, Rfl: 3    lovastatin (MEVACOR) 20 mg tablet, Take 1 tablet (20 mg total) by mouth daily at bedtime, Disp: 90 tablet, Rfl: 3    metFORMIN (GLUCOPHAGE) 500 mg tablet, Take 1 tablet (500 mg total) by mouth 2 (two) times a day with meals, Disp: 180 tablet, Rfl: 1    metoprolol tartrate (LOPRESSOR) 25 mg tablet, Take 1 tablet (25 mg total) by mouth every 12 (twelve) hours, Disp: 180 tablet, Rfl: 3    Microlet Lancets MISC, Testing twice daily, Disp: 100 each, Rfl: 5    warfarin (Coumadin) 1 mg tablet, Take 1 mg by mouth daily, Disp: , Rfl:     acetaminophen (TYLENOL) 325 mg tablet, Take 2 tablets (650 mg total) by mouth every 6 (six) hours as needed for mild pain or fever (Patient not taking: Reported on 3/6/2024), Disp: , Rfl: 0    amLODIPine (NORVASC) 5 mg tablet, Take 1 tablet (5 mg total) by mouth daily at bedtime, Disp: 90 tablet, Rfl: 0    magnesium Oxide (MAG-OX) 400 mg TABS, Take 1 tablet (400 mg total) by mouth 2 (two) times a day for 3 doses, Disp: 3 tablet, Rfl: 0    Lab Results   Component Value Date     04/18/2018    SODIUM 136 12/19/2023    K 4.2 12/19/2023    CL 98 12/19/2023    CO2 28 12/19/2023    ANIONGAP 11.2 04/18/2018    AGAP 10 12/19/2023    BUN 25 12/19/2023    CREATININE 1.18 12/19/2023    GLUC 254 (H) 11/17/2023    GLUF 214 (H) 12/19/2023    CALCIUM 9.1 12/19/2023    AST 23 12/19/2023    ALT 17 12/19/2023    ALKPHOS 125 (H) 12/19/2023    PROT 6.7 04/18/2018    TP 7.7 12/19/2023    BILITOT 0.4 04/18/2018    TBILI 0.45 12/19/2023    EGFR 40 12/19/2023     Lab Results   Component Value Date    WBC 9.36 12/19/2023    HGB 15.6 (H) 12/19/2023    HCT 44.8 12/19/2023    MCV 98 12/19/2023     12/19/2023     Lab Results   Component Value Date    CHOLESTEROL 185 03/01/2021    CHOLESTEROL 201 (H)  "03/06/2020    CHOLESTEROL 146 03/26/2019     Lab Results   Component Value Date    HDL 68 03/01/2021    HDL 80 03/06/2020    HDL 56 03/26/2019     Lab Results   Component Value Date    LDLCALC 94 03/01/2021    LDLCALC 90 03/06/2020    LDLCALC 69 03/26/2019     Lab Results   Component Value Date    TRIG 114 03/01/2021    TRIG 153 (H) 03/06/2020    TRIG 105 03/26/2019     No results found for: \"CHOLHDL\"  Lab Results   Component Value Date    AET0ZSDSQTPR 3.827 11/16/2023     Lab Results   Component Value Date    PTH 98.9 (H) 12/19/2023    CALCIUM 9.1 12/19/2023    PHOS 3.5 12/19/2023     No results found for: \"SPEP\", \"UPEP\"  No results found for: \"MICROALBUR\", \"FAUF65DTU\"        Objective:      /82 (BP Location: Left arm, Patient Position: Sitting, Cuff Size: Standard)   Pulse 69   Ht 5' 4\" (1.626 m)   Wt 63 kg (139 lb)   SpO2 98%   BMI 23.86 kg/m²          Physical Exam  Vitals reviewed.   Constitutional:       General: She is not in acute distress.     Appearance: Normal appearance.   HENT:      Head: Normocephalic and atraumatic.      Right Ear: External ear normal.      Left Ear: External ear normal.   Eyes:      Conjunctiva/sclera: Conjunctivae normal.   Cardiovascular:      Rate and Rhythm: Normal rate and regular rhythm.      Heart sounds: Normal heart sounds.   Pulmonary:      Effort: No respiratory distress.      Breath sounds: No wheezing.   Abdominal:      Palpations: Abdomen is soft.   Musculoskeletal:      Comments: Mild bilateral lower extremity edema   Skin:     General: Skin is warm and dry.   Neurological:      General: No focal deficit present.      Mental Status: She is alert and oriented to person, place, and time.   Psychiatric:         Mood and Affect: Mood normal.         Behavior: Behavior normal.         "

## 2024-04-22 DIAGNOSIS — E03.9 ACQUIRED HYPOTHYROIDISM: ICD-10-CM

## 2024-04-22 RX ORDER — LEVOTHYROXINE SODIUM 0.03 MG/1
25 TABLET ORAL DAILY
Qty: 90 TABLET | Refills: 1 | Status: SHIPPED | OUTPATIENT
Start: 2024-04-22

## 2024-04-22 NOTE — TELEPHONE ENCOUNTER
Patient requesting refill(s) of: levothyroxine     Last filled: 5/3/23  Last appt: 11/28/23  Next appt: none  Pharmacy: [facility]

## 2024-05-07 DIAGNOSIS — E11.65 TYPE 2 DIABETES MELLITUS WITH HYPERGLYCEMIA, WITHOUT LONG-TERM CURRENT USE OF INSULIN (HCC): ICD-10-CM

## 2024-05-08 RX ORDER — GLIPIZIDE 5 MG/1
5 TABLET ORAL
Qty: 180 TABLET | Refills: 0 | Status: SHIPPED | OUTPATIENT
Start: 2024-05-08

## 2024-05-08 NOTE — TELEPHONE ENCOUNTER
It's been over a year since I actually saw her in the office. Her last 2 visits were virtual only. Is she able to schedule an appointment?

## 2024-05-08 NOTE — TELEPHONE ENCOUNTER
She is unable to get into the office for appointments. I asked if there was anyway she can have a family member bring her in. She said she is unsure. The phone call then dropped

## 2024-06-17 ENCOUNTER — OFFICE VISIT (OUTPATIENT)
Dept: FAMILY MEDICINE CLINIC | Facility: CLINIC | Age: 89
End: 2024-06-17
Payer: MEDICARE

## 2024-06-17 DIAGNOSIS — I27.82 OTHER CHRONIC PULMONARY EMBOLISM WITHOUT ACUTE COR PULMONALE (HCC): ICD-10-CM

## 2024-06-17 DIAGNOSIS — E03.9 ACQUIRED HYPOTHYROIDISM: ICD-10-CM

## 2024-06-17 DIAGNOSIS — C67.8 MALIGNANT NEOPLASM OF OVERLAPPING SITES OF BLADDER (HCC): Chronic | ICD-10-CM

## 2024-06-17 DIAGNOSIS — Z00.00 ENCOUNTER FOR ANNUAL WELLNESS VISIT (AWV) IN MEDICARE PATIENT: ICD-10-CM

## 2024-06-17 DIAGNOSIS — N18.32 TYPE 2 DIABETES MELLITUS WITH STAGE 3B CHRONIC KIDNEY DISEASE, WITHOUT LONG-TERM CURRENT USE OF INSULIN (HCC): Chronic | ICD-10-CM

## 2024-06-17 DIAGNOSIS — R91.8 LUNG MASS: Primary | ICD-10-CM

## 2024-06-17 DIAGNOSIS — I10 ESSENTIAL HYPERTENSION: Chronic | ICD-10-CM

## 2024-06-17 DIAGNOSIS — I48.20 CHRONIC A-FIB (HCC): Chronic | ICD-10-CM

## 2024-06-17 DIAGNOSIS — E11.22 TYPE 2 DIABETES MELLITUS WITH STAGE 3B CHRONIC KIDNEY DISEASE, WITHOUT LONG-TERM CURRENT USE OF INSULIN (HCC): Chronic | ICD-10-CM

## 2024-06-17 DIAGNOSIS — N18.4 CHRONIC RENAL DISEASE, STAGE IV (HCC): ICD-10-CM

## 2024-06-17 PROBLEM — L98.429 SACRAL ULCER (HCC): Chronic | Status: RESOLVED | Noted: 2023-11-16 | Resolved: 2024-06-17

## 2024-06-17 PROCEDURE — 99214 OFFICE O/P EST MOD 30 MIN: CPT | Performed by: INTERNAL MEDICINE

## 2024-06-17 PROCEDURE — G0439 PPPS, SUBSEQ VISIT: HCPCS | Performed by: INTERNAL MEDICINE

## 2024-06-17 NOTE — PROGRESS NOTES
Ambulatory Visit  Name: Sylvia Sanderson      : 1933      MRN: [id number]  Encounter Provider: Matilde Sigala MD  Encounter Date: 2024   Encounter department: [facility]    Assessment & Plan   1. Lung mass  -pt continues to decline any diagnostic interventions for this given her age and co- morbidities. She does have increased cough and rhonchi on exam, declines CXR. Discussed palliative care and home hospice should her symptoms progress. Follow up in 3-4 months for re-evaluation. Daughter-in-law instructed to call for sooner visit if symptoms change    -   Comprehensive metabolic panel; Future; Expected date: 2024  -     CBC and differential; Future; Expected date: 2024  2. Encounter for annual wellness visit (AWV) in Medicare patient  3. Chronic a-fib (HCC)  -     Comprehensive metabolic panel; Future; Expected date: 2024  -     CBC and differential; Future; Expected date: 2024  4. Essential hypertension  -     Comprehensive metabolic panel; Future; Expected date: 2024  -     CBC and differential; Future; Expected date: 2024  5. Other chronic pulmonary embolism without acute cor pulmonale (HCC)  -     Comprehensive metabolic panel; Future; Expected date: 2024  -     CBC and differential; Future; Expected date: 2024  6. Type 2 diabetes mellitus with stage 3b chronic kidney disease, without long-term current use of insulin (HCC)  -     Hemoglobin A1C; Future; Expected date: 2024  7. Acquired hypothyroidism  -     TSH, 3rd generation with Free T4 reflex; Future; Expected date: 2024  8. Malignant neoplasm of overlapping sites of bladder (HCC)  -     Comprehensive metabolic panel; Future; Expected date: 2024  -     CBC and differential; Future; Expected date: 2024  9. Chronic renal disease, stage IV (HCC)  -     Comprehensive metabolic panel; Future; Expected date: 2024  -     CBC and differential; Future;  Expected date: 06/17/2024      Falls Plan of Care: balance, strength, and gait training instructions were provided and referral to physical therapy. Recommended assistive device to help with gait and balance. Home safety evaluation by OT recommended.     Urinary Incontinence Plan of Care: counseling topics discussed: use restroom every 2 hours and preventing constipation.       Preventive health issues were discussed with patient, and age appropriate screening tests were ordered as noted in patient's After Visit Summary. Personalized health advice and appropriate referrals for health education or preventive services given if needed, as noted in patient's After Visit Summary.        History of Present Illness     89yo female with history of Afib, CKD, HTN, PE here for AWV. Main issues include back pain, difficulty walking and trouble sleeping. Not interested in pursuing further work up of lung mass or bladder tumors. Denies dyspnea or SOB, reports occasional cough. Still living at home with her . Daughter-in-law brought her today, otherwise pt is essentially home-bound.        Patient Care Team:  Matilde Sigala MD as PCP - General (Internal Medicine)  Celestino May MD (Pulmonary Disease)  Lang Mckeon MD as Medical Oncologist (Hematology and Oncology)  Fermin Patton DO (Nephrology)    Review of Systems   Constitutional:  Positive for fatigue. Negative for chills and fever.   Respiratory:  Positive for cough and shortness of breath.    Musculoskeletal:  Positive for back pain and gait problem.   Neurological:  Positive for weakness. Negative for light-headedness and headaches.   Psychiatric/Behavioral:  Positive for sleep disturbance.      Medical History Reviewed by provider this encounter:  [facility]       Annual Wellness Visit Questionnaire   Sylvia is here for her Subsequent Wellness visit.     Health Risk Assessment:   Patient rates overall health as good. Patient feels that their physical  health rating is same. Patient is dissatisfied with their life. Hearing was rated as same. Patient feels that their emotional and mental health rating is slightly worse. Patients states they are never, rarely angry. Patient states they are always unusually tired/fatigued. Pain experienced in the last 7 days has been a lot. Patient's pain rating has been 7/10. Patient states that she has experienced no weight loss or gain in last 6 months.     Fall Risk Screening:   In the past year, patient has experienced: history of falling in past year    Number of falls: 2 or more  Injured during fall?: Yes    Feels unsteady when standing or walking?: Yes    Worried about falling?: Yes      Urinary Incontinence Screening:   Patient has leaked urine accidently in the last six months.     Home Safety:  Patient has trouble with stairs inside or outside of their home. Patient has working smoke alarms and has working carbon monoxide detector. Home safety hazards include: none.     Nutrition:   Current diet is Regular.     Medications:   Patient is currently taking over-the-counter supplements. OTC medications include: see medication list. Patient is able to manage medications.     Activities of Daily Living (ADLs)/Instrumental Activities of Daily Living (IADLs):   Walk and transfer into and out of bed and chair?: Yes  Dress and groom yourself?: Yes    Bathe or shower yourself?: Yes    Feed yourself? Yes  Do your laundry/housekeeping?: Yes  Manage your money, pay your bills and track your expenses?: Yes  Make your own meals?: Yes    Do your own shopping?: No    ADL comments: Granddaughter delivers her groceries    Previous Hospitalizations:   Any hospitalizations or ED visits within the last 12 months?: Yes    How many hospitalizations have you had in the last year?: 1-2    Advance Care Planning:   Living will: No    Durable POA for healthcare: No    Advanced directive counseling given: Yes    End of Life Decisions reviewed with  patient: Yes    Provider agrees with end of life decisions: Yes      Comments: Discussed palliative care and home hospice     Cognitive Screening:   Provider or family/friend/caregiver concerned regarding cognition?: No    PREVENTIVE SCREENINGS      Cardiovascular Screening:    General: Screening Not Indicated and History Lipid Disorder      Diabetes Screening:     General: Screening Not Indicated and History Diabetes      Colorectal Cancer Screening:     General: Screening Not Indicated      Breast Cancer Screening:     General: Screening Not Indicated      Cervical Cancer Screening:    General: Screening Not Indicated      Osteoporosis Screening:    General: Screening Not Indicated      Lung Cancer Screening:     General: Screening Not Indicated and Patient Declines      Hepatitis C Screening:    General: Screening Not Indicated    Screening, Brief Intervention, and Referral to Treatment (SBIRT)    Screening  Typical number of drinks in a day: 0  Typical number of drinks in a week: 0  Interpretation: Low risk drinking behavior.    Single Item Drug Screening:  How often have you used an illegal drug (including marijuana) or a prescription medication for non-medical reasons in the past year? never    Single Item Drug Screen Score: 0  Interpretation: Negative screen for possible drug use disorder    Brief Intervention  Alcohol & drug use screenings were reviewed. No concerns regarding substance use disorder identified.     Other Counseling Topics:   Car/seat belt/driving safety, skin self-exam, sunscreen and regular weightbearing exercise and calcium and vitamin D intake. Pt no longer drives    Social Determinants of Health     Food Insecurity: No Food Insecurity (11/16/2023)    Hunger Vital Sign    • Worried About Running Out of Food in the Last Year: Never true    • Ran Out of Food in the Last Year: Never true   Transportation Needs: Unknown (11/16/2023)    PRAPARE - Transportation    • Lack of Transportation  (Medical): No   Housing Stability: Low Risk  (11/16/2023)    Housing Stability Vital Sign    • Unable to Pay for Housing in the Last Year: No    • Number of Times Moved in the Last Year: 1    • Homeless in the Last Year: No     No results found.    Objective     There were no vitals taken for this visit.    Physical Exam  Vitals reviewed.   Constitutional:       General: She is not in acute distress.     Appearance: She is normal weight.   Cardiovascular:      Rate and Rhythm: Normal rate and regular rhythm.      Heart sounds: Murmur heard.      No friction rub. No gallop.   Pulmonary:      Effort: Pulmonary effort is normal. No respiratory distress.      Breath sounds: Wheezing and rhonchi present.   Neurological:      Mental Status: She is alert.      Motor: Weakness present.      Gait: Gait abnormal.   Psychiatric:         Mood and Affect: Mood normal.         Behavior: Behavior normal.       Administrative Statements

## 2024-06-17 NOTE — PATIENT INSTRUCTIONS
Medicare Preventive Visit Patient Instructions  Thank you for completing your Welcome to Medicare Visit or Medicare Annual Wellness Visit today. Your next wellness visit will be due in one year (6/18/2025).  The screening/preventive services that you may require over the next 5-10 years are detailed below. Some tests may not apply to you based off risk factors and/or age. Screening tests ordered at today's visit but not completed yet may show as past due. Also, please note that scanned in results may not display below.  Preventive Screenings:  Service Recommendations Previous Testing/Comments   Colorectal Cancer Screening  * Colonoscopy    * Fecal Occult Blood Test (FOBT)/Fecal Immunochemical Test (FIT)  * Fecal DNA/Cologuard Test  * Flexible Sigmoidoscopy Age: 45-75 years old   Colonoscopy: every 10 years (may be performed more frequently if at higher risk)  OR  FOBT/FIT: every 1 year  OR  Cologuard: every 3 years  OR  Sigmoidoscopy: every 5 years  Screening may be recommended earlier than age 45 if at higher risk for colorectal cancer. Also, an individualized decision between you and your healthcare provider will decide whether screening between the ages of 76-85 would be appropriate. Colonoscopy: Not on file  FOBT/FIT: Not on file  Cologuard: Not on file  Sigmoidoscopy: Not on file    Screening Not Indicated     Breast Cancer Screening Age: 40+ years old  Frequency: every 1-2 years  Not required if history of left and right mastectomy Mammogram: Not on file        Cervical Cancer Screening Between the ages of 21-29, pap smear recommended once every 3 years.   Between the ages of 30-65, can perform pap smear with HPV co-testing every 5 years.   Recommendations may differ for women with a history of total hysterectomy, cervical cancer, or abnormal pap smears in past. Pap Smear: Not on file    Screening Not Indicated   Hepatitis C Screening Once for adults born between 1945 and 1965  More frequently in patients at  high risk for Hepatitis C Hep C Antibody: Not on file        Diabetes Screening 1-2 times per year if you're at risk for diabetes or have pre-diabetes Fasting glucose: 167 mg/dL (3/6/2024)  A1C: 7.8 % (5/15/2023)  Screening Not Indicated  History Diabetes   Cholesterol Screening Once every 5 years if you don't have a lipid disorder. May order more often based on risk factors. Lipid panel: 03/01/2021    Screening Not Indicated  History Lipid Disorder     Other Preventive Screenings Covered by Medicare:  Abdominal Aortic Aneurysm (AAA) Screening: covered once if your at risk. You're considered to be at risk if you have a family history of AAA.  Lung Cancer Screening: covers low dose CT scan once per year if you meet all of the following conditions: (1) Age 55-77; (2) No signs or symptoms of lung cancer; (3) Current smoker or have quit smoking within the last 15 years; (4) You have a tobacco smoking history of at least 20 pack years (packs per day multiplied by number of years you smoked); (5) You get a written order from a healthcare provider.  Glaucoma Screening: covered annually if you're considered high risk: (1) You have diabetes OR (2) Family history of glaucoma OR (3)  aged 50 and older OR (4)  American aged 65 and older  Osteoporosis Screening: covered every 2 years if you meet one of the following conditions: (1) You're estrogen deficient and at risk for osteoporosis based off medical history and other findings; (2) Have a vertebral abnormality; (3) On glucocorticoid therapy for more than 3 months; (4) Have primary hyperparathyroidism; (5) On osteoporosis medications and need to assess response to drug therapy.   Last bone density test (DXA Scan): Not on file.  HIV Screening: covered annually if you're between the age of 15-65. Also covered annually if you are younger than 15 and older than 65 with risk factors for HIV infection. For pregnant patients, it is covered up to 3 times per  pregnancy.    Immunizations:  Immunization Recommendations   Influenza Vaccine Annual influenza vaccination during flu season is recommended for all persons aged >= 6 months who do not have contraindications   Pneumococcal Vaccine   * Pneumococcal conjugate vaccine = PCV13 (Prevnar 13), PCV15 (Vaxneuvance), PCV20 (Prevnar 20)  * Pneumococcal polysaccharide vaccine = PPSV23 (Pneumovax) Adults 19-63 yo with certain risk factors or if 65+ yo  If never received any pneumonia vaccine: recommend Prevnar 20 (PCV20)  Give PCV20 if previously received 1 dose of PCV13 or PPSV23   Hepatitis B Vaccine 3 dose series if at intermediate or high risk (ex: diabetes, end stage renal disease, liver disease)   Respiratory syncytial virus (RSV) Vaccine - COVERED BY MEDICARE PART D  * RSVPreF3 (Arexvy) CDC recommends that adults 60 years of age and older may receive a single dose of RSV vaccine using shared clinical decision-making (SCDM)   Tetanus (Td) Vaccine - COST NOT COVERED BY MEDICARE PART B Following completion of primary series, a booster dose should be given every 10 years to maintain immunity against tetanus. Td may also be given as tetanus wound prophylaxis.   Tdap Vaccine - COST NOT COVERED BY MEDICARE PART B Recommended at least once for all adults. For pregnant patients, recommended with each pregnancy.   Shingles Vaccine (Shingrix) - COST NOT COVERED BY MEDICARE PART B  2 shot series recommended in those 19 years and older who have or will have weakened immune systems or those 50 years and older     Health Maintenance Due:  There are no preventive care reminders to display for this patient.  Immunizations Due:      Topic Date Due   • COVID-19 Vaccine (3 - 2023-24 season) 09/01/2023   • Influenza Vaccine (Season Ended) 09/01/2024     Advance Directives   What are advance directives?  Advance directives are legal documents that state your wishes and plans for medical care. These plans are made ahead of time in case you lose  your ability to make decisions for yourself. Advance directives can apply to any medical decision, such as the treatments you want, and if you want to donate organs.   What are the types of advance directives?  There are many types of advance directives, and each state has rules about how to use them. You may choose a combination of any of the following:  Living will:  This is a written record of the treatment you want. You can also choose which treatments you do not want, which to limit, and which to stop at a certain time. This includes surgery, medicine, IV fluid, and tube feedings.   Durable power of  for healthcare (DPAHC):  This is a written record that states who you want to make healthcare choices for you when you are unable to make them for yourself. This person, called a proxy, is usually a family member or a friend. You may choose more than 1 proxy.  Do not resuscitate (DNR) order:  A DNR order is used in case your heart stops beating or you stop breathing. It is a request not to have certain forms of treatment, such as CPR. A DNR order may be included in other types of advance directives.  Medical directive:  This covers the care that you want if you are in a coma, near death, or unable to make decisions for yourself. You can list the treatments you want for each condition. Treatment may include pain medicine, surgery, blood transfusions, dialysis, IV or tube feedings, and a ventilator (breathing machine).  Values history:  This document has questions about your views, beliefs, and how you feel and think about life. This information can help others choose the care that you would choose.  Why are advance directives important?  An advance directive helps you control your care. Although spoken wishes may be used, it is better to have your wishes written down. Spoken wishes can be misunderstood, or not followed. Treatments may be given even if you do not want them. An advance directive may make it  easier for your family to make difficult choices about your care.   Fall Prevention    Fall prevention  includes ways to make your home and other areas safer. It also includes ways you can move more carefully to prevent a fall. Health conditions that cause changes in your blood pressure, vision, or muscle strength and coordination may increase your risk for falls. Medicines may also increase your risk for falls if they make you dizzy, weak, or sleepy.   Fall prevention tips:   Stand or sit up slowly.    Use assistive devices as directed.    Wear shoes that fit well and have soles that .    Wear a personal alarm.    Stay active.    Manage your medical conditions.    Home Safety Tips:  Add items to prevent falls in the bathroom.    Keep paths clear.    Install bright lights in your home.    Keep items you use often on shelves within reach.    Paint or place reflective tape on the edges of your stairs.    Urinary Incontinence   Urinary incontinence (UI)  is when you lose control of your bladder. UI develops because your bladder cannot store or empty urine properly. The 3 most common types of UI are stress incontinence, urge incontinence, or both.  Medicines:   May be given to help strengthen your bladder control. Report any side effects of medication to your healthcare provider.  Do pelvic muscle exercises often:  Your pelvic muscles help you stop urinating. Squeeze these muscles tight for 5 seconds, then relax for 5 seconds. Gradually work up to squeezing for 10 seconds. Do 3 sets of 15 repetitions a day, or as directed. This will help strengthen your pelvic muscles and improve bladder control.  Train your bladder:  Go to the bathroom at set times, such as every 2 hours, even if you do not feel the urge to go. You can also try to hold your urine when you feel the urge to go. For example, hold your urine for 5 minutes when you feel the urge to go. As that becomes easier, hold your urine for 10 minutes.   Self-care:    Keep a UI record.  Write down how often you leak urine and how much you leak. Make a note of what you were doing when you leaked urine.  Drink liquids as directed. You may need to limit the amount of liquid you drink to help control your urine leakage. Do not drink any liquid right before you go to bed. Limit or do not have drinks that contain caffeine or alcohol.   Prevent constipation.  Eat a variety of high-fiber foods. Good examples are high-fiber cereals, beans, vegetables, and whole-grain breads. Walking is the best way to trigger your intestines to have a bowel movement.  Exercise regularly and maintain a healthy weight.  Weight loss and exercise will decrease pressure on your bladder and help you control your leakage.   Use a catheter as directed  to help empty your bladder. A catheter is a tiny, plastic tube that is put into your bladder to drain your urine.   Go to behavior therapy as directed.  Behavior therapy may be used to help you learn to control your urge to urinate.     © Copyright [employer] 2018 Information is for End User's use only and may not be sold, redistributed or otherwise used for commercial purposes. All illustrations and images included in CareNotes® are the copyrighted property of [employer] or [employer]

## 2024-07-05 DIAGNOSIS — E11.65 TYPE 2 DIABETES MELLITUS WITH HYPERGLYCEMIA, WITHOUT LONG-TERM CURRENT USE OF INSULIN (HCC): ICD-10-CM

## 2024-07-06 RX ORDER — GLIPIZIDE 5 MG/1
5 TABLET ORAL
Qty: 180 TABLET | Refills: 0 | Status: SHIPPED | OUTPATIENT
Start: 2024-07-06

## 2024-07-17 DIAGNOSIS — N18.31 TYPE 2 DIABETES MELLITUS WITH STAGE 3A CHRONIC KIDNEY DISEASE, WITHOUT LONG-TERM CURRENT USE OF INSULIN (HCC): ICD-10-CM

## 2024-07-17 DIAGNOSIS — E11.22 TYPE 2 DIABETES MELLITUS WITH STAGE 3A CHRONIC KIDNEY DISEASE, WITHOUT LONG-TERM CURRENT USE OF INSULIN (HCC): ICD-10-CM

## 2024-08-08 DIAGNOSIS — N18.32 TYPE 2 DIABETES MELLITUS WITH STAGE 3B CHRONIC KIDNEY DISEASE, WITHOUT LONG-TERM CURRENT USE OF INSULIN (HCC): Chronic | ICD-10-CM

## 2024-08-08 DIAGNOSIS — E11.22 TYPE 2 DIABETES MELLITUS WITH STAGE 3B CHRONIC KIDNEY DISEASE, WITHOUT LONG-TERM CURRENT USE OF INSULIN (HCC): Chronic | ICD-10-CM

## 2024-08-08 NOTE — TELEPHONE ENCOUNTER
Reason for call:   [x] Refill   [] Prior Auth  [] Other:     Office:   [x] PCP/Provider -   [] Specialty/Provider -     Medication: metFORMIN (GLUCOPHAGE) 500 mg tablet     Dose/Frequency:  Take 1 tablet (500 mg total) by mouth 2 (two) times a day with meals,     Quantity: 180    Pharmacy: [id number] - [place] - [address] [place] [address]     Does the patient have enough for 3 days?   [] Yes   [x] No - Send as HP to POD

## 2024-08-13 ENCOUNTER — APPOINTMENT (OUTPATIENT)
Dept: LAB | Facility: CLINIC | Age: 89
End: 2024-08-13
Payer: MEDICARE

## 2024-08-13 DIAGNOSIS — N18.32 TYPE 2 DIABETES MELLITUS WITH STAGE 3B CHRONIC KIDNEY DISEASE, WITHOUT LONG-TERM CURRENT USE OF INSULIN (HCC): Chronic | ICD-10-CM

## 2024-08-13 DIAGNOSIS — E11.22 TYPE 2 DIABETES MELLITUS WITH STAGE 3B CHRONIC KIDNEY DISEASE, WITHOUT LONG-TERM CURRENT USE OF INSULIN (HCC): Chronic | ICD-10-CM

## 2024-08-13 DIAGNOSIS — I10 ESSENTIAL HYPERTENSION: Chronic | ICD-10-CM

## 2024-08-13 DIAGNOSIS — N18.4 CHRONIC RENAL DISEASE, STAGE IV (HCC): ICD-10-CM

## 2024-08-13 DIAGNOSIS — R91.8 LUNG MASS: ICD-10-CM

## 2024-08-13 DIAGNOSIS — N18.32 STAGE 3B CHRONIC KIDNEY DISEASE (HCC): ICD-10-CM

## 2024-08-13 DIAGNOSIS — E03.9 ACQUIRED HYPOTHYROIDISM: ICD-10-CM

## 2024-08-13 DIAGNOSIS — I48.20 CHRONIC A-FIB (HCC): Chronic | ICD-10-CM

## 2024-08-13 DIAGNOSIS — I27.82 OTHER CHRONIC PULMONARY EMBOLISM WITHOUT ACUTE COR PULMONALE (HCC): ICD-10-CM

## 2024-08-13 DIAGNOSIS — N25.81 SECONDARY HYPERPARATHYROIDISM (HCC): ICD-10-CM

## 2024-08-13 DIAGNOSIS — C67.8 MALIGNANT NEOPLASM OF OVERLAPPING SITES OF BLADDER (HCC): Chronic | ICD-10-CM

## 2024-08-13 LAB
25(OH)D3 SERPL-MCNC: 98.3 NG/ML (ref 30–100)
ALBUMIN SERPL BCG-MCNC: 4.1 G/DL (ref 3.5–5)
ALP SERPL-CCNC: 79 U/L (ref 34–104)
ALT SERPL W P-5'-P-CCNC: 27 U/L (ref 7–52)
ANION GAP SERPL CALCULATED.3IONS-SCNC: 11 MMOL/L (ref 4–13)
AST SERPL W P-5'-P-CCNC: 32 U/L (ref 13–39)
BACTERIA UR QL AUTO: ABNORMAL /HPF
BASOPHILS # BLD AUTO: 0.05 THOUSANDS/ÂΜL (ref 0–0.1)
BASOPHILS NFR BLD AUTO: 1 % (ref 0–1)
BILIRUB SERPL-MCNC: 0.56 MG/DL (ref 0.2–1)
BILIRUB UR QL STRIP: NEGATIVE
BUN SERPL-MCNC: 33 MG/DL (ref 5–25)
CALCIUM SERPL-MCNC: 9.2 MG/DL (ref 8.4–10.2)
CHLORIDE SERPL-SCNC: 98 MMOL/L (ref 96–108)
CLARITY UR: CLEAR
CO2 SERPL-SCNC: 29 MMOL/L (ref 21–32)
COLOR UR: ABNORMAL
CREAT SERPL-MCNC: 1.2 MG/DL (ref 0.6–1.3)
CREAT UR-MCNC: 117.3 MG/DL
EOSINOPHIL # BLD AUTO: 0.09 THOUSAND/ÂΜL (ref 0–0.61)
EOSINOPHIL NFR BLD AUTO: 1 % (ref 0–6)
ERYTHROCYTE [DISTWIDTH] IN BLOOD BY AUTOMATED COUNT: 15 % (ref 11.6–15.1)
EST. AVERAGE GLUCOSE BLD GHB EST-MCNC: 194 MG/DL
GFR SERPL CREATININE-BSD FRML MDRD: 39 ML/MIN/1.73SQ M
GLUCOSE SERPL-MCNC: 207 MG/DL (ref 65–140)
GLUCOSE UR STRIP-MCNC: NEGATIVE MG/DL
HBA1C MFR BLD: 8.4 %
HCT VFR BLD AUTO: 43.8 % (ref 34.8–46.1)
HGB BLD-MCNC: 14.4 G/DL (ref 11.5–15.4)
HGB UR QL STRIP.AUTO: NEGATIVE
IMM GRANULOCYTES # BLD AUTO: 0.02 THOUSAND/UL (ref 0–0.2)
IMM GRANULOCYTES NFR BLD AUTO: 0 % (ref 0–2)
KETONES UR STRIP-MCNC: NEGATIVE MG/DL
LEUKOCYTE ESTERASE UR QL STRIP: NEGATIVE
LYMPHOCYTES # BLD AUTO: 1.18 THOUSANDS/ÂΜL (ref 0.6–4.47)
LYMPHOCYTES NFR BLD AUTO: 13 % (ref 14–44)
MAGNESIUM SERPL-MCNC: 2 MG/DL (ref 1.9–2.7)
MCH RBC QN AUTO: 32.1 PG (ref 26.8–34.3)
MCHC RBC AUTO-ENTMCNC: 32.9 G/DL (ref 31.4–37.4)
MCV RBC AUTO: 98 FL (ref 82–98)
MICROALBUMIN UR-MCNC: 610 MG/L
MICROALBUMIN/CREAT 24H UR: 520 MG/G CREATININE (ref 0–30)
MONOCYTES # BLD AUTO: 0.62 THOUSAND/ÂΜL (ref 0.17–1.22)
MONOCYTES NFR BLD AUTO: 7 % (ref 4–12)
NEUTROPHILS # BLD AUTO: 6.82 THOUSANDS/ÂΜL (ref 1.85–7.62)
NEUTS SEG NFR BLD AUTO: 78 % (ref 43–75)
NITRITE UR QL STRIP: NEGATIVE
NON-SQ EPI CELLS URNS QL MICRO: ABNORMAL /HPF
NRBC BLD AUTO-RTO: 0 /100 WBCS
PH UR STRIP.AUTO: 6 [PH]
PHOSPHATE SERPL-MCNC: 3.1 MG/DL (ref 2.3–4.1)
PLATELET # BLD AUTO: 150 THOUSANDS/UL (ref 149–390)
PMV BLD AUTO: 12.6 FL (ref 8.9–12.7)
POTASSIUM SERPL-SCNC: 4.1 MMOL/L (ref 3.5–5.3)
PROT SERPL-MCNC: 7.5 G/DL (ref 6.4–8.4)
PROT UR STRIP-MCNC: ABNORMAL MG/DL
PTH-INTACT SERPL-MCNC: 70.4 PG/ML (ref 12–88)
RBC # BLD AUTO: 4.49 MILLION/UL (ref 3.81–5.12)
RBC #/AREA URNS AUTO: ABNORMAL /HPF
SODIUM SERPL-SCNC: 138 MMOL/L (ref 135–147)
SP GR UR STRIP.AUTO: 1.02 (ref 1–1.03)
TSH SERPL DL<=0.05 MIU/L-ACNC: 2.73 UIU/ML (ref 0.45–4.5)
UROBILINOGEN UR STRIP-ACNC: <2 MG/DL
WBC # BLD AUTO: 8.78 THOUSAND/UL (ref 4.31–10.16)
WBC #/AREA URNS AUTO: ABNORMAL /HPF

## 2024-08-13 PROCEDURE — 83735 ASSAY OF MAGNESIUM: CPT

## 2024-08-13 PROCEDURE — 85025 COMPLETE CBC W/AUTO DIFF WBC: CPT

## 2024-08-13 PROCEDURE — 82306 VITAMIN D 25 HYDROXY: CPT

## 2024-08-13 PROCEDURE — 83970 ASSAY OF PARATHORMONE: CPT

## 2024-08-13 PROCEDURE — 36415 COLL VENOUS BLD VENIPUNCTURE: CPT

## 2024-08-13 PROCEDURE — 80053 COMPREHEN METABOLIC PANEL: CPT

## 2024-08-13 PROCEDURE — 81001 URINALYSIS AUTO W/SCOPE: CPT

## 2024-08-13 PROCEDURE — 83036 HEMOGLOBIN GLYCOSYLATED A1C: CPT

## 2024-08-13 PROCEDURE — 82570 ASSAY OF URINE CREATININE: CPT

## 2024-08-13 PROCEDURE — 84443 ASSAY THYROID STIM HORMONE: CPT

## 2024-08-13 PROCEDURE — 82043 UR ALBUMIN QUANTITATIVE: CPT

## 2024-08-13 PROCEDURE — 84100 ASSAY OF PHOSPHORUS: CPT

## 2024-09-06 ENCOUNTER — OFFICE VISIT (OUTPATIENT)
Age: 89
End: 2024-09-06
Payer: MEDICARE

## 2024-09-06 VITALS
TEMPERATURE: 97.4 F | WEIGHT: 137 LBS | OXYGEN SATURATION: 97 % | BODY MASS INDEX: 23.39 KG/M2 | DIASTOLIC BLOOD PRESSURE: 82 MMHG | HEIGHT: 64 IN | HEART RATE: 71 BPM | SYSTOLIC BLOOD PRESSURE: 142 MMHG

## 2024-09-06 DIAGNOSIS — N25.81 SECONDARY HYPERPARATHYROIDISM OF RENAL ORIGIN (HCC): ICD-10-CM

## 2024-09-06 DIAGNOSIS — N18.32 TYPE 2 DIABETES MELLITUS WITH STAGE 3B CHRONIC KIDNEY DISEASE, WITHOUT LONG-TERM CURRENT USE OF INSULIN (HCC): Chronic | ICD-10-CM

## 2024-09-06 DIAGNOSIS — E55.9 VITAMIN D DEFICIENCY: ICD-10-CM

## 2024-09-06 DIAGNOSIS — C67.8 MALIGNANT NEOPLASM OF OVERLAPPING SITES OF BLADDER (HCC): Chronic | ICD-10-CM

## 2024-09-06 DIAGNOSIS — E11.22 TYPE 2 DIABETES MELLITUS WITH STAGE 3B CHRONIC KIDNEY DISEASE, WITHOUT LONG-TERM CURRENT USE OF INSULIN (HCC): Chronic | ICD-10-CM

## 2024-09-06 DIAGNOSIS — E83.42 HYPOMAGNESEMIA: ICD-10-CM

## 2024-09-06 DIAGNOSIS — E11.65 TYPE 2 DIABETES MELLITUS WITH HYPERGLYCEMIA, WITHOUT LONG-TERM CURRENT USE OF INSULIN (HCC): ICD-10-CM

## 2024-09-06 DIAGNOSIS — I10 ESSENTIAL HYPERTENSION: Chronic | ICD-10-CM

## 2024-09-06 DIAGNOSIS — N18.32 STAGE 3B CHRONIC KIDNEY DISEASE (HCC): Primary | ICD-10-CM

## 2024-09-06 DIAGNOSIS — N25.81 SECONDARY HYPERPARATHYROIDISM (HCC): ICD-10-CM

## 2024-09-06 PROCEDURE — 99214 OFFICE O/P EST MOD 30 MIN: CPT

## 2024-09-06 RX ORDER — GLIPIZIDE 5 MG/1
5 TABLET ORAL
Qty: 200 TABLET | Refills: 1 | Status: SHIPPED | OUTPATIENT
Start: 2024-09-06

## 2024-09-06 NOTE — PATIENT INSTRUCTIONS
Pleasure to see you today.     Please continue to take all medication as prescribed.     Please call Dr. Meyer to discuss frequent urination and ask if there may be any medication to decrease urinary frequency.    If you decide you would like to see pulmonology for your lung mass plesae call us and we will place a consult.     Blood pressure is stable. Kidney function is stable. Magnesium levels are good, please continue taking magnesium supplement daily.     Please return in 6 months to see Dr. Patton with labs prior to that visit.

## 2024-09-06 NOTE — PROGRESS NOTES
OFFICE FOLLOW UP - Nephrology   Sylvia Sanderson 89 y.o. female MRN: [id number]       ASSESSMENT/PLAN:    Sylvia was seen today for follow-up.    Diagnoses and all orders for this visit:    Stage 3b chronic kidney disease (HCC)    Essential hypertension    Secondary hyperparathyroidism (HCC)    Hypomagnesemia    Type 2 diabetes mellitus with stage 3b chronic kidney disease, without long-term current use of insulin (HCC)    Stage IIIb chronic kidney disease, baseline creatinine 1.2-1.3 mg/dL  Etiology: hypertensive nephrosclerosis, diabetic nephropathy, and age-related nephron loss. Not a candidate for ACE/ARB/SGLT2 or fineronone. Creatinine 1.20 mg/dL, appropriate, GFR 33 ml/min, 8/13.     UA 2+ protein, UACR 520, 8/13. CT CAP with no hydronephrosis, 11/15/23. Continue to avoid NSAIDs and nephrotoxins. Stay well hydrated. Denies NSAID use, was taking tylenol for pain but has been taking something else over the counter . Will continue to monitor.     Essential hypertension  Office blood pressure 142/82 mmHg. Does not check BP at home. Continue 2 gm sodium restriction. Continue amlodipine 5 mg daily, metoprolol 25 mg twice dialy.     Secondary hyperparathyroidism of renal origin  PTH 70.4 pg/mL, Phosphorus 3.1 mg/dL, Calcium 9.2 mg/dL, 8/13. Will continue to monitor.     Hypomagnesemia  Magnesium 2.0 mg/dL, appropriate, 8/13. Continue magnesium oxide 400 mg daily.    Type 2 diabetes mellitus   HA1C 8.4%, elevated, 8/13  Continue management as per endocrinology. Maintained on metformin and glipizide.    Malignant neoplasm of overlapping sites of bladder  Patient notes she is up approximately 10 times at night to urinate as well as urinating approximately 10 times during the day with urge incontinence. She states she wears pads due to incontinence. CT AP with Two enhancing urinary bladder masses are redemonstrated, similar to November 11, 2023. The mass along the anterior superior aspect of the urinary bladder measures  approximately 2.4 cm and the mass along the inferior aspect of the urinary  bladder measures approximately 1.7 cm. The appearance of these masses is highly suggestive of malignant neoplasm. 11/15/23. Patient states she no longer follows with urology, states she does not wish to have surgery. Recommend follow up with urology to determine if treatment is available relieve urinary symptoms despite.     Lung mass  CT CAP Redemonstrated is a large mass, measuring up to 5.4 cm, in the region of the posterior right hilum and medial aspect superior segment right lower lobe of the lung also with extension into the adjacent posteromedial aspect of the right upper lobe of the lung. Extension into the subcarinal region redemonstrated. The mass demonstrates areas of hypodensity centrally suggesting necrosis. Encasement and narrowing of several of the airways as they pass through this mass re-demonstrated. Peripheral spiculation is again seen extending to the pleural surfaces. This is similar to November 11, 2023 and in keeping with malignant neoplasm. There is no evidence of pulmonary contusion or pneumothorax. 11/15/23. Declines consult with pulmonology.       PATIENT INSTRUCTIONS:  There are no Patient Instructions on file for this visit.    HPI: Sylvia Sanderson is a 89 y.o. female who is here for follow up of  chronic kidney disease, hypertension, secondary hyperparathyroidism, hypomagnesium. PMH includes bladder cancer, ,lung nodule with  presumed lung cancer, afib, and pulmonary embolism.      Denies recent hospitalizations, emergency department visits or illness. States overall has been feeling well.     ROS:   A complete review of systems was done. Pertinent positives and negatives as noted in the HPI, otherwise the review of systems is negative.    Constitutional: Denies fever, excessive fatigue, or unintentional weight loss.  HEENT: Denies headaches, dizziness, lightheadedness, or blurred vision.  Respiratory: Denies cough,  "shortness of breath, wheezing or chest pain.  Cardiovascular: Denies palpitation, chest pain, cramping or edema.  Gastrointestinal: Denies abdominal pain, nausea, vomiting, diarrhea, constipation, or changes in appetite or bowel habits.  Genitourinary: Denies changes in urinary frequency or amount, dysuria, hematuria, flank pain, abdominal pain, difficulty with urinary stream or incomplete bladder emptying.  Musculoskeletal: Denies joint pain, muscle weakness, back pain or cramping.  Neurological: Denies headaches, dizziness or tingling.  Psychiatric: Denies depression, anxiety, or changes in mood.  Endocrine: Denies heat or cold intolerance, excessive thirst or excessive hunger.  Hematological/lymphatic: Denies easy bruising, bleeding or swollen lymph nodes.  Integumentary: Denies skin lesions, rashes or wounds.        Physical Exam:   Vitals:    09/06/24 1249   BP: 142/82   BP Location: Left arm   Patient Position: Sitting   Cuff Size: Standard   Pulse: 71   Temp: (!) 97.4 °F (36.3 °C)   SpO2: 97%   Weight: 62.1 kg (137 lb)   Height: 5' 4\" (1.626 m)     Body mass index is 23.52 kg/m².    General: no acute distress   Eyes: conjunctivae pink, anicteric sclerae  ENT: mucous membranes moist  Neck: supple, no JVD  Chest: Wheezes over posterior lung fields, R>L  CVS: irregular rate, history of afib  Abdomen: soft, non-tender, non-distended  Extremities: Trace lower extremity edema , R>L  Skin: no rash  Neuro: awake and alert       Allergies: Other    Medications:   Current Outpatient Medications:     acetaminophen (TYLENOL) 325 mg tablet, Take 2 tablets (650 mg total) by mouth every 6 (six) hours as needed for mild pain or fever, Disp: , Rfl: 0    cholecalciferol (VITAMIN D3) 1,000 units tablet, Take 1,000 Units by mouth daily, Disp: , Rfl:     glucose blood (Contour Next Test) test strip, Test BID, Disp: 100 each, Rfl: 2    levothyroxine 25 mcg tablet, Take 1 tablet (25 mcg total) by mouth daily, Disp: 90 tablet, Rfl: " 1    lovastatin (MEVACOR) 20 mg tablet, Take 1 tablet (20 mg total) by mouth daily at bedtime, Disp: 90 tablet, Rfl: 3    metFORMIN (GLUCOPHAGE) 500 mg tablet, Take 1 tablet (500 mg total) by mouth 2 (two) times a day with meals, Disp: 60 tablet, Rfl: 0    Microlet Lancets MISC, Testing twice daily, Disp: 100 each, Rfl: 5    Multiple Vitamins-Minerals (CENTRUM ADULT 50+ MULTIGUMMIES PO), Take by mouth, Disp: , Rfl:     amLODIPine (NORVASC) 5 mg tablet, Take 1 tablet (5 mg total) by mouth daily at bedtime, Disp: 90 tablet, Rfl: 0    calcium carbonate (OYSTER SHELL,OSCAL) 500 mg, Take 1 tablet by mouth daily with breakfast Do not start before November 18, 2023. (Patient not taking: Reported on 9/6/2024), Disp: 30 tablet, Rfl: 0    cyanocobalamin (VITAMIN B-12) 100 mcg tablet, Take 1,000 mcg by mouth daily, Disp: , Rfl:     glipiZIDE (GLUCOTROL) 5 mg tablet, Take 1 tablet (5 mg total) by mouth 2 (two) times a day before meals, Disp: 180 tablet, Rfl: 0    magnesium Oxide (MAG-OX) 400 mg TABS, Take 1 tablet (400 mg total) by mouth 2 (two) times a day for 3 doses, Disp: 3 tablet, Rfl: 0    metoprolol tartrate (LOPRESSOR) 25 mg tablet, Take 1 tablet (25 mg total) by mouth every 12 (twelve) hours, Disp: 180 tablet, Rfl: 3    Past Medical History:   Diagnosis Date    Abnormal gait     Accidental fall from chair, subsequent encounter     Atrial fibrillation (HCC)     Benign essential hypertension     Bite of animal     Cataract     Cellulitis     Chronic kidney disease     Chronic kidney disease, stage 3 (HCC)     Colon cancer (HCC)     Colon polyp     Compression fracture of L3 vertebra (HCC) 04/08/2023    Compression fracture of thoracic spine, non-traumatic, sequela 02/28/2020    Current tear of lateral cartilage or meniscus of knee     Diabetes mellitus (HCC)     Disease of thyroid gland     Disorder of magnesium metabolism     Dvt femoral (deep venous thrombosis)     Embolism from thrombosis of vein of distal end of  lower extremity (HCC)     Essential hypertension     Fall     Hyperlipidemia     Hyperlipidemia     Hypertension     Hypothyroidism     Insomnia     Kidney stone     Knee joint effusion     Leukocytosis     Malaise and fatigue     MI (myocardial infarction) (HCC)     Hospitalization     Orbital hemorrhage     Other sleep disorders     PAF (paroxysmal atrial fibrillation) (HCC)     Presence of vena cava filter     Pulmonary embolism (HCC)     Tear film insufficiency     Type 2 diabetes mellitus (HCC)     Unspecified osteoarthritis, unspecified site     Weight decreased      Past Surgical History:   Procedure Laterality Date    ABDOMINAL ADHESION SURGERY  2015    Had wound vac    ABDOMINAL SURGERY      APPENDECTOMY      CATARACT EXTRACTION      CHOLECYSTECTOMY      COLON SURGERY      COLOSTOMY  2011    Due to Ileus, then reanastomosis in 2002    COLOSTOMY TAKEDOWN/REVERSE KUMAR      EXCISION BLADDER MESH TRANSVESICLEPORT W/ LASER      HERNIA REPAIR      IVC FILTER INSERTION      KNEE SURGERY  2014    repair     MS CYSTOURETHROSCOPY W/DEST &/RMVL TUMOR LARGE N/A 4/30/2021    Procedure: CYSTOSCOPY, TRANSURETHRAL RESECTION OF BLADDER TUMOR (TURBT);  Surgeon: Clarke Meyer MD;  Location:  MAIN OR;  Service: Urology     Family History   Problem Relation Age of Onset    Deep vein thrombosis Mother     No Known Problems Father       reports that she quit smoking about 23 years ago. Her smoking use included cigarettes. She started smoking about 33 years ago. She has a 5 pack-year smoking history. She has never used smokeless tobacco. She reports that she does not drink alcohol and does not use drugs.      Lab Results:  Results for orders placed or performed in visit on 08/13/24   Comprehensive metabolic panel   Result Value Ref Range    Sodium 138 135 - 147 mmol/L    Potassium 4.1 3.5 - 5.3 mmol/L    Chloride 98 96 - 108 mmol/L    CO2 29 21 - 32 mmol/L    ANION GAP 11 4 - 13 mmol/L    BUN 33 (H) 5 - 25 mg/dL    Creatinine  1.20 0.60 - 1.30 mg/dL    Glucose 207 (H) 65 - 140 mg/dL    Calcium 9.2 8.4 - 10.2 mg/dL    AST 32 13 - 39 U/L    ALT 27 7 - 52 U/L    Alkaline Phosphatase 79 34 - 104 U/L    Total Protein 7.5 6.4 - 8.4 g/dL    Albumin 4.1 3.5 - 5.0 g/dL    Total Bilirubin 0.56 0.20 - 1.00 mg/dL    eGFR 39 ml/min/1.73sq m   CBC and differential   Result Value Ref Range    WBC 8.78 4.31 - 10.16 Thousand/uL    RBC 4.49 3.81 - 5.12 Million/uL    Hemoglobin 14.4 11.5 - 15.4 g/dL    Hematocrit 43.8 34.8 - 46.1 %    MCV 98 82 - 98 fL    MCH 32.1 26.8 - 34.3 pg    MCHC 32.9 31.4 - 37.4 g/dL    RDW 15.0 11.6 - 15.1 %    MPV 12.6 8.9 - 12.7 fL    Platelets 150 149 - 390 Thousands/uL    nRBC 0 /100 WBCs    Segmented % 78 (H) 43 - 75 %    Immature Grans % 0 0 - 2 %    Lymphocytes % 13 (L) 14 - 44 %    Monocytes % 7 4 - 12 %    Eosinophils Relative 1 0 - 6 %    Basophils Relative 1 0 - 1 %    Absolute Neutrophils 6.82 1.85 - 7.62 Thousands/µL    Absolute Immature Grans 0.02 0.00 - 0.20 Thousand/uL    Absolute Lymphocytes 1.18 0.60 - 4.47 Thousands/µL    Absolute Monocytes 0.62 0.17 - 1.22 Thousand/µL    Eosinophils Absolute 0.09 0.00 - 0.61 Thousand/µL    Basophils Absolute 0.05 0.00 - 0.10 Thousands/µL   Albumin / creatinine urine ratio   Result Value Ref Range    Creatinine, Ur 117.3 Reference range not established. mg/dL    Albumin,U,Random 610.0 (H) <20.0 mg/L    Albumin Creat Ratio 520 (H) 0 - 30 mg/g creatinine   Urinalysis with microscopic   Result Value Ref Range    Color, UA Light Yellow     Clarity, UA Clear     Specific Gravity, UA 1.019 1.003 - 1.030    pH, UA 6.0 4.5, 5.0, 5.5, 6.0, 6.5, 7.0, 7.5, 8.0    Leukocytes, UA Negative Negative    Nitrite, UA Negative Negative    Protein,  (2+) (A) Negative mg/dl    Glucose, UA Negative Negative mg/dl    Ketones, UA Negative Negative mg/dl    Urobilinogen, UA <2.0 <2.0 mg/dl mg/dl    Bilirubin, UA Negative Negative    Occult Blood, UA Negative Negative    RBC, UA 1-2 None Seen, 1-2  "/hpf    WBC, UA 4-10 (A) None Seen, 1-2 /hpf    Epithelial Cells Occasional None Seen, Occasional /hpf    Bacteria, UA None Seen None Seen, Occasional /hpf   Magnesium   Result Value Ref Range    Magnesium 2.0 1.9 - 2.7 mg/dL   Phosphorus   Result Value Ref Range    Phosphorus 3.1 2.3 - 4.1 mg/dL   PTH, intact   Result Value Ref Range    PTH 70.4 12.0 - 88.0 pg/mL   Vitamin D 25 hydroxy   Result Value Ref Range    Vit D, 25-Hydroxy 98.3 30.0 - 100.0 ng/mL   Hemoglobin A1C   Result Value Ref Range    Hemoglobin A1C 8.4 (H) Normal 4.0-5.6%; PreDiabetic 5.7-6.4%; Diabetic >=6.5%; Glycemic control for adults with diabetes <7.0% %     mg/dl   TSH, 3rd generation with Free T4 reflex   Result Value Ref Range    TSH 3RD GENERATON 2.731 0.450 - 4.500 uIU/mL             Invalid input(s): \"ALBUMIN\"      Portions of the record may have been created with voice recognition software. Occasional wrong word or \"sound a like\" substitutions may have occurred due to the inherent limitations of voice recognition software. Read the chart carefully and recognize, using context, where substitutions have occurred.If you have any questions, please contact the dictating provider.   "

## 2024-09-23 DIAGNOSIS — N18.32 TYPE 2 DIABETES MELLITUS WITH STAGE 3B CHRONIC KIDNEY DISEASE, WITHOUT LONG-TERM CURRENT USE OF INSULIN (HCC): Chronic | ICD-10-CM

## 2024-09-23 DIAGNOSIS — E11.22 TYPE 2 DIABETES MELLITUS WITH STAGE 3B CHRONIC KIDNEY DISEASE, WITHOUT LONG-TERM CURRENT USE OF INSULIN (HCC): Chronic | ICD-10-CM

## 2024-09-23 NOTE — TELEPHONE ENCOUNTER
Reason for call:   [x] Refill   [] Prior Auth  [] Other:     Office:   [x] PCP/Provider -   [] Specialty/Provider -     Medication:   Metformin 500mg- take 1 tablet by mouth 2 times a day with meals      Pharmacy: [facility] [place]    Does the patient have enough for 3 days?   [] Yes   [x] No - Send as HP to POD

## 2024-10-18 DIAGNOSIS — E03.9 ACQUIRED HYPOTHYROIDISM: ICD-10-CM

## 2024-10-18 NOTE — TELEPHONE ENCOUNTER
Patient requesting refill(s) of: Levothyroxine 25 mcg    Last filled:4/22/24  Last appt: 6/17/24  Next appt:11/05/24  Pharmacy:[facility] [place]

## 2024-10-20 RX ORDER — LEVOTHYROXINE SODIUM 25 UG/1
25 TABLET ORAL DAILY
Qty: 90 TABLET | Refills: 1 | Status: SHIPPED | OUTPATIENT
Start: 2024-10-20

## 2024-11-05 ENCOUNTER — OFFICE VISIT (OUTPATIENT)
Dept: FAMILY MEDICINE CLINIC | Facility: CLINIC | Age: 89
End: 2024-11-05
Payer: MEDICARE

## 2024-11-05 ENCOUNTER — APPOINTMENT (OUTPATIENT)
Dept: RADIOLOGY | Facility: CLINIC | Age: 89
End: 2024-11-05
Payer: MEDICARE

## 2024-11-05 VITALS
TEMPERATURE: 97.2 F | OXYGEN SATURATION: 99 % | BODY MASS INDEX: 23.39 KG/M2 | RESPIRATION RATE: 16 BRPM | DIASTOLIC BLOOD PRESSURE: 82 MMHG | HEIGHT: 64 IN | HEART RATE: 65 BPM | SYSTOLIC BLOOD PRESSURE: 136 MMHG | WEIGHT: 137 LBS

## 2024-11-05 DIAGNOSIS — E11.22 TYPE 2 DIABETES MELLITUS WITH STAGE 3B CHRONIC KIDNEY DISEASE, WITHOUT LONG-TERM CURRENT USE OF INSULIN (HCC): Primary | Chronic | ICD-10-CM

## 2024-11-05 DIAGNOSIS — R91.8 LUNG MASS: Chronic | ICD-10-CM

## 2024-11-05 DIAGNOSIS — R05.3 CHRONIC COUGH: ICD-10-CM

## 2024-11-05 DIAGNOSIS — R53.81 DEBILITY: ICD-10-CM

## 2024-11-05 DIAGNOSIS — N18.32 TYPE 2 DIABETES MELLITUS WITH STAGE 3B CHRONIC KIDNEY DISEASE, WITHOUT LONG-TERM CURRENT USE OF INSULIN (HCC): Primary | Chronic | ICD-10-CM

## 2024-11-05 DIAGNOSIS — I10 ESSENTIAL HYPERTENSION: Chronic | ICD-10-CM

## 2024-11-05 DIAGNOSIS — Z23 ENCOUNTER FOR IMMUNIZATION: ICD-10-CM

## 2024-11-05 DIAGNOSIS — N32.81 OVERACTIVE BLADDER: ICD-10-CM

## 2024-11-05 PROCEDURE — 90662 IIV NO PRSV INCREASED AG IM: CPT | Performed by: INTERNAL MEDICINE

## 2024-11-05 PROCEDURE — 99214 OFFICE O/P EST MOD 30 MIN: CPT | Performed by: INTERNAL MEDICINE

## 2024-11-05 PROCEDURE — 71046 X-RAY EXAM CHEST 2 VIEWS: CPT

## 2024-11-05 PROCEDURE — G0008 ADMIN INFLUENZA VIRUS VAC: HCPCS | Performed by: INTERNAL MEDICINE

## 2024-11-05 RX ORDER — BENZONATATE 200 MG/1
200 CAPSULE ORAL 3 TIMES DAILY PRN
Qty: 20 CAPSULE | Refills: 0 | Status: SHIPPED | OUTPATIENT
Start: 2024-11-05 | End: 2024-11-12 | Stop reason: SDUPTHER

## 2024-11-05 RX ORDER — SOLIFENACIN SUCCINATE 10 MG/1
10 TABLET, FILM COATED ORAL DAILY
Qty: 30 TABLET | Refills: 5 | Status: SHIPPED | OUTPATIENT
Start: 2024-11-05

## 2024-11-05 NOTE — PATIENT INSTRUCTIONS
Please try vesicare daily to help with overactive bladder   Please get xray done to check lung mass

## 2024-11-05 NOTE — PROGRESS NOTES
Ambulatory Visit  Name: Sylvia Sanderson      : 1933      MRN: [id number]  Encounter Provider: Matilde Sigala MD  Encounter Date: 2024   Encounter department: [facility] PRIMARY [facility]ARE    Assessment & Plan  Type 2 diabetes mellitus with stage 3b chronic kidney disease, without long-term current use of insulin (HCC)    Lab Results   Component Value Date    HGBA1C 8.4 (H) 2024            Lung mass    Orders:    XR chest pa and lateral; Future    benzonatate (TESSALON) 200 MG capsule; Take 1 capsule (200 mg total) by mouth 3 (three) times a day as needed for cough    Essential hypertension         Debility         Overactive bladder    Orders:    solifenacin (VESICARE) 10 MG tablet; Take 1 tablet (10 mg total) by mouth daily    Encounter for immunization    Orders:    influenza vaccine, high-dose, PF 0.5 mL (Fluzone High Dose)       History of Present Illness     89yo female with history of diabetes, bladder cancer, suspected lung cancer, PE, HTN here for follow up.     Struggling with nocturia, wakes up 10 times to urinate overnight. Had previously tried medications from Dr. Meyer but they weren't effective. Afraid to try sleep aids because she may have enuresis.     Lung mass: declined bronch and tissue biopsy given her age. Coughing more and wheezing occasionally. Still not interested in hospice or palliative.     Feels more unsteady, back pain. No recent falls but struggles with ADLs. Declines referral for [facility],         History obtained from : patient and daughter-in-law  Review of Systems   Constitutional:  Positive for activity change and fatigue. Negative for appetite change, chills, fever and unexpected weight change.   Respiratory:  Positive for cough, chest tightness and wheezing.    Genitourinary:  Positive for enuresis, frequency and urgency.   Musculoskeletal:  Positive for back pain and gait problem.   Psychiatric/Behavioral:  Positive for sleep disturbance.      Current  "Outpatient Medications on File Prior to Visit   Medication Sig Dispense Refill    acetaminophen (TYLENOL) 325 mg tablet Take 2 tablets (650 mg total) by mouth every 6 (six) hours as needed for mild pain or fever  0    amLODIPine (NORVASC) 5 mg tablet Take 1 tablet (5 mg total) by mouth daily at bedtime 90 tablet 0    calcium carbonate (OYSTER SHELL,OSCAL) 500 mg Take 1 tablet by mouth daily with breakfast Do not start before November 18, 2023. 30 tablet 0    cholecalciferol (VITAMIN D3) 1,000 units tablet Take 1,000 Units by mouth daily      cyanocobalamin (VITAMIN B-12) 100 mcg tablet Take 1,000 mcg by mouth daily      glipiZIDE (GLUCOTROL) 5 mg tablet Take 1 tablet (5 mg total) by mouth 2 (two) times a day before meals 200 tablet 1    glucose blood (Contour Next Test) test strip Test  each 2    levothyroxine 25 mcg tablet Take 1 tablet (25 mcg total) by mouth daily 90 tablet 1    lovastatin (MEVACOR) 20 mg tablet Take 1 tablet (20 mg total) by mouth daily at bedtime 90 tablet 3    metFORMIN (GLUCOPHAGE) 500 mg tablet Take 1 tablet (500 mg total) by mouth 2 (two) times a day with meals 180 tablet 1    metoprolol tartrate (LOPRESSOR) 25 mg tablet Take 1 tablet (25 mg total) by mouth every 12 (twelve) hours 180 tablet 3    Microlet Lancets MISC Testing twice daily 100 each 5    Multiple Vitamins-Minerals (CENTRUM ADULT 50+ MULTIGUMMIES PO) Take by mouth      magnesium Oxide (MAG-OX) 400 mg TABS Take 1 tablet (400 mg total) by mouth 2 (two) times a day for 3 doses 3 tablet 0    [DISCONTINUED] linaGLIPtin (Tradjenta) 5 MG TABS Take 5 mg by mouth daily (Patient not taking: Reported on 3/30/2021) 90 tablet 3    [DISCONTINUED] lisinopril (ZESTRIL) 5 mg tablet take 1 tablet by mouth once daily 90 tablet 2     No current facility-administered medications on file prior to visit.          Objective     /82   Pulse 65   Temp (!) 97.2 °F (36.2 °C) (Temporal)   Resp 16   Ht 5' 4\" (1.626 m)   Wt 62.1 kg (137 lb) "   SpO2 99%   BMI 23.52 kg/m²     Physical Exam  Vitals reviewed.   Constitutional:       General: She is not in acute distress.     Appearance: She is normal weight.   HENT:      Head: Normocephalic and atraumatic.   Cardiovascular:      Rate and Rhythm: Normal rate and regular rhythm.      Heart sounds: No murmur heard.     No friction rub. No gallop.   Pulmonary:      Effort: Pulmonary effort is normal.      Breath sounds: Wheezing present.   Neurological:      Mental Status: She is alert.      Motor: Weakness present.      Gait: Gait abnormal.   Psychiatric:         Mood and Affect: Mood normal.         Behavior: Behavior normal.

## 2024-11-05 NOTE — ASSESSMENT & PLAN NOTE
Orders:    XR chest pa and lateral; Future    benzonatate (TESSALON) 200 MG capsule; Take 1 capsule (200 mg total) by mouth 3 (three) times a day as needed for cough

## 2024-11-07 ENCOUNTER — TELEPHONE (OUTPATIENT)
Age: 89
End: 2024-11-07

## 2024-11-07 DIAGNOSIS — R05.3 CHRONIC COUGH: ICD-10-CM

## 2024-11-07 DIAGNOSIS — R91.8 LUNG MASS: Chronic | ICD-10-CM

## 2024-11-07 NOTE — TELEPHONE ENCOUNTER
[facility] called to relay Immediate Findings on Chest X-Ray ordered 11/5.

## 2024-11-08 NOTE — TELEPHONE ENCOUNTER
The mass has been present for some time, reviewing the CT scan from last November   Please inquire if patient is feeling ok, sob? Chest discomfort?

## 2024-11-11 DIAGNOSIS — E78.00 PURE HYPERCHOLESTEROLEMIA: ICD-10-CM

## 2024-11-11 DIAGNOSIS — I48.0 PAROXYSMAL ATRIAL FIBRILLATION (HCC): ICD-10-CM

## 2024-11-11 DIAGNOSIS — E11.69 TYPE 2 DIABETES MELLITUS WITH OTHER SPECIFIED COMPLICATION, WITHOUT LONG-TERM CURRENT USE OF INSULIN (HCC): ICD-10-CM

## 2024-11-11 DIAGNOSIS — I10 ESSENTIAL HYPERTENSION: ICD-10-CM

## 2024-11-11 NOTE — TELEPHONE ENCOUNTER
Patient requesting refill(s) of: Metoprolol 15 mg    lovastatin 20 mg     Last filled:12/29/23  Last appt:11/5/24  Next appt: 7/8/25  Pharmacy: [facility]

## 2024-11-12 RX ORDER — BENZONATATE 200 MG/1
200 CAPSULE ORAL 3 TIMES DAILY PRN
Qty: 20 CAPSULE | Refills: 3 | Status: SHIPPED | OUTPATIENT
Start: 2024-11-12

## 2024-11-12 RX ORDER — METOPROLOL TARTRATE 25 MG/1
25 TABLET, FILM COATED ORAL EVERY 12 HOURS
Qty: 180 TABLET | Refills: 3 | Status: SHIPPED | OUTPATIENT
Start: 2024-11-12

## 2024-11-12 RX ORDER — LOVASTATIN 20 MG/1
20 TABLET ORAL
Qty: 90 TABLET | Refills: 3 | Status: SHIPPED | OUTPATIENT
Start: 2024-11-12

## 2024-11-12 NOTE — TELEPHONE ENCOUNTER
Spoke with patient, denies any SOB or chest discomfort, reports having bad cough still. Asking for refill on Tessalon as she is almost out already and taking them frequently. Pended.

## 2024-11-19 ENCOUNTER — RESULTS FOLLOW-UP (OUTPATIENT)
Dept: FAMILY MEDICINE CLINIC | Facility: CLINIC | Age: 89
End: 2024-11-19

## 2024-11-23 ENCOUNTER — TELEPHONE (OUTPATIENT)
Dept: OTHER | Facility: OTHER | Age: 89
End: 2024-11-23

## 2024-11-26 ENCOUNTER — TELEPHONE (OUTPATIENT)
Age: 89
End: 2024-11-26

## 2024-11-26 NOTE — TELEPHONE ENCOUNTER
Patient's daughter in law called. She stated the patient said the cough medication is not helping and they feel like her cough is getting worse.     Deysi also wanted to ask Dr. Sigala for more information about hospice or a personal care aide.     She is asking for a call back, please advise.    Thank you.

## 2024-11-27 ENCOUNTER — HOSPICE ADMISSION (OUTPATIENT)
Dept: HOME HOSPICE | Facility: HOSPICE | Age: 89
End: 2024-11-27
Payer: MEDICARE

## 2024-11-27 ENCOUNTER — HOME CARE VISIT (OUTPATIENT)
Dept: HOME HEALTH SERVICES | Facility: HOME HEALTHCARE | Age: 89
End: 2024-11-27
Payer: MEDICARE

## 2024-11-27 DIAGNOSIS — C34.01 MALIGNANT NEOPLASM OF HILUS OF RIGHT LUNG (HCC): ICD-10-CM

## 2024-11-27 DIAGNOSIS — C67.8 MALIGNANT NEOPLASM OF OVERLAPPING SITES OF BLADDER (HCC): ICD-10-CM

## 2024-11-27 DIAGNOSIS — R91.8 LUNG MASS: ICD-10-CM

## 2024-11-27 DIAGNOSIS — R05.3 CHRONIC COUGH: Primary | ICD-10-CM

## 2024-11-27 RX ORDER — CODEINE PHOSPHATE AND GUAIFENESIN 10; 100 MG/5ML; MG/5ML
5 SOLUTION ORAL 3 TIMES DAILY PRN
Qty: 237 ML | Refills: 0 | Status: SHIPPED | OUTPATIENT
Start: 2024-11-27

## 2024-11-27 NOTE — PROGRESS NOTES
Spoke with daughter-in-law regarding worsening cough and dyspnea, and pt and family are agreeable to hospice services at home. Will place referral.

## 2024-12-02 ENCOUNTER — HOME CARE VISIT (OUTPATIENT)
Dept: HOME HOSPICE | Facility: HOSPICE | Age: 89
End: 2024-12-02
Payer: MEDICARE

## 2024-12-02 ENCOUNTER — HOME CARE VISIT (OUTPATIENT)
Dept: HOME HEALTH SERVICES | Facility: HOME HEALTHCARE | Age: 89
End: 2024-12-02
Payer: MEDICARE

## 2024-12-02 VITALS
HEART RATE: 80 BPM | SYSTOLIC BLOOD PRESSURE: 154 MMHG | RESPIRATION RATE: 18 BRPM | DIASTOLIC BLOOD PRESSURE: 88 MMHG | TEMPERATURE: 97 F

## 2024-12-02 PROCEDURE — G0299 HHS/HOSPICE OF RN EA 15 MIN: HCPCS

## 2024-12-02 PROCEDURE — 10330057 MEDICATION, GENERAL

## 2024-12-02 PROCEDURE — G0155 HHCP-SVS OF CSW,EA 15 MIN: HCPCS

## 2024-12-03 ENCOUNTER — HOME CARE VISIT (OUTPATIENT)
Dept: HOME HOSPICE | Facility: HOSPICE | Age: 89
End: 2024-12-03
Payer: MEDICARE

## 2024-12-03 ENCOUNTER — HOME CARE VISIT (OUTPATIENT)
Dept: HOME HEALTH SERVICES | Facility: HOME HEALTHCARE | Age: 89
End: 2024-12-03
Payer: MEDICARE

## 2024-12-03 PROCEDURE — G0156 HHCP-SVS OF AIDE,EA 15 MIN: HCPCS

## 2024-12-04 ENCOUNTER — HOME CARE VISIT (OUTPATIENT)
Dept: HOME HEALTH SERVICES | Facility: HOME HEALTHCARE | Age: 89
End: 2024-12-04
Payer: MEDICARE

## 2024-12-04 VITALS
HEART RATE: 77 BPM | RESPIRATION RATE: 18 BRPM | TEMPERATURE: 97.5 F | SYSTOLIC BLOOD PRESSURE: 130 MMHG | DIASTOLIC BLOOD PRESSURE: 86 MMHG

## 2024-12-04 PROCEDURE — G0299 HHS/HOSPICE OF RN EA 15 MIN: HCPCS

## 2024-12-04 PROCEDURE — G0156 HHCP-SVS OF AIDE,EA 15 MIN: HCPCS

## 2024-12-05 ENCOUNTER — HOME CARE VISIT (OUTPATIENT)
Dept: HOME HEALTH SERVICES | Facility: HOME HEALTHCARE | Age: 89
End: 2024-12-05
Payer: MEDICARE

## 2024-12-05 PROCEDURE — G0156 HHCP-SVS OF AIDE,EA 15 MIN: HCPCS

## 2024-12-06 ENCOUNTER — HOME CARE VISIT (OUTPATIENT)
Dept: HOME HEALTH SERVICES | Facility: HOME HEALTHCARE | Age: 89
End: 2024-12-06
Payer: MEDICARE

## 2024-12-06 VITALS
RESPIRATION RATE: 19 BRPM | HEART RATE: 78 BPM | SYSTOLIC BLOOD PRESSURE: 146 MMHG | TEMPERATURE: 96.9 F | DIASTOLIC BLOOD PRESSURE: 82 MMHG

## 2024-12-06 PROCEDURE — G0156 HHCP-SVS OF AIDE,EA 15 MIN: HCPCS

## 2024-12-06 PROCEDURE — G0299 HHS/HOSPICE OF RN EA 15 MIN: HCPCS

## 2024-12-09 ENCOUNTER — HOME CARE VISIT (OUTPATIENT)
Dept: HOME HEALTH SERVICES | Facility: HOME HEALTHCARE | Age: 89
End: 2024-12-09
Payer: MEDICARE

## 2024-12-09 PROCEDURE — G0156 HHCP-SVS OF AIDE,EA 15 MIN: HCPCS

## 2024-12-10 ENCOUNTER — HOME CARE VISIT (OUTPATIENT)
Dept: HOME HEALTH SERVICES | Facility: HOME HEALTHCARE | Age: 89
End: 2024-12-10
Payer: MEDICARE

## 2024-12-10 VITALS
TEMPERATURE: 98 F | DIASTOLIC BLOOD PRESSURE: 64 MMHG | RESPIRATION RATE: 18 BRPM | SYSTOLIC BLOOD PRESSURE: 146 MMHG | HEART RATE: 81 BPM

## 2024-12-10 PROCEDURE — G0299 HHS/HOSPICE OF RN EA 15 MIN: HCPCS

## 2024-12-10 PROCEDURE — G0156 HHCP-SVS OF AIDE,EA 15 MIN: HCPCS

## 2024-12-11 ENCOUNTER — TELEPHONE (OUTPATIENT)
Age: 89
End: 2024-12-11

## 2024-12-11 ENCOUNTER — HOME CARE VISIT (OUTPATIENT)
Dept: HOME HEALTH SERVICES | Facility: HOME HEALTHCARE | Age: 89
End: 2024-12-11
Payer: MEDICARE

## 2024-12-11 PROCEDURE — G0156 HHCP-SVS OF AIDE,EA 15 MIN: HCPCS

## 2024-12-11 NOTE — TELEPHONE ENCOUNTER
*Possible fraud *    Rohan from [facility] pharmacy called     requesting fax number and requesting patient notes so that patient can get CGM and supplies.     Patient pharmacy is [facility] in [place]     Patient is currently on hospice care .    No information was provided to him.     Please advise. Thank you

## 2024-12-12 ENCOUNTER — HOME CARE VISIT (OUTPATIENT)
Dept: HOME HEALTH SERVICES | Facility: HOME HEALTHCARE | Age: 89
End: 2024-12-12
Payer: MEDICARE

## 2024-12-12 PROCEDURE — G0156 HHCP-SVS OF AIDE,EA 15 MIN: HCPCS

## 2024-12-12 NOTE — TELEPHONE ENCOUNTER
Rohan from [facility] pharmacy called to check to see if PCP received the fax request. Triage nurse informed rohan I dont see anything in the chart.  PCP please be aware. I did not enclose any patient information since this encounter was labeled possible fraud.

## 2024-12-13 ENCOUNTER — HOME CARE VISIT (OUTPATIENT)
Dept: HOME HOSPICE | Facility: HOSPICE | Age: 89
End: 2024-12-13
Payer: MEDICARE

## 2024-12-13 ENCOUNTER — HOME CARE VISIT (OUTPATIENT)
Dept: HOME HEALTH SERVICES | Facility: HOME HEALTHCARE | Age: 89
End: 2024-12-13
Payer: MEDICARE

## 2024-12-13 VITALS
TEMPERATURE: 97.7 F | HEART RATE: 74 BPM | DIASTOLIC BLOOD PRESSURE: 56 MMHG | SYSTOLIC BLOOD PRESSURE: 138 MMHG | RESPIRATION RATE: 18 BRPM

## 2024-12-13 PROCEDURE — G0156 HHCP-SVS OF AIDE,EA 15 MIN: HCPCS

## 2024-12-13 PROCEDURE — G0299 HHS/HOSPICE OF RN EA 15 MIN: HCPCS

## 2024-12-16 ENCOUNTER — HOME CARE VISIT (OUTPATIENT)
Dept: HOME HEALTH SERVICES | Facility: HOME HEALTHCARE | Age: 89
End: 2024-12-16
Payer: MEDICARE

## 2024-12-16 PROCEDURE — G0156 HHCP-SVS OF AIDE,EA 15 MIN: HCPCS

## 2024-12-17 ENCOUNTER — HOME CARE VISIT (OUTPATIENT)
Dept: HOME HOSPICE | Facility: HOSPICE | Age: 89
End: 2024-12-17
Payer: MEDICARE

## 2024-12-17 ENCOUNTER — HOME CARE VISIT (OUTPATIENT)
Dept: HOME HEALTH SERVICES | Facility: HOME HEALTHCARE | Age: 89
End: 2024-12-17
Payer: MEDICARE

## 2024-12-17 VITALS
DIASTOLIC BLOOD PRESSURE: 78 MMHG | HEART RATE: 79 BPM | RESPIRATION RATE: 18 BRPM | SYSTOLIC BLOOD PRESSURE: 124 MMHG | TEMPERATURE: 97.3 F

## 2024-12-17 PROCEDURE — 10330057 MEDICATION, GENERAL

## 2024-12-17 PROCEDURE — G0299 HHS/HOSPICE OF RN EA 15 MIN: HCPCS

## 2024-12-18 ENCOUNTER — HOME CARE VISIT (OUTPATIENT)
Dept: HOME HEALTH SERVICES | Facility: HOME HEALTHCARE | Age: 89
End: 2024-12-18
Payer: MEDICARE

## 2024-12-18 PROCEDURE — G0156 HHCP-SVS OF AIDE,EA 15 MIN: HCPCS

## 2024-12-19 ENCOUNTER — TELEPHONE (OUTPATIENT)
Dept: HOME HEALTH SERVICES | Facility: HOME HEALTHCARE | Age: 89
End: 2024-12-19

## 2024-12-20 ENCOUNTER — HOME CARE VISIT (OUTPATIENT)
Dept: HOME HEALTH SERVICES | Facility: HOME HEALTHCARE | Age: 89
End: 2024-12-20
Payer: MEDICARE

## 2024-12-20 VITALS
DIASTOLIC BLOOD PRESSURE: 74 MMHG | SYSTOLIC BLOOD PRESSURE: 124 MMHG | TEMPERATURE: 98 F | HEART RATE: 71 BPM | RESPIRATION RATE: 18 BRPM

## 2024-12-20 PROCEDURE — G0299 HHS/HOSPICE OF RN EA 15 MIN: HCPCS

## 2024-12-20 PROCEDURE — G0156 HHCP-SVS OF AIDE,EA 15 MIN: HCPCS

## 2024-12-23 ENCOUNTER — HOME CARE VISIT (OUTPATIENT)
Dept: HOME HEALTH SERVICES | Facility: HOME HEALTHCARE | Age: 89
End: 2024-12-23
Payer: MEDICARE

## 2024-12-23 PROCEDURE — G0156 HHCP-SVS OF AIDE,EA 15 MIN: HCPCS

## 2024-12-24 ENCOUNTER — HOME CARE VISIT (OUTPATIENT)
Dept: HOME HEALTH SERVICES | Facility: HOME HEALTHCARE | Age: 89
End: 2024-12-24
Payer: MEDICARE

## 2024-12-24 VITALS
TEMPERATURE: 97.8 F | SYSTOLIC BLOOD PRESSURE: 130 MMHG | HEART RATE: 66 BPM | RESPIRATION RATE: 18 BRPM | DIASTOLIC BLOOD PRESSURE: 76 MMHG

## 2024-12-24 PROCEDURE — G0299 HHS/HOSPICE OF RN EA 15 MIN: HCPCS

## 2024-12-24 PROCEDURE — G0156 HHCP-SVS OF AIDE,EA 15 MIN: HCPCS

## 2024-12-26 ENCOUNTER — HOME CARE VISIT (OUTPATIENT)
Dept: HOME HEALTH SERVICES | Facility: HOME HEALTHCARE | Age: 89
End: 2024-12-26
Payer: MEDICARE

## 2024-12-26 PROCEDURE — G0156 HHCP-SVS OF AIDE,EA 15 MIN: HCPCS

## 2024-12-27 ENCOUNTER — HOME CARE VISIT (OUTPATIENT)
Dept: HOME HOSPICE | Facility: HOSPICE | Age: 89
End: 2024-12-27
Payer: MEDICARE

## 2024-12-27 VITALS
RESPIRATION RATE: 17 BRPM | SYSTOLIC BLOOD PRESSURE: 160 MMHG | TEMPERATURE: 98 F | DIASTOLIC BLOOD PRESSURE: 76 MMHG | HEART RATE: 89 BPM

## 2024-12-27 PROCEDURE — G0299 HHS/HOSPICE OF RN EA 15 MIN: HCPCS

## 2024-12-30 ENCOUNTER — HOME CARE VISIT (OUTPATIENT)
Dept: HOME HEALTH SERVICES | Facility: HOME HEALTHCARE | Age: 89
End: 2024-12-30
Payer: MEDICARE

## 2024-12-30 PROCEDURE — G0156 HHCP-SVS OF AIDE,EA 15 MIN: HCPCS

## 2024-12-31 ENCOUNTER — HOME CARE VISIT (OUTPATIENT)
Dept: HOME HEALTH SERVICES | Facility: HOME HEALTHCARE | Age: 89
End: 2024-12-31
Payer: MEDICARE

## 2024-12-31 ENCOUNTER — HOME CARE VISIT (OUTPATIENT)
Dept: HOME HOSPICE | Facility: HOSPICE | Age: 89
End: 2024-12-31
Payer: MEDICARE

## 2024-12-31 VITALS
SYSTOLIC BLOOD PRESSURE: 134 MMHG | HEART RATE: 69 BPM | DIASTOLIC BLOOD PRESSURE: 76 MMHG | RESPIRATION RATE: 19 BRPM | TEMPERATURE: 97.3 F

## 2024-12-31 PROCEDURE — G0156 HHCP-SVS OF AIDE,EA 15 MIN: HCPCS

## 2024-12-31 PROCEDURE — G0299 HHS/HOSPICE OF RN EA 15 MIN: HCPCS

## 2025-01-01 ENCOUNTER — HOME CARE VISIT (OUTPATIENT)
Dept: HOME HOSPICE | Facility: HOSPICE | Age: OVER 89
End: 2025-01-01
Payer: MEDICARE

## 2025-01-01 ENCOUNTER — HOME CARE VISIT (OUTPATIENT)
Dept: HOME HEALTH SERVICES | Facility: HOME HEALTHCARE | Age: OVER 89
End: 2025-01-01
Payer: MEDICARE

## 2025-01-01 ENCOUNTER — TELEPHONE (OUTPATIENT)
Age: OVER 89
End: 2025-01-01

## 2025-01-01 VITALS — RESPIRATION RATE: 16 BRPM

## 2025-01-01 DIAGNOSIS — Z51.5 HOSPICE CARE: ICD-10-CM

## 2025-01-01 PROCEDURE — G0156 HHCP-SVS OF AIDE,EA 15 MIN: HCPCS

## 2025-01-01 PROCEDURE — 10330090 VN HOSPICE ROOM AND BOARD

## 2025-01-01 PROCEDURE — G0299 HHS/HOSPICE OF RN EA 15 MIN: HCPCS

## 2025-01-01 RX ORDER — MORPHINE SULFATE 100 MG/5ML
SOLUTION ORAL
Qty: 30 ML | Refills: 0 | Status: SHIPPED | OUTPATIENT
Start: 2025-01-01 | End: 2025-03-24

## 2025-01-01 RX ORDER — LORAZEPAM 2 MG/ML
CONCENTRATE ORAL
Qty: 30 ML | Refills: 0 | Status: SHIPPED | OUTPATIENT
Start: 2025-01-01 | End: 2025-03-24

## 2025-01-03 ENCOUNTER — HOME CARE VISIT (OUTPATIENT)
Dept: HOME HEALTH SERVICES | Facility: HOME HEALTHCARE | Age: OVER 89
End: 2025-01-03
Payer: MEDICARE

## 2025-01-03 VITALS
SYSTOLIC BLOOD PRESSURE: 138 MMHG | DIASTOLIC BLOOD PRESSURE: 72 MMHG | RESPIRATION RATE: 18 BRPM | TEMPERATURE: 97.6 F | HEART RATE: 94 BPM

## 2025-01-03 PROCEDURE — G0156 HHCP-SVS OF AIDE,EA 15 MIN: HCPCS

## 2025-01-03 PROCEDURE — G0299 HHS/HOSPICE OF RN EA 15 MIN: HCPCS

## 2025-01-04 ENCOUNTER — HOME CARE VISIT (OUTPATIENT)
Dept: HOME HEALTH SERVICES | Facility: HOME HEALTHCARE | Age: OVER 89
End: 2025-01-04
Payer: MEDICARE

## 2025-01-04 PROCEDURE — G0156 HHCP-SVS OF AIDE,EA 15 MIN: HCPCS

## 2025-01-06 ENCOUNTER — TELEPHONE (OUTPATIENT)
Dept: HOME HEALTH SERVICES | Facility: HOME HEALTHCARE | Age: OVER 89
End: 2025-01-06

## 2025-01-07 ENCOUNTER — HOME CARE VISIT (OUTPATIENT)
Dept: HOME HEALTH SERVICES | Facility: HOME HEALTHCARE | Age: OVER 89
End: 2025-01-07
Payer: MEDICARE

## 2025-01-07 PROCEDURE — G0156 HHCP-SVS OF AIDE,EA 15 MIN: HCPCS

## 2025-01-08 ENCOUNTER — HOME CARE VISIT (OUTPATIENT)
Dept: HOME HEALTH SERVICES | Facility: HOME HEALTHCARE | Age: OVER 89
End: 2025-01-08
Payer: MEDICARE

## 2025-01-08 ENCOUNTER — HOME CARE VISIT (OUTPATIENT)
Dept: HOME HOSPICE | Facility: HOSPICE | Age: OVER 89
End: 2025-01-08
Payer: MEDICARE

## 2025-01-08 VITALS
TEMPERATURE: 97.5 F | HEART RATE: 74 BPM | RESPIRATION RATE: 18 BRPM | SYSTOLIC BLOOD PRESSURE: 138 MMHG | DIASTOLIC BLOOD PRESSURE: 78 MMHG

## 2025-01-08 PROCEDURE — G0299 HHS/HOSPICE OF RN EA 15 MIN: HCPCS

## 2025-01-09 ENCOUNTER — HOME CARE VISIT (OUTPATIENT)
Dept: HOME HEALTH SERVICES | Facility: HOME HEALTHCARE | Age: OVER 89
End: 2025-01-09
Payer: MEDICARE

## 2025-01-09 PROCEDURE — G0156 HHCP-SVS OF AIDE,EA 15 MIN: HCPCS

## 2025-01-10 ENCOUNTER — HOME CARE VISIT (OUTPATIENT)
Dept: HOME HEALTH SERVICES | Facility: HOME HEALTHCARE | Age: OVER 89
End: 2025-01-10
Payer: MEDICARE

## 2025-01-10 ENCOUNTER — HOME CARE VISIT (OUTPATIENT)
Dept: HOME HOSPICE | Facility: HOSPICE | Age: OVER 89
End: 2025-01-10
Payer: MEDICARE

## 2025-01-10 VITALS
RESPIRATION RATE: 19 BRPM | HEART RATE: 84 BPM | SYSTOLIC BLOOD PRESSURE: 140 MMHG | TEMPERATURE: 98 F | DIASTOLIC BLOOD PRESSURE: 94 MMHG

## 2025-01-10 PROCEDURE — G0299 HHS/HOSPICE OF RN EA 15 MIN: HCPCS

## 2025-01-10 PROCEDURE — G0155 HHCP-SVS OF CSW,EA 15 MIN: HCPCS

## 2025-01-10 PROCEDURE — G0156 HHCP-SVS OF AIDE,EA 15 MIN: HCPCS

## 2025-01-13 ENCOUNTER — HOME CARE VISIT (OUTPATIENT)
Dept: HOME HOSPICE | Facility: HOSPICE | Age: OVER 89
End: 2025-01-13
Payer: MEDICARE

## 2025-01-14 ENCOUNTER — HOME CARE VISIT (OUTPATIENT)
Dept: HOME HEALTH SERVICES | Facility: HOME HEALTHCARE | Age: OVER 89
End: 2025-01-14
Payer: MEDICARE

## 2025-01-14 VITALS
TEMPERATURE: 97.9 F | HEART RATE: 95 BPM | DIASTOLIC BLOOD PRESSURE: 82 MMHG | RESPIRATION RATE: 17 BRPM | SYSTOLIC BLOOD PRESSURE: 142 MMHG

## 2025-01-14 PROCEDURE — G0299 HHS/HOSPICE OF RN EA 15 MIN: HCPCS

## 2025-01-14 PROCEDURE — G0156 HHCP-SVS OF AIDE,EA 15 MIN: HCPCS

## 2025-01-15 ENCOUNTER — HOME CARE VISIT (OUTPATIENT)
Dept: HOME HEALTH SERVICES | Facility: HOME HEALTHCARE | Age: OVER 89
End: 2025-01-15
Payer: MEDICARE

## 2025-01-15 PROCEDURE — G0156 HHCP-SVS OF AIDE,EA 15 MIN: HCPCS

## 2025-01-16 ENCOUNTER — HOME CARE VISIT (OUTPATIENT)
Dept: HOME HOSPICE | Facility: HOSPICE | Age: OVER 89
End: 2025-01-16
Payer: MEDICARE

## 2025-01-16 ENCOUNTER — HOME CARE VISIT (OUTPATIENT)
Dept: HOME HEALTH SERVICES | Facility: HOME HEALTHCARE | Age: OVER 89
End: 2025-01-16
Payer: MEDICARE

## 2025-01-16 PROCEDURE — G0156 HHCP-SVS OF AIDE,EA 15 MIN: HCPCS

## 2025-01-17 ENCOUNTER — HOME CARE VISIT (OUTPATIENT)
Dept: HOME HEALTH SERVICES | Facility: HOME HEALTHCARE | Age: OVER 89
End: 2025-01-17
Payer: MEDICARE

## 2025-01-17 VITALS
HEART RATE: 85 BPM | DIASTOLIC BLOOD PRESSURE: 68 MMHG | SYSTOLIC BLOOD PRESSURE: 126 MMHG | RESPIRATION RATE: 19 BRPM | TEMPERATURE: 98 F

## 2025-01-17 PROCEDURE — G0156 HHCP-SVS OF AIDE,EA 15 MIN: HCPCS

## 2025-01-17 PROCEDURE — G0299 HHS/HOSPICE OF RN EA 15 MIN: HCPCS

## 2025-01-20 ENCOUNTER — HOME CARE VISIT (OUTPATIENT)
Dept: HOME HEALTH SERVICES | Facility: HOME HEALTHCARE | Age: OVER 89
End: 2025-01-20
Payer: MEDICARE

## 2025-01-20 ENCOUNTER — HOME CARE VISIT (OUTPATIENT)
Dept: HOME HOSPICE | Facility: HOSPICE | Age: OVER 89
End: 2025-01-20
Payer: MEDICARE

## 2025-01-20 VITALS
SYSTOLIC BLOOD PRESSURE: 124 MMHG | RESPIRATION RATE: 17 BRPM | HEART RATE: 65 BPM | TEMPERATURE: 97.6 F | DIASTOLIC BLOOD PRESSURE: 58 MMHG

## 2025-01-20 PROCEDURE — G0299 HHS/HOSPICE OF RN EA 15 MIN: HCPCS

## 2025-01-20 PROCEDURE — G0155 HHCP-SVS OF CSW,EA 15 MIN: HCPCS

## 2025-01-20 PROCEDURE — G0156 HHCP-SVS OF AIDE,EA 15 MIN: HCPCS

## 2025-01-21 ENCOUNTER — HOME CARE VISIT (OUTPATIENT)
Dept: HOME HEALTH SERVICES | Facility: HOME HEALTHCARE | Age: OVER 89
End: 2025-01-21
Payer: MEDICARE

## 2025-01-21 PROCEDURE — G0156 HHCP-SVS OF AIDE,EA 15 MIN: HCPCS

## 2025-01-22 ENCOUNTER — HOME CARE VISIT (OUTPATIENT)
Dept: HOME HEALTH SERVICES | Facility: HOME HEALTHCARE | Age: OVER 89
End: 2025-01-22
Payer: MEDICARE

## 2025-01-22 VITALS
DIASTOLIC BLOOD PRESSURE: 60 MMHG | RESPIRATION RATE: 17 BRPM | SYSTOLIC BLOOD PRESSURE: 118 MMHG | TEMPERATURE: 98.2 F | HEART RATE: 66 BPM

## 2025-01-22 PROCEDURE — G0299 HHS/HOSPICE OF RN EA 15 MIN: HCPCS

## 2025-01-22 PROCEDURE — G0156 HHCP-SVS OF AIDE,EA 15 MIN: HCPCS

## 2025-01-23 ENCOUNTER — HOME CARE VISIT (OUTPATIENT)
Dept: HOME HEALTH SERVICES | Facility: HOME HEALTHCARE | Age: OVER 89
End: 2025-01-23
Payer: MEDICARE

## 2025-01-23 PROCEDURE — G0156 HHCP-SVS OF AIDE,EA 15 MIN: HCPCS

## 2025-01-24 ENCOUNTER — HOME CARE VISIT (OUTPATIENT)
Dept: HOME HEALTH SERVICES | Facility: HOME HEALTHCARE | Age: OVER 89
End: 2025-01-24
Payer: MEDICARE

## 2025-01-24 PROCEDURE — G0156 HHCP-SVS OF AIDE,EA 15 MIN: HCPCS

## 2025-01-27 ENCOUNTER — HOME CARE VISIT (OUTPATIENT)
Dept: HOME HEALTH SERVICES | Facility: HOME HEALTHCARE | Age: OVER 89
End: 2025-01-27
Payer: MEDICARE

## 2025-01-27 PROCEDURE — G0156 HHCP-SVS OF AIDE,EA 15 MIN: HCPCS

## 2025-01-28 ENCOUNTER — HOME CARE VISIT (OUTPATIENT)
Dept: HOME HEALTH SERVICES | Facility: HOME HEALTHCARE | Age: OVER 89
End: 2025-01-28
Payer: MEDICARE

## 2025-01-28 VITALS — RESPIRATION RATE: 17 BRPM | TEMPERATURE: 97.4 F | HEART RATE: 69 BPM

## 2025-01-28 PROCEDURE — G0156 HHCP-SVS OF AIDE,EA 15 MIN: HCPCS

## 2025-01-28 PROCEDURE — G0299 HHS/HOSPICE OF RN EA 15 MIN: HCPCS

## 2025-01-29 ENCOUNTER — HOME CARE VISIT (OUTPATIENT)
Dept: HOME HEALTH SERVICES | Facility: HOME HEALTHCARE | Age: OVER 89
End: 2025-01-29
Payer: MEDICARE

## 2025-01-29 PROCEDURE — G0156 HHCP-SVS OF AIDE,EA 15 MIN: HCPCS

## 2025-01-30 ENCOUNTER — HOME CARE VISIT (OUTPATIENT)
Dept: HOME HEALTH SERVICES | Facility: HOME HEALTHCARE | Age: OVER 89
End: 2025-01-30
Payer: MEDICARE

## 2025-01-30 ENCOUNTER — HOME CARE VISIT (OUTPATIENT)
Dept: HOME HOSPICE | Facility: HOSPICE | Age: OVER 89
End: 2025-01-30
Payer: MEDICARE

## 2025-01-30 VITALS
SYSTOLIC BLOOD PRESSURE: 126 MMHG | RESPIRATION RATE: 17 BRPM | TEMPERATURE: 97.6 F | HEART RATE: 89 BPM | DIASTOLIC BLOOD PRESSURE: 70 MMHG

## 2025-01-30 PROCEDURE — G0156 HHCP-SVS OF AIDE,EA 15 MIN: HCPCS

## 2025-01-30 PROCEDURE — G0299 HHS/HOSPICE OF RN EA 15 MIN: HCPCS

## 2025-01-31 ENCOUNTER — HOME CARE VISIT (OUTPATIENT)
Dept: HOME HEALTH SERVICES | Facility: HOME HEALTHCARE | Age: OVER 89
End: 2025-01-31
Payer: MEDICARE

## 2025-01-31 PROCEDURE — G0156 HHCP-SVS OF AIDE,EA 15 MIN: HCPCS

## 2025-02-03 ENCOUNTER — HOME CARE VISIT (OUTPATIENT)
Dept: HOME HEALTH SERVICES | Facility: HOME HEALTHCARE | Age: OVER 89
End: 2025-02-03
Payer: MEDICARE

## 2025-02-03 VITALS
RESPIRATION RATE: 19 BRPM | TEMPERATURE: 97.7 F | SYSTOLIC BLOOD PRESSURE: 142 MMHG | DIASTOLIC BLOOD PRESSURE: 64 MMHG | HEART RATE: 77 BPM

## 2025-02-03 PROCEDURE — G0299 HHS/HOSPICE OF RN EA 15 MIN: HCPCS

## 2025-02-03 PROCEDURE — G0156 HHCP-SVS OF AIDE,EA 15 MIN: HCPCS

## 2025-02-04 ENCOUNTER — HOME CARE VISIT (OUTPATIENT)
Dept: HOME HEALTH SERVICES | Facility: HOME HEALTHCARE | Age: OVER 89
End: 2025-02-04
Payer: MEDICARE

## 2025-02-04 PROCEDURE — G0156 HHCP-SVS OF AIDE,EA 15 MIN: HCPCS

## 2025-02-05 ENCOUNTER — HOME CARE VISIT (OUTPATIENT)
Dept: HOME HEALTH SERVICES | Facility: HOME HEALTHCARE | Age: OVER 89
End: 2025-02-05
Payer: MEDICARE

## 2025-02-05 VITALS
SYSTOLIC BLOOD PRESSURE: 124 MMHG | TEMPERATURE: 97.8 F | RESPIRATION RATE: 17 BRPM | HEART RATE: 84 BPM | DIASTOLIC BLOOD PRESSURE: 74 MMHG

## 2025-02-05 PROCEDURE — G0155 HHCP-SVS OF CSW,EA 15 MIN: HCPCS

## 2025-02-05 PROCEDURE — G0299 HHS/HOSPICE OF RN EA 15 MIN: HCPCS

## 2025-02-05 PROCEDURE — G0156 HHCP-SVS OF AIDE,EA 15 MIN: HCPCS

## 2025-02-07 ENCOUNTER — HOME CARE VISIT (OUTPATIENT)
Dept: HOME HEALTH SERVICES | Facility: HOME HEALTHCARE | Age: OVER 89
End: 2025-02-07
Payer: MEDICARE

## 2025-02-07 PROCEDURE — G0156 HHCP-SVS OF AIDE,EA 15 MIN: HCPCS

## 2025-02-10 ENCOUNTER — HOME CARE VISIT (OUTPATIENT)
Dept: HOME HEALTH SERVICES | Facility: HOME HEALTHCARE | Age: OVER 89
End: 2025-02-10
Payer: MEDICARE

## 2025-02-10 PROCEDURE — G0156 HHCP-SVS OF AIDE,EA 15 MIN: HCPCS

## 2025-02-11 ENCOUNTER — HOME CARE VISIT (OUTPATIENT)
Dept: HOME HEALTH SERVICES | Facility: HOME HEALTHCARE | Age: OVER 89
End: 2025-02-11
Payer: MEDICARE

## 2025-02-11 PROCEDURE — G0156 HHCP-SVS OF AIDE,EA 15 MIN: HCPCS

## 2025-02-12 ENCOUNTER — HOME CARE VISIT (OUTPATIENT)
Dept: HOME HEALTH SERVICES | Facility: HOME HEALTHCARE | Age: OVER 89
End: 2025-02-12
Payer: MEDICARE

## 2025-02-12 VITALS
RESPIRATION RATE: 18 BRPM | HEART RATE: 62 BPM | TEMPERATURE: 99 F | DIASTOLIC BLOOD PRESSURE: 72 MMHG | SYSTOLIC BLOOD PRESSURE: 150 MMHG

## 2025-02-12 PROCEDURE — G0156 HHCP-SVS OF AIDE,EA 15 MIN: HCPCS

## 2025-02-12 PROCEDURE — G0299 HHS/HOSPICE OF RN EA 15 MIN: HCPCS

## 2025-02-13 ENCOUNTER — HOME CARE VISIT (OUTPATIENT)
Dept: HOME HEALTH SERVICES | Facility: HOME HEALTHCARE | Age: OVER 89
End: 2025-02-13
Payer: MEDICARE

## 2025-02-13 PROCEDURE — G0156 HHCP-SVS OF AIDE,EA 15 MIN: HCPCS

## 2025-02-14 ENCOUNTER — HOME CARE VISIT (OUTPATIENT)
Dept: HOME HEALTH SERVICES | Facility: HOME HEALTHCARE | Age: OVER 89
End: 2025-02-14
Payer: MEDICARE

## 2025-02-14 VITALS
SYSTOLIC BLOOD PRESSURE: 140 MMHG | DIASTOLIC BLOOD PRESSURE: 78 MMHG | RESPIRATION RATE: 19 BRPM | HEART RATE: 89 BPM | TEMPERATURE: 97.1 F

## 2025-02-14 PROCEDURE — G0156 HHCP-SVS OF AIDE,EA 15 MIN: HCPCS

## 2025-02-14 PROCEDURE — G0299 HHS/HOSPICE OF RN EA 15 MIN: HCPCS

## 2025-02-17 ENCOUNTER — HOME CARE VISIT (OUTPATIENT)
Dept: HOME HEALTH SERVICES | Facility: HOME HEALTHCARE | Age: OVER 89
End: 2025-02-17
Payer: MEDICARE

## 2025-02-17 VITALS
HEART RATE: 97 BPM | DIASTOLIC BLOOD PRESSURE: 68 MMHG | TEMPERATURE: 96.9 F | RESPIRATION RATE: 19 BRPM | SYSTOLIC BLOOD PRESSURE: 148 MMHG

## 2025-02-17 PROCEDURE — G0156 HHCP-SVS OF AIDE,EA 15 MIN: HCPCS

## 2025-02-17 PROCEDURE — G0299 HHS/HOSPICE OF RN EA 15 MIN: HCPCS

## 2025-02-18 ENCOUNTER — HOME CARE VISIT (OUTPATIENT)
Dept: HOME HEALTH SERVICES | Facility: HOME HEALTHCARE | Age: OVER 89
End: 2025-02-18
Payer: MEDICARE

## 2025-02-18 PROCEDURE — G0156 HHCP-SVS OF AIDE,EA 15 MIN: HCPCS

## 2025-02-19 ENCOUNTER — HOME CARE VISIT (OUTPATIENT)
Dept: HOME HEALTH SERVICES | Facility: HOME HEALTHCARE | Age: OVER 89
End: 2025-02-19
Payer: MEDICARE

## 2025-02-19 VITALS
SYSTOLIC BLOOD PRESSURE: 128 MMHG | TEMPERATURE: 97.1 F | RESPIRATION RATE: 19 BRPM | DIASTOLIC BLOOD PRESSURE: 69 MMHG | HEART RATE: 57 BPM

## 2025-02-19 PROCEDURE — G0156 HHCP-SVS OF AIDE,EA 15 MIN: HCPCS

## 2025-02-19 PROCEDURE — G0299 HHS/HOSPICE OF RN EA 15 MIN: HCPCS

## 2025-02-19 PROCEDURE — G0155 HHCP-SVS OF CSW,EA 15 MIN: HCPCS

## 2025-02-20 ENCOUNTER — HOME CARE VISIT (OUTPATIENT)
Dept: HOME HEALTH SERVICES | Facility: HOME HEALTHCARE | Age: OVER 89
End: 2025-02-20
Payer: MEDICARE

## 2025-02-20 ENCOUNTER — HOME CARE VISIT (OUTPATIENT)
Dept: HOME HOSPICE | Facility: HOSPICE | Age: OVER 89
End: 2025-02-20
Payer: MEDICARE

## 2025-02-20 PROCEDURE — G0156 HHCP-SVS OF AIDE,EA 15 MIN: HCPCS

## 2025-02-21 ENCOUNTER — HOME CARE VISIT (OUTPATIENT)
Dept: HOME HEALTH SERVICES | Facility: HOME HEALTHCARE | Age: OVER 89
End: 2025-02-21
Payer: MEDICARE

## 2025-02-21 VITALS
DIASTOLIC BLOOD PRESSURE: 60 MMHG | TEMPERATURE: 97.3 F | HEART RATE: 68 BPM | RESPIRATION RATE: 18 BRPM | SYSTOLIC BLOOD PRESSURE: 132 MMHG

## 2025-02-21 PROCEDURE — G0299 HHS/HOSPICE OF RN EA 15 MIN: HCPCS

## 2025-02-21 PROCEDURE — G0156 HHCP-SVS OF AIDE,EA 15 MIN: HCPCS

## 2025-02-22 ENCOUNTER — HOME CARE VISIT (OUTPATIENT)
Dept: HOME HOSPICE | Facility: HOSPICE | Age: OVER 89
End: 2025-02-22
Payer: MEDICARE

## 2025-02-23 ENCOUNTER — HOME CARE VISIT (OUTPATIENT)
Dept: HOME HEALTH SERVICES | Facility: HOME HEALTHCARE | Age: OVER 89
End: 2025-02-23
Payer: MEDICARE

## 2025-02-23 PROCEDURE — G0156 HHCP-SVS OF AIDE,EA 15 MIN: HCPCS

## 2025-02-24 ENCOUNTER — HOME CARE VISIT (OUTPATIENT)
Dept: HOME HEALTH SERVICES | Facility: HOME HEALTHCARE | Age: OVER 89
End: 2025-02-24
Payer: MEDICARE

## 2025-02-24 VITALS
DIASTOLIC BLOOD PRESSURE: 62 MMHG | RESPIRATION RATE: 18 BRPM | TEMPERATURE: 97 F | HEART RATE: 70 BPM | SYSTOLIC BLOOD PRESSURE: 137 MMHG

## 2025-02-24 PROCEDURE — G0156 HHCP-SVS OF AIDE,EA 15 MIN: HCPCS

## 2025-02-24 PROCEDURE — G0299 HHS/HOSPICE OF RN EA 15 MIN: HCPCS

## 2025-02-25 ENCOUNTER — HOME CARE VISIT (OUTPATIENT)
Dept: HOME HEALTH SERVICES | Facility: HOME HEALTHCARE | Age: OVER 89
End: 2025-02-25
Payer: MEDICARE

## 2025-02-25 ENCOUNTER — HOME CARE VISIT (OUTPATIENT)
Dept: HOME HOSPICE | Facility: HOSPICE | Age: OVER 89
End: 2025-02-25
Payer: MEDICARE

## 2025-02-25 PROCEDURE — G0156 HHCP-SVS OF AIDE,EA 15 MIN: HCPCS

## 2025-02-26 ENCOUNTER — HOME CARE VISIT (OUTPATIENT)
Dept: HOME HEALTH SERVICES | Facility: HOME HEALTHCARE | Age: OVER 89
End: 2025-02-26
Payer: MEDICARE

## 2025-02-26 PROCEDURE — G0156 HHCP-SVS OF AIDE,EA 15 MIN: HCPCS

## 2025-02-27 ENCOUNTER — HOME CARE VISIT (OUTPATIENT)
Dept: HOME HEALTH SERVICES | Facility: HOME HEALTHCARE | Age: OVER 89
End: 2025-02-27
Payer: MEDICARE

## 2025-02-27 ENCOUNTER — HOME CARE VISIT (OUTPATIENT)
Dept: HOME HOSPICE | Facility: HOSPICE | Age: OVER 89
End: 2025-02-27
Payer: MEDICARE

## 2025-02-27 VITALS
TEMPERATURE: 96.9 F | HEART RATE: 61 BPM | DIASTOLIC BLOOD PRESSURE: 60 MMHG | RESPIRATION RATE: 18 BRPM | SYSTOLIC BLOOD PRESSURE: 110 MMHG

## 2025-02-27 PROCEDURE — G0299 HHS/HOSPICE OF RN EA 15 MIN: HCPCS

## 2025-02-27 PROCEDURE — G0155 HHCP-SVS OF CSW,EA 15 MIN: HCPCS

## 2025-02-27 PROCEDURE — G0156 HHCP-SVS OF AIDE,EA 15 MIN: HCPCS

## 2025-03-02 ENCOUNTER — HOME CARE VISIT (OUTPATIENT)
Dept: HOME HEALTH SERVICES | Facility: HOME HEALTHCARE | Age: OVER 89
End: 2025-03-02
Payer: MEDICARE

## 2025-03-02 PROCEDURE — G0156 HHCP-SVS OF AIDE,EA 15 MIN: HCPCS

## 2025-03-03 ENCOUNTER — HOME CARE VISIT (OUTPATIENT)
Dept: HOME HEALTH SERVICES | Facility: HOME HEALTHCARE | Age: OVER 89
End: 2025-03-03
Payer: MEDICARE

## 2025-03-03 ENCOUNTER — HOME CARE VISIT (OUTPATIENT)
Dept: HOME HOSPICE | Facility: HOSPICE | Age: OVER 89
End: 2025-03-03
Payer: MEDICARE

## 2025-03-03 VITALS
TEMPERATURE: 97.3 F | HEART RATE: 65 BPM | DIASTOLIC BLOOD PRESSURE: 84 MMHG | SYSTOLIC BLOOD PRESSURE: 136 MMHG | RESPIRATION RATE: 19 BRPM

## 2025-03-03 PROCEDURE — G0299 HHS/HOSPICE OF RN EA 15 MIN: HCPCS

## 2025-03-03 PROCEDURE — G0156 HHCP-SVS OF AIDE,EA 15 MIN: HCPCS

## 2025-03-05 ENCOUNTER — HOME CARE VISIT (OUTPATIENT)
Dept: HOME HEALTH SERVICES | Facility: HOME HEALTHCARE | Age: OVER 89
End: 2025-03-05
Payer: MEDICARE

## 2025-03-05 PROCEDURE — G0156 HHCP-SVS OF AIDE,EA 15 MIN: HCPCS

## 2025-03-06 ENCOUNTER — HOME CARE VISIT (OUTPATIENT)
Dept: HOME HOSPICE | Facility: HOSPICE | Age: OVER 89
End: 2025-03-06
Payer: MEDICARE

## 2025-03-06 ENCOUNTER — HOME CARE VISIT (OUTPATIENT)
Dept: HOME HEALTH SERVICES | Facility: HOME HEALTHCARE | Age: OVER 89
End: 2025-03-06
Payer: MEDICARE

## 2025-03-06 VITALS
TEMPERATURE: 97.3 F | RESPIRATION RATE: 18 BRPM | SYSTOLIC BLOOD PRESSURE: 145 MMHG | DIASTOLIC BLOOD PRESSURE: 86 MMHG | HEART RATE: 71 BPM

## 2025-03-06 PROCEDURE — G0299 HHS/HOSPICE OF RN EA 15 MIN: HCPCS

## 2025-03-06 PROCEDURE — G0156 HHCP-SVS OF AIDE,EA 15 MIN: HCPCS

## 2025-03-07 ENCOUNTER — HOME CARE VISIT (OUTPATIENT)
Dept: HOME HOSPICE | Facility: HOSPICE | Age: OVER 89
End: 2025-03-07
Payer: MEDICARE

## 2025-03-07 ENCOUNTER — HOME CARE VISIT (OUTPATIENT)
Dept: HOME HEALTH SERVICES | Facility: HOME HEALTHCARE | Age: OVER 89
End: 2025-03-07
Payer: MEDICARE

## 2025-03-07 PROCEDURE — G0156 HHCP-SVS OF AIDE,EA 15 MIN: HCPCS

## 2025-03-10 ENCOUNTER — HOME CARE VISIT (OUTPATIENT)
Dept: HOME HEALTH SERVICES | Facility: HOME HEALTHCARE | Age: OVER 89
End: 2025-03-10
Payer: MEDICARE

## 2025-03-10 VITALS
DIASTOLIC BLOOD PRESSURE: 67 MMHG | HEART RATE: 49 BPM | SYSTOLIC BLOOD PRESSURE: 130 MMHG | RESPIRATION RATE: 19 BRPM | TEMPERATURE: 97.7 F

## 2025-03-10 PROCEDURE — G0299 HHS/HOSPICE OF RN EA 15 MIN: HCPCS

## 2025-03-10 PROCEDURE — G0156 HHCP-SVS OF AIDE,EA 15 MIN: HCPCS

## 2025-03-11 ENCOUNTER — HOME CARE VISIT (OUTPATIENT)
Dept: HOME HEALTH SERVICES | Facility: HOME HEALTHCARE | Age: OVER 89
End: 2025-03-11
Payer: MEDICARE

## 2025-03-11 PROCEDURE — G0156 HHCP-SVS OF AIDE,EA 15 MIN: HCPCS

## 2025-03-12 ENCOUNTER — HOME CARE VISIT (OUTPATIENT)
Dept: HOME HEALTH SERVICES | Facility: HOME HEALTHCARE | Age: OVER 89
End: 2025-03-12
Payer: MEDICARE

## 2025-03-12 PROCEDURE — G0156 HHCP-SVS OF AIDE,EA 15 MIN: HCPCS

## 2025-03-13 ENCOUNTER — HOME CARE VISIT (OUTPATIENT)
Dept: HOME HEALTH SERVICES | Facility: HOME HEALTHCARE | Age: OVER 89
End: 2025-03-13
Payer: MEDICARE

## 2025-03-13 VITALS
RESPIRATION RATE: 19 BRPM | TEMPERATURE: 97.1 F | HEART RATE: 65 BPM | DIASTOLIC BLOOD PRESSURE: 78 MMHG | SYSTOLIC BLOOD PRESSURE: 142 MMHG

## 2025-03-13 PROCEDURE — G0156 HHCP-SVS OF AIDE,EA 15 MIN: HCPCS

## 2025-03-13 PROCEDURE — G0299 HHS/HOSPICE OF RN EA 15 MIN: HCPCS

## 2025-03-14 ENCOUNTER — HOME CARE VISIT (OUTPATIENT)
Dept: HOME HOSPICE | Facility: HOSPICE | Age: OVER 89
End: 2025-03-14
Payer: MEDICARE

## 2025-03-14 ENCOUNTER — APPOINTMENT (EMERGENCY)
Dept: CT IMAGING | Facility: HOSPITAL | Age: OVER 89
End: 2025-03-14
Payer: MEDICARE

## 2025-03-14 ENCOUNTER — APPOINTMENT (EMERGENCY)
Dept: RADIOLOGY | Facility: HOSPITAL | Age: OVER 89
End: 2025-03-14
Payer: MEDICARE

## 2025-03-14 ENCOUNTER — HOME CARE VISIT (OUTPATIENT)
Dept: HOME HEALTH SERVICES | Facility: HOME HEALTHCARE | Age: OVER 89
End: 2025-03-14
Payer: MEDICARE

## 2025-03-14 ENCOUNTER — HOSPITAL ENCOUNTER (EMERGENCY)
Facility: HOSPITAL | Age: OVER 89
Discharge: HOME | End: 2025-03-14
Attending: EMERGENCY MEDICINE
Payer: MEDICARE

## 2025-03-14 VITALS
DIASTOLIC BLOOD PRESSURE: 57 MMHG | SYSTOLIC BLOOD PRESSURE: 123 MMHG | RESPIRATION RATE: 18 BRPM | TEMPERATURE: 96 F | HEART RATE: 83 BPM

## 2025-03-14 VITALS
BODY MASS INDEX: 23.52 KG/M2 | TEMPERATURE: 97.8 F | HEIGHT: 64 IN | HEART RATE: 67 BPM | SYSTOLIC BLOOD PRESSURE: 134 MMHG | DIASTOLIC BLOOD PRESSURE: 65 MMHG | OXYGEN SATURATION: 94 % | RESPIRATION RATE: 18 BRPM

## 2025-03-14 DIAGNOSIS — S13.4XXA INJURY TO LIGAMENT OF CERVICAL SPINE, INITIAL ENCOUNTER: ICD-10-CM
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
## 2025-03-14 DIAGNOSIS — C67.8 MALIGNANT NEOPLASM OF OVERLAPPING SITES OF BLADDER (HCC): Chronic | ICD-10-CM

## 2025-03-14 DIAGNOSIS — N18.32 STAGE 3B CHRONIC KIDNEY DISEASE (HCC): ICD-10-CM

## 2025-03-14 DIAGNOSIS — J94.8 HYDROPNEUMOTHORAX: ICD-10-CM

## 2025-03-14 DIAGNOSIS — S01.01XA LACERATION OF SCALP, INITIAL ENCOUNTER: ICD-10-CM

## 2025-03-14 DIAGNOSIS — R91.8 LUNG MASS: Chronic | ICD-10-CM

## 2025-03-14 DIAGNOSIS — I60.9 SUBARACHNOID HEMORRHAGE (HCC): Primary | ICD-10-CM

## 2025-03-14 PROBLEM — I13.10 HYPERTENSIVE HEART AND KIDNEY DISEASE: Status: ACTIVE | Noted: 2025-01-01

## 2025-03-14 LAB
ANION GAP SERPL CALCULATED.3IONS-SCNC: 4 MMOL/L (ref 4–13)
APTT PPP: 24 SECONDS (ref 23–34)
BASOPHILS # BLD AUTO: 0.04 THOUSANDS/ÂΜL (ref 0–0.1)
BASOPHILS NFR BLD AUTO: 1 % (ref 0–1)
BUN SERPL-MCNC: 36 MG/DL (ref 5–25)
CALCIUM SERPL-MCNC: 9 MG/DL (ref 8.4–10.2)
CHLORIDE SERPL-SCNC: 98 MMOL/L (ref 96–108)
CK SERPL-CCNC: 86 U/L (ref 26–192)
CO2 SERPL-SCNC: 33 MMOL/L (ref 21–32)
CREAT SERPL-MCNC: 1.31 MG/DL (ref 0.6–1.3)
EOSINOPHIL # BLD AUTO: 0.07 THOUSAND/ÂΜL (ref 0–0.61)
EOSINOPHIL NFR BLD AUTO: 1 % (ref 0–6)
ERYTHROCYTE [DISTWIDTH] IN BLOOD BY AUTOMATED COUNT: 15.6 % (ref 11.6–15.1)
GFR SERPL CREATININE-BSD FRML MDRD: 35 ML/MIN/1.73SQ M
GLUCOSE SERPL-MCNC: 226 MG/DL (ref 65–140)
HCT VFR BLD AUTO: 44 % (ref 34.8–46.1)
HGB BLD-MCNC: 13.9 G/DL (ref 11.5–15.4)
IMM GRANULOCYTES # BLD AUTO: 0.14 THOUSAND/UL (ref 0–0.2)
IMM GRANULOCYTES NFR BLD AUTO: 2 % (ref 0–2)
INR PPP: 0.92 (ref 0.85–1.19)
LYMPHOCYTES # BLD AUTO: 0.67 THOUSANDS/ÂΜL (ref 0.6–4.47)
LYMPHOCYTES NFR BLD AUTO: 9 % (ref 14–44)
MCH RBC QN AUTO: 30.7 PG (ref 26.8–34.3)
MCHC RBC AUTO-ENTMCNC: 31.6 G/DL (ref 31.4–37.4)
MCV RBC AUTO: 97 FL (ref 82–98)
MONOCYTES # BLD AUTO: 0.69 THOUSAND/ÂΜL (ref 0.17–1.22)
MONOCYTES NFR BLD AUTO: 9 % (ref 4–12)
NEUTROPHILS # BLD AUTO: 6.14 THOUSANDS/ÂΜL (ref 1.85–7.62)
NEUTS SEG NFR BLD AUTO: 78 % (ref 43–75)
NRBC BLD AUTO-RTO: 0 /100 WBCS
PLATELET # BLD AUTO: 193 THOUSANDS/UL (ref 149–390)
PMV BLD AUTO: 11.6 FL (ref 8.9–12.7)
POTASSIUM SERPL-SCNC: 5.3 MMOL/L (ref 3.5–5.3)
PROTHROMBIN TIME: 12.9 SECONDS (ref 12.3–15)
RBC # BLD AUTO: 4.53 MILLION/UL (ref 3.81–5.12)
SODIUM SERPL-SCNC: 135 MMOL/L (ref 135–147)
WBC # BLD AUTO: 7.75 THOUSAND/UL (ref 4.31–10.16)

## 2025-03-14 PROCEDURE — 85025 COMPLETE CBC W/AUTO DIFF WBC: CPT | Performed by: EMERGENCY MEDICINE

## 2025-03-14 PROCEDURE — 72170 X-RAY EXAM OF PELVIS: CPT

## 2025-03-14 PROCEDURE — G0156 HHCP-SVS OF AIDE,EA 15 MIN: HCPCS

## 2025-03-14 PROCEDURE — 96374 THER/PROPH/DIAG INJ IV PUSH: CPT

## 2025-03-14 PROCEDURE — 70498 CT ANGIOGRAPHY NECK: CPT

## 2025-03-14 PROCEDURE — 71250 CT THORAX DX C-: CPT

## 2025-03-14 PROCEDURE — 80048 BASIC METABOLIC PNL TOTAL CA: CPT | Performed by: EMERGENCY MEDICINE

## 2025-03-14 PROCEDURE — 70450 CT HEAD/BRAIN W/O DYE: CPT

## 2025-03-14 PROCEDURE — 36415 COLL VENOUS BLD VENIPUNCTURE: CPT | Performed by: EMERGENCY MEDICINE

## 2025-03-14 PROCEDURE — 85730 THROMBOPLASTIN TIME PARTIAL: CPT | Performed by: EMERGENCY MEDICINE

## 2025-03-14 PROCEDURE — 90715 TDAP VACCINE 7 YRS/> IM: CPT | Performed by: EMERGENCY MEDICINE

## 2025-03-14 PROCEDURE — 10330090 VN HOSPICE ROOM AND BOARD

## 2025-03-14 PROCEDURE — 85610 PROTHROMBIN TIME: CPT | Performed by: EMERGENCY MEDICINE

## 2025-03-14 PROCEDURE — G0155 HHCP-SVS OF CSW,EA 15 MIN: HCPCS

## 2025-03-14 PROCEDURE — 72125 CT NECK SPINE W/O DYE: CPT

## 2025-03-14 PROCEDURE — G0299 HHS/HOSPICE OF RN EA 15 MIN: HCPCS

## 2025-03-14 PROCEDURE — 71045 X-RAY EXAM CHEST 1 VIEW: CPT

## 2025-03-14 PROCEDURE — 90471 IMMUNIZATION ADMIN: CPT

## 2025-03-14 PROCEDURE — 99285 EMERGENCY DEPT VISIT HI MDM: CPT

## 2025-03-14 PROCEDURE — 82550 ASSAY OF CK (CPK): CPT | Performed by: EMERGENCY MEDICINE

## 2025-03-14 PROCEDURE — 70496 CT ANGIOGRAPHY HEAD: CPT

## 2025-03-14 PROCEDURE — 74176 CT ABD & PELVIS W/O CONTRAST: CPT

## 2025-03-14 RX ORDER — BACITRACIN, NEOMYCIN, POLYMYXIN B 400; 3.5; 5 [USP'U]/G; MG/G; [USP'U]/G
1 OINTMENT TOPICAL ONCE
Status: COMPLETED | OUTPATIENT
Start: 2025-03-14 | End: 2025-03-14

## 2025-03-14 RX ORDER — FENTANYL CITRATE 50 UG/ML
25 INJECTION, SOLUTION INTRAMUSCULAR; INTRAVENOUS ONCE
Refills: 0 | Status: COMPLETED | OUTPATIENT
Start: 2025-03-14 | End: 2025-03-14

## 2025-03-14 RX ORDER — OXYCODONE HYDROCHLORIDE 5 MG/1
5 TABLET ORAL ONCE
Refills: 0 | Status: COMPLETED | OUTPATIENT
Start: 2025-03-14 | End: 2025-03-14

## 2025-03-14 RX ADMIN — OXYCODONE HYDROCHLORIDE 5 MG: 5 TABLET ORAL at 10:21

## 2025-03-14 RX ADMIN — TETANUS TOXOID, REDUCED DIPHTHERIA TOXOID AND ACELLULAR PERTUSSIS VACCINE, ADSORBED 0.5 ML: 5; 2.5; 8; 8; 2.5 SUSPENSION INTRAMUSCULAR at 01:14

## 2025-03-14 RX ADMIN — IOHEXOL 85 ML: 350 INJECTION, SOLUTION INTRAVENOUS at 02:25

## 2025-03-14 RX ADMIN — FENTANYL CITRATE 25 MCG: 50 INJECTION INTRAMUSCULAR; INTRAVENOUS at 01:13

## 2025-03-14 RX ADMIN — BACITRACIN ZINC, NEOMYCIN, POLYMYXIN B SULFAT 1 SMALL APPLICATION: 5000; 3.5; 4 OINTMENT TOPICAL at 01:57

## 2025-03-14 NOTE — ED PROVIDER NOTES
Emergency Department Trauma Note  Sylvia Sanderson 89 y.o. female MRN: [id number]  Unit/Bed#: TR 03/TR 03 Encounter: [id number]      Trauma Alert: Trauma Acuity: Trauma Evaluation  Model of Arrival: Mode of Arrival: BLS via Trauma Squad Name and Number: [id number]  Trauma Team: Current Providers  Attending Provider: Mihir Daigle DO  Charge Nurse: Day Ellis RN  Consultants:     None      History of Present Illness     Chief Complaint:   Chief Complaint   Patient presents with    Fall     Fall out of bed, hit head on night stand and bedroom lamp     HPI:  Sylvia Sanderson is a 89 y.o. female who presents via ems for reports of fall.  States she was trying to get out of bed and fell striking her head. Denies blood thinner or aspirin usage or LOC. Unsure of why she fell. Denies any other symptoms or issues at the same time.   Mechanism:Details of Incident: Fall from standing height, hit head on night stand and lamp Injury Date: 03/14/25        HPI  Review of Systems   Neurological:  Positive for headaches.   All other systems reviewed and are negative.      Historical Information     Immunizations:   Immunization History   Administered Date(s) Administered    COVID-19 MODERNA VACC 0.5 ML IM 04/01/2021, 05/18/2021    INFLUENZA 10/26/2015, 09/01/2016, 08/17/2017, 09/02/2018    Influenza Split High Dose Preservative Free IM 11/05/2024    Influenza, high dose seasonal 0.7 mL 10/10/2022    Pneumococcal Conjugate 13-Valent 12/31/2018    Pneumococcal Polysaccharide PPV23 10/23/2017    Tdap 03/14/2025       Past Medical History:   Diagnosis Date    Abnormal gait     Accidental fall from chair, subsequent encounter     Atrial fibrillation (HCC)     Benign essential hypertension     Bite of animal     Cataract     Cellulitis     Chronic kidney disease     Chronic kidney disease, stage 3 (HCC)     Colon cancer (HCC)     Colon polyp     Compression fracture of L3 vertebra (HCC) 04/08/2023    Compression fracture of  thoracic spine, non-traumatic, sequela 2020    Current tear of lateral cartilage or meniscus of knee     Diabetes mellitus (HCC)     Disease of thyroid gland     Disorder of magnesium metabolism     Dvt femoral (deep venous thrombosis)     Embolism from thrombosis of vein of distal end of lower extremity (HCC)     Essential hypertension     Fall     Hyperlipidemia     Hyperlipidemia     Hypertension     Hypothyroidism     Insomnia     Kidney stone     Knee joint effusion     Leukocytosis     Malaise and fatigue     MI (myocardial infarction) (HCC)     Hospitalization     Orbital hemorrhage     Other sleep disorders     PAF (paroxysmal atrial fibrillation) (HCC)     Presence of vena cava filter     Pulmonary embolism (HCC)     Tear film insufficiency     Type 2 diabetes mellitus (HCC)     Unspecified osteoarthritis, unspecified site     Weight decreased        Family History   Problem Relation Age of Onset    Deep vein thrombosis Mother     No Known Problems Father      Past Surgical History:   Procedure Laterality Date    ABDOMINAL ADHESION SURGERY      Had wound vac    ABDOMINAL SURGERY      APPENDECTOMY      CATARACT EXTRACTION      CHOLECYSTECTOMY      COLON SURGERY      COLOSTOMY      Due to Ileus, then reanastomosis in     COLOSTOMY TAKEDOWN/REVERSE KUMAR      EXCISION BLADDER MESH TRANSVESICLEPORT W/ LASER      HERNIA REPAIR      IVC FILTER INSERTION      KNEE SURGERY  2014    repair     AR CYSTOURETHROSCOPY W/DEST &/RMVL TUMOR LARGE N/A 2021    Procedure: CYSTOSCOPY, TRANSURETHRAL RESECTION OF BLADDER TUMOR (TURBT);  Surgeon: Clarke Meyer MD;  Location:  MAIN OR;  Service: Urology     Social History     Tobacco Use    Smoking status: Former     Current packs/day: 0.00     Average packs/day: 0.5 packs/day for 10.0 years (5.0 ttl pk-yrs)     Types: Cigarettes     Start date: 3/19/1991     Quit date: 3/19/2001     Years since quittin.0    Smokeless tobacco: Never   Vaping Use     Vaping status: Never Used   Substance Use Topics    Alcohol use: Never    Drug use: Never     E-Cigarette/Vaping    E-Cigarette Use Never User      E-Cigarette/Vaping Substances    Nicotine No     THC No     CBD No     Flavoring No     Other No     Unknown No        Family History: non-contributory    Meds/Allergies   Prior to Admission Medications   Prescriptions Last Dose Informant Patient Reported? Taking?   LORazepam (Ativan) 0.5 mg tablet   Yes No   Sig: Take 0.5 mg by mouth every 6 (six) hours as needed for anxiety. CP Medication on hold  Indications: Feeling Anxious   Morphine Sulfate, Concentrate, 20 mg/mL concentrated solution   Yes No   Sig: Take 5 mg by mouth every 3 (three) hours as needed for moderate pain or severe pain (SOB). CP Medication on hold  Indications: Difficulty Breathing, Pain   acetaminophen (TYLENOL) 325 mg tablet  Self No No   Sig: Take 2 tablets (650 mg total) by mouth every 6 (six) hours as needed for mild pain or fever   acetaminophen (TYLENOL) 650 mg suppository   Yes No   Sig: Insert 650 mg into the rectum every 6 (six) hours as needed for fever or mild pain. CP Medication on hold  Indications: Fever, Pain   bisacodyl (DULCOLAX) 10 mg suppository   Yes No   Sig: Insert 10 mg into the rectum daily as needed for constipation. CP Medication on hold  Indications: Constipation   guaiFENesin-codeine (ROBITUSSIN AC) 100-10 mg/5 mL oral solution   No No   Sig: Take 5 mL by mouth 3 (three) times a day as needed for cough   haloperidol (HALDOL) oral concentrated solution   Yes No   Sig: Take 1 mg by mouth every 6 (six) hours as needed for agitation (Restlessness). CP Medication on hold  Indications: Problems with Behavior   hyoscyamine (LEVSIN/SL) 0.125 mg SL tablet   Yes No   Sig: Take 0.125 mg by mouth every 4 (four) hours as needed (Terminal Secretions). CP Medication on hold  Indications: Terminal Secretions   levothyroxine 25 mcg tablet   No No   Sig: Take 1 tablet (25 mcg total) by  mouth daily   metFORMIN (GLUCOPHAGE) 500 mg tablet   No No   Sig: Take 1 tablet (500 mg total) by mouth 2 (two) times a day with meals   metoprolol tartrate (LOPRESSOR) 25 mg tablet   No No   Sig: Take 1 tablet (25 mg total) by mouth every 12 (twelve) hours   oxyCODONE (ROXICODONE) 5 immediate release tablet   Yes No   Sig: Take 2.5 mg by mouth every 4 (four) hours as needed for moderate pain or severe pain (SOB). Per Provider patient may take 2.5 mg- 5 mg prn for pain/SOB.  Indications: Acute Pain, Chronic Pain, SOB   prochlorperazine (COMPAZINE) 10 mg tablet   Yes No   Sig: Take 10 mg by mouth every 6 (six) hours as needed for nausea or vomiting. CP Medication on hold  Indications: Nausea and Vomiting   senna (Senna-Tabs) 8.6 MG tablet   Yes No   Sig: Take 1 tablet by mouth daily as needed for constipation. Indications: Constipation   zinc oxide (Balmex Complete Protection) 11.3 % cream   Yes No   Sig: Apply 1 Application topically daily as needed (Skin Protectant). Indications: Skin Protectant      Facility-Administered Medications: None       Allergies   Allergen Reactions    Other GI Intolerance     Imitation crab meat         PHYSICAL EXAM      Objective   Vitals:   First set: Temperature: (!) 97.4 °F (36.3 °C) (03/14/25 0040)  Pulse: 63 (03/14/25 0040)  Respirations: 15 (03/14/25 0040)  Blood Pressure: (!) 177/85 (03/14/25 0040)  SpO2: 96 % (03/14/25 0040)    Primary Survey:   (A) Airway: intact  (B) Breathing: equal B/L  (C) Circulation: Pulses:   normal  (D) Disabliity:  GCS Total:  15  (E) Expose:  Completed    Secondary Survey: (Click on Physical Exam tab above)  Physical Exam  Vitals and nursing note reviewed.   Constitutional:       General: She is not in acute distress.     Appearance: She is well-developed. She is not diaphoretic.   HENT:      Head: Normocephalic.      Comments: Hematoma to L anterior forehead and laceration to L parietal scalp     Right Ear: External ear normal.      Left Ear:  External ear normal.      Nose: Nose normal.   Eyes:      General: No scleral icterus.        Right eye: No discharge.         Left eye: No discharge.      Conjunctiva/sclera: Conjunctivae normal.      Pupils: Pupils are equal, round, and reactive to light.   Neck:      Vascular: No JVD.      Trachea: No tracheal deviation.   Cardiovascular:      Rate and Rhythm: Normal rate and regular rhythm.      Heart sounds: Normal heart sounds. No murmur heard.     No friction rub. No gallop.   Pulmonary:      Effort: Pulmonary effort is normal. No respiratory distress.      Breath sounds: Normal breath sounds. No stridor. No wheezing or rales.   Abdominal:      General: Bowel sounds are normal. There is no distension.      Palpations: Abdomen is soft. There is no mass.      Tenderness: There is no abdominal tenderness. There is no guarding.   Musculoskeletal:         General: Tenderness (L greater trochanter) present. No deformity or signs of injury. Normal range of motion.      Cervical back: Normal range of motion and neck supple.      Right lower leg: No edema.      Left lower leg: No edema.   Skin:     General: Skin is warm and dry.      Coloration: Skin is not pale.      Findings: No erythema or rash.   Neurological:      Mental Status: She is alert and oriented to person, place, and time.      Cranial Nerves: No cranial nerve deficit.      Sensory: No sensory deficit.      Motor: No abnormal muscle tone.   Psychiatric:         Behavior: Behavior normal.         Thought Content: Thought content normal.         Judgment: Judgment normal.         Cervical spine cleared by clinical criteria? No (imaging required)      Invasive Devices       Peripheral Intravenous Line  Duration             Peripheral IV 03/14/25 Right Antecubital <1 day                    Lab Results:   Results Reviewed       Procedure Component Value Units Date/Time    Protime-INR [[id number]]  (Normal) Collected: 03/14/25 0049    Lab Status: Final result  Specimen: Blood from Arm, Right Updated: 03/14/25 0113     Protime 12.9 seconds      INR 0.92    Narrative:      INR Therapeutic Range    Indication                                             INR Range      Atrial Fibrillation                                               2.0-3.0  Hypercoagulable State                                    2.0.2.3  Left Ventricular Asist Device                            2.0-3.0  Mechanical Heart Valve                                  -    Aortic(with afib, MI, embolism, HF, LA enlargement,    and/or coagulopathy)                                     2.0-3.0 (2.5-3.5)     Mitral                                                             2.5-3.5  Prosthetic/Bioprosthetic Heart Valve               2.0-3.0  Venous thromboembolism (VTE: VT, PE        2.0-3.0    APTT [[id number]]  (Normal) Collected: 03/14/25 0049    Lab Status: Final result Specimen: Blood from Arm, Right Updated: 03/14/25 0113     PTT 24 seconds     Basic metabolic panel [[id number]]  (Abnormal) Collected: 03/14/25 0049    Lab Status: Final result Specimen: Blood from Arm, Right Updated: 03/14/25 0112     Sodium 135 mmol/L      Potassium 5.3 mmol/L      Chloride 98 mmol/L      CO2 33 mmol/L      ANION GAP 4 mmol/L      BUN 36 mg/dL      Creatinine 1.31 mg/dL      Glucose 226 mg/dL      Calcium 9.0 mg/dL      eGFR 35 ml/min/1.73sq m     Narrative:      National Kidney Disease Foundation guidelines for Chronic Kidney Disease (CKD):     Stage 1 with normal or high GFR (GFR > 90 mL/min/1.73 square meters)    Stage 2 Mild CKD (GFR = 60-89 mL/min/1.73 square meters)    Stage 3A Moderate CKD (GFR = 45-59 mL/min/1.73 square meters)    Stage 3B Moderate CKD (GFR = 30-44 mL/min/1.73 square meters)    Stage 4 Severe CKD (GFR = 15-29 mL/min/1.73 square meters)    Stage 5 End Stage CKD (GFR <15 mL/min/1.73 square meters)  Note: GFR calculation is accurate only with a steady state creatinine    CK [[id number]]  (Normal) Collected:  03/14/25 0049    Lab Status: Final result Specimen: Blood from Arm, Right Updated: 03/14/25 0112     Total CK 86 U/L     CBC and differential [[id number]]  (Abnormal) Collected: 03/14/25 0049    Lab Status: Final result Specimen: Blood from Arm, Right Updated: 03/14/25 0058     WBC 7.75 Thousand/uL      RBC 4.53 Million/uL      Hemoglobin 13.9 g/dL      Hematocrit 44.0 %      MCV 97 fL      MCH 30.7 pg      MCHC 31.6 g/dL      RDW 15.6 %      MPV 11.6 fL      Platelets 193 Thousands/uL      nRBC 0 /100 WBCs      Segmented % 78 %      Immature Grans % 2 %      Lymphocytes % 9 %      Monocytes % 9 %      Eosinophils Relative 1 %      Basophils Relative 1 %      Absolute Neutrophils 6.14 Thousands/µL      Absolute Immature Grans 0.14 Thousand/uL      Absolute Lymphocytes 0.67 Thousands/µL      Absolute Monocytes 0.69 Thousand/µL      Eosinophils Absolute 0.07 Thousand/µL      Basophils Absolute 0.04 Thousands/µL     UA w Reflex to Microscopic w Reflex to Culture [[id number]]     Lab Status: No result Specimen: Urine                    Imaging Studies:   Direct to CT: No  TRAUMA - CT head wo contrast   Final Result by Kalen Archer MD (03/14 0116)         1. Punctate focus of acute subarachnoid hemorrhage in a right parietal sulcus, likely traumatic. No mass effect.   2. Left frontal scalp hematoma.         I personally discussed this study with LIDIA MONTEIRO on 3/14/2025 1:12 AM.                        Workstation performed: [id number]         TRAUMA - CT spine cervical wo contrast   Final Result by Kalen Archer MD (03/14 0148)         1. Mild subluxation of the right C3-4 facet.   2. No evidence of acute cervical spine fracture.   3. Widening of the C3-4 interspinous space may indicate ligamentous injury.   4. Partially visualized right apical hydropneumothorax, further described on the chest CT report.         I personally discussed this study with LIDIA MONTEIRO on 3/14/2025 1:24 AM.              "     Workstation performed: [id number]         TRAUMA - CT chest abdomen pelvis wo contrast   Final Result by Kalen Archer MD (03/14 0140)         1. Small right apical hydropneumothorax.   2. Right perihilar cavitary 5 x 4 x 8 cm mass consistent with primary lung malignancy, significantly increased in size.   3. Right lower lobe lobar consolidation and atelectasis consistent with postobstructive pneumonia, likely extending into the right upper and middle lobe surrounding the tumor.   4. New cavitary 1.5 cm lesion in the lingula also likely to represent malignancy.   5. Mediastinal and right hilar lymphadenopathy.   6. Innumerable hepatic metastases.   7. More extensive heterogeneous densities in the anterior aspect of the bladder lumen concerning for malignancy.   8. No evidence of acute trauma in the abdomen or pelvis.         I personally discussed this study with LIDIA MONTEIRO on 3/14/2025 1:47 AM.               Workstation performed: [id number]         XR Trauma chest portable   Final Result by Kalen Archer MD (03/14 0145)         1. Small right hydropneumothorax, better visualized on the chest CT.      2. Enlarging right perihilar cavitary mass and surrounding postobstructive pneumonia.            Workstation performed: [id number]         XR Trauma pelvis ap only 1 or 2 vw   Final Result by Kalen Archer MD (03/14 0159)      No evidence of acute pelvic fracture.            Workstation performed: [id number]         CTA head and neck with and without contrast    (Results Pending)         Procedures  Universal Protocol:  procedure performed by consultantConsent: Verbal consent obtained.  Risks and benefits: risks, benefits and alternatives were discussed  Consent given by: patient  Time out: Immediately prior to procedure a \"time out\" was called to verify the correct patient, procedure, equipment, support staff and site/side marked as required.  Patient understanding: patient states " understanding of the procedure being performed  Patient consent: the patient's understanding of the procedure matches consent given  Site marked: the operative site was marked  Radiology Images displayed and confirmed. If images not available, report reviewed: imaging studies available  Patient identity confirmed: verbally with patient  Laceration repair    Date/Time: 3/14/2025 1:36 AM    Performed by: Mihir Daigle DO  Authorized by: Mihir Daigle DO  Body area: head/neck  Location details: scalp  Laceration length: 2 cm  Foreign bodies: no foreign bodies  Tendon involvement: none  Nerve involvement: none  Vascular damage: no    Sedation:  Patient sedated: no      Wound Dehiscence:  Superficial Wound Dehiscence: simple closure      Procedure Details:  Preparation: Patient was prepped and draped in the usual sterile fashion.  Irrigation solution: saline  Irrigation method: jet lavage  Amount of cleaning: standard  Debridement: none  Degree of undermining: none  Skin closure: staples  Number of sutures: 5  Approximation: close  Approximation difficulty: simple  Dressing: antibiotic ointment, 4x4 sterile gauze and gauze roll      POC FAST US    Date/Time: 3/14/2025 1:39 AM    Performed by: Mihir Daigle DO  Authorized by: Mihir Daigle DO    Patient location:  ED  Other Assisting Provider: No    Procedure details:     Exam Type:  Diagnostic    Indications: blunt abdominal trauma and abdominal pain      Assess for:  Intra-abdominal fluid and pericardial effusion    Technique: FAST      Views obtained:  Heart - Pericardial sac, LUQ - Splenorenal space, Suprapubic - Pouch of Herb and RUQ - Koenig's Pouch    Image quality: diagnostic      Image availability:  Images available in PACS and video obtained  FAST Findings:     RUQ (Hepatorenal) free fluid: absent      LUQ (Splenorenal) free fluid: absent      Suprapubic free fluid: absent      Cardiac wall motion: identified      Pericardial  effusion: absent    Interpretation:     Impressions: negative             ED Course  ED Course as of 03/14/25 0316   Fri Mar 14, 2025   0233 Spoke with patient's daughter in law Deysi Sanderson who reports patient is on hospice. Reached out to on call hospice line, awaiting call back.    0243 Patient does confirm that she is on hospice at this time. Patient is awake alert and oriented to person/place/time.    0256 Spoke with Dr. Greenberg about case and patient is still on hospice and from their perspective and patient and family are ok with going home they are in agreement.    0301 Spoke with patient's daughter-in-law Deysi Sanderson who reiterates that patient is on hospice.  Explained all the injuries and that they may shorten patient's life and family is in agreement that they would still prefer the patient to go home at this time.  Will cancel transfer to [facility] and try to get patient home as possible.           Medical Decision Making  89-year-old female presented the ED for reports of fall for which she is unsure why she fell at home with head strike.  Noted to have hematoma left forehead and approximate 2 cm laceration left parietal scalp repaired with staples.  CT scan showing punctate subarachnoid.  Patient does not take aspirin or blood thinners per patient or the chart.  Tetanus updated as unclear when last received.  Case discussed with on-call trauma surgery Dr. Mendosa at [facility] and patient transferred for further evaluation.  Patient is GCS 15 on my examination.    CT scans ordered as patient with left pelvic/hip tenderness but no obvious deformity.  Fast was negative however with tenderness CT scan ordered.  Patient also with reported known lung mass.  And as patient with clear head strike CT head and C-spine ordered.    Amount and/or Complexity of Data Reviewed  Labs: ordered.  Radiology: ordered.    Risk  OTC drugs.  Prescription drug management.                Disposition  Priority  One Transfer: No  Final diagnoses:   Subarachnoid hemorrhage (HCC)   Fall, initial encounter   Laceration of scalp, initial encounter     Time reflects when diagnosis was documented in both MDM as applicable and the Disposition within this note       Time User Action Codes Description Comment    3/14/2025  1:25 AM Mihir Daigle [I60.9] Subarachnoid hemorrhage (HCC)     3/14/2025  1:25 AM Mihir Daigle [W19.XXXA] Fall, initial encounter     3/14/2025  1:25 AM Mihir Daigle [S01.01XA] Laceration of scalp, initial encounter           ED Disposition       ED Disposition   Discharge    Condition   Stable    Date/Time   Fri Mar 14, 2025  3:15 AM    Comment   Sylvia Sanderson stable for discharge back to home hospice.                MD Documentation      Flowsheet Row Most Recent Value   Patient Condition The patient has been stabilized such that within reasonable medical probability, no material deterioration of the patient condition or the condition of the unborn child(michelle) is likely to result from the transfer   Reason for Transfer Level of Care needed not available at this facility   Benefits of Transfer Specialized equipment and/or services available at the receiving facility (Include comment)________________________  [Trauma/Neurosurgery]   Risks of Transfer Potential for delay in receiving treatment, Potential deterioration of medical condition, Loss of IV, Increased discomfort during transfer, Possible worsening of condition or death during transfer   Accepting Physician Dr. Mendosa   Accepting Facility [facility], [facility]   Sending MD Dr. Daigle   Provider Certification General risk, such as traffic hazards, adverse weather conditions, rough terrain or turbulence, possible failure of equipment (including vehicle or aircraft), or consequences of actions of persons outside the control of the transport personnel, Unanticipated needs of medical equipment and personnel during  transport, Risk of worsening condition, The possibility of a transport vehicle being unavailable          RN Documentation      Flowsheet Row Most Recent Value   Accepting Facility Name, City & State  [facility]          Follow-up Information       Follow up With Specialties Details Why Contact Info Additional Information    Matilde Sigala MD Internal Medicine   [address] [facility] [address]  [phone]       [facility] Emergency Department Emergency Medicine Go to  As needed, If symptoms worsen [address] [address] [phone] [facility] Emergency Department, [address], [place] [address]          Patient's Medications   Discharge Prescriptions    No medications on file     No discharge procedures on file.    PDMP Review         Value Time User    PDMP Reviewed  Yes 11/27/2024 12:19 PM Matilde Sigala MD            ED Provider  Electronically Signed by           Mihir Daigle,   03/14/25 0139       Mihir Daigle,   03/14/25 0316

## 2025-03-14 NOTE — EMTALA/ACUTE CARE TRANSFER
[facility] EMERGENCY DEPARTMENT  [address] [place] [address]  Dept: [phone]      EMTALA TRANSFER CONSENT    NAME Sylvia Sanderson                                         1933                              MRN [id number]    I have been informed of my rights regarding examination, treatment, and transfer   by Dr. Mihir Daigle,     Benefits: Specialized equipment and/or services available at the receiving facility (Include comment)________________________ (Trauma/Neurosurgery)    Risks: Potential for delay in receiving treatment, Potential deterioration of medical condition, Loss of IV, Increased discomfort during transfer, Possible worsening of condition or death during transfer      Transfer Request   I acknowledge that my medical condition has been evaluated and explained to me by the emergency department physician or other qualified medical person and/or my attending physician who has recommended and offered to me further medical examination and treatment. I understand the Hospital's obligation with respect to the treatment and stabilization of my emergency medical condition. I nevertheless request to be transferred. I release the Hospital, the doctor, and any other persons caring for me from all responsibility or liability for any injury or ill effects that may result from my transfer and agree to accept all responsibility for the consequences of my choice to transfer, rather than receive stabilizing treatment at the Hospital. I understand that because the transfer is my request, my insurance may not provide reimbursement for the services.  The Hospital will assist and direct me and my family in how to make arrangements for transfer, but the hospital is not liable for any fees charged by the transport service.  In spite of this understanding, I refuse to consent to further medical examination and treatment which has been offered to me, and request transfer to Accepting  Facility Name, City & State : [facility]. I authorize the performance of emergency medical procedures and treatments upon me in both transit and upon arrival at the receiving facility.  Additionally, I authorize the release of any and all medical records to the receiving facility and request they be transported with me, if possible.    I authorize the performance of emergency medical procedures and treatments upon me in both transit and upon arrival at the receiving facility.  Additionally, I authorize the release of any and all medical records to the receiving facility and request they be transported with me, if possible.  I understand that the safest mode of transportation during a medical emergency is an ambulance and that the Hospital advocates the use of this mode of transport. Risks of traveling to the receiving facility by car, including absence of medical control, life sustaining equipment, such as oxygen, and medical personnel has been explained to me and I fully understand them.    (PAWEL CORRECT BOX BELOW)  [  ]  I consent to the stated transfer and to be transported by ambulance/helicopter.  [  ]  I consent to the stated transfer, but refuse transportation by ambulance and accept full responsibility for my transportation by car.  I understand the risks of non-ambulance transfers and I exonerate the Hospital and its staff from any deterioration in my condition that results from this refusal.    X___________________________________________    DATE  25  TIME________  Signature of patient or legally responsible individual signing on patient behalf           RELATIONSHIP TO PATIENT_________________________          Provider Certification    NAME Sylvia Sanderson                                         1933                              MRN [id number]    A medical screening exam was performed on the above named patient.  Based on the examination:    Condition Necessitating Transfer The primary encounter  diagnosis was Subarachnoid hemorrhage (HCC). Diagnoses of Fall, initial encounter and Laceration of scalp, initial encounter were also pertinent to this visit.    Patient Condition: The patient has been stabilized such that within reasonable medical probability, no material deterioration of the patient condition or the condition of the unborn child(michelle) is likely to result from the transfer    Reason for Transfer: Level of Care needed not available at this facility    Transfer Requirements: Facility [facility]   Space available and qualified personnel available for treatment as acknowledged by    Agreed to accept transfer and to provide appropriate medical treatment as acknowledged by       Dr. Mendosa  Appropriate medical records of the examination and treatment of the patient are provided at the time of transfer   STAFF INITIAL WHEN COMPLETED _______  Transfer will be performed by qualified personnel from    and appropriate transfer equipment as required, including the use of necessary and appropriate life support measures.    Provider Certification: I have examined the patient and explained the following risks and benefits of being transferred/refusing transfer to the patient/family:  General risk, such as traffic hazards, adverse weather conditions, rough terrain or turbulence, possible failure of equipment (including vehicle or aircraft), or consequences of actions of persons outside the control of the transport personnel, Unanticipated needs of medical equipment and personnel during transport, Risk of worsening condition, The possibility of a transport vehicle being unavailable      Based on these reasonable risks and benefits to the patient and/or the unborn child(michelle), and based upon the information available at the time of the patient’s examination, I certify that the medical benefits reasonably to be expected from the provision of appropriate medical treatments at another medical facility outweigh the  increasing risks, if any, to the individual’s medical condition, and in the case of labor to the unborn child, from effecting the transfer.    X____________________________________________ DATE 03/14/25        TIME_______      ORIGINAL - SEND TO MEDICAL RECORDS   COPY - SEND WITH PATIENT DURING TRANSFER

## 2025-03-14 NOTE — ED NOTES
Hospice RN Rohan with patient to develop plan of care for discharge from ER. Numerous text messages exchanged between hospice doctors, coordinators.     Yosi Jimenez RN  03/14/25 [id number]

## 2025-03-14 NOTE — ED NOTES
Patient was able to dress and transfer on her own, requiring assistance just pulling up her paints.     Yosi Jimenez RN  03/14/25 [id number]

## 2025-03-14 NOTE — DISCHARGE INSTRUCTIONS
If you develop any new or worsening symptoms please immediately return to your nearest emergency department.     Please try not to get the area wet for approximately 24 hours.  When cleaning the area after that you can use soap and water just please do not scrub it clean and do not scrub it dry.  Please pat while drying.  Please have staples removed in approximately 7 days.

## 2025-03-14 NOTE — ED NOTES
Assisted patient to [facility], patient requesting to get dressed, clothes provided and recliner chair provided. Call bell within reach. Patient encouraged to summon help if she needs any.     Yosi Jimenez RN  03/14/25 [id number]

## 2025-03-14 NOTE — PROGRESS NOTES
CH initiated support visit to pt in setting of comfort care. Pt received CH upright in bed, no family present. Palliative  present.    Pt shared about her social structure: her spouse recently , she has 3 living adult children. 2 live distantly, 1 lives nearby.    Pt expressed her concern about bill paying and paperwork that might go ignored if she is admitted in IP hospice. CH provided support and encouragement.     privately met with CH and clarified that pt does not receive robust support from her children, decisional or otherwise.

## 2025-03-15 PROCEDURE — 10330090 VN HOSPICE ROOM AND BOARD

## 2025-03-16 PROCEDURE — 10330090 VN HOSPICE ROOM AND BOARD

## 2025-03-17 PROCEDURE — 10330090 VN HOSPICE ROOM AND BOARD

## 2025-03-18 PROCEDURE — 10330090 VN HOSPICE ROOM AND BOARD

## 2025-03-22 NOTE — PROGRESS NOTES
3/22/2025 3:35 PM  [facility] facility patient requests SOC medications and medication adjusted due to change in patient condition.  Filled electronically via Epic as per [place] State Law.    Requested Prescriptions     Signed Prescriptions Disp Refills    morphine 20 mg/mL concentrated solution 30 mL 0     Sig: Take 1 mL (20 mg total) by mouth every 6 (six) hours. May also take 1 mL (20 mg total) every hour as needed (pain/dyspnea). Max Daily Amount: 560 mg.    LORazepam (ATIVAN) 2 mg/mL concentrated solution 30 mL 0     Sig: Take 0.25 mL (0.5 mg total) by mouth every 6 (six) hours. May also take 0.25 mL (0.5 mg total) every hour as needed for anxiety (dyspnea).       Darline Dudley MD  [facility]  Hospice Answering Service: [phone]

## 2025-03-25 ENCOUNTER — TELEPHONE (OUTPATIENT)
Age: OVER 89
End: 2025-03-25

## 2025-03-25 NOTE — TELEPHONE ENCOUNTER
eLni from [facility] called and they would like to know if Darline Dudley could sign the death certificate. She is faxing a copy to [phone]. And site that could also be done online, questions please call Leni at [phone] and fax is [phone]. Thank you

## 2025-04-04 ENCOUNTER — HOME CARE VISIT (OUTPATIENT)
Dept: HOME HOSPICE | Facility: HOSPICE | Age: OVER 89
End: 2025-04-04
Payer: MEDICARE

## (undated) DEVICE — TOWEL SURG XR DETECT GREEN STRL RFD

## (undated) DEVICE — SYRINGE 20ML LL

## (undated) DEVICE — BAG DRAINAGE UROCATCHER 4 SKYTRON

## (undated) DEVICE — CATH FOLEY COUDE 22FR 5ML 2 WAY LUBRICATH

## (undated) DEVICE — MEDI-VAC NON-CONDUCTIVE SUCTION TUBING 6MM X 1.8M (6FT.) L: Brand: CARDINAL HEALTH

## (undated) DEVICE — GARMENT,MEDLINE,DVT,INT,CALF,FOAM,MED: Brand: MEDLINE

## (undated) DEVICE — ADAPTOR CATH

## (undated) DEVICE — BAG DRAINAGE URINARY W TOWER

## (undated) DEVICE — 4-PORT MANIFOLD: Brand: NEPTUNE 2

## (undated) DEVICE — HF-RESECTION ELECTRODE PLASMALOOP LOOP, MEDIUM, 24 FR., 12°-30°, ESG TURIS: Brand: OLYMPUS

## (undated) DEVICE — GLOVE SRG BIOGEL 7.5

## (undated) DEVICE — NEEDLE 18 G X 1 1/2 SAFETY

## (undated) DEVICE — SPECIMEN CONTAINER STERILE PEEL PACK

## (undated) DEVICE — SYRINGE,TOOMEY,IRRIGATION,70CC,STERILE: Brand: MEDLINE

## (undated) DEVICE — LUBRICANT SURGILUBE TUBE 4 OZ  FLIP TOP

## (undated) DEVICE — PACK CUSTOM GU CYSTO PACK RF